# Patient Record
Sex: FEMALE | Race: WHITE | HISPANIC OR LATINO | Employment: OTHER | URBAN - METROPOLITAN AREA
[De-identification: names, ages, dates, MRNs, and addresses within clinical notes are randomized per-mention and may not be internally consistent; named-entity substitution may affect disease eponyms.]

---

## 2017-06-13 ENCOUNTER — TRANSCRIBE ORDERS (OUTPATIENT)
Dept: ADMINISTRATIVE | Facility: HOSPITAL | Age: 72
End: 2017-06-13

## 2017-06-13 DIAGNOSIS — E04.1 THYROID NODULE: Primary | ICD-10-CM

## 2017-06-16 ENCOUNTER — HOSPITAL ENCOUNTER (OUTPATIENT)
Dept: ULTRASOUND IMAGING | Facility: HOSPITAL | Age: 72
Discharge: HOME | End: 2017-06-16
Admitting: INTERNAL MEDICINE

## 2017-06-16 DIAGNOSIS — E04.1 THYROID NODULE: ICD-10-CM

## 2017-06-16 PROCEDURE — 76536 US EXAM OF HEAD AND NECK: CPT

## 2017-12-08 ENCOUNTER — TRANSCRIBE ORDERS (OUTPATIENT)
Dept: ADMINISTRATIVE | Facility: HOSPITAL | Age: 72
End: 2017-12-08

## 2017-12-13 ENCOUNTER — TRANSCRIBE ORDERS (OUTPATIENT)
Dept: ADMINISTRATIVE | Facility: HOSPITAL | Age: 72
End: 2017-12-13

## 2017-12-13 DIAGNOSIS — K83.8 COMMON BILE DUCT DILATATION: Primary | ICD-10-CM

## 2017-12-18 ENCOUNTER — OFFICE VISIT (OUTPATIENT)
Dept: PULMONOLOGY | Facility: CLINIC | Age: 72
End: 2017-12-18

## 2017-12-18 DIAGNOSIS — R06.02 SOB (SHORTNESS OF BREATH): Primary | ICD-10-CM

## 2017-12-18 PROCEDURE — 94726 PLETHYSMOGRAPHY LUNG VOLUMES: CPT | Performed by: INTERNAL MEDICINE

## 2017-12-18 PROCEDURE — 94375 RESPIRATORY FLOW VOLUME LOOP: CPT | Performed by: INTERNAL MEDICINE

## 2017-12-18 PROCEDURE — 94729 DIFFUSING CAPACITY: CPT | Performed by: INTERNAL MEDICINE

## 2017-12-22 ENCOUNTER — HOSPITAL ENCOUNTER (OUTPATIENT)
Dept: MRI IMAGING | Facility: HOSPITAL | Age: 72
Discharge: HOME | End: 2017-12-22
Admitting: INTERNAL MEDICINE

## 2017-12-22 DIAGNOSIS — K83.8 COMMON BILE DUCT DILATATION: ICD-10-CM

## 2017-12-22 PROCEDURE — 0 GADOBENATE DIMEGLUMINE 529 MG/ML SOLUTION: Performed by: INTERNAL MEDICINE

## 2017-12-22 PROCEDURE — 82565 ASSAY OF CREATININE: CPT

## 2017-12-22 PROCEDURE — 74183 MRI ABD W/O CNTR FLWD CNTR: CPT

## 2017-12-22 PROCEDURE — A9577 INJ MULTIHANCE: HCPCS | Performed by: INTERNAL MEDICINE

## 2017-12-22 RX ADMIN — GADOBENATE DIMEGLUMINE 18 ML: 529 INJECTION, SOLUTION INTRAVENOUS at 09:30

## 2017-12-26 ENCOUNTER — OFFICE VISIT (OUTPATIENT)
Dept: PULMONOLOGY | Facility: CLINIC | Age: 72
End: 2017-12-26

## 2017-12-26 VITALS
HEIGHT: 64 IN | WEIGHT: 178.5 LBS | SYSTOLIC BLOOD PRESSURE: 126 MMHG | RESPIRATION RATE: 18 BRPM | TEMPERATURE: 98.1 F | BODY MASS INDEX: 30.48 KG/M2 | HEART RATE: 87 BPM | DIASTOLIC BLOOD PRESSURE: 70 MMHG | OXYGEN SATURATION: 95 %

## 2017-12-26 DIAGNOSIS — J30.2 OTHER SEASONAL ALLERGIC RHINITIS: ICD-10-CM

## 2017-12-26 DIAGNOSIS — R91.8 LUNG NODULES: Primary | ICD-10-CM

## 2017-12-26 LAB
ALBUMIN SERPL-MCNC: 4.4 G/DL (ref 3.2–4.8)
ALBUMIN/GLOB SERPL: 2.3 G/DL (ref 1.5–2.5)
ALP SERPL-CCNC: 148 U/L (ref 25–100)
ALT SERPL W P-5'-P-CCNC: 50 U/L (ref 7–40)
ANION GAP SERPL CALCULATED.3IONS-SCNC: 11 MMOL/L (ref 3–11)
AST SERPL-CCNC: 30 U/L (ref 0–33)
BASOPHILS # BLD AUTO: 0.01 10*3/MM3 (ref 0–0.2)
BASOPHILS NFR BLD AUTO: 0.1 % (ref 0–1)
BILIRUB SERPL-MCNC: 0.4 MG/DL (ref 0.3–1.2)
BUN BLD-MCNC: 22 MG/DL (ref 9–23)
BUN/CREAT SERPL: 24.4 (ref 7–25)
CALCIUM SPEC-SCNC: 9.2 MG/DL (ref 8.7–10.4)
CHLORIDE SERPL-SCNC: 102 MMOL/L (ref 99–109)
CO2 SERPL-SCNC: 25 MMOL/L (ref 20–31)
CREAT BLD-MCNC: 0.9 MG/DL (ref 0.6–1.3)
CREAT BLDA-MCNC: 0.7 MG/DL (ref 0.6–1.3)
DEPRECATED RDW RBC AUTO: 48.4 FL (ref 37–54)
EOSINOPHIL # BLD AUTO: 0.11 10*3/MM3 (ref 0–0.3)
EOSINOPHIL NFR BLD AUTO: 1.6 % (ref 0–3)
ERYTHROCYTE [DISTWIDTH] IN BLOOD BY AUTOMATED COUNT: 14.6 % (ref 11.3–14.5)
GFR SERPL CREATININE-BSD FRML MDRD: 62 ML/MIN/1.73
GLOBULIN UR ELPH-MCNC: 1.9 GM/DL
GLUCOSE BLD-MCNC: 176 MG/DL (ref 70–100)
HCT VFR BLD AUTO: 37.9 % (ref 34.5–44)
HGB BLD-MCNC: 12 G/DL (ref 11.5–15.5)
IMM GRANULOCYTES # BLD: 0.02 10*3/MM3 (ref 0–0.03)
IMM GRANULOCYTES NFR BLD: 0.3 % (ref 0–0.6)
LYMPHOCYTES # BLD AUTO: 1.42 10*3/MM3 (ref 0.6–4.8)
LYMPHOCYTES NFR BLD AUTO: 21 % (ref 24–44)
MCH RBC QN AUTO: 28.8 PG (ref 27–31)
MCHC RBC AUTO-ENTMCNC: 31.7 G/DL (ref 32–36)
MCV RBC AUTO: 91.1 FL (ref 80–99)
MONOCYTES # BLD AUTO: 0.6 10*3/MM3 (ref 0–1)
MONOCYTES NFR BLD AUTO: 8.9 % (ref 0–12)
NEUTROPHILS # BLD AUTO: 4.61 10*3/MM3 (ref 1.5–8.3)
NEUTROPHILS NFR BLD AUTO: 68.1 % (ref 41–71)
PLATELET # BLD AUTO: 326 10*3/MM3 (ref 150–450)
PMV BLD AUTO: 11.7 FL (ref 6–12)
POTASSIUM BLD-SCNC: 3.6 MMOL/L (ref 3.5–5.5)
PROT SERPL-MCNC: 6.3 G/DL (ref 5.7–8.2)
RBC # BLD AUTO: 4.16 10*6/MM3 (ref 3.89–5.14)
SODIUM BLD-SCNC: 138 MMOL/L (ref 132–146)
WBC NRBC COR # BLD: 6.77 10*3/MM3 (ref 3.5–10.8)

## 2017-12-26 PROCEDURE — 86003 ALLG SPEC IGE CRUDE XTRC EA: CPT | Performed by: INTERNAL MEDICINE

## 2017-12-26 PROCEDURE — 86698 HISTOPLASMA ANTIBODY: CPT | Performed by: INTERNAL MEDICINE

## 2017-12-26 PROCEDURE — 86609 BACTERIUM ANTIBODY: CPT | Performed by: INTERNAL MEDICINE

## 2017-12-26 PROCEDURE — 86235 NUCLEAR ANTIGEN ANTIBODY: CPT | Performed by: INTERNAL MEDICINE

## 2017-12-26 PROCEDURE — 86256 FLUORESCENT ANTIBODY TITER: CPT | Performed by: INTERNAL MEDICINE

## 2017-12-26 PROCEDURE — 83520 IMMUNOASSAY QUANT NOS NONAB: CPT | Performed by: INTERNAL MEDICINE

## 2017-12-26 PROCEDURE — 85025 COMPLETE CBC W/AUTO DIFF WBC: CPT | Performed by: INTERNAL MEDICINE

## 2017-12-26 PROCEDURE — 86631 CHLAMYDIA ANTIBODY: CPT | Performed by: INTERNAL MEDICINE

## 2017-12-26 PROCEDURE — 99204 OFFICE O/P NEW MOD 45 MIN: CPT | Performed by: INTERNAL MEDICINE

## 2017-12-26 PROCEDURE — 86612 BLASTOMYCES ANTIBODY: CPT | Performed by: INTERNAL MEDICINE

## 2017-12-26 PROCEDURE — 36415 COLL VENOUS BLD VENIPUNCTURE: CPT | Performed by: INTERNAL MEDICINE

## 2017-12-26 PROCEDURE — 86606 ASPERGILLUS ANTIBODY: CPT | Performed by: INTERNAL MEDICINE

## 2017-12-26 PROCEDURE — 86671 FUNGUS NES ANTIBODY: CPT | Performed by: INTERNAL MEDICINE

## 2017-12-26 PROCEDURE — 80053 COMPREHEN METABOLIC PANEL: CPT | Performed by: INTERNAL MEDICINE

## 2017-12-26 PROCEDURE — 87899 AGENT NOS ASSAY W/OPTIC: CPT | Performed by: INTERNAL MEDICINE

## 2017-12-26 PROCEDURE — 86635 COCCIDIOIDES ANTIBODY: CPT | Performed by: INTERNAL MEDICINE

## 2017-12-26 PROCEDURE — 86602 ANTINOMYCES ANTIBODY: CPT | Performed by: INTERNAL MEDICINE

## 2017-12-26 PROCEDURE — 86480 TB TEST CELL IMMUN MEASURE: CPT | Performed by: INTERNAL MEDICINE

## 2017-12-26 PROCEDURE — 86225 DNA ANTIBODY NATIVE: CPT | Performed by: INTERNAL MEDICINE

## 2017-12-26 PROCEDURE — 82785 ASSAY OF IGE: CPT | Performed by: INTERNAL MEDICINE

## 2017-12-26 RX ORDER — LEVOTHYROXINE SODIUM 0.05 MG/1
50 TABLET ORAL NIGHTLY
COMMUNITY
Start: 2013-08-08 | End: 2019-03-15

## 2017-12-26 RX ORDER — LOSARTAN POTASSIUM AND HYDROCHLOROTHIAZIDE 25; 100 MG/1; MG/1
1 TABLET ORAL NIGHTLY
COMMUNITY
Start: 2017-12-19 | End: 2019-07-15 | Stop reason: ALTCHOICE

## 2017-12-26 RX ORDER — CITALOPRAM 40 MG/1
20 TABLET ORAL DAILY
COMMUNITY
Start: 2013-10-16 | End: 2018-03-15 | Stop reason: DRUGHIGH

## 2017-12-26 RX ORDER — GABAPENTIN 800 MG/1
800 TABLET ORAL 3 TIMES DAILY
COMMUNITY
Start: 2017-12-21

## 2017-12-26 RX ORDER — ASPIRIN 81 MG/1
81 TABLET ORAL NIGHTLY
COMMUNITY
Start: 2013-11-26

## 2017-12-26 RX ORDER — HYDROXYZINE HYDROCHLORIDE 25 MG/1
25-50 TABLET, FILM COATED ORAL NIGHTLY
COMMUNITY
Start: 2017-12-19

## 2017-12-26 RX ORDER — OMEPRAZOLE 20 MG/1
20 CAPSULE, DELAYED RELEASE ORAL 2 TIMES DAILY
COMMUNITY
Start: 2017-11-18

## 2017-12-26 RX ORDER — LOVASTATIN 20 MG/1
20 TABLET ORAL NIGHTLY
COMMUNITY
Start: 2017-11-07

## 2017-12-26 RX ORDER — AMLODIPINE BESYLATE 2.5 MG/1
2.5 TABLET ORAL NIGHTLY
COMMUNITY
Start: 2017-12-13 | End: 2019-07-15

## 2017-12-26 RX ORDER — HYDROCODONE BITARTRATE AND ACETAMINOPHEN 7.5; 325 MG/1; MG/1
1 TABLET ORAL 2 TIMES DAILY
COMMUNITY
Start: 2017-12-08

## 2017-12-27 PROBLEM — R91.8 LUNG NODULES: Status: ACTIVE | Noted: 2017-12-27

## 2017-12-27 PROBLEM — Z87.09 H/O EXTRINSIC ASTHMA: Status: ACTIVE | Noted: 2017-12-27

## 2017-12-27 PROBLEM — Z78.9 NON-SMOKER: Status: ACTIVE | Noted: 2017-12-27

## 2017-12-27 NOTE — PROGRESS NOTES
"  PULMONARY  NOTE    Chief Complaint     Abnormal CT scan of the chest    History of Present Illness     72-year-old white female was referred for evaluation of an abnormal CT scan of the chest.    She incidentally was found to have an abnormal CT scan of the chest.  She has bilateral patchy/groundglass irregular nodular densities.  She's had 2 serial CT scans approximately 2 months apart which have shown some interval enlargement.    She is a lifelong nonsmoker although has had some secondhand smoke exposure.    She has no history of known chronic lung disease other than a past history of \"asthma\".    She has a morning cough but produces little sputum.  She has had no hemoptysis.  There's been no recent change in that.  Other then having been treated in October with a course of antibiotics for an acute upper respiratory tract infection.    She has dyspnea on exertion.  She has difficulty going up a flight of stairs without having to stop her rest.  She denies chest pain or palpitations.    She has noted no hematuria and has no history of renal dysfunction.    At time she has a \"upset stomach\", particularly after meals.  She takes omeprazole on a daily basis.  She doesn't follow reflux precautions.    She has had a recent transthoracic echocardiogram that appears to be unremarkable by report.    Patient Active Problem List   Diagnosis   • Lung nodules (\"Soft\", bilateral)   • Non-smoker   • H/O asthma     No Known Allergies    Current Outpatient Prescriptions:   •  aspirin 81 MG EC tablet, Take  by mouth Daily., Disp: , Rfl:   •  citalopram (CeleXA) 40 MG tablet, Take  by mouth Daily., Disp: , Rfl:   •  gabapentin (NEURONTIN) 800 MG tablet, Take 800 mg by mouth 3 (Three) Times a Day., Disp: , Rfl:   •  HYDROcodone-acetaminophen (NORCO) 5-325 MG per tablet, Take 1 tablet by mouth Every 6 (Six) Hours As Needed., Disp: , Rfl:   •  hydrOXYzine (ATARAX) 25 MG tablet, Take 25 mg by mouth Daily., Disp: , Rfl:   •  " "levothyroxine (SYNTHROID, LEVOTHROID) 50 MCG tablet, Take 50 mcg by mouth Daily., Disp: , Rfl:   •  losartan-hydrochlorothiazide (HYZAAR) 100-25 MG per tablet, Take 1 tablet by mouth Daily., Disp: , Rfl:   •  lovastatin (MEVACOR) 10 MG tablet, Take 10 mg by mouth Every Night., Disp: , Rfl:   •  omeprazole (priLOSEC) 20 MG capsule, Take 20 mg by mouth Daily., Disp: , Rfl:   •  amLODIPine (NORVASC) 2.5 MG tablet, Take 2.5 mg by mouth Daily., Disp: , Rfl:   MEDICATION LIST AND ALLERGIES REVIEWED.    Family History   Problem Relation Age of Onset   • Ovarian cancer Mother 19   • Emphysema Father    • Breast cancer Neg Hx      Social History   Substance Use Topics   • Smoking status: Never Smoker   • Smokeless tobacco: Never Used   • Alcohol use No     Social History     Social History Narrative    Nonsmoker    Has been exposed to secondhand smoke        Has 3 children    No regular alcohol use     FAMILY AND SOCIAL HISTORY REVIEWED.    Review of Systems  ALSO REFER TO SCANNED ROS SHEET FROM SAME DATE.    /70 (BP Location: Left arm, Patient Position: Sitting)  Pulse 87  Temp 98.1 °F (36.7 °C)  Resp 18  Ht 162.6 cm (64.02\")  Wt 81 kg (178 lb 8 oz)  SpO2 95%  BMI 30.62 kg/m2  Physical Exam   Constitutional: She is oriented to person, place, and time. She appears well-developed. No distress.   HENT:   Head: Normocephalic and atraumatic.   Neck: No thyromegaly present.   Cardiovascular: Normal rate, regular rhythm and normal heart sounds.    No murmur heard.  Pulmonary/Chest: Effort normal and breath sounds normal. No stridor.   Abdominal: Soft. Bowel sounds are normal.   Musculoskeletal: Normal range of motion. She exhibits no edema.   Lymphadenopathy:     She has no cervical adenopathy.        Right: No supraclavicular and no epitrochlear adenopathy present.        Left: No supraclavicular and no epitrochlear adenopathy present.   Neurological: She is alert and oriented to person, place, and time. " "  Skin: Skin is warm and dry. She is not diaphoretic.   Psychiatric: She has a normal mood and affect. Her behavior is normal.   Nursing note and vitals reviewed.      Results     PFTs reveal no airway obstruction, no restriction, and a normal diffusion capacity    CT scan of the chest reviewed on PACS.  Bilateral irregular, soft peripheral densities noted    Problem List       ICD-10-CM ICD-9-CM   1. Lung nodules (\"Soft\", bilateral) R91.8 793.19   2. Other seasonal allergic rhinitis  J30.2 477.8       Discussion     We discussed her test results.  PFTs and exam are unremarkable.  CT scan is worrisome but more for an inflammatory or infectious process than malignancy but that is still in the differential.    We will obtain lab work today for ANCA, hypersensitivity pneumonitis panel, fungal serologies, etc.  I'll see her back in several weeks to follow-up on the lab results and we will get an I-Logic CT scan of the chest at that time and probably consider navigational bronchoscopy.  A surgical lung biopsy is really the only other way of getting tissue that may be diagnostic.  A CT FNA is unlikely to provide useful information given the \"soft\" nature of these lesions.    Dixon Fatima MD  Note electronically signed    CC: Ly Funez MD  "

## 2017-12-28 LAB
CENTROMERE B AB SER-ACNC: <0.2 AI (ref 0–0.9)
CHROMATIN AB SERPL-ACNC: <0.2 AI (ref 0–0.9)
CRYPTOC AG CSF QL: NEGATIVE
DSDNA AB SER-ACNC: <1 IU/ML (ref 0–9)
ENA JO1 AB SER-ACNC: <0.2 AI (ref 0–0.9)
ENA RNP AB SER-ACNC: <0.2 AI (ref 0–0.9)
ENA SCL70 AB SER-ACNC: <0.2 AI (ref 0–0.9)
ENA SM AB SER-ACNC: <0.2 AI (ref 0–0.9)
ENA SS-A AB SER-ACNC: <0.2 AI (ref 0–0.9)
ENA SS-B AB SER-ACNC: <0.2 AI (ref 0–0.9)
Lab: NORMAL
TOTAL IGE SMQN RAST: 1 IU/ML (ref 0–100)

## 2017-12-29 LAB
C-ANCA TITR SER IF: NORMAL TITER
MYELOPEROXIDASE AB SER-ACNC: <9 U/ML (ref 0–9)
P-ANCA ATYPICAL TITR SER IF: NORMAL TITER
P-ANCA TITR SER IF: NORMAL TITER
PROTEINASE3 AB SER IA-ACNC: <3.5 U/ML (ref 0–3.5)

## 2017-12-30 LAB
A FUMIGATUS1 AB SER QL ID: NEGATIVE
A PULLULANS AB SER QL: NEGATIVE
INTERPRETATION: NORMAL
LACEYELLA SACCHARI AB SER QL: NEGATIVE
M TB TUBERC IFN-G BLD QL: NEGATIVE
MICROPOLYSPORA FAENI: NEGATIVE
PIGEON SERUM AB QL ID: NEGATIVE
QFT TB AG MINUS NIL VALUE: 0 IU/ML
QUANTIFERON CRITERIA: NORMAL
QUANTIFERON MITOGEN VALUE: >10 IU/ML
QUANTIFERON NIL VALUE: 0.02 IU/ML
QUANTIFERON TB AG VALUE: 0.02 IU/ML
T VULGARIS AB SER QL ID: NEGATIVE

## 2018-01-01 LAB
A ALTERNATA IGE QN: <0.1 KU/L
A FUMIGATUS IGE QN: <0.1 KU/L
AMER ROACH IGE QN: <0.1 KU/L
BAHIA GRASS IGE QN: <0.1 KU/L
BERMUDA GRASS IGE QN: <0.1 KU/L
BOXELDER IGE QN: <0.1 KU/L
C HERBARUM IGE QN: <0.1 KU/L
CAT DANDER IGG QN: <0.1 KU/L
CMN PIGWEED IGE QN: <0.1 KU/L
COMMON RAGWEED IGE QN: <0.1 KU/L
CONV CLASS DESCRIPTION: NORMAL
D FARINAE IGE QN: <0.1 KU/L
D PTERONYSS IGE QN: <0.1 KU/L
DOG DANDER IGE QN: <0.1 KU/L
ENGL PLANTAIN IGE QN: <0.1 KU/L
HAZELNUT POLN IGE QN: <0.1 KU/L
JOHNSON GRASS IGE QN: <0.1 KU/L
KENT BLUE GRASS IGE QN: <0.1 KU/L
M RACEMOSUS IGE QN: <0.1 KU/L
MT JUNIPER IGE QN: <0.1 KU/L
MUGWORT IGE QN: <0.1 KU/L
NETTLE IGE QN: <0.1 KU/L
P NOTATUM IGE QN: <0.1 KU/L
S BOTRYOSUM IGE QN: <0.1 KU/L
SHEEP SORREL IGE QN: <0.1 KU/L
SWEET GUM IGE QN: <0.1 KU/L
T011-IGE MAPLE LEAF SYCAMORE: <0.1 KU/L
WHITE ELM IGE QN: <0.1 KU/L
WHITE HICKORY IGE QN: <0.1 KU/L
WHITE MULBERRY IGE QN: <0.1 KU/L
WHITE OAK IGE QN: <0.1 KU/L

## 2018-01-02 DIAGNOSIS — R91.8 LUNG NODULES: Primary | ICD-10-CM

## 2018-01-02 LAB
A FLAVUS AB SER QL ID: NEGATIVE
A FUMIGATUS AB SER QL ID: NEGATIVE
A NIGER AB SER QL ID: NEGATIVE
B DERMAT AB TITR SER: NEGATIVE {TITER}
C IMMITIS AB TITR SER ID: NORMAL {TITER}
H CAPSUL AB TITR SER ID: NEGATIVE {TITER}

## 2018-01-16 ENCOUNTER — HOSPITAL ENCOUNTER (OUTPATIENT)
Dept: CT IMAGING | Facility: HOSPITAL | Age: 73
Discharge: HOME | End: 2018-01-16
Admitting: NURSE PRACTITIONER

## 2018-01-16 DIAGNOSIS — R91.8 LUNG NODULES: ICD-10-CM

## 2018-01-16 PROCEDURE — 71250 CT THORAX DX C-: CPT

## 2018-02-05 ENCOUNTER — DOCUMENTATION (OUTPATIENT)
Dept: PULMONOLOGY | Facility: CLINIC | Age: 73
End: 2018-02-05

## 2018-02-05 NOTE — PROGRESS NOTES
Finally got Previous CT from [facility] on CD for direct comparison.  NSC in LLL density or other patchy infiltrative areas.  Discussed with patient on phone.  Will do a SANDRITA bronch to biopsy LLL lesion.  Just behind a rib so not a good CT FNA candidate.

## 2018-02-07 ENCOUNTER — TRANSCRIBE ORDERS (OUTPATIENT)
Dept: PULMONOLOGY | Facility: CLINIC | Age: 73
End: 2018-02-07

## 2018-02-12 ENCOUNTER — APPOINTMENT (OUTPATIENT)
Dept: PREADMISSION TESTING | Facility: HOSPITAL | Age: 73
End: 2018-02-12

## 2018-02-12 VITALS — HEIGHT: 63 IN | BODY MASS INDEX: 32.19 KG/M2 | WEIGHT: 181.66 LBS

## 2018-02-12 LAB
ALBUMIN SERPL-MCNC: 3.9 G/DL (ref 3.2–4.8)
ALBUMIN/GLOB SERPL: 2 G/DL (ref 1.5–2.5)
ALP SERPL-CCNC: 154 U/L (ref 25–100)
ALT SERPL W P-5'-P-CCNC: 61 U/L (ref 7–40)
ANION GAP SERPL CALCULATED.3IONS-SCNC: 6 MMOL/L (ref 3–11)
AST SERPL-CCNC: 30 U/L (ref 0–33)
BILIRUB SERPL-MCNC: 0.5 MG/DL (ref 0.3–1.2)
BUN BLD-MCNC: 14 MG/DL (ref 9–23)
BUN/CREAT SERPL: 20 (ref 7–25)
CALCIUM SPEC-SCNC: 9.7 MG/DL (ref 8.7–10.4)
CHLORIDE SERPL-SCNC: 103 MMOL/L (ref 99–109)
CO2 SERPL-SCNC: 31 MMOL/L (ref 20–31)
CREAT BLD-MCNC: 0.7 MG/DL (ref 0.6–1.3)
DEPRECATED RDW RBC AUTO: 47.2 FL (ref 37–54)
ERYTHROCYTE [DISTWIDTH] IN BLOOD BY AUTOMATED COUNT: 14.6 % (ref 11.3–14.5)
GFR SERPL CREATININE-BSD FRML MDRD: 82 ML/MIN/1.73
GLOBULIN UR ELPH-MCNC: 2 GM/DL
GLUCOSE BLD-MCNC: 117 MG/DL (ref 70–100)
HCT VFR BLD AUTO: 36.5 % (ref 34.5–44)
HGB BLD-MCNC: 11.5 G/DL (ref 11.5–15.5)
MCH RBC QN AUTO: 27.6 PG (ref 27–31)
MCHC RBC AUTO-ENTMCNC: 31.5 G/DL (ref 32–36)
MCV RBC AUTO: 87.7 FL (ref 80–99)
PLATELET # BLD AUTO: 300 10*3/MM3 (ref 150–450)
PMV BLD AUTO: 10.7 FL (ref 6–12)
POTASSIUM BLD-SCNC: 3.7 MMOL/L (ref 3.5–5.5)
PROT SERPL-MCNC: 5.9 G/DL (ref 5.7–8.2)
RBC # BLD AUTO: 4.16 10*6/MM3 (ref 3.89–5.14)
SODIUM BLD-SCNC: 140 MMOL/L (ref 132–146)
WBC NRBC COR # BLD: 5.45 10*3/MM3 (ref 3.5–10.8)

## 2018-02-12 PROCEDURE — 93005 ELECTROCARDIOGRAM TRACING: CPT

## 2018-02-12 PROCEDURE — 36415 COLL VENOUS BLD VENIPUNCTURE: CPT

## 2018-02-12 PROCEDURE — 85027 COMPLETE CBC AUTOMATED: CPT | Performed by: INTERNAL MEDICINE

## 2018-02-12 PROCEDURE — 80053 COMPREHEN METABOLIC PANEL: CPT | Performed by: INTERNAL MEDICINE

## 2018-02-12 RX ORDER — MELATONIN
1000 DAILY
COMMUNITY
End: 2019-01-15

## 2018-02-12 RX ORDER — CETIRIZINE HYDROCHLORIDE 10 MG/1
10 TABLET ORAL NIGHTLY
COMMUNITY

## 2018-02-12 NOTE — DISCHARGE INSTRUCTIONS
The following information and instructions were given:    NPO after MN except sips of water with routine prescribed medication (except blood thinner, diabetes, or weight reducing medication) unless otherwise instructed by your physician.  Do not eat, drink, smoke or chew gum after MN the night before surgery. This also includes no mints.    DO NOT shave for two days before your procedure.  Do not wear makeup.      DO NOT wear fingernail polish (gel/regular) and/or acrylic/artificial nails on the day of surgery.   If a patient had recent manicure and would rather not remove polish or artificial nails, then the minimum requirement is that the polish/artificial nails must be removed from the middle finger on each hand.      If patient was having surgery on an upper extremity, then the patient was instructed that fingernail polish/artificial fingernails must be removed for surgery.  NO EXCEPTIONS.      If patient was having surgery on a lower extremity, then the patient was instructed that toenail polish on both extremities must be removed for surgery.  NO EXCEPTIONS.    Remove all jewelry (advised to go to jeweler if unable to remove).  Jewelry especially rings can no longer be taped for surgery.    Leave anything you consider valuable at home.    Leave your suitcase in the car until after your surgery.    Bring the following with you (if applicable)   -picture ID and insurance cards   -Co-pay/deductible required by insurance   -Medications in the original bottles (not a list) including all over-the-counter  medications if not brought to PAT   -Copy of advance directive, living will or power of  documents if not  brought to PAT   -CPAP or BIPAP mask and tubing (do not bring machine)   -Skin prep instructions sheet   -PAT Pass    Education booklet, brochure, handout or binder given to patient.    Pain Control After Surgery handout given to patient.    Respirex use (handout given to patient) and pneumonia  prevention.    Signs and Symptoms of infection.    DVT Prevention stressing the importance of ambulation.

## 2018-02-13 ENCOUNTER — ANESTHESIA (OUTPATIENT)
Dept: GASTROENTEROLOGY | Facility: HOSPITAL | Age: 73
End: 2018-02-13

## 2018-02-13 ENCOUNTER — ANESTHESIA EVENT (OUTPATIENT)
Dept: GASTROENTEROLOGY | Facility: HOSPITAL | Age: 73
End: 2018-02-13

## 2018-02-13 ENCOUNTER — APPOINTMENT (OUTPATIENT)
Dept: GENERAL RADIOLOGY | Facility: HOSPITAL | Age: 73
End: 2018-02-13

## 2018-02-13 ENCOUNTER — HOSPITAL ENCOUNTER (OUTPATIENT)
Facility: HOSPITAL | Age: 73
Setting detail: HOSPITAL OUTPATIENT SURGERY
Discharge: HOME | End: 2018-02-13
Attending: INTERNAL MEDICINE | Admitting: INTERNAL MEDICINE

## 2018-02-13 VITALS
RESPIRATION RATE: 18 BRPM | OXYGEN SATURATION: 100 % | HEART RATE: 83 BPM | SYSTOLIC BLOOD PRESSURE: 130 MMHG | TEMPERATURE: 98.1 F | DIASTOLIC BLOOD PRESSURE: 60 MMHG

## 2018-02-13 DIAGNOSIS — R91.1 LUNG NODULE: ICD-10-CM

## 2018-02-13 PROCEDURE — 31623 DX BRONCHOSCOPE/BRUSH: CPT | Performed by: INTERNAL MEDICINE

## 2018-02-13 PROCEDURE — 76000 FLUOROSCOPY <1 HR PHYS/QHP: CPT

## 2018-02-13 PROCEDURE — 71045 X-RAY EXAM CHEST 1 VIEW: CPT

## 2018-02-13 PROCEDURE — 31628 BRONCHOSCOPY/LUNG BX EACH: CPT | Performed by: INTERNAL MEDICINE

## 2018-02-13 PROCEDURE — 87116 MYCOBACTERIA CULTURE: CPT | Performed by: INTERNAL MEDICINE

## 2018-02-13 PROCEDURE — 87206 SMEAR FLUORESCENT/ACID STAI: CPT | Performed by: INTERNAL MEDICINE

## 2018-02-13 PROCEDURE — 87070 CULTURE OTHR SPECIMN AEROBIC: CPT | Performed by: INTERNAL MEDICINE

## 2018-02-13 PROCEDURE — 88112 CYTOPATH CELL ENHANCE TECH: CPT | Performed by: INTERNAL MEDICINE

## 2018-02-13 PROCEDURE — 31627 NAVIGATIONAL BRONCHOSCOPY: CPT | Performed by: INTERNAL MEDICINE

## 2018-02-13 PROCEDURE — 87102 FUNGUS ISOLATION CULTURE: CPT | Performed by: INTERNAL MEDICINE

## 2018-02-13 PROCEDURE — 31652 BRONCH EBUS SAMPLNG 1/2 NODE: CPT | Performed by: INTERNAL MEDICINE

## 2018-02-13 PROCEDURE — 25010000002 HYDRALAZINE PER 20 MG: Performed by: NURSE ANESTHETIST, CERTIFIED REGISTERED

## 2018-02-13 PROCEDURE — 88305 TISSUE EXAM BY PATHOLOGIST: CPT | Performed by: INTERNAL MEDICINE

## 2018-02-13 PROCEDURE — 31624 DX BRONCHOSCOPE/LAVAGE: CPT | Performed by: INTERNAL MEDICINE

## 2018-02-13 PROCEDURE — 25010000002 PROPOFOL 10 MG/ML EMULSION: Performed by: NURSE ANESTHETIST, CERTIFIED REGISTERED

## 2018-02-13 PROCEDURE — C1726 CATH, BAL DIL, NON-VASCULAR: HCPCS | Performed by: INTERNAL MEDICINE

## 2018-02-13 PROCEDURE — 31654 BRONCH EBUS IVNTJ PERPH LES: CPT | Performed by: INTERNAL MEDICINE

## 2018-02-13 PROCEDURE — 25010000002 ONDANSETRON PER 1 MG: Performed by: NURSE ANESTHETIST, CERTIFIED REGISTERED

## 2018-02-13 PROCEDURE — 25010000002 SUCCINYLCHOLINE PER 20 MG: Performed by: NURSE ANESTHETIST, CERTIFIED REGISTERED

## 2018-02-13 PROCEDURE — 25010000002 DEXAMETHASONE PER 1 MG: Performed by: NURSE ANESTHETIST, CERTIFIED REGISTERED

## 2018-02-13 PROCEDURE — 87205 SMEAR GRAM STAIN: CPT | Performed by: INTERNAL MEDICINE

## 2018-02-13 RX ORDER — PROMETHAZINE HYDROCHLORIDE 25 MG/1
25 SUPPOSITORY RECTAL ONCE AS NEEDED
Status: DISCONTINUED | OUTPATIENT
Start: 2018-02-13 | End: 2018-02-13 | Stop reason: HOSPADM

## 2018-02-13 RX ORDER — IPRATROPIUM BROMIDE AND ALBUTEROL SULFATE 2.5; .5 MG/3ML; MG/3ML
3 SOLUTION RESPIRATORY (INHALATION) ONCE AS NEEDED
Status: DISCONTINUED | OUTPATIENT
Start: 2018-02-13 | End: 2018-02-13 | Stop reason: HOSPADM

## 2018-02-13 RX ORDER — DEXAMETHASONE SODIUM PHOSPHATE 4 MG/ML
INJECTION, SOLUTION INTRA-ARTICULAR; INTRALESIONAL; INTRAMUSCULAR; INTRAVENOUS; SOFT TISSUE AS NEEDED
Status: DISCONTINUED | OUTPATIENT
Start: 2018-02-13 | End: 2018-02-13 | Stop reason: SURG

## 2018-02-13 RX ORDER — SODIUM CHLORIDE, SODIUM LACTATE, POTASSIUM CHLORIDE, CALCIUM CHLORIDE 600; 310; 30; 20 MG/100ML; MG/100ML; MG/100ML; MG/100ML
INJECTION, SOLUTION INTRAVENOUS CONTINUOUS PRN
Status: DISCONTINUED | OUTPATIENT
Start: 2018-02-13 | End: 2018-02-13 | Stop reason: SURG

## 2018-02-13 RX ORDER — PROMETHAZINE HYDROCHLORIDE 25 MG/ML
6.25 INJECTION, SOLUTION INTRAMUSCULAR; INTRAVENOUS ONCE AS NEEDED
Status: DISCONTINUED | OUTPATIENT
Start: 2018-02-13 | End: 2018-02-13 | Stop reason: HOSPADM

## 2018-02-13 RX ORDER — OXYCODONE AND ACETAMINOPHEN 7.5; 325 MG/1; MG/1
1 TABLET ORAL ONCE AS NEEDED
Status: DISCONTINUED | OUTPATIENT
Start: 2018-02-13 | End: 2018-02-13 | Stop reason: HOSPADM

## 2018-02-13 RX ORDER — FENTANYL CITRATE 50 UG/ML
50 INJECTION, SOLUTION INTRAMUSCULAR; INTRAVENOUS
Status: DISCONTINUED | OUTPATIENT
Start: 2018-02-13 | End: 2018-02-13 | Stop reason: HOSPADM

## 2018-02-13 RX ORDER — HYDRALAZINE HYDROCHLORIDE 20 MG/ML
INJECTION INTRAMUSCULAR; INTRAVENOUS AS NEEDED
Status: DISCONTINUED | OUTPATIENT
Start: 2018-02-13 | End: 2018-02-13 | Stop reason: SURG

## 2018-02-13 RX ORDER — PROMETHAZINE HYDROCHLORIDE 25 MG/1
25 TABLET ORAL ONCE AS NEEDED
Status: DISCONTINUED | OUTPATIENT
Start: 2018-02-13 | End: 2018-02-13 | Stop reason: HOSPADM

## 2018-02-13 RX ORDER — HYDROMORPHONE HYDROCHLORIDE 1 MG/ML
0.5 INJECTION, SOLUTION INTRAMUSCULAR; INTRAVENOUS; SUBCUTANEOUS
Status: DISCONTINUED | OUTPATIENT
Start: 2018-02-13 | End: 2018-02-13 | Stop reason: HOSPADM

## 2018-02-13 RX ORDER — PROPOFOL 10 MG/ML
VIAL (ML) INTRAVENOUS AS NEEDED
Status: DISCONTINUED | OUTPATIENT
Start: 2018-02-13 | End: 2018-02-13 | Stop reason: SURG

## 2018-02-13 RX ORDER — HYDRALAZINE HYDROCHLORIDE 20 MG/ML
5 INJECTION INTRAMUSCULAR; INTRAVENOUS
Status: DISCONTINUED | OUTPATIENT
Start: 2018-02-13 | End: 2018-02-13 | Stop reason: HOSPADM

## 2018-02-13 RX ORDER — SUCCINYLCHOLINE CHLORIDE 20 MG/ML
INJECTION INTRAMUSCULAR; INTRAVENOUS AS NEEDED
Status: DISCONTINUED | OUTPATIENT
Start: 2018-02-13 | End: 2018-02-13 | Stop reason: SURG

## 2018-02-13 RX ORDER — OXYCODONE HYDROCHLORIDE AND ACETAMINOPHEN 5; 325 MG/1; MG/1
1 TABLET ORAL ONCE AS NEEDED
Status: DISCONTINUED | OUTPATIENT
Start: 2018-02-13 | End: 2018-02-13 | Stop reason: HOSPADM

## 2018-02-13 RX ORDER — SODIUM CHLORIDE 0.9 % (FLUSH) 0.9 %
1-10 SYRINGE (ML) INJECTION AS NEEDED
Status: DISCONTINUED | OUTPATIENT
Start: 2018-02-13 | End: 2018-02-13 | Stop reason: HOSPADM

## 2018-02-13 RX ORDER — ESMOLOL HYDROCHLORIDE 10 MG/ML
INJECTION INTRAVENOUS AS NEEDED
Status: DISCONTINUED | OUTPATIENT
Start: 2018-02-13 | End: 2018-02-13 | Stop reason: SURG

## 2018-02-13 RX ORDER — ONDANSETRON 2 MG/ML
INJECTION INTRAMUSCULAR; INTRAVENOUS AS NEEDED
Status: DISCONTINUED | OUTPATIENT
Start: 2018-02-13 | End: 2018-02-13 | Stop reason: SURG

## 2018-02-13 RX ORDER — ONDANSETRON 2 MG/ML
4 INJECTION INTRAMUSCULAR; INTRAVENOUS ONCE AS NEEDED
Status: DISCONTINUED | OUTPATIENT
Start: 2018-02-13 | End: 2018-02-13 | Stop reason: HOSPADM

## 2018-02-13 RX ORDER — LIDOCAINE HYDROCHLORIDE 10 MG/ML
INJECTION, SOLUTION EPIDURAL; INFILTRATION; INTRACAUDAL; PERINEURAL AS NEEDED
Status: DISCONTINUED | OUTPATIENT
Start: 2018-02-13 | End: 2018-02-13 | Stop reason: SURG

## 2018-02-13 RX ORDER — NALOXONE HCL 0.4 MG/ML
0.4 VIAL (ML) INJECTION AS NEEDED
Status: DISCONTINUED | OUTPATIENT
Start: 2018-02-13 | End: 2018-02-13 | Stop reason: HOSPADM

## 2018-02-13 RX ORDER — LABETALOL HYDROCHLORIDE 5 MG/ML
5 INJECTION, SOLUTION INTRAVENOUS
Status: DISCONTINUED | OUTPATIENT
Start: 2018-02-13 | End: 2018-02-13 | Stop reason: HOSPADM

## 2018-02-13 RX ORDER — MEPERIDINE HYDROCHLORIDE 25 MG/ML
12.5 INJECTION INTRAMUSCULAR; INTRAVENOUS; SUBCUTANEOUS
Status: DISCONTINUED | OUTPATIENT
Start: 2018-02-13 | End: 2018-02-13 | Stop reason: HOSPADM

## 2018-02-13 RX ADMIN — ESMOLOL HYDROCHLORIDE 20 MG: 10 INJECTION INTRAVENOUS at 14:28

## 2018-02-13 RX ADMIN — EPHEDRINE SULFATE 5 MG: 50 INJECTION INTRAMUSCULAR; INTRAVENOUS; SUBCUTANEOUS at 14:57

## 2018-02-13 RX ADMIN — SUCCINYLCHOLINE CHLORIDE 200 MG: 20 INJECTION, SOLUTION INTRAMUSCULAR; INTRAVENOUS at 14:18

## 2018-02-13 RX ADMIN — PROPOFOL 50 MG: 10 INJECTION, EMULSION INTRAVENOUS at 14:19

## 2018-02-13 RX ADMIN — EPHEDRINE SULFATE 10 MG: 50 INJECTION INTRAMUSCULAR; INTRAVENOUS; SUBCUTANEOUS at 14:42

## 2018-02-13 RX ADMIN — ESMOLOL HYDROCHLORIDE 10 MG: 10 INJECTION INTRAVENOUS at 14:33

## 2018-02-13 RX ADMIN — SODIUM CHLORIDE, POTASSIUM CHLORIDE, SODIUM LACTATE AND CALCIUM CHLORIDE: 600; 310; 30; 20 INJECTION, SOLUTION INTRAVENOUS at 14:12

## 2018-02-13 RX ADMIN — PROPOFOL 150 MG: 10 INJECTION, EMULSION INTRAVENOUS at 14:18

## 2018-02-13 RX ADMIN — HYDRALAZINE HYDROCHLORIDE 10 MG: 20 INJECTION INTRAMUSCULAR; INTRAVENOUS at 14:33

## 2018-02-13 RX ADMIN — PROPOFOL 50 MG: 10 INJECTION, EMULSION INTRAVENOUS at 14:51

## 2018-02-13 RX ADMIN — LIDOCAINE HYDROCHLORIDE 50 MG: 10 INJECTION, SOLUTION EPIDURAL; INFILTRATION; INTRACAUDAL; PERINEURAL at 14:18

## 2018-02-13 RX ADMIN — ONDANSETRON 4 MG: 2 INJECTION INTRAMUSCULAR; INTRAVENOUS at 14:35

## 2018-02-13 RX ADMIN — DEXAMETHASONE SODIUM PHOSPHATE 8 MG: 4 INJECTION, SOLUTION INTRAMUSCULAR; INTRAVENOUS at 14:35

## 2018-02-13 NOTE — ANESTHESIA PREPROCEDURE EVALUATION
Anesthesia Evaluation     Patient summary reviewed and Nursing notes reviewed   NPO Solid Status: > 8 hours  NPO Liquid Status: > 8 hours           Airway   Mallampati: II  TM distance: >3 FB  Neck ROM: full  no difficulty expected  Dental      Pulmonary    (-) pneumonia, COPD, asthma, shortness of breath, recent URI, not a smoker  Cardiovascular     ECG reviewed    (+) hypertension,   (-) past MI, CAD, dysrhythmias, cardiac stents    ROS comment: Normal sinus rhythm  Minimal voltage criteria for LVH, may be normal variant  Nonspecific ST and T wave abnormality    ECHO   EF = 65%. Mild MVR is present  · RVSP(TR) 35.3 mmHg    Neuro/Psych  (+) psychiatric history (celexa gabapentin atarax),     (-) seizures, TIA, CVA  GI/Hepatic/Renal/Endo    (+)  GERD, liver disease, diabetes mellitus (High ),   (-) no renal disease, hypothyroidism    Musculoskeletal     Abdominal    Substance History      OB/GYN          Other   (+) arthritis     ROS/Med Hx Other: Liver resection fo hemangiomas 1998 [facility]                Anesthesia Plan    ASA 3     general   (Propofol Infusion as part of Anti PONV tech )  intravenous induction   Anesthetic plan and risks discussed with patient.    Plan discussed with CRNA.

## 2018-02-13 NOTE — H&P
"72-year-old white female was referred for evaluation of an abnormal CT scan of the chest.     She incidentally was found to have an abnormal CT scan of the chest.  She has bilateral patchy/groundglass irregular nodular densities.  She's had 2 serial CT scans approximately 2 months apart which have shown some interval enlargement.     She is a lifelong nonsmoker although has had some secondhand smoke exposure.     She has no history of known chronic lung disease other than a past history of \"asthma\".     She has a morning cough but produces little sputum.  She has had no hemoptysis.  There's been no recent change in that.  Other then having been treated in October with a course of antibiotics for an acute upper respiratory tract infection.     She has dyspnea on exertion.  She has difficulty going up a flight of stairs without having to stop her rest.  She denies chest pain or palpitations.     She has noted no hematuria and has no history of renal dysfunction.     At time she has a \"upset stomach\", particularly after meals.  She takes omeprazole on a daily basis.  She doesn't follow reflux precautions.     She has had a recent transthoracic echocardiogram that appears to be unremarkable by report.         Patient Active Problem List   Diagnosis   • Lung nodules (\"Soft\", bilateral)   • Non-smoker   • H/O asthma      No Known Allergies     Current Outpatient Prescriptions:   •  aspirin 81 MG EC tablet, Take  by mouth Daily., Disp: , Rfl:   •  citalopram (CeleXA) 40 MG tablet, Take  by mouth Daily., Disp: , Rfl:   •  gabapentin (NEURONTIN) 800 MG tablet, Take 800 mg by mouth 3 (Three) Times a Day., Disp: , Rfl:   •  HYDROcodone-acetaminophen (NORCO) 5-325 MG per tablet, Take 1 tablet by mouth Every 6 (Six) Hours As Needed., Disp: , Rfl:   •  hydrOXYzine (ATARAX) 25 MG tablet, Take 25 mg by mouth Daily., Disp: , Rfl:   •  levothyroxine (SYNTHROID, LEVOTHROID) 50 MCG tablet, Take 50 mcg by mouth Daily., Disp: , Rfl:   •  " "losartan-hydrochlorothiazide (HYZAAR) 100-25 MG per tablet, Take 1 tablet by mouth Daily., Disp: , Rfl:   •  lovastatin (MEVACOR) 10 MG tablet, Take 10 mg by mouth Every Night., Disp: , Rfl:   •  omeprazole (priLOSEC) 20 MG capsule, Take 20 mg by mouth Daily., Disp: , Rfl:   •  amLODIPine (NORVASC) 2.5 MG tablet, Take 2.5 mg by mouth Daily., Disp: , Rfl:   MEDICATION LIST AND ALLERGIES REVIEWED.           Family History   Problem Relation Age of Onset   • Ovarian cancer Mother 19   • Emphysema Father     • Breast cancer Neg Hx             Social History   Substance Use Topics   • Smoking status: Never Smoker   • Smokeless tobacco: Never Used   • Alcohol use No      Social History          Social History Narrative     Nonsmoker     Has been exposed to secondhand smoke          Has 3 children     No regular alcohol use      FAMILY AND SOCIAL HISTORY REVIEWED.     Review of Systems  ALSO REFER TO SCANNED ROS SHEET FROM SAME DATE.     /70 (BP Location: Left arm, Patient Position: Sitting)  Pulse 87  Temp 98.1 °F (36.7 °C)  Resp 18  Ht 162.6 cm (64.02\")  Wt 81 kg (178 lb 8 oz)  SpO2 95%  BMI 30.62 kg/m2  Physical Exam   Constitutional: She is oriented to person, place, and time. She appears well-developed. No distress.   HENT:   Head: Normocephalic and atraumatic.   Neck: No thyromegaly present.   Cardiovascular: Normal rate, regular rhythm and normal heart sounds.    No murmur heard.  Pulmonary/Chest: Effort normal and breath sounds normal. No stridor.   Abdominal: Soft. Bowel sounds are normal.   Musculoskeletal: Normal range of motion. She exhibits no edema.   Lymphadenopathy:     She has no cervical adenopathy.        Right: No supraclavicular and no epitrochlear adenopathy present.        Left: No supraclavicular and no epitrochlear adenopathy present.   Neurological: She is alert and oriented to person, place, and time.   Skin: Skin is warm and dry. She is not diaphoretic.   Psychiatric: She " "has a normal mood and affect. Her behavior is normal.   Nursing note and vitals reviewed.        Results      PFTs reveal no airway obstruction, no restriction, and a normal diffusion capacity     CT scan of the chest reviewed on PACS.  Bilateral irregular, soft peripheral densities noted     Problem List          ICD-10-CM ICD-9-CM   1. Lung nodules (\"Soft\", bilateral) R91.8 793.19   2. Other seasonal allergic rhinitis  J30.2 477.8         Discussion      We discussed her test results.  PFTs and exam are unremarkable.  CT scan is worrisome but more for an inflammatory or infectious process than malignancy but that is still in the differential.     We will obtain lab work today for ANCA, hypersensitivity pneumonitis panel, fungal serologies, etc.  I'll see her back in several weeks to follow-up on the lab results and we will get an I-Logic CT scan of the chest at that time and probably consider navigational bronchoscopy.  A surgical lung biopsy is really the only other way of getting tissue that may be diagnostic.  A CT FNA is unlikely to provide useful information given the \"soft\" nature of these lesions.     Dixon Fatima MD  Note electronically signed     CC: Ly Funez MD    FU Note:    Finally got Previous CT from [facility] on CD for direct comparison.  NSC in LLL density or other patchy infiltrative areas.  Discussed with patient on phone.  Will do a SANDRITA bronch to biopsy LLL lesion.  Just behind a rib so not a good CT FNA candidate.    Today there is no change in history, exam, or plan.    KALEIGH Fatima MD  Pulmonary and Critical Care Medicine       "

## 2018-02-13 NOTE — OP NOTE
Bronchoscopy Procedure Note    Pre-op Diagnosis: Left lower lobe infiltrate    Post-op Diagnosis: Left lower lobe infiltrate    Surgeon: Dixon Fatima MD    Anesthesia: Gen. anesthesia    Operation: Flexible fiberoptic bronchoscopy    Findings: No endobronchial lesions    Specimen(s): Transbronchial biopsies left lower lobe, BAL left lower lobe, cytology brushing specimen left lower lobe, station 4R TBNA    Estimated Blood Loss: Minimal/None    Complications: No immediate    Indications and History:  Ruby Pettit is a 73 y.o. female  with persistent patchy infiltrate and consolidation left lower lobe of uncertain etiology.  The risks, benefits, complications, treatment options and expected outcomes were discussed with the patient.  The possibilities of reaction to medication, pulmonary aspiration, perforation of a viscus, bleeding, failure to diagnose a condition and creating a complication requiring transfusion or operation were discussed with the patient who freely signed the consent.      Description of Procedure:  The patient was seen in the Holding Room and the site of surgery properly noted/marked.  The patient was taken to the Endoscopy Suite, identified as Ruby Pettit  and the procedure verified as Flexible Fiberoptic Bronchoscopy.  A Time Out was held and the above information confirmed.    In the endoscopy suite the patient was intubated with an 8.5 endotracheal tube after the induction of general anesthesia.    The bronchoscope was introduced through the endotracheal tube which confirmed good position.  This scope was advanced into the right mainstem bronchus.  The right upper lobe, bronchus intermedius, right lower lobe, superior segment right lower lobe, and right middle lobe revealed no endobronchial lesions and minimal clear secretions.    The scope is advanced into the left mainstem bronchus.  The left upper lobe, lingula, left lower lobe, and superior segment left lower lobe revealed no  endobronchial lesions and minimal clear secretions.    The scope was pulled up into the trachea and the navigational probe was introduced.  Utilizing the navigational software registration was performed then using previously plotted courses to the left lower lobe infiltrate/consolidation the scope and then the working channel were advanced out to this location.  Radial ultrasound was used to help confirm appropriate biopsy locations.    Multiple transbronchial biopsies were obtained.  A BAL was obtained with 60 mL of normal saline, 30 mL returned.  And a 7 mm cytology brushing specimen was obtained.    The therapeutic scope was removed and the end bronchial ultrasound scope was introduced.  It was advanced to station 4.  Station 4, station 7, station 10 bilaterally were both evaluated.  Only one small lymph node was noted at station 4R and multiple passes were taken with a 21-gauge needle.  These were similar for routine histopathology.    The scope was withdrawn without difficulty.  Patient tolerated the procedure well.  There are no complications.    Postprocedure chest x-ray revealed no pneumothorax.    The Patient was taken to the Endoscopy Recovery area in satisfactory condition.    Attestation: I performed the procedure    Dixon Fatima MD, [facility]

## 2018-02-13 NOTE — ANESTHESIA POSTPROCEDURE EVALUATION
Patient: Ruby Pettit    Procedure Summary     Date Anesthesia Start Anesthesia Stop Room / Location    02/13/18 1412   [facility]       Procedure Diagnosis Surgeon Provider    BRONCHOSCOPY WITH SANDRITA ENDOBRONCHIAL ULTRASOUND WITH FLUORO (N/A Bronchus) No diagnosis on file. MD El Merritt MD          Anesthesia Type: general  Last vitals 2/13/18 @ 1525  BP   143/63   Temp   96.8   Pulse   92   Resp   22   SpO2   94%     Post Anesthesia Care and Evaluation    Patient location during evaluation: PACU  Patient participation: complete - patient cannot participate  Level of consciousness: responsive to physical stimuli (Restless)  Pain score: 0  Pain management: adequate  Airway patency: patent  Anesthetic complications: No anesthetic complications    Cardiovascular status: stable  Respiratory status: acceptable, nasal cannula and spontaneous ventilation  Hydration status: stable    Comments: Pt transferred to PACU with O2. Vital signs stable. Report to PACU RN and care accepted.

## 2018-02-13 NOTE — ANESTHESIA PROCEDURE NOTES
Airway  Urgency: elective    Date/Time: 2/13/2018 2:25 PM  End Time:2/13/2018 2:25 PM  Difficult airway    General Information and Staff    Patient location during procedure: OR  CRNA: ASTRID DAVID    Indications and Patient Condition  Indications for airway management: airway protection    Preoxygenated: yes  MILS maintained throughout  Mask difficulty assessment: 1 - vent by mask    Final Airway Details  Final airway type: endotracheal airway      Successful airway: ETT  Cuffed: yes   Successful intubation technique: direct laryngoscopy  Facilitating devices/methods: Bougie  Endotracheal tube insertion site: oral  Blade: Ken  Blade size: #3  ETT size: 7.0 mm  Cormack-Lehane Classification: grade IIa - partial view of glottis  Placement verified by: chest auscultation and capnometry   Measured from: lips  ETT to lips (cm): 21  Number of attempts at approach: 2    Additional Comments  ANTERIOR GLOTTIS

## 2018-02-15 LAB
BACTERIA SPEC RESP CULT: NORMAL
BACTERIA SPEC RESP CULT: NORMAL
GRAM STN SPEC: NORMAL
GRAM STN SPEC: NORMAL
LAB AP CASE REPORT: NORMAL
Lab: NORMAL
PATH REPORT.FINAL DX SPEC: NORMAL

## 2018-02-16 ENCOUNTER — TELEPHONE (OUTPATIENT)
Dept: PULMONOLOGY | Facility: CLINIC | Age: 73
End: 2018-02-16

## 2018-02-16 LAB
CYTO UR: NORMAL
LAB AP CASE REPORT: NORMAL
LAB AP CLINICAL INFORMATION: NORMAL
Lab: NORMAL
PATH REPORT.FINAL DX SPEC: NORMAL
PATH REPORT.GROSS SPEC: NORMAL

## 2018-02-16 NOTE — TELEPHONE ENCOUNTER
Biopsy results reviewed with pathologist.  Some inflammatory changes and one granuloma.  Cultures negative so far.    Will need to follow up on cultures and serial CT scans at the very least.    Discussed with the patient on the phone.    Will see back in a couple of months to review culture results and discuss CT re-scanning.

## 2018-03-15 ENCOUNTER — OFFICE VISIT (OUTPATIENT)
Dept: PULMONOLOGY | Facility: CLINIC | Age: 73
End: 2018-03-15

## 2018-03-15 VITALS
HEIGHT: 63 IN | RESPIRATION RATE: 18 BRPM | SYSTOLIC BLOOD PRESSURE: 116 MMHG | WEIGHT: 183.13 LBS | DIASTOLIC BLOOD PRESSURE: 70 MMHG | TEMPERATURE: 98.1 F | HEART RATE: 71 BPM | BODY MASS INDEX: 32.45 KG/M2 | OXYGEN SATURATION: 91 %

## 2018-03-15 DIAGNOSIS — R91.8 LUNG NODULES: Primary | ICD-10-CM

## 2018-03-15 DIAGNOSIS — R06.09 DYSPNEA ON EXERTION: ICD-10-CM

## 2018-03-15 PROCEDURE — 99213 OFFICE O/P EST LOW 20 MIN: CPT | Performed by: NURSE PRACTITIONER

## 2018-03-15 RX ORDER — CITALOPRAM 20 MG/1
20 TABLET ORAL NIGHTLY
COMMUNITY
Start: 2018-02-15

## 2018-03-15 NOTE — PROGRESS NOTES
"[facility] Follow [facility]    CHIEF COMPLAINT    Abnormal chest CT     HISTORY OF PRESENT ILLNESS    Ruby Pettit is a 73 y.o.female here today for follow up after bronchoscopy for an abnormal chest CT.     She had a CT Abdomen/Pelvis last September at [facility] that incidentally some lower lobe nodular opacities. A dedicated chest CT was performed in November that revealed multiple nodular opacities including a 1.4 cm opacity in the left lung base and a 1.3 cm nodular opacity in the lingula. There was also a 1.6 cm mixed attenuation lesion in the right upper lobe with more patchy areas of ground-glass attenuation in the right middle and lower lobe. She was referred to our office for furthter evaluation and saw Dr Fatima on 12/26/17.     Lab work from her initial visit was unremarkable. She had a follow-up CT performed at [facility] on 1/16/18. There was no significant change in comparison to her previous scan done at [facility]. She underwent navigational bronchoscopy with Dr Fatima on 2/13/18.     She had a little bit of a cough for about 1 week after her bronchoscopy but this has since resolved. She still complains of dyspnea on exertion. She had normal PFTs and her echo was unremarkable by report.         Patient Active Problem List   Diagnosis   • Lung nodules (\"Soft\", bilateral)   • Non-smoker   • H/O asthma       No Known Allergies    Current Outpatient Prescriptions:   •  amLODIPine (NORVASC) 2.5 MG tablet, Take 2.5 mg by mouth Daily., Disp: , Rfl:   •  aspirin 81 MG EC tablet, Take 81 mg by mouth Daily., Disp: , Rfl:   •  cetirizine (zyrTEC) 10 MG tablet, Take 10 mg by mouth Daily., Disp: , Rfl:   •  cholecalciferol (VITAMIN D3) 1000 units tablet, Take 1,000 Units by mouth Daily., Disp: , Rfl:   •  citalopram (CeleXA) 20 MG tablet, Take 20 mg by mouth Daily., Disp: , Rfl:   •  gabapentin (NEURONTIN) 800 MG tablet, Take 800 mg by mouth 3 (Three) Times a Day., Disp: , Rfl:   •  HYDROcodone-acetaminophen (NORCO) 5-325 MG " "per tablet, Take 1 tablet by mouth 2 (Two) Times a Day As Needed., Disp: , Rfl:   •  hydrOXYzine (ATARAX) 25 MG tablet, Take 25 mg by mouth Daily., Disp: , Rfl:   •  levothyroxine (SYNTHROID, LEVOTHROID) 50 MCG tablet, Take 50 mcg by mouth Daily., Disp: , Rfl:   •  losartan-hydrochlorothiazide (HYZAAR) 100-25 MG per tablet, Take 1 tablet by mouth Daily., Disp: , Rfl:   •  lovastatin (MEVACOR) 10 MG tablet, Take 10 mg by mouth Every Night., Disp: , Rfl:   •  Milk Thistle 175 MG capsule, Take 1 capsule by mouth Daily., Disp: , Rfl:   •  omeprazole (priLOSEC) 20 MG capsule, Take 20 mg by mouth 2 (Two) Times a Day., Disp: , Rfl:   •  Selenium (SELENICAPS-200 PO), Take 200 mcg by mouth Daily., Disp: , Rfl:   MEDICATION LIST AND ALLERGIES REVIEWED.    Social History   Substance Use Topics   • Smoking status: Never Smoker   • Smokeless tobacco: Never Used   • Alcohol use 0.0 - 1.2 oz/week       FAMILY AND SOCIAL HISTORY REVIEWED.    Review of Systems   Constitutional: Negative for chills, fatigue, fever and unexpected weight change.   HENT: Negative for congestion, nosebleeds, postnasal drip, rhinorrhea, sinus pressure and trouble swallowing.    Respiratory: Positive for shortness of breath (on exertion ). Negative for cough, chest tightness and wheezing.    Cardiovascular: Negative for chest pain and leg swelling.   Gastrointestinal: Negative for abdominal pain, constipation, diarrhea, nausea and vomiting.   Genitourinary: Negative for dysuria, frequency, hematuria and urgency.   Musculoskeletal: Negative for myalgias.   Neurological: Negative for dizziness, weakness, numbness and headaches.   All other systems reviewed and are negative.  .    /70 (BP Location: Right arm, Patient Position: Sitting, Cuff Size: Adult)   Pulse 71   Temp 98.1 °F (36.7 °C)   Resp 18   Ht 160 cm (62.99\")   Wt 83.1 kg (183 lb 2 oz)   SpO2 91%   BMI 32.45 kg/m²     Physical Exam   Constitutional: She is oriented to person, place, and " "time. She appears well-developed. No distress.   HENT:   Head: Normocephalic.   Neck: Normal range of motion. Neck supple.   Cardiovascular: Normal rate, regular rhythm and normal heart sounds.    Pulmonary/Chest: Effort normal and breath sounds normal. No stridor. No respiratory distress. She has no wheezes. She has no rales.   Musculoskeletal: Normal range of motion. She exhibits no edema.   Neurological: She is alert and oriented to person, place, and time.   Skin: Skin is warm and dry.   Psychiatric: She has a normal mood and affect. Her behavior is normal.   Vitals reviewed.        RESULTS      PROBLEM LIST    Problem List Items Addressed This Visit        Respiratory    Lung nodules (\"Soft\", bilateral) - Primary    Relevant Orders    CT Chest Without Contrast      Other Visit Diagnoses     Dyspnea on exertion                DISCUSSION    We reviewed her labs from her initial visit, pathology, cytology, and culture reports. We'll continue to monitor her AFB and Fungal cultures until finalized (in about 2 weeks).     We'll follow up on her abnormal CT scan with a repeat CT Chest in about 3 months.    We discussed that her dyspnea on exertion could be a result of deconditioning and obesity. I've encouraged weight loss and regular activity.     Follow up with Dr Fatima in 3 months after CT scan.     I spent 15 minutes with the patient. I spent > 50% percent of this time counseling and discussing diagnosis, diagnostic testing and current status.    Kristen Ocampo, EMMIE  03/15/17985:39 PM  Electronically signed     Please note that portions of this note were completed with a voice recognition program. Efforts were made to edit the dictations, but occasionally words are mistranscribed.      CC: Ly Funez MD  "

## 2018-03-27 LAB
FUNGUS WND CULT: NORMAL
MYCOBACTERIUM SPEC CULT: NORMAL
NIGHT BLUE STAIN TISS: NORMAL

## 2018-07-25 ENCOUNTER — HOSPITAL ENCOUNTER (OUTPATIENT)
Dept: CT IMAGING | Facility: HOSPITAL | Age: 73
Discharge: HOME | End: 2018-07-25
Admitting: NURSE PRACTITIONER

## 2018-07-25 DIAGNOSIS — R91.8 LUNG NODULES: ICD-10-CM

## 2018-07-25 PROCEDURE — 71250 CT THORAX DX C-: CPT

## 2018-07-26 ENCOUNTER — OFFICE VISIT (OUTPATIENT)
Dept: PULMONOLOGY | Facility: CLINIC | Age: 73
End: 2018-07-26

## 2018-07-26 VITALS
WEIGHT: 186 LBS | DIASTOLIC BLOOD PRESSURE: 82 MMHG | HEIGHT: 64 IN | BODY MASS INDEX: 31.76 KG/M2 | OXYGEN SATURATION: 92 % | TEMPERATURE: 98.6 F | SYSTOLIC BLOOD PRESSURE: 130 MMHG | HEART RATE: 80 BPM

## 2018-07-26 DIAGNOSIS — Z87.09 H/O EXTRINSIC ASTHMA: ICD-10-CM

## 2018-07-26 DIAGNOSIS — J30.89 SEASONAL AND PERENNIAL ALLERGIC RHINITIS: ICD-10-CM

## 2018-07-26 DIAGNOSIS — J30.2 SEASONAL AND PERENNIAL ALLERGIC RHINITIS: ICD-10-CM

## 2018-07-26 DIAGNOSIS — R05.9 COUGH: ICD-10-CM

## 2018-07-26 DIAGNOSIS — Z78.9 NON-SMOKER: ICD-10-CM

## 2018-07-26 DIAGNOSIS — R91.8 LUNG NODULES: Primary | ICD-10-CM

## 2018-07-26 PROCEDURE — 99214 OFFICE O/P EST MOD 30 MIN: CPT | Performed by: INTERNAL MEDICINE

## 2018-07-26 NOTE — PROGRESS NOTES
"  PULMONARY  NOTE    Chief Complaint     Abnormal CT scan of the chest    History of Present Illness     73-year-old white female returns today for follow-up.  She was last seen in our office March 2018.    Last September she underwent an abdominal and pelvic CT scan of the chest which revealed multiple nodular opacities in the lung bases.  Follow-up scanning revealed solid and semisolid nodules ranging from 1.3-1.6 cm.  She underwent a navigational bronchoscopy which was unremarkable with no growth on cultures and no evidence of malignancy.    She returns today for follow-up.  Overall she feels that she is doing okay.  She still has a daily relatively dry cough.  She has produced no hemoptysis.    She does have some postnasal drip and sinus congestion periodically.  She uses Zyrtec.    She does not report regular reflux symptoms.  She uses omeprazole on a fairly regular basis, however.    Patient Active Problem List   Diagnosis   • Lung nodules (\"Soft\", bilateral)   • Non-smoker   • H/O asthma   • Cough   • Perennial rhinitis     No Known Allergies    Current Outpatient Prescriptions:   •  amLODIPine (NORVASC) 2.5 MG tablet, Take 2.5 mg by mouth Daily., Disp: , Rfl:   •  aspirin 81 MG EC tablet, Take 81 mg by mouth Daily., Disp: , Rfl:   •  cetirizine (zyrTEC) 10 MG tablet, Take 10 mg by mouth Daily., Disp: , Rfl:   •  cholecalciferol (VITAMIN D3) 1000 units tablet, Take 1,000 Units by mouth Daily., Disp: , Rfl:   •  citalopram (CeleXA) 20 MG tablet, Take 20 mg by mouth Daily., Disp: , Rfl:   •  gabapentin (NEURONTIN) 800 MG tablet, Take 800 mg by mouth 3 (Three) Times a Day., Disp: , Rfl:   •  HYDROcodone-acetaminophen (NORCO) 5-325 MG per tablet, Take 1 tablet by mouth 2 (Two) Times a Day As Needed., Disp: , Rfl:   •  hydrOXYzine (ATARAX) 25 MG tablet, Take 25 mg by mouth Daily., Disp: , Rfl:   •  levothyroxine (SYNTHROID, LEVOTHROID) 50 MCG tablet, Take 50 mcg by mouth Daily., Disp: , Rfl:   •  " "losartan-hydrochlorothiazide (HYZAAR) 100-25 MG per tablet, Take 1 tablet by mouth Daily., Disp: , Rfl:   •  lovastatin (MEVACOR) 10 MG tablet, Take 10 mg by mouth Every Night., Disp: , Rfl:   •  Milk Thistle 175 MG capsule, Take 1 capsule by mouth Daily., Disp: , Rfl:   •  omeprazole (priLOSEC) 20 MG capsule, Take 20 mg by mouth 2 (Two) Times a Day., Disp: , Rfl:   •  Selenium (SELENICAPS-200 PO), Take 200 mcg by mouth Daily., Disp: , Rfl:   MEDICATION LIST AND ALLERGIES REVIEWED.    Family History   Problem Relation Age of Onset   • Ovarian cancer Mother 19   • Emphysema Father    • Breast cancer Neg Hx      Social History   Substance Use Topics   • Smoking status: Never Smoker   • Smokeless tobacco: Never Used   • Alcohol use 0.0 - 1.2 oz/week     Social History     Social History Narrative    Nonsmoker    Has been exposed to secondhand smoke        Has 3 children    No regular alcohol use     FAMILY AND SOCIAL HISTORY REVIEWED.    Review of Systems  ALSO REFER TO SCANNED ROS SHEET FROM SAME DATE.    /82   Pulse 80   Temp 98.6 °F (37 °C)   Ht 162.6 cm (64\")   Wt 84.4 kg (186 lb)   SpO2 92%   BMI 31.93 kg/m²   Physical Exam   Constitutional: She is oriented to person, place, and time. She appears well-developed. No distress.   HENT:   Head: Normocephalic and atraumatic.   Neck: No thyromegaly present.   Cardiovascular: Normal rate, regular rhythm and normal heart sounds.    No murmur heard.  Pulmonary/Chest: Effort normal and breath sounds normal. No stridor.   Abdominal: Soft. Bowel sounds are normal.   Musculoskeletal: Normal range of motion. She exhibits no edema.   Lymphadenopathy:     She has no cervical adenopathy.        Right: No supraclavicular and no epitrochlear adenopathy present.        Left: No supraclavicular and no epitrochlear adenopathy present.   Neurological: She is alert and oriented to person, place, and time.   Skin: Skin is warm and dry. She is not diaphoretic. " "  Psychiatric: She has a normal mood and affect. Her behavior is normal.   Nursing note and vitals reviewed.      Results     CT scan of the chest from 7/25/2018 was reviewed on PACS.  The largest, primary remaining abnormality is a 9 mm solid nodule    Problem List       ICD-10-CM ICD-9-CM   1. Lung nodules (\"Soft\", bilateral) R91.8 793.19   2. Non-smoker Z78.9 V49.89   3. H/O asthma Z87.09 V12.69   4. Perennial rhinitis J30.89 477.9    J30.2    5. Cough R05 786.2       Discussion     We discussed her CT scan results.  Her previously noted groundglass abnormalities had ranged 1.3-1.6 cm.  On her current CT scan, the largest abnormality his 9 mm in size.  This is most likely the sequela of a prior inflammatory insult.  At this point, I've just recommended serial CT scan with a follow-up CT scan about 6 months and probably one year after that.    Standard of care would be to follow the semisolid nodules for total of 5 years with scans every other year the last 2.    Dixon Fatima MD  Note electronically signed    CC: Ly Funez MD  "

## 2018-07-27 DIAGNOSIS — R91.8 LUNG NODULES: Primary | ICD-10-CM

## 2018-09-17 ENCOUNTER — TRANSCRIBE ORDERS (OUTPATIENT)
Dept: ADMINISTRATIVE | Facility: HOSPITAL | Age: 73
End: 2018-09-17

## 2018-09-17 DIAGNOSIS — Z12.31 VISIT FOR SCREENING MAMMOGRAM: Primary | ICD-10-CM

## 2018-10-01 ENCOUNTER — TRANSCRIBE ORDERS (OUTPATIENT)
Dept: DIABETES SERVICES | Facility: HOSPITAL | Age: 73
End: 2018-10-01

## 2018-10-01 DIAGNOSIS — E11.9 TYPE 2 DIABETES MELLITUS WITHOUT COMPLICATION, WITHOUT LONG-TERM CURRENT USE OF INSULIN (HCC): Primary | ICD-10-CM

## 2018-10-18 ENCOUNTER — APPOINTMENT (OUTPATIENT)
Dept: DIABETES SERVICES | Facility: HOSPITAL | Age: 73
End: 2018-10-18

## 2018-10-26 ENCOUNTER — HOSPITAL ENCOUNTER (OUTPATIENT)
Dept: DIABETES SERVICES | Facility: HOSPITAL | Age: 73
Setting detail: RECURRING SERIES
Discharge: HOME | End: 2018-10-26

## 2018-10-26 PROCEDURE — G0109 DIAB MANAGE TRN IND/GROUP: HCPCS | Performed by: DIETITIAN, REGISTERED

## 2018-12-05 ENCOUNTER — APPOINTMENT (OUTPATIENT)
Dept: CT IMAGING | Facility: HOSPITAL | Age: 73
End: 2018-12-05
Attending: INTERNAL MEDICINE

## 2018-12-11 ENCOUNTER — HOSPITAL ENCOUNTER (OUTPATIENT)
Dept: MAMMOGRAPHY | Facility: HOSPITAL | Age: 73
Discharge: HOME | End: 2018-12-11
Admitting: INTERNAL MEDICINE

## 2018-12-11 DIAGNOSIS — Z12.31 VISIT FOR SCREENING MAMMOGRAM: ICD-10-CM

## 2018-12-11 PROCEDURE — 77067 SCR MAMMO BI INCL CAD: CPT

## 2018-12-11 PROCEDURE — 77063 BREAST TOMOSYNTHESIS BI: CPT | Performed by: RADIOLOGY

## 2018-12-11 PROCEDURE — 77063 BREAST TOMOSYNTHESIS BI: CPT

## 2018-12-11 PROCEDURE — 77067 SCR MAMMO BI INCL CAD: CPT | Performed by: RADIOLOGY

## 2019-01-07 ENCOUNTER — HOSPITAL ENCOUNTER (OUTPATIENT)
Dept: CT IMAGING | Facility: HOSPITAL | Age: 74
Discharge: HOME | End: 2019-01-07
Attending: INTERNAL MEDICINE | Admitting: INTERNAL MEDICINE

## 2019-01-07 ENCOUNTER — APPOINTMENT (OUTPATIENT)
Dept: CT IMAGING | Facility: HOSPITAL | Age: 74
End: 2019-01-07
Attending: INTERNAL MEDICINE

## 2019-01-07 DIAGNOSIS — R91.8 LUNG NODULES: ICD-10-CM

## 2019-01-07 PROCEDURE — 71250 CT THORAX DX C-: CPT

## 2019-01-15 ENCOUNTER — APPOINTMENT (OUTPATIENT)
Dept: CT IMAGING | Facility: HOSPITAL | Age: 74
End: 2019-01-15

## 2019-01-15 ENCOUNTER — APPOINTMENT (OUTPATIENT)
Dept: GENERAL RADIOLOGY | Facility: HOSPITAL | Age: 74
End: 2019-01-15

## 2019-01-15 ENCOUNTER — HOSPITAL ENCOUNTER (INPATIENT)
Facility: HOSPITAL | Age: 74
LOS: 1 days | Discharge: HOME | End: 2019-01-16
Attending: EMERGENCY MEDICINE | Admitting: FAMILY MEDICINE

## 2019-01-15 ENCOUNTER — APPOINTMENT (OUTPATIENT)
Dept: MRI IMAGING | Facility: HOSPITAL | Age: 74
End: 2019-01-15

## 2019-01-15 DIAGNOSIS — R55 SYNCOPE AND COLLAPSE: Primary | ICD-10-CM

## 2019-01-15 DIAGNOSIS — N39.0 URINARY TRACT INFECTION IN ELDERLY PATIENT: ICD-10-CM

## 2019-01-15 DIAGNOSIS — G93.9 LESION OF TEMPORAL LOBE: ICD-10-CM

## 2019-01-15 DIAGNOSIS — E11.69 DIABETES MELLITUS TYPE 2 IN OBESE (HCC): ICD-10-CM

## 2019-01-15 DIAGNOSIS — E66.9 DIABETES MELLITUS TYPE 2 IN OBESE (HCC): ICD-10-CM

## 2019-01-15 DIAGNOSIS — I10 ELEVATED BLOOD PRESSURE READING WITH DIAGNOSIS OF HYPERTENSION: ICD-10-CM

## 2019-01-15 PROBLEM — E87.6 HYPOKALEMIA: Status: ACTIVE | Noted: 2019-01-15

## 2019-01-15 PROBLEM — E11.9 T2DM (TYPE 2 DIABETES MELLITUS) (HCC): Status: ACTIVE | Noted: 2019-01-15

## 2019-01-15 PROBLEM — G62.9 NEUROPATHY: Status: ACTIVE | Noted: 2019-01-15

## 2019-01-15 PROBLEM — A49.9 UTI (URINARY TRACT INFECTION), BACTERIAL: Status: ACTIVE | Noted: 2019-01-15

## 2019-01-15 PROBLEM — K21.9 GERD (GASTROESOPHAGEAL REFLUX DISEASE): Status: ACTIVE | Noted: 2019-01-15

## 2019-01-15 LAB
ALBUMIN SERPL-MCNC: 4.33 G/DL (ref 3.2–4.8)
ALBUMIN/GLOB SERPL: 2.3 G/DL (ref 1.5–2.5)
ALP SERPL-CCNC: 143 U/L (ref 25–100)
ALT SERPL W P-5'-P-CCNC: 43 U/L (ref 7–40)
ANION GAP SERPL CALCULATED.3IONS-SCNC: 10 MMOL/L (ref 3–11)
AST SERPL-CCNC: 23 U/L (ref 0–33)
BACTERIA UR QL AUTO: ABNORMAL /HPF
BASOPHILS # BLD AUTO: 0.02 10*3/MM3 (ref 0–0.2)
BASOPHILS NFR BLD AUTO: 0.2 % (ref 0–1)
BILIRUB SERPL-MCNC: 0.4 MG/DL (ref 0.3–1.2)
BILIRUB UR QL STRIP: NEGATIVE
BUN BLD-MCNC: 17 MG/DL (ref 9–23)
BUN/CREAT SERPL: 17.9 (ref 7–25)
CALCIUM SPEC-SCNC: 9.1 MG/DL (ref 8.7–10.4)
CHLORIDE SERPL-SCNC: 99 MMOL/L (ref 99–109)
CLARITY UR: ABNORMAL
CO2 SERPL-SCNC: 25 MMOL/L (ref 20–31)
COLOR UR: YELLOW
CREAT BLD-MCNC: 0.95 MG/DL (ref 0.6–1.3)
DEPRECATED RDW RBC AUTO: 51.8 FL (ref 37–54)
EOSINOPHIL # BLD AUTO: 0.09 10*3/MM3 (ref 0–0.3)
EOSINOPHIL NFR BLD AUTO: 1.1 % (ref 0–3)
ERYTHROCYTE [DISTWIDTH] IN BLOOD BY AUTOMATED COUNT: 16.9 % (ref 11.3–14.5)
GFR SERPL CREATININE-BSD FRML MDRD: 58 ML/MIN/1.73
GLOBULIN UR ELPH-MCNC: 1.9 GM/DL
GLUCOSE BLD-MCNC: 169 MG/DL (ref 70–100)
GLUCOSE BLDC GLUCOMTR-MCNC: 139 MG/DL (ref 70–130)
GLUCOSE UR STRIP-MCNC: NEGATIVE MG/DL
HCT VFR BLD AUTO: 37.3 % (ref 34.5–44)
HGB BLD-MCNC: 11.8 G/DL (ref 11.5–15.5)
HGB UR QL STRIP.AUTO: NEGATIVE
HOLD SPECIMEN: NORMAL
HOLD SPECIMEN: NORMAL
HYALINE CASTS UR QL AUTO: ABNORMAL /LPF
IMM GRANULOCYTES # BLD AUTO: 0.02 10*3/MM3 (ref 0–0.03)
IMM GRANULOCYTES NFR BLD AUTO: 0.2 % (ref 0–0.6)
KETONES UR QL STRIP: ABNORMAL
LEUKOCYTE ESTERASE UR QL STRIP.AUTO: ABNORMAL
LYMPHOCYTES # BLD AUTO: 2.18 10*3/MM3 (ref 0.6–4.8)
LYMPHOCYTES NFR BLD AUTO: 25.5 % (ref 24–44)
MAGNESIUM SERPL-MCNC: 2.2 MG/DL (ref 1.3–2.7)
MCH RBC QN AUTO: 26.5 PG (ref 27–31)
MCHC RBC AUTO-ENTMCNC: 31.6 G/DL (ref 32–36)
MCV RBC AUTO: 83.6 FL (ref 80–99)
MONOCYTES # BLD AUTO: 0.76 10*3/MM3 (ref 0–1)
MONOCYTES NFR BLD AUTO: 8.9 % (ref 0–12)
NEUTROPHILS # BLD AUTO: 5.5 10*3/MM3 (ref 1.5–8.3)
NEUTROPHILS NFR BLD AUTO: 64.3 % (ref 41–71)
NITRITE UR QL STRIP: POSITIVE
PH UR STRIP.AUTO: 6 [PH] (ref 5–8)
PLATELET # BLD AUTO: 401 10*3/MM3 (ref 150–450)
PMV BLD AUTO: 10.7 FL (ref 6–12)
POTASSIUM BLD-SCNC: 3.2 MMOL/L (ref 3.5–5.5)
PROT SERPL-MCNC: 6.2 G/DL (ref 5.7–8.2)
PROT UR QL STRIP: ABNORMAL
RBC # BLD AUTO: 4.46 10*6/MM3 (ref 3.89–5.14)
RBC # UR: ABNORMAL /HPF
REF LAB TEST METHOD: ABNORMAL
SODIUM BLD-SCNC: 134 MMOL/L (ref 132–146)
SP GR UR STRIP: 1.02 (ref 1–1.03)
SQUAMOUS #/AREA URNS HPF: ABNORMAL /HPF
TROPONIN I SERPL-MCNC: 0 NG/ML (ref 0–0.07)
UROBILINOGEN UR QL STRIP: ABNORMAL
WBC NRBC COR # BLD: 8.55 10*3/MM3 (ref 3.5–10.8)
WBC UR QL AUTO: ABNORMAL /HPF
WHOLE BLOOD HOLD SPECIMEN: NORMAL
WHOLE BLOOD HOLD SPECIMEN: NORMAL

## 2019-01-15 PROCEDURE — 87086 URINE CULTURE/COLONY COUNT: CPT | Performed by: EMERGENCY MEDICINE

## 2019-01-15 PROCEDURE — 87077 CULTURE AEROBIC IDENTIFY: CPT | Performed by: EMERGENCY MEDICINE

## 2019-01-15 PROCEDURE — 93005 ELECTROCARDIOGRAM TRACING: CPT

## 2019-01-15 PROCEDURE — 85025 COMPLETE CBC W/AUTO DIFF WBC: CPT | Performed by: EMERGENCY MEDICINE

## 2019-01-15 PROCEDURE — 70450 CT HEAD/BRAIN W/O DYE: CPT

## 2019-01-15 PROCEDURE — 99285 EMERGENCY DEPT VISIT HI MDM: CPT

## 2019-01-15 PROCEDURE — 83735 ASSAY OF MAGNESIUM: CPT | Performed by: EMERGENCY MEDICINE

## 2019-01-15 PROCEDURE — 80053 COMPREHEN METABOLIC PANEL: CPT | Performed by: EMERGENCY MEDICINE

## 2019-01-15 PROCEDURE — G0378 HOSPITAL OBSERVATION PER HR: HCPCS

## 2019-01-15 PROCEDURE — 25010000002 CEFTRIAXONE PER 250 MG: Performed by: EMERGENCY MEDICINE

## 2019-01-15 PROCEDURE — A9577 INJ MULTIHANCE: HCPCS | Performed by: FAMILY MEDICINE

## 2019-01-15 PROCEDURE — 84484 ASSAY OF TROPONIN QUANT: CPT

## 2019-01-15 PROCEDURE — 81001 URINALYSIS AUTO W/SCOPE: CPT | Performed by: EMERGENCY MEDICINE

## 2019-01-15 PROCEDURE — 93005 ELECTROCARDIOGRAM TRACING: CPT | Performed by: EMERGENCY MEDICINE

## 2019-01-15 PROCEDURE — 87186 SC STD MICRODIL/AGAR DIL: CPT | Performed by: EMERGENCY MEDICINE

## 2019-01-15 PROCEDURE — 71045 X-RAY EXAM CHEST 1 VIEW: CPT

## 2019-01-15 PROCEDURE — 0 GADOBENATE DIMEGLUMINE 529 MG/ML SOLUTION: Performed by: FAMILY MEDICINE

## 2019-01-15 PROCEDURE — 99223 1ST HOSP IP/OBS HIGH 75: CPT | Performed by: INTERNAL MEDICINE

## 2019-01-15 PROCEDURE — 70553 MRI BRAIN STEM W/O & W/DYE: CPT

## 2019-01-15 PROCEDURE — 82962 GLUCOSE BLOOD TEST: CPT

## 2019-01-15 RX ORDER — AMLODIPINE BESYLATE 2.5 MG/1
2.5 TABLET ORAL NIGHTLY
Status: CANCELLED | OUTPATIENT
Start: 2019-01-15

## 2019-01-15 RX ORDER — CETIRIZINE HYDROCHLORIDE 10 MG/1
10 TABLET ORAL NIGHTLY
Status: DISCONTINUED | OUTPATIENT
Start: 2019-01-16 | End: 2019-01-16 | Stop reason: HOSPADM

## 2019-01-15 RX ORDER — CITALOPRAM 20 MG/1
20 TABLET ORAL NIGHTLY
Status: DISCONTINUED | OUTPATIENT
Start: 2019-01-16 | End: 2019-01-16 | Stop reason: HOSPADM

## 2019-01-15 RX ORDER — GABAPENTIN 400 MG/1
800 CAPSULE ORAL EVERY 12 HOURS SCHEDULED
Status: DISCONTINUED | OUTPATIENT
Start: 2019-01-16 | End: 2019-01-16 | Stop reason: HOSPADM

## 2019-01-15 RX ORDER — GLIMEPIRIDE 1 MG/1
1 TABLET ORAL
COMMUNITY
End: 2021-04-05

## 2019-01-15 RX ORDER — SODIUM CHLORIDE 0.9 % (FLUSH) 0.9 %
3-10 SYRINGE (ML) INJECTION AS NEEDED
Status: DISCONTINUED | OUTPATIENT
Start: 2019-01-15 | End: 2019-01-16 | Stop reason: HOSPADM

## 2019-01-15 RX ORDER — HYDROXYZINE HYDROCHLORIDE 25 MG/1
25 TABLET, FILM COATED ORAL NIGHTLY
Status: DISCONTINUED | OUTPATIENT
Start: 2019-01-16 | End: 2019-01-16 | Stop reason: HOSPADM

## 2019-01-15 RX ORDER — LEVOTHYROXINE SODIUM 0.05 MG/1
50 TABLET ORAL NIGHTLY
Status: DISCONTINUED | OUTPATIENT
Start: 2019-01-16 | End: 2019-01-16 | Stop reason: HOSPADM

## 2019-01-15 RX ORDER — POTASSIUM CHLORIDE 7.45 MG/ML
10 INJECTION INTRAVENOUS
Status: DISCONTINUED | OUTPATIENT
Start: 2019-01-15 | End: 2019-01-16 | Stop reason: HOSPADM

## 2019-01-15 RX ORDER — ATORVASTATIN CALCIUM 10 MG/1
10 TABLET, FILM COATED ORAL DAILY
Status: DISCONTINUED | OUTPATIENT
Start: 2019-01-16 | End: 2019-01-16 | Stop reason: HOSPADM

## 2019-01-15 RX ORDER — DEXTROSE MONOHYDRATE 25 G/50ML
25 INJECTION, SOLUTION INTRAVENOUS
Status: DISCONTINUED | OUTPATIENT
Start: 2019-01-15 | End: 2019-01-16 | Stop reason: HOSPADM

## 2019-01-15 RX ORDER — SODIUM CHLORIDE 0.9 % (FLUSH) 0.9 %
10 SYRINGE (ML) INJECTION AS NEEDED
Status: DISCONTINUED | OUTPATIENT
Start: 2019-01-15 | End: 2019-01-16 | Stop reason: HOSPADM

## 2019-01-15 RX ORDER — PANTOPRAZOLE SODIUM 40 MG/1
40 TABLET, DELAYED RELEASE ORAL EVERY MORNING
Status: DISCONTINUED | OUTPATIENT
Start: 2019-01-16 | End: 2019-01-16 | Stop reason: HOSPADM

## 2019-01-15 RX ORDER — NICOTINE POLACRILEX 4 MG
15 LOZENGE BUCCAL
Status: DISCONTINUED | OUTPATIENT
Start: 2019-01-15 | End: 2019-01-16 | Stop reason: HOSPADM

## 2019-01-15 RX ORDER — POTASSIUM CHLORIDE 750 MG/1
40 CAPSULE, EXTENDED RELEASE ORAL AS NEEDED
Status: DISCONTINUED | OUTPATIENT
Start: 2019-01-15 | End: 2019-01-16 | Stop reason: HOSPADM

## 2019-01-15 RX ORDER — POTASSIUM CHLORIDE 1.5 G/1.77G
40 POWDER, FOR SOLUTION ORAL AS NEEDED
Status: DISCONTINUED | OUTPATIENT
Start: 2019-01-15 | End: 2019-01-16 | Stop reason: HOSPADM

## 2019-01-15 RX ORDER — CEFTRIAXONE SODIUM 1 G/50ML
1 INJECTION, SOLUTION INTRAVENOUS ONCE
Status: COMPLETED | OUTPATIENT
Start: 2019-01-15 | End: 2019-01-15

## 2019-01-15 RX ORDER — CEFTRIAXONE SODIUM 1 G/50ML
1 INJECTION, SOLUTION INTRAVENOUS NIGHTLY
Status: DISCONTINUED | OUTPATIENT
Start: 2019-01-16 | End: 2019-01-16 | Stop reason: HOSPADM

## 2019-01-15 RX ORDER — AMLODIPINE BESYLATE 2.5 MG/1
2.5 TABLET ORAL NIGHTLY
Status: DISCONTINUED | OUTPATIENT
Start: 2019-01-16 | End: 2019-01-16 | Stop reason: HOSPADM

## 2019-01-15 RX ORDER — ONDANSETRON 4 MG/1
4 TABLET, FILM COATED ORAL EVERY 6 HOURS PRN
Status: DISCONTINUED | OUTPATIENT
Start: 2019-01-15 | End: 2019-01-16 | Stop reason: HOSPADM

## 2019-01-15 RX ORDER — SODIUM CHLORIDE 9 MG/ML
125 INJECTION, SOLUTION INTRAVENOUS CONTINUOUS
Status: DISCONTINUED | OUTPATIENT
Start: 2019-01-15 | End: 2019-01-16 | Stop reason: HOSPADM

## 2019-01-15 RX ORDER — SODIUM CHLORIDE 0.9 % (FLUSH) 0.9 %
3 SYRINGE (ML) INJECTION EVERY 12 HOURS SCHEDULED
Status: DISCONTINUED | OUTPATIENT
Start: 2019-01-16 | End: 2019-01-16 | Stop reason: HOSPADM

## 2019-01-15 RX ORDER — ONDANSETRON 2 MG/ML
4 INJECTION INTRAMUSCULAR; INTRAVENOUS EVERY 6 HOURS PRN
Status: DISCONTINUED | OUTPATIENT
Start: 2019-01-15 | End: 2019-01-16 | Stop reason: HOSPADM

## 2019-01-15 RX ADMIN — POTASSIUM CHLORIDE 40 MEQ: 750 CAPSULE, EXTENDED RELEASE ORAL at 23:40

## 2019-01-15 RX ADMIN — AMLODIPINE BESYLATE 2.5 MG: 2.5 TABLET ORAL at 23:41

## 2019-01-15 RX ADMIN — GABAPENTIN 800 MG: 400 CAPSULE ORAL at 23:41

## 2019-01-15 RX ADMIN — SODIUM CHLORIDE 125 ML/HR: 9 INJECTION, SOLUTION INTRAVENOUS at 23:17

## 2019-01-15 RX ADMIN — CEFTRIAXONE SODIUM 1 G: 1 INJECTION, SOLUTION INTRAVENOUS at 21:45

## 2019-01-15 RX ADMIN — LEVOTHYROXINE SODIUM 50 MCG: 50 TABLET ORAL at 23:41

## 2019-01-15 RX ADMIN — SODIUM CHLORIDE 500 ML: 9 INJECTION, SOLUTION INTRAVENOUS at 18:14

## 2019-01-15 RX ADMIN — GADOBENATE DIMEGLUMINE 18 ML: 529 INJECTION, SOLUTION INTRAVENOUS at 21:20

## 2019-01-15 RX ADMIN — SODIUM CHLORIDE 125 ML/HR: 9 INJECTION, SOLUTION INTRAVENOUS at 18:44

## 2019-01-15 RX ADMIN — HYDROXYZINE HYDROCHLORIDE 25 MG: 25 TABLET, FILM COATED ORAL at 23:41

## 2019-01-15 RX ADMIN — CITALOPRAM HYDROBROMIDE 20 MG: 20 TABLET ORAL at 23:41

## 2019-01-15 RX ADMIN — CETIRIZINE HYDROCHLORIDE 10 MG: 10 TABLET, FILM COATED ORAL at 23:41

## 2019-01-15 NOTE — ED PROVIDER NOTES
Subjective   Ms. Ruby Pettit is a 73-year-old female presenting to the emergency department for evaluation following a syncopal episode just prior to arrival. The patient reports that she was sitting down, having dinner at [employer] when she suddenly developed a headache. She endorses associated nausea, dizziness, and lightheadedness. She then stood up to go to the bathroom, and just before she got there, she fell, sliding down the wall and landing on the ground. She is amnesic to the event and is unsure if she hit her head. She lost consciousness and was easily aroused, per witness at the scene. She states that her symptoms have somewhat resolved since arrival at the ED, but she is still feeling nauseated and lightheaded. She denies any CP, unilateral weakness, SOA, or fevers associated with the episode. She denies a history of similar episodes. The patient does have a history of HTN, DM, and peripheral neuropathy. There are no other acute complaints at this time.        History provided by:  Patient  Syncope   Episode history:  Single  Most recent episode:  Today  Duration: Unable to specify.  Timing:  Constant  Progression:  Partially resolved  Chronicity:  New  Context: standing up    Witnessed: yes    Relieved by:  None tried  Worsened by:  Nothing  Ineffective treatments:  None tried  Associated symptoms: dizziness, headaches and nausea    Associated symptoms: no chest pain, no fever, no shortness of breath and no vomiting        Review of Systems   Constitutional: Negative.  Negative for fever.   Respiratory: Negative.  Negative for shortness of breath.    Cardiovascular: Positive for syncope. Negative for chest pain.   Gastrointestinal: Positive for nausea. Negative for vomiting.   Genitourinary: Negative.    Musculoskeletal: Negative.    Skin: Negative.    Neurological: Positive for dizziness, syncope, light-headedness and headaches.   Psychiatric/Behavioral: Negative.    All other systems reviewed and  are negative.      Past Medical History:   Diagnosis Date   • Anxiety and depression    • Arthritis    • Disease of thyroid gland    • GERD (gastroesophageal reflux disease)    • Head injury due to trauma 1992    fell down stairs    • History of transfusion    • Hypertension    • Liver disorder     surgery 1998 approx    • Peripheral neuropathic pain    • T2DM (type 2 diabetes mellitus) (CMS/HCC)    • Wears eyeglasses        No Known Allergies    Past Surgical History:   Procedure Laterality Date   • ABDOMINAL SURGERY     • APPENDECTOMY     • BRONCHOSCOPY N/A 2/13/2018    Procedure: BRONCHOSCOPY WITH SANDRITA ENDOBRONCHIAL ULTRASOUND WITH FLUORO;  Surgeon: Dixon Fatima MD;  Location: [facility] ENDOSCOPY;  Service:    • CHOLECYSTECTOMY     • COLONOSCOPY     • HYSTERECTOMY      2001   • LIVER RESECTION     • OOPHORECTOMY Bilateral     2001   • TUBAL ABDOMINAL LIGATION         Family History   Problem Relation Age of Onset   • Ovarian cancer Mother 19   • Emphysema Father    • Breast cancer Neg Hx        Social History     Socioeconomic History   • Marital status:      Spouse name: Not on file   • Number of children: Not on file   • Years of education: Not on file   • Highest education level: Not on file   Tobacco Use   • Smoking status: Never Smoker   • Smokeless tobacco: Never Used   Substance and Sexual Activity   • Alcohol use: Yes     Alcohol/week: 0.0 - 1.2 oz   • Drug use: No   • Sexual activity: Defer   Social History Narrative    Nonsmoker    Has been exposed to secondhand smoke        Has 3 children    No regular alcohol use         Objective   Physical Exam   Constitutional: She is oriented to person, place, and time. She appears well-developed and well-nourished. No distress.   HENT:   Head: Normocephalic and atraumatic.   Nose: Nose normal.   Eyes: Conjunctivae are normal. No scleral icterus.   Neck: Normal range of motion. Neck supple.   Cardiovascular: Normal rate, regular rhythm and  normal heart sounds.   No murmur heard.  Pulmonary/Chest: Effort normal and breath sounds normal. No respiratory distress.   Abdominal: Soft. Bowel sounds are normal. There is no tenderness.   Musculoskeletal: Normal range of motion.   Mild edema to the bilateral lower extremities.   Neurological: She is alert and oriented to person, place, and time.   2/5 strength in the bilateral upper extremities.   Skin: Skin is warm and dry.   Psychiatric: She has a normal mood and affect. Her behavior is normal.   Nursing note and vitals reviewed.      Procedures         ED Course  ED Course as of Jan 16 0013   Tue Anthony 15, 2019   1836 Troponin I: 0.00 [RS]   1915 Leuk Esterase, UA: (!) Small (1+) [RS]   1915 Nitrite, UA: (!) Positive [RS]   1915 WBC, UA: (!) 6-12 [RS]   1915 Bacteria, UA: (!) 4+ [RS]   1916 Hospitalist paged for admission.  [RS]   1928 I personally reviewed the images and report with the radiologist.  Patient has a nonspecific left temporal lesion.  MRI ordered.  [RS]   1937 Discussed the findings with the patient.  When I did mention the lesion on the brain, the patient reported that recently she's been having some issues with vision, coordination, memory, and overall fine motor skills.  His may explain the left temporal lesion.  We've ordered imaging to characterize.  The successful the case with Dr. Frazier who agrees to admit.  [RS]      ED Course User Index  [RS] Omar Pierce MD     Recent Results (from the past 24 hour(s))   POC Troponin, Rapid    Collection Time: 01/15/19  6:09 PM   Result Value Ref Range    Troponin I 0.00 0.00 - 0.07 ng/mL   Comprehensive Metabolic Panel    Collection Time: 01/15/19  6:13 PM   Result Value Ref Range    Glucose 169 (H) 70 - 100 mg/dL    BUN 17 9 - 23 mg/dL    Creatinine 0.95 0.60 - 1.30 mg/dL    Sodium 134 132 - 146 mmol/L    Potassium 3.2 (L) 3.5 - 5.5 mmol/L    Chloride 99 99 - 109 mmol/L    CO2 25.0 20.0 - 31.0 mmol/L    Calcium 9.1 8.7 - 10.4 mg/dL    Total  Protein 6.2 5.7 - 8.2 g/dL    Albumin 4.33 3.20 - 4.80 g/dL    ALT (SGPT) 43 (H) 7 - 40 U/L    AST (SGOT) 23 0 - 33 U/L    Alkaline Phosphatase 143 (H) 25 - 100 U/L    Total Bilirubin 0.4 0.3 - 1.2 mg/dL    eGFR Non African Amer 58 (L) >60 mL/min/1.73    Globulin 1.9 gm/dL    A/G Ratio 2.3 1.5 - 2.5 g/dL    BUN/Creatinine Ratio 17.9 7.0 - 25.0    Anion Gap 10.0 3.0 - 11.0 mmol/L   Magnesium    Collection Time: 01/15/19  6:13 PM   Result Value Ref Range    Magnesium 2.2 1.3 - 2.7 mg/dL   Light Blue Top    Collection Time: 01/15/19  6:13 PM   Result Value Ref Range    Extra Tube hold for add-on    Green Top (Gel)    Collection Time: 01/15/19  6:13 PM   Result Value Ref Range    Extra Tube Hold for add-ons.    Lavender Top    Collection Time: 01/15/19  6:13 PM   Result Value Ref Range    Extra Tube hold for add-on    Gold Top - SST    Collection Time: 01/15/19  6:13 PM   Result Value Ref Range    Extra Tube Hold for add-ons.    CBC Auto Differential    Collection Time: 01/15/19  6:13 PM   Result Value Ref Range    WBC 8.55 3.50 - 10.80 10*3/mm3    RBC 4.46 3.89 - 5.14 10*6/mm3    Hemoglobin 11.8 11.5 - 15.5 g/dL    Hematocrit 37.3 34.5 - 44.0 %    MCV 83.6 80.0 - 99.0 fL    MCH 26.5 (L) 27.0 - 31.0 pg    MCHC 31.6 (L) 32.0 - 36.0 g/dL    RDW 16.9 (H) 11.3 - 14.5 %    RDW-SD 51.8 37.0 - 54.0 fl    MPV 10.7 6.0 - 12.0 fL    Platelets 401 150 - 450 10*3/mm3    Neutrophil % 64.3 41.0 - 71.0 %    Lymphocyte % 25.5 24.0 - 44.0 %    Monocyte % 8.9 0.0 - 12.0 %    Eosinophil % 1.1 0.0 - 3.0 %    Basophil % 0.2 0.0 - 1.0 %    Immature Grans % 0.2 0.0 - 0.6 %    Neutrophils, Absolute 5.50 1.50 - 8.30 10*3/mm3    Lymphocytes, Absolute 2.18 0.60 - 4.80 10*3/mm3    Monocytes, Absolute 0.76 0.00 - 1.00 10*3/mm3    Eosinophils, Absolute 0.09 0.00 - 0.30 10*3/mm3    Basophils, Absolute 0.02 0.00 - 0.20 10*3/mm3    Immature Grans, Absolute 0.02 0.00 - 0.03 10*3/mm3   Urinalysis With Microscopic If Indicated (No Culture) - Urine, Clean  Catch    Collection Time: 01/15/19  6:31 PM   Result Value Ref Range    Color, UA Yellow Yellow, Straw    Appearance, UA Cloudy (A) Clear    pH, UA 6.0 5.0 - 8.0    Specific Gravity, UA 1.020 1.001 - 1.030    Glucose, UA Negative Negative    Ketones, UA Trace (A) Negative    Bilirubin, UA Negative Negative    Blood, UA Negative Negative    Protein, UA Trace (A) Negative    Leuk Esterase, UA Small (1+) (A) Negative    Nitrite, UA Positive (A) Negative    Urobilinogen, UA 1.0 E.U./dL 0.2 - 1.0 E.U./dL   Urinalysis, Microscopic Only - Urine, Clean Catch    Collection Time: 01/15/19  6:31 PM   Result Value Ref Range    RBC, UA 0-2 None Seen, 0-2 /HPF    WBC, UA 6-12 (A) None Seen, 0-2 /HPF    Bacteria, UA 4+ (A) None Seen, Trace /HPF    Squamous Epithelial Cells, UA 0-2 None Seen, 0-2 /HPF    Hyaline Casts, UA 7-12 0 - 6 /LPF    Methodology Automated Microscopy    POC Glucose Once    Collection Time: 01/15/19 11:52 PM   Result Value Ref Range    Glucose 139 (H) 70 - 130 mg/dL     Note: In addition to lab results from this visit, the labs listed above may include labs taken at another facility or during a different encounter within the last 24 hours. Please correlate lab times with ED admission and discharge times for further clarification of the services performed during this visit.    MRI Brain With & Without Contrast   Final Result   Nonspecific nonenhancing cystic focus in the left cerebellum with mild adjacent    edema. Although chronic infarction is possible, lack of volume loss    demonstrated by the temporal horn raises possibility of cystic lesion.    Recommend repeat imaging in 3 months      THIS DOCUMENT HAS BEEN ELECTRONICALLY SIGNED BY TANIYA FLORES MD      CT Head Without Contrast   Final Result   Addendum 2 of 2   THIS REPORT CONTAINS FINDINGS THAT MAY BE CRITICAL TO PATIENT CARE. The    findings were verbally communicated via telephone conference with Omar Pierce    at 7:22 PM EST on 1/15/2019. The  findings were acknowledged and    understood.      THIS DOCUMENT HAS BEEN ELECTRONICALLY SIGNED BY TANIYA FLORES MD      Addendum 1 of 2   Report should read:      Brain:  15 mm hypodensity left temporal lobe. No visualized hemorrhage. No       midline shift.      THIS DOCUMENT HAS BEEN ELECTRONICALLY SIGNED BY TANIYA FLORES MD      Final   Nonspecific hypodensity in the left temporal lobe. Although possibly related to    encephalomalacia/chronic infarction, no definite volume loss is seen of the    adjacent left temporal horn. Recommend MRI brain with gadolinium to further    evaluate.      THIS DOCUMENT HAS BEEN ELECTRONICALLY SIGNED BY TANIYA FLORES MD      XR Chest 1 View    (Results Pending)     Vitals:    01/15/19 2323 01/15/19 2336 01/15/19 2338 01/15/19 2339   BP:  137/67 139/68 125/64   BP Location:  Right arm Right arm Right arm   Patient Position:  Lying Sitting Standing   Pulse:       Resp:       Temp:       TempSrc:       SpO2:  93% 93% 90%   Weight: 86.7 kg (191 lb 3.2 oz)      Height:         Medications   sodium chloride 0.9 % flush 10 mL (not administered)   Sodium chloride 0.9 % infusion (125 mL/hr Intravenous New Bag 1/15/19 2317)   amLODIPine (NORVASC) tablet 2.5 mg (2.5 mg Oral Given 1/15/19 2341)   cetirizine (zyrTEC) tablet 10 mg (10 mg Oral Given 1/15/19 2341)   citalopram (CeleXA) tablet 20 mg (20 mg Oral Given 1/15/19 2341)   gabapentin (NEURONTIN) capsule 800 mg (800 mg Oral Given 1/15/19 2341)   hydrOXYzine (ATARAX) tablet 25 mg (25 mg Oral Given 1/15/19 2341)   levothyroxine (SYNTHROID, LEVOTHROID) tablet 50 mcg (50 mcg Oral Given 1/15/19 2341)   losartan (COZAAR) 100 mg, hydrochlorothiazide (HYDRODIURIL) 25 mg for HYZAAR 100-25 (not administered)   atorvastatin (LIPITOR) tablet 10 mg (not administered)   pantoprazole (PROTONIX) EC tablet 40 mg (not administered)   sodium chloride 0.9 % flush 3 mL (3 mL Intravenous Not Given 1/15/19 2347)   sodium chloride 0.9 % flush 3-10 mL (not  administered)   dextrose (GLUTOSE) oral gel 15 g (not administered)   dextrose (D50W) 25 g/ 50mL Intravenous Solution 25 g (not administered)   glucagon (human recombinant) (GLUCAGEN DIAGNOSTIC) injection 1 mg (not administered)   insulin lispro (humaLOG) injection 0-9 Units (0 Units Subcutaneous Not Given 1/15/19 2354)   ondansetron (ZOFRAN) tablet 4 mg (not administered)     Or   ondansetron (ZOFRAN) injection 4 mg (not administered)   cefTRIAXone (ROCEPHIN) IVPB 1 g (not administered)   potassium chloride (MICRO-K) CR capsule 40 mEq (40 mEq Oral Given 1/15/19 2340)     Or   potassium chloride (KLOR-CON) packet 40 mEq ( Oral Not Given:  See Alt 1/15/19 2340)     Or   potassium chloride 10 mEq in 100 mL IVPB ( Intravenous Not Given:  See Alt 1/15/19 2340)   sodium chloride 0.9 % bolus 500 mL (0 mL Intravenous Stopped 1/15/19 1844)   cefTRIAXone (ROCEPHIN) IVPB 1 g (0 g Intravenous Stopped 1/15/19 2215)   gadobenate dimeglumine (MULTIHANCE) injection 18 mL (18 mL Intravenous Given 1/15/19 2120)     ECG/EMG Results (last 24 hours)     Procedure Component Value Units Date/Time    ECG 12 Lead [[id number]] Collected:  01/15/19 1727     Updated:  01/15/19 1728                        MDM  Number of Diagnoses or Management Options  Diabetes mellitus type 2 in obese (CMS/HCC):   Elevated blood pressure reading with diagnosis of hypertension:   Lesion of temporal lobe:   Syncope and collapse:   Urinary tract infection in elderly patient:      Amount and/or Complexity of Data Reviewed  Clinical lab tests: reviewed  Tests in the radiology section of CPT®: reviewed  Decide to obtain previous medical records or to obtain history from someone other than the patient: yes  Obtain history from someone other than the patient: yes  Discuss the patient with other providers: yes  Independent visualization of images, tracings, or specimens: yes        Final diagnoses:   Syncope and collapse   Urinary tract infection in elderly patient    Lesion of temporal lobe   Elevated blood pressure reading with diagnosis of hypertension   Diabetes mellitus type 2 in obese (CMS/HCC)       Documentation assistance provided by juan m Orozco.  Information recorded by the scribe was done at my direction and has been verified and validated by me.     Ish Orozco  01/15/19 1806       Omar Pierce MD  01/16/19 0013

## 2019-01-16 ENCOUNTER — DOCUMENTATION (OUTPATIENT)
Dept: PULMONOLOGY | Facility: CLINIC | Age: 74
End: 2019-01-16

## 2019-01-16 ENCOUNTER — APPOINTMENT (OUTPATIENT)
Dept: CARDIOLOGY | Facility: HOSPITAL | Age: 74
End: 2019-01-16

## 2019-01-16 VITALS
WEIGHT: 191.2 LBS | OXYGEN SATURATION: 93 % | RESPIRATION RATE: 16 BRPM | SYSTOLIC BLOOD PRESSURE: 140 MMHG | HEART RATE: 72 BPM | TEMPERATURE: 98.7 F | HEIGHT: 64 IN | BODY MASS INDEX: 32.64 KG/M2 | DIASTOLIC BLOOD PRESSURE: 89 MMHG

## 2019-01-16 LAB
ANION GAP SERPL CALCULATED.3IONS-SCNC: 3.4 MMOL/L (ref 3–11)
BASOPHILS # BLD AUTO: 0.01 10*3/MM3 (ref 0–0.2)
BASOPHILS NFR BLD AUTO: 0.2 % (ref 0–1)
BH CV ECHO MEAS - AO MAX PG (FULL): 3.9 MMHG
BH CV ECHO MEAS - AO MAX PG: 7.4 MMHG
BH CV ECHO MEAS - AO MEAN PG (FULL): 2.2 MMHG
BH CV ECHO MEAS - AO MEAN PG: 3.9 MMHG
BH CV ECHO MEAS - AO ROOT AREA (BSA CORRECTED): 1.7
BH CV ECHO MEAS - AO ROOT AREA: 8.1 CM^2
BH CV ECHO MEAS - AO ROOT DIAM: 3.2 CM
BH CV ECHO MEAS - AO V2 MAX: 135.7 CM/SEC
BH CV ECHO MEAS - AO V2 MEAN: 90 CM/SEC
BH CV ECHO MEAS - AO V2 VTI: 29.5 CM
BH CV ECHO MEAS - ASC AORTA: 3.3 CM
BH CV ECHO MEAS - AVA(I,A): 2.7 CM^2
BH CV ECHO MEAS - AVA(I,D): 2.7 CM^2
BH CV ECHO MEAS - AVA(V,A): 2.1 CM^2
BH CV ECHO MEAS - AVA(V,D): 2.1 CM^2
BH CV ECHO MEAS - BSA(HAYCOCK): 2 M^2
BH CV ECHO MEAS - BSA(HAYCOCK): 2 M^2
BH CV ECHO MEAS - BSA: 1.9 M^2
BH CV ECHO MEAS - BSA: 1.9 M^2
BH CV ECHO MEAS - BZI_BMI: 32.8 KILOGRAMS/M^2
BH CV ECHO MEAS - BZI_BMI: 32.8 KILOGRAMS/M^2
BH CV ECHO MEAS - BZI_METRIC_HEIGHT: 162.6 CM
BH CV ECHO MEAS - BZI_METRIC_HEIGHT: 162.6 CM
BH CV ECHO MEAS - BZI_METRIC_WEIGHT: 86.6 KG
BH CV ECHO MEAS - BZI_METRIC_WEIGHT: 86.6 KG
BH CV ECHO MEAS - EDV(CUBED): 87.6 ML
BH CV ECHO MEAS - EDV(TEICH): 89.6 ML
BH CV ECHO MEAS - EF(CUBED): 89.6 %
BH CV ECHO MEAS - EF(TEICH): 84.2 %
BH CV ECHO MEAS - ESV(CUBED): 9.1 ML
BH CV ECHO MEAS - ESV(TEICH): 14.2 ML
BH CV ECHO MEAS - FS: 53 %
BH CV ECHO MEAS - IVS/LVPW: 0.94
BH CV ECHO MEAS - IVSD: 1.1 CM
BH CV ECHO MEAS - LA DIMENSION: 3.7 CM
BH CV ECHO MEAS - LA/AO: 1.1
BH CV ECHO MEAS - LAD MAJOR: 4.9 CM
BH CV ECHO MEAS - LAT PEAK E' VEL: 4.9 CM/SEC
BH CV ECHO MEAS - LATERAL E/E' RATIO: 13.9
BH CV ECHO MEAS - LV MASS(C)D: 168.7 GRAMS
BH CV ECHO MEAS - LV MASS(C)DI: 88 GRAMS/M^2
BH CV ECHO MEAS - LV MAX PG: 3.4 MMHG
BH CV ECHO MEAS - LV MEAN PG: 1.7 MMHG
BH CV ECHO MEAS - LV V1 MAX: 92.5 CM/SEC
BH CV ECHO MEAS - LV V1 MEAN: 60.6 CM/SEC
BH CV ECHO MEAS - LV V1 VTI: 25.8 CM
BH CV ECHO MEAS - LVIDD: 4.4 CM
BH CV ECHO MEAS - LVIDS: 2.1 CM
BH CV ECHO MEAS - LVOT AREA (M): 3.1 CM^2
BH CV ECHO MEAS - LVOT AREA: 3 CM^2
BH CV ECHO MEAS - LVOT DIAM: 2 CM
BH CV ECHO MEAS - LVPWD: 1.1 CM
BH CV ECHO MEAS - MED PEAK E' VEL: 6.8 CM/SEC
BH CV ECHO MEAS - MEDIAL E/E' RATIO: 10.2
BH CV ECHO MEAS - MV A MAX VEL: 98.7 CM/SEC
BH CV ECHO MEAS - MV DEC TIME: 0.26 SEC
BH CV ECHO MEAS - MV E MAX VEL: 71 CM/SEC
BH CV ECHO MEAS - MV E/A: 0.72
BH CV ECHO MEAS - PA ACC SLOPE: 656.7 CM/SEC^2
BH CV ECHO MEAS - PA ACC TIME: 0.11 SEC
BH CV ECHO MEAS - PA MAX PG: 3.7 MMHG
BH CV ECHO MEAS - PA PR(ACCEL): 30.7 MMHG
BH CV ECHO MEAS - PA V2 MAX: 96.4 CM/SEC
BH CV ECHO MEAS - RAP SYSTOLE: 8 MMHG
BH CV ECHO MEAS - RVDD: 2 CM
BH CV ECHO MEAS - RVSP: 40 MMHG
BH CV ECHO MEAS - SI(AO): 124.1 ML/M^2
BH CV ECHO MEAS - SI(CUBED): 40.9 ML/M^2
BH CV ECHO MEAS - SI(LVOT): 40.9 ML/M^2
BH CV ECHO MEAS - SI(TEICH): 39.3 ML/M^2
BH CV ECHO MEAS - SV(AO): 238.1 ML
BH CV ECHO MEAS - SV(CUBED): 78.5 ML
BH CV ECHO MEAS - SV(LVOT): 78.4 ML
BH CV ECHO MEAS - SV(TEICH): 75.4 ML
BH CV ECHO MEAS - TAPSE (>1.6): 2.6 CM2
BH CV ECHO MEAS - TR MAX PG: 32 MMHG
BH CV ECHO MEAS - TR MAX VEL: 279.6 CM/SEC
BH CV ECHO MEAS - TV MAX PG: 1 MMHG
BH CV ECHO MEAS - TV V2 MAX: 51.2 CM/SEC
BH CV ECHO MEASUREMENTS AVERAGE E/E' RATIO: 12.14
BH CV XLRA - RV BASE: 3.2 CM
BH CV XLRA - RV LENGTH: 6 CM
BH CV XLRA - RV MID: 3.3 CM
BH CV XLRA - TDI S': 12.3 CM/SEC
BH CV XLRA MEAS LEFT CCA RATIO VEL: 101 CM/SEC
BH CV XLRA MEAS LEFT DIST CCA EDV: 19.6 CM/SEC
BH CV XLRA MEAS LEFT DIST CCA PSV: 62.9 CM/SEC
BH CV XLRA MEAS LEFT DIST ICA EDV: 30.4 CM/SEC
BH CV XLRA MEAS LEFT DIST ICA PSV: 97.2 CM/SEC
BH CV XLRA MEAS LEFT ICA RATIO VEL: 124 CM/SEC
BH CV XLRA MEAS LEFT ICA/CCA RATIO: 1.2
BH CV XLRA MEAS LEFT MID CCA EDV: 22.6 CM/SEC
BH CV XLRA MEAS LEFT MID CCA PSV: 102.1 CM/SEC
BH CV XLRA MEAS LEFT MID ICA EDV: 36.3 CM/SEC
BH CV XLRA MEAS LEFT MID ICA PSV: 124.7 CM/SEC
BH CV XLRA MEAS LEFT PROX CCA EDV: 22.6 CM/SEC
BH CV XLRA MEAS LEFT PROX CCA PSV: 107.1 CM/SEC
BH CV XLRA MEAS LEFT PROX ECA EDV: 27 CM/SEC
BH CV XLRA MEAS LEFT PROX ECA PSV: 138 CM/SEC
BH CV XLRA MEAS LEFT PROX ICA EDV: 30.4 CM/SEC
BH CV XLRA MEAS LEFT PROX ICA PSV: 99.2 CM/SEC
BH CV XLRA MEAS LEFT PROX SCLA PSV: 167.9 CM/SEC
BH CV XLRA MEAS LEFT VERTEBRAL A EDV: 27.5 CM/SEC
BH CV XLRA MEAS LEFT VERTEBRAL A PSV: 94.3 CM/SEC
BH CV XLRA MEAS RIGHT CCA RATIO VEL: 84.5 CM/SEC
BH CV XLRA MEAS RIGHT DIST CCA EDV: 12.7 CM/SEC
BH CV XLRA MEAS RIGHT DIST CCA PSV: 46.9 CM/SEC
BH CV XLRA MEAS RIGHT DIST ICA EDV: 34.9 CM/SEC
BH CV XLRA MEAS RIGHT DIST ICA PSV: 120.5 CM/SEC
BH CV XLRA MEAS RIGHT ICA RATIO VEL: 85.5 CM/SEC
BH CV XLRA MEAS RIGHT ICA/CCA RATIO: 1
BH CV XLRA MEAS RIGHT MID CCA EDV: 14.7 CM/SEC
BH CV XLRA MEAS RIGHT MID CCA PSV: 85.4 CM/SEC
BH CV XLRA MEAS RIGHT MID ICA EDV: 20.4 CM/SEC
BH CV XLRA MEAS RIGHT MID ICA PSV: 86 CM/SEC
BH CV XLRA MEAS RIGHT PROX CCA EDV: 24.6 CM/SEC
BH CV XLRA MEAS RIGHT PROX CCA PSV: 95.3 CM/SEC
BH CV XLRA MEAS RIGHT PROX ECA EDV: 10 CM/SEC
BH CV XLRA MEAS RIGHT PROX ECA PSV: 70 CM/SEC
BH CV XLRA MEAS RIGHT PROX ICA EDV: 13.8 CM/SEC
BH CV XLRA MEAS RIGHT PROX ICA PSV: 64 CM/SEC
BH CV XLRA MEAS RIGHT PROX SCLA PSV: 217.1 CM/SEC
BH CV XLRA MEAS RIGHT VERTEBRAL A EDV: 10 CM/SEC
BH CV XLRA MEAS RIGHT VERTEBRAL A PSV: 40 CM/SEC
BUN BLD-MCNC: 14 MG/DL (ref 9–23)
BUN/CREAT SERPL: 17.3 (ref 7–25)
CALCIUM SPEC-SCNC: 8.4 MG/DL (ref 8.7–10.4)
CHLORIDE SERPL-SCNC: 108 MMOL/L (ref 99–109)
CO2 SERPL-SCNC: 25.6 MMOL/L (ref 20–31)
CREAT BLD-MCNC: 0.81 MG/DL (ref 0.6–1.3)
DEPRECATED RDW RBC AUTO: 53.5 FL (ref 37–54)
EOSINOPHIL # BLD AUTO: 0.13 10*3/MM3 (ref 0–0.3)
EOSINOPHIL NFR BLD AUTO: 2.1 % (ref 0–3)
ERYTHROCYTE [DISTWIDTH] IN BLOOD BY AUTOMATED COUNT: 17.2 % (ref 11.3–14.5)
GFR SERPL CREATININE-BSD FRML MDRD: 69 ML/MIN/1.73
GLUCOSE BLD-MCNC: 135 MG/DL (ref 70–100)
GLUCOSE BLDC GLUCOMTR-MCNC: 108 MG/DL (ref 70–130)
GLUCOSE BLDC GLUCOMTR-MCNC: 127 MG/DL (ref 70–130)
GLUCOSE BLDC GLUCOMTR-MCNC: 98 MG/DL (ref 70–130)
HBA1C MFR BLD: 7 % (ref 4.8–5.6)
HCT VFR BLD AUTO: 33.3 % (ref 34.5–44)
HGB BLD-MCNC: 10.2 G/DL (ref 11.5–15.5)
IMM GRANULOCYTES # BLD AUTO: 0.02 10*3/MM3 (ref 0–0.03)
IMM GRANULOCYTES NFR BLD AUTO: 0.3 % (ref 0–0.6)
LV EF 2D ECHO EST: 65 %
LYMPHOCYTES # BLD AUTO: 1.62 10*3/MM3 (ref 0.6–4.8)
LYMPHOCYTES NFR BLD AUTO: 26.1 % (ref 24–44)
MCH RBC QN AUTO: 26.1 PG (ref 27–31)
MCHC RBC AUTO-ENTMCNC: 30.6 G/DL (ref 32–36)
MCV RBC AUTO: 85.2 FL (ref 80–99)
MONOCYTES # BLD AUTO: 0.43 10*3/MM3 (ref 0–1)
MONOCYTES NFR BLD AUTO: 6.9 % (ref 0–12)
NEUTROPHILS # BLD AUTO: 4.01 10*3/MM3 (ref 1.5–8.3)
NEUTROPHILS NFR BLD AUTO: 64.7 % (ref 41–71)
PLATELET # BLD AUTO: 323 10*3/MM3 (ref 150–450)
PMV BLD AUTO: 10.4 FL (ref 6–12)
POTASSIUM BLD-SCNC: 4.2 MMOL/L (ref 3.5–5.5)
POTASSIUM BLD-SCNC: 4.8 MMOL/L (ref 3.5–5.5)
RBC # BLD AUTO: 3.91 10*6/MM3 (ref 3.89–5.14)
SODIUM BLD-SCNC: 137 MMOL/L (ref 132–146)
TSH SERPL DL<=0.05 MIU/L-ACNC: 1.18 MIU/ML (ref 0.35–5.35)
WBC NRBC COR # BLD: 6.2 10*3/MM3 (ref 3.5–10.8)

## 2019-01-16 PROCEDURE — 93306 TTE W/DOPPLER COMPLETE: CPT | Performed by: INTERNAL MEDICINE

## 2019-01-16 PROCEDURE — 82962 GLUCOSE BLOOD TEST: CPT

## 2019-01-16 PROCEDURE — 80048 BASIC METABOLIC PNL TOTAL CA: CPT | Performed by: NURSE PRACTITIONER

## 2019-01-16 PROCEDURE — 93010 ELECTROCARDIOGRAM REPORT: CPT | Performed by: INTERNAL MEDICINE

## 2019-01-16 PROCEDURE — 85025 COMPLETE CBC W/AUTO DIFF WBC: CPT | Performed by: NURSE PRACTITIONER

## 2019-01-16 PROCEDURE — 99239 HOSP IP/OBS DSCHRG MGMT >30: CPT | Performed by: INTERNAL MEDICINE

## 2019-01-16 PROCEDURE — 93880 EXTRACRANIAL BILAT STUDY: CPT

## 2019-01-16 PROCEDURE — 99222 1ST HOSP IP/OBS MODERATE 55: CPT | Performed by: PSYCHIATRY & NEUROLOGY

## 2019-01-16 PROCEDURE — 93005 ELECTROCARDIOGRAM TRACING: CPT | Performed by: NURSE PRACTITIONER

## 2019-01-16 PROCEDURE — 83036 HEMOGLOBIN GLYCOSYLATED A1C: CPT | Performed by: NURSE PRACTITIONER

## 2019-01-16 PROCEDURE — 93880 EXTRACRANIAL BILAT STUDY: CPT | Performed by: INTERNAL MEDICINE

## 2019-01-16 PROCEDURE — 84132 ASSAY OF SERUM POTASSIUM: CPT | Performed by: INTERNAL MEDICINE

## 2019-01-16 PROCEDURE — 84443 ASSAY THYROID STIM HORMONE: CPT | Performed by: NURSE PRACTITIONER

## 2019-01-16 PROCEDURE — 99221 1ST HOSP IP/OBS SF/LOW 40: CPT | Performed by: NEUROLOGICAL SURGERY

## 2019-01-16 PROCEDURE — 93306 TTE W/DOPPLER COMPLETE: CPT

## 2019-01-16 RX ORDER — CEFUROXIME AXETIL 250 MG/1
250 TABLET ORAL 2 TIMES DAILY
Qty: 10 TABLET | Refills: 0 | Status: SHIPPED | OUTPATIENT
Start: 2019-01-16 | End: 2019-01-21

## 2019-01-16 RX ADMIN — PANTOPRAZOLE SODIUM 40 MG: 40 TABLET, DELAYED RELEASE ORAL at 05:39

## 2019-01-16 RX ADMIN — GABAPENTIN 800 MG: 400 CAPSULE ORAL at 08:40

## 2019-01-16 RX ADMIN — SODIUM CHLORIDE 125 ML/HR: 9 INJECTION, SOLUTION INTRAVENOUS at 06:25

## 2019-01-16 RX ADMIN — POTASSIUM CHLORIDE 40 MEQ: 750 CAPSULE, EXTENDED RELEASE ORAL at 05:39

## 2019-01-16 RX ADMIN — HYDROCHLOROTHIAZIDE: 25 TABLET ORAL at 08:40

## 2019-01-16 RX ADMIN — SODIUM CHLORIDE, PRESERVATIVE FREE 3 ML: 5 INJECTION INTRAVENOUS at 08:40

## 2019-01-16 RX ADMIN — ATORVASTATIN CALCIUM 10 MG: 10 TABLET, FILM COATED ORAL at 08:40

## 2019-01-16 NOTE — DISCHARGE SUMMARY
[facility] Medicine Services  DISCHARGE SUMMARY    Patient Name: Ruby Pettit  : 1945  MRN: [id number]    Date of Admission: 1/15/2019  Date of Discharge: 2019  Primary Care Physician: Ly Funez MD    Consults     Date and Time Order Name Status Description    2019 0030 Inpatient Neurosurgery Consult Completed     2019 0030 Inpatient Neurology Consult General            Hospital Course     Presenting Problem:   Syncope and collapse [R55]  Syncope and collapse [R55]    Active Hospital Problems    Diagnosis Date Noted   • **Syncope and collapse [R55] 01/15/2019   • UTI (urinary tract infection), bacterial [N39.0, A49.9] 01/15/2019   • Hypertension [I10] 01/15/2019   • T2DM (type 2 diabetes mellitus) (CMS/Formerly [facility]) [E11.9] 01/15/2019   • Neuropathy [G62.9] 01/15/2019   • GERD (gastroesophageal reflux disease) [K21.9] 01/15/2019   • Hypokalemia [E87.6] 01/15/2019   • Lesion of temporal lobe [G93.9] 01/15/2019      Resolved Hospital Problems   No resolved problems to display.          Hospital Course:  Rbuy Pettit is a 73 y.o. female admitted after a syncopal event.    Syncope, likely vasovagal: 72 y/o female presenting with above. Patient not found to have hypotension or hypoglycemic upon arrival. No medication changes were found which might have precipitated this. Echo and carotid US were completed and prelim report was not consistent with cause for syncope. She was fount to have GNR UTI which likely precipitated pain which caused her syncope. She will continue PO ceftin x 5 days at d/c. Can f/u with PCP in 1 week    Abnormal MRI brain: Patient incidentally noted to have abnormal MRI brain with cystic lesion. She was seen by Dr. Pruitt who felt this was likely incidental and required no treatment. Furthermore felt it played no role in her symptoms. She will follow up with repeat MRI brain in 6 months.      Discharge Follow Up Recommendations for labs/diagnostics:    PCP in 1 week   Neurology in 4 weeks.   Dr. Pruitt in 6 months with MRI brain w/ and w/out contrast    Day of Discharge     HPI:  Up in bed with friend at bedside. No complaints. Doing well. Wants to go home.    Review of Systems  Gen- No fevers, chills  CV- No chest pain, palpitations  Resp- No cough, dyspnea  GI- No N/V/D, abd pain    Otherwise ROS is negative except as mentioned in the HPI.    Vital Signs:   Temp:  [97.5 °F (36.4 °C)-98 °F (36.7 °C)] 98 °F (36.7 °C)  Heart Rate:  [62-74] 74  Resp:  [16-18] 16  BP: (117-150)/(54-84) 150/63     Physical Exam:  Constitutional: No acute distress, awake, alert, obese  HENT: NCAT, mucous membranes moist  Respiratory: Clear to auscultation bilaterally, respiratory effort normal   Cardiovascular: RRR, no murmurs, rubs, or gallops, palpable pedal pulses bilaterally  Gastrointestinal: Positive bowel sounds, soft, nontender, nondistended  Musculoskeletal: No bilateral ankle edema  Psychiatric: Appropriate affect, cooperative  Neurologic: Oriented x 3, strength symmetric in all extremities, Cranial Nerves grossly intact to confrontation, speech clear  Skin: No rashes    Pertinent  and/or Most Recent Results     Results from last 7 days   Lab Units 01/16/19  1031 01/16/19  0517 01/15/19  1813   WBC 10*3/mm3  --  6.20 8.55   HEMOGLOBIN g/dL  --  10.2* 11.8   HEMATOCRIT %  --  33.3* 37.3   PLATELETS 10*3/mm3  --  323 401   SODIUM mmol/L  --  137 134   POTASSIUM mmol/L 4.8 4.2 3.2*   CHLORIDE mmol/L  --  108 99   CO2 mmol/L  --  25.6 25.0   BUN mg/dL  --  14 17   CREATININE mg/dL  --  0.81 0.95   GLUCOSE mg/dL  --  135* 169*   CALCIUM mg/dL  --  8.4* 9.1     Results from last 7 days   Lab Units 01/15/19  1813   BILIRUBIN mg/dL 0.4   ALK PHOS U/L 143*   ALT (SGPT) U/L 43*   AST (SGOT) U/L 23           Invalid input(s): TG, LDLCALC, LDLREALC  Results from last 7 days   Lab Units 01/16/19  0517 01/15/19  1809   TSH mIU/mL 1.179  --    HEMOGLOBIN A1C % 7.00*  --    TROPONIN I ng/mL   --  0.00       Brief Urine Lab Results  (Last result in the past 365 days)      Color   Clarity   Blood   Leuk Est   Nitrite   Protein   CREAT   Urine HCG        01/15/19 1831 Yellow Cloudy Negative Small (1+) Positive Trace               Microbiology Results Abnormal     Procedure Component Value - Date/Time    Urine Culture - Urine, Urine, Clean Catch [[id number]]  (Abnormal) Collected:  01/15/19 1831    Lab Status:  Preliminary result Specimen:  Urine, Clean Catch Updated:  01/16/19 1009     Urine Culture >100,000 CFU/mL Gram Negative Bacilli          Imaging Results (all)     Procedure Component Value Units Date/Time    XR Chest 1 View [[id number]] Collected:  01/16/19 0850     Updated:  01/16/19 0918    Narrative:       EXAMINATION: XR CHEST 1 VW- 01/15/2019     INDICATION: Weak/Dizzy/AMS triage protocol     TECHNIQUE:  Single view frontal chest.     COMPARISONS: CT chest 01/07/2019     FINDINGS:  The opacities in the lingula and left lower lobe are not well  delineated. No pleural effusion or pneumothorax. Cardiomediastinal  silhouette is within normal limits.       Impression:       Known opacities in the left lower lobe and lingula are not  well visualized.     D:  01/15/2019  E:  01/16/2019     This report was finalized on 1/16/2019 9:16 AM by Wilbert Vallecillo.       MRI Brain With & Without Contrast [[id number]] Collected:  01/15/19 2100     Updated:  01/15/19 2157    Narrative:       EXAM:    MR Head Without and With Contrast     EXAM DATE/TIME:    1/15/2019 9:00 PM     CLINICAL HISTORY:    73 years old, female; Type 2 diabetes mellitus with other specified   complication; Obesity, unspecified; Disorder of brain, unspecified; Essential   (primary) hypertension; Urinary tract infection, site not specified; Syncope   and collapse; Signs and symptoms and abnormal findings; Abnormal radiologic   findings of head/skull; Intracranial mass / space-occupying lesion; Dizziness   and syncope and collapse and other: Nausea,  headache; Patient HX: Possible   abnormality on CT head, syncopal episode today, headache, nausea, dizziness   creat 0.9 gfr 58 18 ml multihance; Additional info: Left temporal lesion on CT     TECHNIQUE:    MR of the head without and with intravenous contrast.     CONTRAST:    18 ml of Multihance administered intravenously.     COMPARISON:    CT HEAD WO CONTRAST 1/15/2019 7:00 PM     FINDINGS:    Brain:  1.5 x 1.3 cm cystic focus in the left temporal lobe with minimal   surrounding edema. No associated contrast enhancement. Enhancing 1.7 cm   extra-axial lesion along the right tentorium suggestive for meningioma. No   acute infarction. No extra-axial collection. There are minimal periventricular   and subcortical areas of T2/flair high signal consistent with chronic small   vessel ischemic disease.    Ventricles: Normal. No ventriculomegaly.    Bones/joints:  Disc bulge at C4-5 appears to indent the ventral spinal cord.    Soft tissues: Normal.    Sinuses: Normal as visualized. No acute sinusitis.    Mastoid air cells: Normal as visualized. No mastoid effusion.    Orbits: Unremarkable.       Impression:       Nonspecific nonenhancing cystic focus in the left cerebellum with mild adjacent   edema. Although chronic infarction is possible, lack of volume loss   demonstrated by the temporal horn raises possibility of cystic lesion.   Recommend repeat imaging in 3 months    THIS DOCUMENT HAS BEEN ELECTRONICALLY SIGNED BY POLO AREVALO MD    CT Head Without Contrast [[id number]] Collected:  01/15/19 1901     Updated:  01/15/19 1922    Addenda:        THIS REPORT CONTAINS FINDINGS THAT MAY BE CRITICAL TO PATIENT CARE. The   findings were verbally communicated via telephone conference with Omar Pierce   at 7:22 PM EST on 1/15/2019. The findings were acknowledged and   understood.    THIS DOCUMENT HAS BEEN ELECTRONICALLY SIGNED BY POLO AREVALO MD  Signed:  01/15/19 1922 by Polo Arevalo MD            Report should  read:    Brain:  15 mm hypodensity left temporal lobe. No visualized hemorrhage. No     midline shift.    THIS DOCUMENT HAS BEEN ELECTRONICALLY SIGNED BY POLO AREVALO MD  Signed:  01/15/19 1920 by Polo Arevalo MD    Narrative:       EXAM:    CT Head Without Contrast     EXAM DATE/TIME:    1/15/2019 7:01 PM     CLINICAL HISTORY:    73 years old, female; Signs and symptoms; Other: See notes; Additional info:   Headache with dizziness and syncope     TECHNIQUE:    Axial computed tomography images of the head/brain without contrast.    All CT scans at this facility use at least one of these dose optimization   techniques: automated exposure control; mA and/or kV adjustment per patient   size (includes targeted exams where dose is matched to clinical indication); or   iterative reconstruction.     COMPARISON:    No relevant prior studies available.     FINDINGS:    Brain:  15 mm hypodensity left temporal lobe. Otherwise normal in height and   alignment. No visualized hemorrhage    Ventricles: Normal. No ventriculomegaly.    Bones/joints: Normal. No acute fracture.    Sinuses: Normal as visualized. No acute sinusitis.    Mastoid air cells: Normal as visualized. No mastoid effusion.    Soft tissues: Normal.       Impression:       Nonspecific hypodensity in the left temporal lobe. Although possibly related to   encephalomalacia/chronic infarction, no definite volume loss is seen of the   adjacent left temporal horn. Recommend MRI brain with gadolinium to further   evaluate.    THIS DOCUMENT HAS BEEN ELECTRONICALLY SIGNED BY POLO AREVALO MD                    Results for orders placed during the hospital encounter of 09/28/16   Adult Transthoracic Echo Complete    Narrative · Left ventricular function is normal. Estimated EF = 65%.  · Mild mitral valve regurgitation is present  · RVSP(TR) 35.3 mmHg           Order Current Status    Urine Culture - Urine, Urine, Clean Catch Preliminary result        Discharge Details         Discharge Medications      New Medications      Instructions Start Date   cefuroxime 250 MG tablet  Commonly known as:  CEFTIN   250 mg, Oral, 2 Times Daily         Continue These Medications      Instructions Start Date   amLODIPine 2.5 MG tablet  Commonly known as:  NORVASC   2.5 mg, Oral, Nightly      aspirin 81 MG EC tablet   81 mg, Oral, Nightly      cetirizine 10 MG tablet  Commonly known as:  zyrTEC   10 mg, Oral, Nightly      citalopram 20 MG tablet  Commonly known as:  CeleXA   20 mg, Oral, Nightly      gabapentin 800 MG tablet  Commonly known as:  NEURONTIN   800 mg, Oral, 2 Times Daily      glimepiride 1 MG tablet  Commonly known as:  AMARYL   1 mg, Oral, Daily With Lunch, Daily at noon       HYDROcodone-acetaminophen 5-325 MG per tablet  Commonly known as:  NORCO   1 tablet, Oral, Nightly      hydrOXYzine 25 MG tablet  Commonly known as:  ATARAX   25-50 mg, Oral, Nightly      levothyroxine 50 MCG tablet  Commonly known as:  SYNTHROID, LEVOTHROID   50 mcg, Oral, Nightly      losartan-hydrochlorothiazide 100-25 MG per tablet  Commonly known as:  HYZAAR   1 tablet, Oral, Nightly      lovastatin 20 MG tablet  Commonly known as:  MEVACOR   20 mg, Oral, Nightly      Milk Thistle 175 MG capsule   1 capsule, Oral, Daily      omeprazole 20 MG capsule  Commonly known as:  priLOSEC   20 mg, Oral, 2 Times Daily             No Known Allergies      Discharge Disposition: Home      Discharge Diet:  Diet Order   Procedures   • Diet Regular; Cardiac, Consistent Carbohydrate         Discharge Activity: As tolreated      CODE STATUS:    Code Status and Medical Interventions:   Ordered at: 01/15/19 2127     Code Status:    CPR     Medical Interventions (Level of Support Prior to Arrest):    Full         No future appointments.        Time Spent on Discharge: 45 minutes    Electronically signed by Coral Escobar II, DO, 01/16/19, 2:38 PM.

## 2019-01-16 NOTE — CONSULTS
Referring Provider: Dr. Escobar  Reason for Consultation: Syncope    Patient Care Team:  Ly Funez MD as PCP - General    Chief complaint syncope    Subjective .     History of present illness:  73-year-old right-handed woman with diabetes and peripheral neuropathy to the ED yesterday after syncope while having dinner at [employer].  She developed a frontal headache that builds over couple of minutes and with that then noted nausea and dizziness and lightheadedness.  The headache improved but she remained nauseated and was afraid she might vomit so she stood up to walk to the bathroom and as she walked felt very lightheaded and nauseated.  She held onto the wall and then leaned against it and slid down and lost consciousness.  She was reportedly aroused easily by witnesses at the scene, and had no convulsion.  She reports she had no prolonged alteration of consciousness and remembers events after she came to.  She had no chest pain, unilateral weakness or incoordination.  The dizziness was not a spinning sensation but more lightheaded.  She recalls having 1 episode of syncope sometime in the past few years.  She reports she occasionally feels lightheaded on standing but not frequently.  She had no tongue biting or incontinence with this event.    Review of Systems  Pertinent items are noted in HPI, all other systems reviewed and negative     History  Past Medical History:   Diagnosis Date   • Anxiety and depression    • Arthritis    • Disease of thyroid gland    • GERD (gastroesophageal reflux disease)    • Head injury due to trauma 1992    fell down stairs    • History of transfusion    • Hypertension    • Liver disorder     surgery 1998 approx    • Peripheral neuropathic pain    • T2DM (type 2 diabetes mellitus) (CMS/HCC)    • Wears eyeglasses    ,   Past Surgical History:   Procedure Laterality Date   • ABDOMINAL SURGERY     • APPENDECTOMY     • BRONCHOSCOPY N/A 2/13/2018    Procedure:  BRONCHOSCOPY WITH SANDRITA ENDOBRONCHIAL ULTRASOUND WITH FLUORO;  Surgeon: Dixon Fatima MD;  Location: [facility] ENDOSCOPY;  Service:    • CHOLECYSTECTOMY     • COLONOSCOPY     • HYSTERECTOMY      2001   • LIVER RESECTION     • OOPHORECTOMY Bilateral     2001   • TUBAL ABDOMINAL LIGATION     ,   Family History   Problem Relation Age of Onset   • Ovarian cancer Mother 19   • Emphysema Father    • Breast cancer Neg Hx    ,   Social History     Tobacco Use   • Smoking status: Never Smoker   • Smokeless tobacco: Never Used   Substance Use Topics   • Alcohol use: Yes     Alcohol/week: 0.0 - 1.2 oz   • Drug use: No   ,   Medications Prior to Admission   Medication Sig Dispense Refill Last Dose   • amLODIPine (NORVASC) 2.5 MG tablet Take 2.5 mg by mouth Every Night.   1/14/2019 at Unknown time   • aspirin 81 MG EC tablet Take 81 mg by mouth Every Night.   1/14/2019 at Unknown time   • cetirizine (zyrTEC) 10 MG tablet Take 10 mg by mouth Every Night.   1/14/2019 at Unknown time   • citalopram (CeleXA) 20 MG tablet Take 20 mg by mouth Every Night.   1/14/2019 at Unknown time   • gabapentin (NEURONTIN) 800 MG tablet Take 800 mg by mouth 2 (Two) Times a Day.   1/15/2019 at Unknown time   • glimepiride (AMARYL) 1 MG tablet Take 1 mg by mouth Daily With Lunch. Daily at noon   1/15/2019 at Unknown time   • HYDROcodone-acetaminophen (NORCO) 5-325 MG per tablet Take 1 tablet by mouth Every Night.   1/14/2019 at Unknown time   • hydrOXYzine (ATARAX) 25 MG tablet Take 25-50 mg by mouth Every Night.   1/14/2019 at Unknown time   • levothyroxine (SYNTHROID, LEVOTHROID) 50 MCG tablet Take 50 mcg by mouth Every Night.   1/14/2019 at Unknown time   • losartan-hydrochlorothiazide (HYZAAR) 100-25 MG per tablet Take 1 tablet by mouth Every Night.   1/14/2019 at Unknown time   • lovastatin (MEVACOR) 20 MG tablet Take 20 mg by mouth Every Night.   1/14/2019 at Unknown time   • Milk Thistle 175 MG capsule Take 1 capsule by mouth Daily.    "1/14/2019 at Unknown time   • omeprazole (priLOSEC) 20 MG capsule Take 20 mg by mouth 2 (Two) Times a Day.   1/15/2019 at Unknown time   , Scheduled Meds:    amLODIPine 2.5 mg Oral Nightly   atorvastatin 10 mg Oral Daily   ceftriaxone 1 g Intravenous Nightly   cetirizine 10 mg Oral Nightly   citalopram 20 mg Oral Nightly   gabapentin 800 mg Oral Q12H   hydrOXYzine 25 mg Oral Nightly   insulin lispro 0-9 Units Subcutaneous 4x Daily With Meals & Nightly   levothyroxine 50 mcg Oral Nightly   losartan-HCTZ (HYZAAR) 100-25 combo dose  Oral Daily   pantoprazole 40 mg Oral QAM   sodium chloride 3 mL Intravenous Q12H   , Continuous Infusions:    Sodium chloride 125 mL/hr Last Rate: 125 mL/hr (01/16/19 0841)   , PRN Meds:  dextrose  •  dextrose  •  glucagon (human recombinant)  •  ondansetron **OR** ondansetron  •  potassium chloride **OR** potassium chloride **OR** potassium chloride  •  sodium chloride  •  sodium chloride and Allergies:  Patient has no known allergies.    Objective     Vital Signs   Blood pressure 150/63, pulse 74, temperature 98 °F (36.7 °C), temperature source Oral, resp. rate 16, height 162.6 cm (64\"), weight 86.7 kg (191 lb 3.2 oz), SpO2 94 %, not currently breastfeeding.    Physical Exam:   Well-developed elderly white woman in no acute distress, alert and fully oriented, with normal language, attention, memory and fund of knowledge.  She has no dysarthria.  2.5 mm and reactive, visual fields full to confrontation, no papilledema appreciated, extraocular movements intact, normal facial sensation and movement, hearing intact to voice, palate and shoulders elevate symmetrically and tongue protrudes midline  Motor: 5/5 throughout with no pronator drift  Muscle tone and coordination are normal in upper and lower extremities  Vibration is present although diminished in the toes and light touch is symmetric  Gait is normal  Neck supple, no carotid bruit appreciated  Heart RRR no murmur appreciated  Abdomen " soft, NT, ND with positive bowel sounds    Results Review:   I reviewed the patient's new clinical results.  I reviewed the patient's new imaging results and agree with the interpretation.  I reviewed the patient's other test results and agree with the interpretation  Head CT and brain MRI images reviewed, agree small cyst and left temporal lobe, and small probable meningioma around the tentorium on the right  Labs reviewed, BMP unremarkable except glucose 135, white cell count 6.2 H&H 10.2 and 33, platelets 323, A1c 7, TSH 1.179, UA with small leukocyte esterase and nitrite positive, culture plus Escherichia coli    Assessment/Plan       Syncope and collapse    UTI (urinary tract infection), bacterial    Hypertension    T2DM (type 2 diabetes mellitus) (CMS/HCC)    Neuropathy    GERD (gastroesophageal reflux disease)    Hypokalemia    Lesion of temporal lobe      I agree this event is highly consistent with syncope without features to suggest primary seizure.  Discussed with patient that pain from the headache may have been a factor in causing a vasovagal event and discussed symptoms of that and how to avoid syncope in the future.  I note neurosurgery plan for follow-up scan.  Patient okay for discharge home from neurology perspective.    I discussed the patients findings and my recommendations with patient and primary care team    Carmen Holcomb MD  01/16/19  3:53 PM

## 2019-01-16 NOTE — H&P
"    [facility] Medicine Services  HISTORY AND PHYSICAL    Patient Name: Ruby Pettit  : 1945  MRN: [id number]  Primary Care Physician: Ly Funez MD  Date of admission: 1/15/2019      Subjective   Subjective     Chief Complaint:  Loss of consciousness    HPI:  Ruby Pettit is a 73 y.o. female who was in [place]; had headache then nausea.  Went to bathroom and friend thought she looked odd.  Tried to go to bathroom.  [place] commotion and found people gathered around her.  Friend said she was twitching but no hx of sz.  Pt remembered going to bathroom but no other details.  No loss of control of bowel or bladder.  Still feels nauseated.  No dysuria or frequency.  Does note ankle swelling for last week.  Pt notes memory impairment over last few wks.  LOC very short \"maybe 2 minutes\".  Still feels nauseated but not dizzy.  Some light-headedness.  No cp or palpitations.    Pt does note 6 month hx of being significantly short of breath limiting even her usual house hold activities.  This was noted at same time she notes memory loss.     Review of Systems      Otherwise 10-system ROS reviewed and is negative except as mentioned in the HPI.    Personal History     Past Medical History:   Diagnosis Date   • Anxiety and depression    • Arthritis    • Disease of thyroid gland    • GERD (gastroesophageal reflux disease)    • Head injury due to trauma     fell down stairs    • History of transfusion    • Hypertension    • Liver disorder     surgery  approx    • Peripheral neuropathic pain    • T2DM (type 2 diabetes mellitus) (CMS/HCC)    • Wears eyeglasses        Past Surgical History:   Procedure Laterality Date   • ABDOMINAL SURGERY     • APPENDECTOMY     • BRONCHOSCOPY N/A 2018    Procedure: BRONCHOSCOPY WITH SANDRITA ENDOBRONCHIAL ULTRASOUND WITH FLUORO;  Surgeon: Dixon Fatima MD;  Location: [facility] ENDOSCOPY;  Service:    • CHOLECYSTECTOMY     • COLONOSCOPY     • " HYSTERECTOMY      2001   • LIVER RESECTION     • OOPHORECTOMY Bilateral     2001   • TUBAL ABDOMINAL LIGATION         Family History: family history includes Emphysema in her father; Ovarian cancer (age of onset: 19) in her mother. Otherwise pertinent FHx was reviewed and unremarkable.     Social History:  reports that  has never smoked. she has never used smokeless tobacco. She reports that she drinks alcohol. She reports that she does not use drugs.  Social History     Social History Narrative    Nonsmoker    Has been exposed to secondhand smoke        Has 3 children    No regular alcohol use       Medications:    Available home medication information reviewed.    (Not in a hospital admission)    No Known Allergies    Objective   Objective     Vital Signs:   Temp:  [97.5 °F (36.4 °C)] 97.5 °F (36.4 °C)  Heart Rate:  [73] 73  Resp:  [17] 17  BP: (120)/(57) 120/57    O2; 89-92% RA    Physical Exam   Gen; alert, oriented, nad  Heent; perrla, eomi, mmm, no facial asymmetry  Cv; rrr, no mrg  L; ctab, no wheeze/crackles  Abd; soft, +bs, ntnd  Ext; no cce, 2+ pulses  Skin; cdi, warm  Neuro; 4+/5 rue motor; 5/5 remainder; cn's grossly intact; ftn intact; no nystagmus; sensation intact  Psych; mood and affect appropriate      Results Reviewed:  I have personally reviewed current lab, radiology, and data and agree.    Results from last 7 days   Lab Units  01/15/19   1813   WBC 10*3/mm3  8.55   HEMOGLOBIN g/dL  11.8   HEMATOCRIT %  37.3   PLATELETS 10*3/mm3  401     Results from last 7 days   Lab Units  01/15/19   1813  01/15/19   1809   SODIUM mmol/L  134   --    POTASSIUM mmol/L  3.2*   --    CHLORIDE mmol/L  99   --    CO2 mmol/L  25.0   --    BUN mg/dL  17   --    CREATININE mg/dL  0.95   --    GLUCOSE mg/dL  169*   --    CALCIUM mg/dL  9.1   --    ALT (SGPT) U/L  43*   --    AST (SGOT) U/L  23   --    TROPONIN I ng/mL   --   0.00     Estimated Creatinine Clearance: 56 mL/min (by C-G formula based on SCr of 0.95  mg/dL).  Brief Urine Lab Results  (Last result in the past 365 days)      Color   Clarity   Blood   Leuk Est   Nitrite   Protein   CREAT   Urine HCG        01/15/19 1831 Yellow Cloudy Negative Small (1+) Positive Trace             No results found for: BNP  Imaging Results (last 24 hours)     Procedure Component Value Units Date/Time    MRI Brain With & Without Contrast [[id number]] Updated:  01/15/19 2129    CT Head Without Contrast [[id number]] Collected:  01/15/19 1901     Updated:  01/15/19 1922    Addenda:        THIS REPORT CONTAINS FINDINGS THAT MAY BE CRITICAL TO PATIENT CARE. The   findings were verbally communicated via telephone conference with Omar Pierce   at 7:22 PM EST on 1/15/2019. The findings were acknowledged and   understood.    THIS DOCUMENT HAS BEEN ELECTRONICALLY SIGNED BY POLO AREVALO MD  Signed:  01/15/19 1922 by Polo Arevalo MD            Report should read:    Brain:  15 mm hypodensity left temporal lobe. No visualized hemorrhage. No     midline shift.    THIS DOCUMENT HAS BEEN ELECTRONICALLY SIGNED BY POOL AREVALO MD  Signed:  01/15/19 1920 by Polo Arevalo MD    Narrative:       EXAM:    CT Head Without Contrast     EXAM DATE/TIME:    1/15/2019 7:01 PM     CLINICAL HISTORY:    73 years old, female; Signs and symptoms; Other: See notes; Additional info:   Headache with dizziness and syncope     TECHNIQUE:    Axial computed tomography images of the head/brain without contrast.    All CT scans at this facility use at least one of these dose optimization   techniques: automated exposure control; mA and/or kV adjustment per patient   size (includes targeted exams where dose is matched to clinical indication); or   iterative reconstruction.     COMPARISON:    No relevant prior studies available.     FINDINGS:    Brain:  15 mm hypodensity left temporal lobe. Otherwise normal in height and   alignment. No visualized hemorrhage    Ventricles: Normal. No ventriculomegaly.    Bones/joints:  Normal. No acute fracture.    Sinuses: Normal as visualized. No acute sinusitis.    Mastoid air cells: Normal as visualized. No mastoid effusion.    Soft tissues: Normal.       Impression:       Nonspecific hypodensity in the left temporal lobe. Although possibly related to   encephalomalacia/chronic infarction, no definite volume loss is seen of the   adjacent left temporal horn. Recommend MRI brain with gadolinium to further   evaluate.    THIS DOCUMENT HAS BEEN ELECTRONICALLY SIGNED BY TANIYA FLORES MD    XR Chest 1 View [[id number]] Updated:  01/15/19 8724        Results for orders placed during the hospital encounter of 09/28/16   Adult Transthoracic Echo Complete    Narrative · Left ventricular function is normal. Estimated EF = 65%.  · Mild mitral valve regurgitation is present  · RVSP(TR) 35.3 mmHg          Assessment/Plan   Assessment / Plan     Active Hospital Problems    Diagnosis Date Noted   • **Syncope and collapse [R55] 01/15/2019   • UTI (urinary tract infection), bacterial [N39.0, A49.9] 01/15/2019   • Hypertension [I10] 01/15/2019   • T2DM (type 2 diabetes mellitus) (CMS/[facility]) [E11.9] 01/15/2019   • Neuropathy [G62.9] 01/15/2019   • GERD (gastroesophageal reflux disease) [K21.9] 01/15/2019   • Hypokalemia [E87.6] 01/15/2019   • Lesion of temporal lobe [G93.9] 01/15/2019     MRI pending, CT w/ abnormal findings           Assessment & Plan    **Sycnope and Collapse; SUSPECT THIS MAY BE MULTIFACTORIAL W/ UTI, DYSPNEA NOW W/ HYPOXIA IN ER, AND ?TEMPORAL LOBE FINDINGS.  WILL ADDRESS EACH.  AWAIT MRI RESULTS AND CONSULT ?NEURO/NS PENDING RESULTS.  AGREE W/ ECHO REGARDLESS GIVEN DYSPNEA/HYPOXIA.    -CT head obtained; abnormal findings  -MRI Brain pending  -labs reviewed  -bilateral carotid duplex ordered  -ECHO ordered for am  -fall precautions  -neuro checks every 4 hours  -telemetry  -trend EKG  -neurology consult in am    **UTI; SEE ABOVE  -cx pending  -Rocephin pending  -pt  asymptomatic    **Hypokalemia; REPLACE. MG WNL.  -magnesium normal  -electrolyte replacement ordered  -repeat bmp in am    **Lesion on temporal lobe (15mm hypodensity)SEE ABOVE.  WILL F/U RESULTS AND CONSULT APPROPRIATELY.  CONCERNED GIVEN HEADACHES, ??SZ ACTIVITY, RUE WEAKNESS, NAUSEA.  -MRI pending  -Neuro to see in am  -neuro checks, no neurological deficits on exam  -pt does report recent subtle memory changes and improvement in her vision??    **DYSPNEA/HYPOXIA;   AWAITING OFFICIAL CXR RESULTS.  AGREE W/ ECHO AND WOULD PROB BENEFIT FROM CCT GIVEN DURATION TENZIN IF TEMPORAL LOBE FINDINGS ARE ?MASS.  NO HX OF TOBACCO ABUSE BUT DOES HAVE HX OF ASTHMA.      DVT prophylaxis:  AWAIT MRI RESULTS    CODE STATUS:    Code Status and Medical Interventions:   Ordered at: 01/15/19 2127     Code Status:    CPR     Medical Interventions (Level of Support Prior to Arrest):    Full       Admission Status:  I believe this patient meets INPATIENT status due to the need for care which can only be reasonably provided in an hospital setting such as possible need for aggressive/expedited ancillary services and/or consultation services, IV medications, close physician monitoring, and/or procedures.  In such, I feel patient’s risk for adverse outcomes and need for care warrant INPATIENT evaluation and predict the patient’s care encounter to likely last beyond 2 midnights.    Electronically signed by EMMIE You, 01/15/19, 9:30 PM.

## 2019-01-16 NOTE — CONSULTS
NEUROSURGERY CONSULT    Referring Provider: No ref. provider found  Reason for Consultation: Left temporal cystic lesion    Patient Care Team:  Ly Funez MD as PCP - General    Chief complaint: Fainting episode    Subjective .     History of present illness:  The patient is a 73-year-old woman who was having supper yesterday evening at [place] when she developed an abrupt frontal headache followed quickly by dizziness.  She stood and tried to walk to the restroom, became more dizzy and unsteady and had an episode of syncope apparently sinking to the ground.  The next she remembers is someone asking her if she was all right.  She was nauseated throughout the night.  Dizziness improved more rapidly than the nausea.  She did not have any residual headache.  She was admitted to the hospital for observation.  CT scan of the head showed a left temporal anterior cystic lesion, an MRI scan of the head was done.  She is a diabetic, but has not been noted to have hypoglycemia.    Results Review:   MRI scan of the head shows a 13 x 15 mm cystic finding in the left anterior temporal lobe just in front of the temporal horn tip without significant mass effect at all.  She is also found to have an incidental 1.7 cm enhancing lesion on the edge of the right tentorium at the incise dura that is most likely a benign small meningioma.    Review of Systems  Pertinent items are noted in HPI, all other systems reviewed.    History  Past Medical History:   Diagnosis Date   • Anxiety and depression    • Arthritis    • Disease of thyroid gland    • GERD (gastroesophageal reflux disease)    • Head injury due to trauma 1992    fell down stairs    • History of transfusion    • Hypertension    • Liver disorder     surgery 1998 approx    • Peripheral neuropathic pain    • T2DM (type 2 diabetes mellitus) (CMS/HCC)    • Wears eyeglasses    ,   Past Surgical History:   Procedure Laterality Date   • ABDOMINAL SURGERY     • APPENDECTOMY      • BRONCHOSCOPY N/A 2/13/2018    Procedure: BRONCHOSCOPY WITH SANDRITA ENDOBRONCHIAL ULTRASOUND WITH FLUORO;  Surgeon: Dixon Fatima MD;  Location: Replaced by [facility] ENDOSCOPY;  Service:    • CHOLECYSTECTOMY     • COLONOSCOPY     • HYSTERECTOMY      2001   • LIVER RESECTION     • OOPHORECTOMY Bilateral     2001   • TUBAL ABDOMINAL LIGATION     ,   Family History   Problem Relation Age of Onset   • Ovarian cancer Mother 19   • Emphysema Father    • Breast cancer Neg Hx    ,   Social History     Tobacco Use   • Smoking status: Never Smoker   • Smokeless tobacco: Never Used   Substance Use Topics   • Alcohol use: Yes     Alcohol/week: 0.0 - 1.2 oz   • Drug use: No   ,   Medications Prior to Admission   Medication Sig Dispense Refill Last Dose   • amLODIPine (NORVASC) 2.5 MG tablet Take 2.5 mg by mouth Every Night.   1/14/2019 at Unknown time   • aspirin 81 MG EC tablet Take 81 mg by mouth Every Night.   1/14/2019 at Unknown time   • cetirizine (zyrTEC) 10 MG tablet Take 10 mg by mouth Every Night.   1/14/2019 at Unknown time   • citalopram (CeleXA) 20 MG tablet Take 20 mg by mouth Every Night.   1/14/2019 at Unknown time   • gabapentin (NEURONTIN) 800 MG tablet Take 800 mg by mouth 2 (Two) Times a Day.   1/15/2019 at Unknown time   • glimepiride (AMARYL) 1 MG tablet Take 1 mg by mouth Daily With Lunch. Daily at noon   1/15/2019 at Unknown time   • HYDROcodone-acetaminophen (NORCO) 5-325 MG per tablet Take 1 tablet by mouth Every Night.   1/14/2019 at Unknown time   • hydrOXYzine (ATARAX) 25 MG tablet Take 25-50 mg by mouth Every Night.   1/14/2019 at Unknown time   • levothyroxine (SYNTHROID, LEVOTHROID) 50 MCG tablet Take 50 mcg by mouth Every Night.   1/14/2019 at Unknown time   • losartan-hydrochlorothiazide (HYZAAR) 100-25 MG per tablet Take 1 tablet by mouth Every Night.   1/14/2019 at Unknown time   • lovastatin (MEVACOR) 20 MG tablet Take 20 mg by mouth Every Night.   1/14/2019 at Unknown time   • Milk Thistle 175  "MG capsule Take 1 capsule by mouth Daily.   1/14/2019 at Unknown time   • omeprazole (priLOSEC) 20 MG capsule Take 20 mg by mouth 2 (Two) Times a Day.   1/15/2019 at Unknown time    and Allergies:  Patient has no known allergies.    Objective     Vital Signs   Blood pressure 140/75, pulse 64, temperature 98 °F (36.7 °C), temperature source Oral, resp. rate 18, height 162.6 cm (64\"), weight 86.7 kg (191 lb 3.2 oz), SpO2 94 %, not currently breastfeeding.    Physical Exam:  NEUROLOGICAL EXAMINATION:      MENTAL STATUS:  Alert and oriented.  Speech intact.  Recent and remote memory intact.      CRANIAL NERVES:  Cranial nerve II:  Visual fields are full to confrontation.  Cranial nerves III, IV and VI:  PERRLADC.  Extraocular movements are intact.  Nystagmus is not present.  Cranial nerve V:  Facial sensation is intact to light touch.  Cranial nerve VII:  Muscles of facial expression reveal no asymmetry.  Cranial nerve VIII:  Hearing is intact to finger rub bilaterally.  Cranial nerves IX and X:  Palate elevates symmetrically.  Cranial nerve XI:  Shoulder shrug is intact.  Cranial nerve XII:  Tongue is midline without evidence of atrophy or fasciculation.    MUSCULOSKELETAL:  No scalp or facial trauma.    MOTOR:  Deltoid, biceps, triceps, and  strength intact.  No hand atrophy.  Hip flexion, knee extension, ankle dorsiflexion and plantar flexion intact. No tremor, spasticity, ataxia, or dysmetria.    SENSATION:  Intact to touch upper and lower extremities.  Position sense intact.    REFLEXES:  DTR intact and symmetrical in upper and lower extremities.Babinski negative bilaterally.      Assessment/Plan     DIAGNOSIS: Syncopal episode, undetermined etiology.  Incidental left anterior temporal lobe cyst measuring 15 mm in size.  It also incidental oblong 1.7 cm probable meningioma on the edge of the right tentorium without mass effect on the brainstem.  The cyst and benign tumor findings are unrelated to the symptoms " of headache, dizziness, nausea, or syncope which she experienced.    RECOMMENDATION: Follow-up MRI scan of the head with and without contrast in 6 months.  Currently there is no need to treat these lesions as they are not responsible for her symptoms.    Juan Pruitt MD  01/16/19  10:07 AM

## 2019-01-16 NOTE — PROGRESS NOTES
Patient underwent a repeat CT scan of the chest.  The previously noted abnormalities in the left base appear more prominent and there is a new, peripheral solid nodule.  The soft nodule in the left base was present on prior scans but the solid more peripheral nodule is new in comparison to prior scan.    There have been some patchy subtle groundglass changes on the right which I don't see on the current scan.  I still think these are probably inflammatory insults, possibly reflux and aspiration but malignancy or a respiratory tract infection cannot be excluded.  A PET scan could be considered but would not differentiate between an inflammatory or malignant process.    Like to see her in the office to discuss her CAT scan results and consider a bronchoscopy with at least a BAL and possibly biopsy, as well.

## 2019-01-16 NOTE — PLAN OF CARE
Problem: Patient Care Overview  Goal: Plan of Care Review  Outcome: Ongoing (interventions implemented as appropriate)  Pt states feeling better, no complaints, pain free

## 2019-01-16 NOTE — PROGRESS NOTES
Discharge Planning Assessment  [facility]     Patient Name: Ruby Pettit  MRN: [id number]  Today's Date: 1/16/2019    Admit Date: 1/15/2019    Discharge Needs Assessment     Row Name 01/16/19 0948       Living Environment    Lives With  child(amalia), adult    Name(s) of Who Lives With Patient  Son, Geio Angelucci lives with patient    Current Living Arrangements  home/apartment/condo    Primary Care Provided by  self    Provides Primary Care For  no one    Family Caregiver if Needed  child(amalia), adult    Family Caregiver Names  Son, Geio Angelucci    Quality of Family Relationships  supportive;involved    Able to Return to Prior Arrangements  yes    Living Arrangement Comments  Lives in duplex with bed and bath n first floor.  Son, Geio Angelucci lives with patient and assists with housework, laundry, and shopping       Resource/Environmental Concerns    Resource/Environmental Concerns  none    Transportation Concerns  car, none       Transition Planning    Patient/Family Anticipates Transition to  home with family    Patient/Family Anticipated Services at Transition  none    Transportation Anticipated  family or friend will provide       Discharge Needs Assessment    Readmission Within the Last 30 Days  no previous admission in last 30 days    Concerns to be Addressed  no discharge needs identified    Equipment Currently Used at Home  glucometer    Anticipated Changes Related to Illness  none    Equipment Needed After Discharge  none        Discharge Plan     Row Name 01/16/19 0950       Plan    Plan  Plan is home at discharge    Patient/Family in Agreement with Plan  yes    Plan Comments  Met with patient at bedside.  Son lives with her and assists as needed.  No discharge needs    Final Discharge Disposition Code  01 - home or self-care        Destination      No service coordination in this encounter.      Durable Medical Equipment      No service coordination in this encounter.      Dialysis/Infusion      No  service coordination in this encounter.      Home Medical Care      No service coordination in this encounter.      Community Resources      No service coordination in this encounter.          Demographic Summary     Row Name 01/16/19 0947       General Information    Admission Type  observation    Arrived From  emergency department    Referral Source  admission list    Reason for Consult  discharge planning    Preferred Language  English        Functional Status     Row Name 01/16/19 0947       Functional Status    Usual Activity Tolerance  moderate    Current Activity Tolerance  moderate    Functional Status Comments  Independent with ADL       Functional Status, IADL    Medications  independent    Meal Preparation  independent    Housekeeping  assistive person    Laundry  assistive person    Shopping  assistive person    IADL Comments  Son, Geio Angelucci        Psychosocial    No documentation.       Abuse/Neglect    No documentation.       Legal    No documentation.       Substance Abuse    No documentation.       Patient Forms    No documentation.           Stacey Pastrana RN

## 2019-01-16 NOTE — PLAN OF CARE
Problem: Patient Care Overview  Goal: Plan of Care Review  Outcome: Ongoing (interventions implemented as appropriate)   01/16/19 0327   Coping/Psychosocial   Plan of Care Reviewed With patient   Plan of Care Review   Progress improving     Goal: Interprofessional Rounds/Family Conf  Outcome: Ongoing (interventions implemented as appropriate)      Problem: Syncope (Adult)  Goal: Identify Related Risk Factors and Signs and Symptoms  Outcome: Ongoing (interventions implemented as appropriate)    Goal: Physical Safety/Health Maintenance  Outcome: Ongoing (interventions implemented as appropriate)    Goal: Optimal Emotional/Functional [place]  Outcome: Ongoing (interventions implemented as appropriate)      Problem: Fall Risk (Adult)  Goal: Identify Related Risk Factors and Signs and Symptoms  Outcome: Ongoing (interventions implemented as appropriate)    Goal: Absence of Fall  Outcome: Ongoing (interventions implemented as appropriate)

## 2019-01-17 LAB — BACTERIA SPEC AEROBE CULT: ABNORMAL

## 2019-01-29 ENCOUNTER — OFFICE VISIT (OUTPATIENT)
Dept: NEUROLOGY | Facility: CLINIC | Age: 74
End: 2019-01-29

## 2019-01-29 VITALS
HEIGHT: 64 IN | HEART RATE: 81 BPM | DIASTOLIC BLOOD PRESSURE: 74 MMHG | WEIGHT: 190 LBS | OXYGEN SATURATION: 98 % | BODY MASS INDEX: 32.44 KG/M2 | SYSTOLIC BLOOD PRESSURE: 110 MMHG

## 2019-01-29 DIAGNOSIS — R55 SYNCOPE AND COLLAPSE: Primary | ICD-10-CM

## 2019-01-29 PROCEDURE — 99215 OFFICE O/P EST HI 40 MIN: CPT | Performed by: PSYCHIATRY & NEUROLOGY

## 2019-01-29 RX ORDER — METFORMIN HYDROCHLORIDE 500 MG/1
500 TABLET, EXTENDED RELEASE ORAL
COMMUNITY
End: 2019-03-15

## 2019-01-29 NOTE — PROGRESS NOTES
Subjective:    CC: Ruby Pettit is seen today in consultation at the request of Ly Funez MD for Syncope       HPI:  73-year-old female with a history of hypertension, hyperlipidemia, diabetes mellitus type 2, anxiety, depression, arthritis presents as a hospital follow-up for syncope.  As per patient she had an episode of passing out on 1/15.  Patient was out eating lunch with her friend when she had abdominal pain and went to use the restroom.  She then felt lightheaded and passed out.  She was tremulous when she woke up but denies any shaking.  She was also back to her baseline right away and did not have any tongue bite, bowel/bladder incontinence.  She was brought to the [facility] where she had extensive testing including a CT head and MRI brain that showed a cyst in the left temporal region but no acute intracranial abnormalities.  Carotid Doppler revealed 0-49% stenosis bilaterally and echocardiogram showed an EF of 65% with mild mitral and tricuspid regurg but no PFO.  She was also found to have a UTI and treated for the same.  Her A1c in the hospital was 7.  Patient says she has had a similar episode a few years ago again where she had abdominal pain and an episode of passing out when she went to use the bathroom.  In addition she has episodes of lightheadedness with prolonged standing or walking.    Of note-I personally reviewed her MRI brain, ER notes and Dr. Dr. Holcomb's note as follows    History of present illness:  73-year-old right-handed woman with diabetes and peripheral neuropathy to the ED yesterday after syncope while having dinner at [employer].  She developed a frontal headache that builds over couple of minutes and with that then noted nausea and dizziness and lightheadedness.  The headache improved but she remained nauseated and was afraid she might vomit so she stood up to walk to the bathroom and as she walked felt very lightheaded and nauseated.  She held onto the  wall and then leaned against it and slid down and lost consciousness.  She was reportedly aroused easily by witnesses at the scene, and had no convulsion.  She reports she had no prolonged alteration of consciousness and remembers events after she came to.  She had no chest pain, unilateral weakness or incoordination.  The dizziness was not a spinning sensation but more lightheaded.  She recalls having 1 episode of syncope sometime in the past few years.  She reports she occasionally feels lightheaded on standing but not frequently. She had no tongue biting or incontinence with this event.    The following portions of the patient's history were reviewed today and updated as of 01/29/2019  : allergies, current medications, past family history, past medical history, past social history, past surgical history and problem list  These document will be scanned to patient's chart.      Current Outpatient Medications:   •  amLODIPine (NORVASC) 2.5 MG tablet, Take 2.5 mg by mouth Every Night., Disp: , Rfl:   •  aspirin 81 MG EC tablet, Take 81 mg by mouth Every Night., Disp: , Rfl:   •  cetirizine (zyrTEC) 10 MG tablet, Take 10 mg by mouth Every Night., Disp: , Rfl:   •  citalopram (CeleXA) 20 MG tablet, Take 20 mg by mouth Every Night., Disp: , Rfl:   •  gabapentin (NEURONTIN) 800 MG tablet, Take 800 mg by mouth 2 (Two) Times a Day., Disp: , Rfl:   •  HYDROcodone-acetaminophen (NORCO) 5-325 MG per tablet, Take 1 tablet by mouth Every Night., Disp: , Rfl:   •  hydrOXYzine (ATARAX) 25 MG tablet, Take 25-50 mg by mouth Every Night., Disp: , Rfl:   •  levothyroxine (SYNTHROID, LEVOTHROID) 50 MCG tablet, Take 50 mcg by mouth Every Night., Disp: , Rfl:   •  losartan-hydrochlorothiazide (HYZAAR) 100-25 MG per tablet, Take 1 tablet by mouth Every Night., Disp: , Rfl:   •  lovastatin (MEVACOR) 20 MG tablet, Take 20 mg by mouth Every Night., Disp: , Rfl:   •  metFORMIN ER (GLUCOPHAGE-XR) 500 MG 24 hr tablet, Take 500 mg by mouth  Daily With Breakfast., Disp: , Rfl:   •  Milk Thistle 175 MG capsule, Take 1 capsule by mouth Daily., Disp: , Rfl:   •  omeprazole (priLOSEC) 20 MG capsule, Take 20 mg by mouth 2 (Two) Times a Day., Disp: , Rfl:   •  glimepiride (AMARYL) 1 MG tablet, Take 1 mg by mouth Daily With Lunch. Daily at noon, Disp: , Rfl:    Past Medical History:   Diagnosis Date   • Anxiety and depression    • Arthritis    • Disease of thyroid gland    • GERD (gastroesophageal reflux disease)    • Head injury due to trauma 1992    fell down stairs    • History of transfusion    • Hypertension    • Liver disorder     surgery 1998 approx    • Peripheral neuropathic pain    • T2DM (type 2 diabetes mellitus) (CMS/HCC)    • Wears eyeglasses       Past Surgical History:   Procedure Laterality Date   • ABDOMINAL SURGERY     • APPENDECTOMY     • BRONCHOSCOPY N/A 2/13/2018    Procedure: BRONCHOSCOPY WITH SANDRITA ENDOBRONCHIAL ULTRASOUND WITH FLUORO;  Surgeon: Dixon Fatima MD;  Location: [facility] END[facility]COPY;  Service:    • CHOLECYSTECTOMY     • COLONOSCOPY     • HYSTERECTOMY      2001   • LIVER RESECTION     • OOPHORECTOMY Bilateral     2001   • TUBAL ABDOMINAL LIGATION        Family History   Problem Relation Age of Onset   • Ovarian cancer Mother 19   • Emphysema Father    • Breast cancer Neg Hx       Social History     Socioeconomic History   • Marital status:      Spouse name: Not on file   • Number of children: Not on file   • Years of education: Not on file   • Highest education level: Not on file   Social Needs   • Financial resource strain: Not on file   • Food insecurity - worry: Not on file   • Food insecurity - inability: Not on file   • Transportation needs - medical: Not on file   • Transportation needs - non-medical: Not on file   Occupational History   • Not on file   Tobacco Use   • Smoking status: Never Smoker   • Smokeless tobacco: Never Used   Substance and Sexual Activity   • Alcohol use: Yes     Alcohol/week: 0.0 -  "1.2 oz   • Drug use: No   • Sexual activity: Defer   Other Topics Concern   • Not on file   Social History Narrative    Nonsmoker    Has been exposed to secondhand smoke        Has 3 children    No regular alcohol use     Review of Systems   Constitutional: Positive for fatigue.   Respiratory: Positive for shortness of breath and wheezing.    Cardiovascular: Positive for leg swelling.   Musculoskeletal: Positive for back pain.   Neurological: Positive for light-headedness, numbness and confusion.   Psychiatric/Behavioral: Positive for depressed mood. The patient is nervous/anxious.    All other systems reviewed and are negative.      Objective:    /74 (BP Location: Right arm, Patient Position: Sitting, Cuff Size: Adult)   Pulse 81   Ht 162.6 cm (64\")   Wt 86.2 kg (190 lb)   SpO2 98%   BMI 32.61 kg/m²     Neurology Exam:    General apperance: NAD.  Orthostatics today revealed a 14 point drop in systolic blood pressure on standing up and also made the patient lightheaded    Mental status: Alert, awake and oriented to time place and person.    Recent and Remote memory: Intact.    Attention span and Concentration: Normal.     Language and Speech: Intact- No dysarthria.    Fluency, Naming , Repitition and Comprehension:  Intact    Cranial Nerves:   CN II: Visual fields are full. Intact. Fundi - Normal, No papillederma, Pupils - RAFAEL  CN III, IV and VI: Extraocular movements are intact. Normal saccades.   CN V: Facial sensation is intact.   CN VII: Muscles of facial expression reveal no asymmetry. Intact.   CN VIII: Hearing is intact. Whispered voice intact.   CN IX and X: Palate elevates symmetrically. Intact  CN XI: Shoulder shrug is intact.   CN XII: Tongue is midline without evidence of atrophy or fasciculation.     Ophthalmoscopic exam of optic disc-normal    Motor:  Right UE muscle strength 5/5. Normal tone.     Left UE muscle strength 5/5. Normal tone.      Right LE muscle strength5/5. Normal " tone.     Left LE muscle strength 5/5. Normal tone.      Sensory: Normal light touch, vibration and pinprick sensation bilaterally.    DTRs: 2+ bilaterally in upper and lower extremities.    Babinski: Negative bilaterally.    Co-ordination: Normal finger-to-nose, heel to shin B/L.    Rhomberg: Negative.    Gait: Normal.    Cardiovascular: Regular rate and rhythm without murmur, gallop or rub.    Assessment and Plan:  1. Syncope and collapse  Most likely due to autonomic dysfunction as a result of underlying diabetes.  Her most recent episode could also be precipitated by an underlying UTI  I have asked patient to keep herself very well hydrated and start wearing above-knee moderate compression stockings for increased venous return  I have also asked her to monitor her blood pressure every day and to call her PCP to adjust the dose of her losartan/HCTZ if her blood pressure runs on the lower limit of normal  I will defer starting her on medications for now       Return in about 2 months (around 3/29/2019).     I spent over 45 minutes with the patient face to face out of which over 50% (22.5 minutes) was spent in management, instructions and education regarding her syncope.     Batool Aguila MD

## 2019-03-05 ENCOUNTER — OFFICE VISIT (OUTPATIENT)
Dept: PULMONOLOGY | Facility: CLINIC | Age: 74
End: 2019-03-05

## 2019-03-05 VITALS
DIASTOLIC BLOOD PRESSURE: 60 MMHG | TEMPERATURE: 98 F | SYSTOLIC BLOOD PRESSURE: 148 MMHG | HEIGHT: 64 IN | HEART RATE: 89 BPM | OXYGEN SATURATION: 97 % | WEIGHT: 194 LBS | BODY MASS INDEX: 33.12 KG/M2

## 2019-03-05 DIAGNOSIS — J45.31 MILD PERSISTENT ASTHMA WITH ACUTE EXACERBATION: ICD-10-CM

## 2019-03-05 DIAGNOSIS — Z78.9 NON-SMOKER: ICD-10-CM

## 2019-03-05 DIAGNOSIS — J30.2 SEASONAL AND PERENNIAL ALLERGIC RHINITIS: ICD-10-CM

## 2019-03-05 DIAGNOSIS — J45.998 OTHER ASTHMA: ICD-10-CM

## 2019-03-05 DIAGNOSIS — K21.9 CHRONIC GERD: ICD-10-CM

## 2019-03-05 DIAGNOSIS — Z87.09 H/O EXTRINSIC ASTHMA: ICD-10-CM

## 2019-03-05 DIAGNOSIS — J30.89 SEASONAL AND PERENNIAL ALLERGIC RHINITIS: ICD-10-CM

## 2019-03-05 DIAGNOSIS — R91.8 LUNG NODULES: Primary | ICD-10-CM

## 2019-03-05 PROCEDURE — 99214 OFFICE O/P EST MOD 30 MIN: CPT | Performed by: INTERNAL MEDICINE

## 2019-03-05 NOTE — PROGRESS NOTES
"  PULMONARY  NOTE    Chief Complaint     Abnormal CT scan of the chest, history of asthma, perennial rhinitis    History of Present Illness     74-year-old white female returns today for follow-up.  I last saw her in July 2018.    She has a history of groundglass nodules on prior CT scan.  About a year ago she underwent a bronchoscopy with transbronchial biopsies under navigational assistance.  The results of cultures and biopsies were negative for malignancy or respiratory tract infection.    We had a follow-up CT scan which revealed some interval decrease in some of the semisolid nodules.    Then, on her most recent CT scan, 1 of the soft nodules has enlarged and she has a new solid nodular density in the periphery on the left side.    She returns today for follow-up and to discuss her CT scan.    She has noted some intermittent dyspnea and wheezing.  She has no regular cough or sputum production.  She has had no hemoptysis.    She denies weight loss, fevers, or chills.    She has a history of GERD in the past but no regular reflux symptoms.    She has seasonal sinus drainage and postnasal drip and typically uses Zyrtec.    Patient Active Problem List   Diagnosis   • Migratory Lung nodules (\"Soft\" and solid, bilateral)   • Non-smoker   • H/O asthma   • Cough   • Perennial rhinitis   • Syncope and collapse   • UTI (urinary tract infection), bacterial   • Hypertension   • T2DM (type 2 diabetes mellitus) (CMS/HCC)   • Neuropathy   • Hypokalemia   • Lesion of temporal lobe   • GERD     No Known Allergies    Current Outpatient Medications:   •  amLODIPine (NORVASC) 2.5 MG tablet, Take 2.5 mg by mouth Every Night., Disp: , Rfl:   •  aspirin 81 MG EC tablet, Take 81 mg by mouth Every Night., Disp: , Rfl:   •  cetirizine (zyrTEC) 10 MG tablet, Take 10 mg by mouth Every Night., Disp: , Rfl:   •  citalopram (CeleXA) 20 MG tablet, Take 20 mg by mouth Every Night., Disp: , Rfl:   •  gabapentin (NEURONTIN) 800 MG tablet, Take " "800 mg by mouth 2 (Two) Times a Day., Disp: , Rfl:   •  glimepiride (AMARYL) 1 MG tablet, Take 1 mg by mouth Daily With Lunch. Daily at noon, Disp: , Rfl:   •  HYDROcodone-acetaminophen (NORCO) 5-325 MG per tablet, Take 1 tablet by mouth Every Night., Disp: , Rfl:   •  hydrOXYzine (ATARAX) 25 MG tablet, Take 25-50 mg by mouth Every Night., Disp: , Rfl:   •  levothyroxine (SYNTHROID, LEVOTHROID) 50 MCG tablet, Take 50 mcg by mouth Every Night., Disp: , Rfl:   •  losartan-hydrochlorothiazide (HYZAAR) 100-25 MG per tablet, Take 1 tablet by mouth Every Night., Disp: , Rfl:   •  lovastatin (MEVACOR) 20 MG tablet, Take 20 mg by mouth Every Night., Disp: , Rfl:   •  metFORMIN ER (GLUCOPHAGE-XR) 500 MG 24 hr tablet, Take 500 mg by mouth Daily With Breakfast., Disp: , Rfl:   •  Milk Thistle 175 MG capsule, Take 1 capsule by mouth Daily., Disp: , Rfl:   •  omeprazole (priLOSEC) 20 MG capsule, Take 20 mg by mouth 2 (Two) Times a Day., Disp: , Rfl:   MEDICATION LIST AND ALLERGIES REVIEWED.    Family History   Problem Relation Age of Onset   • Ovarian cancer Mother 19   • Emphysema Father    • Breast cancer Neg Hx      Social History     Tobacco Use   • Smoking status: Never Smoker   • Smokeless tobacco: Never Used   Substance Use Topics   • Alcohol use: Yes     Alcohol/week: 0.0 - 1.2 oz   • Drug use: No     Social History     Social History Narrative    Nonsmoker    Has been exposed to secondhand smoke        Has 3 children    No regular alcohol use     FAMILY AND SOCIAL HISTORY REVIEWED.    Review of Systems  ALSO REFER TO SCANNED ROS SHEET FROM SAME DATE.    /60 (BP Location: Right arm, Patient Position: Sitting, Cuff Size: Adult)   Pulse 89   Temp 98 °F (36.7 °C)   Ht 162.6 cm (64\")   Wt 88 kg (194 lb)   SpO2 97% Comment: sitting room air  BMI 33.30 kg/m²   Physical Exam   Constitutional: She is oriented to person, place, and time. She appears well-developed. No distress.   HENT:   Head: Normocephalic " "and atraumatic.   Neck: No thyromegaly present.   Cardiovascular: Normal rate, regular rhythm and normal heart sounds.   No murmur heard.  Pulmonary/Chest: Effort normal and breath sounds normal. No stridor.   Abdominal: Soft. Bowel sounds are normal.   Musculoskeletal: Normal range of motion. She exhibits no edema.   Lymphadenopathy:     She has no cervical adenopathy.        Right: No supraclavicular and no epitrochlear adenopathy present.        Left: No supraclavicular and no epitrochlear adenopathy present.   Neurological: She is alert and oriented to person, place, and time.   Skin: Skin is warm and dry. She is not diaphoretic.   Psychiatric: She has a normal mood and affect. Her behavior is normal.   Nursing note and vitals reviewed.      Results     CT scan of the chest was reviewed on PACS.  Soft/ground glass as well as a solid left lower lobe nodule are noted    Problem List       ICD-10-CM ICD-9-CM   1. Migratory Lung nodules (\"Soft\" and solid, bilateral) R91.8 793.19   2. GERD K21.9 530.81   3. Non-smoker Z78.9 V49.89   4. Perennial rhinitis J30.89 477.9    J30.2    5. H/O asthma Z87.09 V12.69       Discussion     We reviewed her CT scan of the chest.  Unfortunately, she has had progressive changes that I believe necessitate reevaluation with a repeat bronchoscopy.  It has been just a little over a year since her prior navigational bronchoscopy.  She has new lesions, at least 1 of which is fairly solid in appearance and somewhat concerning.  She does not have overt symptoms suggestive of an acute or indolent respiratory tract infection.    We discussed again the risks and benefits of navigational bronchoscopy.  This includes pneumothorax, particularly given the peripheral location of the more solid of her pulmonary lesions.    She has a history of asthma and some symptoms of increasing wheezing.  I am going to give her samples of Brio including written instructions and a discussion of potential side " effects.    When we obtain labs, we will also check a hypersensitivity pneumonitis panel, Rast panel, and eosinophil count.    Reflux might be playing a role in her recurrent pulmonary infiltrates although she really does not have much in the way of regular reflux symptoms.    Dixon Fatima MD  Note electronically signed    CC: Ly Funez MD

## 2019-03-11 ENCOUNTER — TRANSCRIBE ORDERS (OUTPATIENT)
Dept: PULMONOLOGY | Facility: CLINIC | Age: 74
End: 2019-03-11

## 2019-03-11 DIAGNOSIS — R91.8 LUNG NODULES: Primary | ICD-10-CM

## 2019-03-15 ENCOUNTER — APPOINTMENT (OUTPATIENT)
Dept: PREADMISSION TESTING | Facility: HOSPITAL | Age: 74
End: 2019-03-15

## 2019-03-15 LAB
ALBUMIN SERPL-MCNC: 4.34 G/DL (ref 3.2–4.8)
ALBUMIN/GLOB SERPL: 2.5 G/DL (ref 1.5–2.5)
ALP SERPL-CCNC: 135 U/L (ref 25–100)
ALT SERPL W P-5'-P-CCNC: 38 U/L (ref 7–40)
ANION GAP SERPL CALCULATED.3IONS-SCNC: 7 MMOL/L (ref 3–11)
AST SERPL-CCNC: 20 U/L (ref 0–33)
BILIRUB SERPL-MCNC: 0.4 MG/DL (ref 0.3–1.2)
BUN BLD-MCNC: 13 MG/DL (ref 9–23)
BUN/CREAT SERPL: 16.7 (ref 7–25)
CALCIUM SPEC-SCNC: 9.5 MG/DL (ref 8.7–10.4)
CHLORIDE SERPL-SCNC: 99 MMOL/L (ref 99–109)
CO2 SERPL-SCNC: 29 MMOL/L (ref 20–31)
CREAT BLD-MCNC: 0.78 MG/DL (ref 0.6–1.3)
DEPRECATED RDW RBC AUTO: 50.7 FL (ref 37–54)
ERYTHROCYTE [DISTWIDTH] IN BLOOD BY AUTOMATED COUNT: 16.8 % (ref 11.3–14.5)
GFR SERPL CREATININE-BSD FRML MDRD: 72 ML/MIN/1.73
GLOBULIN UR ELPH-MCNC: 1.8 GM/DL
GLUCOSE BLD-MCNC: 85 MG/DL (ref 70–100)
HCT VFR BLD AUTO: 35.6 % (ref 34.5–44)
HGB BLD-MCNC: 11.5 G/DL (ref 11.5–15.5)
INR PPP: 0.93 (ref 0.85–1.16)
MCH RBC QN AUTO: 26.6 PG (ref 27–31)
MCHC RBC AUTO-ENTMCNC: 32.3 G/DL (ref 32–36)
MCV RBC AUTO: 82.4 FL (ref 80–99)
PLATELET # BLD AUTO: 351 10*3/MM3 (ref 150–450)
PMV BLD AUTO: 9.3 FL (ref 6–12)
POTASSIUM BLD-SCNC: 4.1 MMOL/L (ref 3.5–5.5)
PROT SERPL-MCNC: 6.1 G/DL (ref 5.7–8.2)
PROTHROMBIN TIME: 12 SECONDS (ref 11.2–14.3)
RBC # BLD AUTO: 4.32 10*6/MM3 (ref 3.89–5.14)
SODIUM BLD-SCNC: 135 MMOL/L (ref 132–146)
WBC NRBC COR # BLD: 8.18 10*3/MM3 (ref 3.5–10.8)

## 2019-03-15 PROCEDURE — 80053 COMPREHEN METABOLIC PANEL: CPT | Performed by: INTERNAL MEDICINE

## 2019-03-15 PROCEDURE — 36415 COLL VENOUS BLD VENIPUNCTURE: CPT

## 2019-03-15 PROCEDURE — 85610 PROTHROMBIN TIME: CPT | Performed by: INTERNAL MEDICINE

## 2019-03-15 PROCEDURE — 85027 COMPLETE CBC AUTOMATED: CPT | Performed by: INTERNAL MEDICINE

## 2019-03-15 RX ORDER — LEVOTHYROXINE SODIUM 0.05 MG/1
50 TABLET ORAL DAILY
COMMUNITY

## 2019-03-21 ENCOUNTER — HOSPITAL ENCOUNTER (INPATIENT)
Facility: HOSPITAL | Age: 74
LOS: 1 days | Discharge: HOME | End: 2019-03-22
Attending: INTERNAL MEDICINE | Admitting: INTERNAL MEDICINE

## 2019-03-21 ENCOUNTER — APPOINTMENT (OUTPATIENT)
Dept: GENERAL RADIOLOGY | Facility: HOSPITAL | Age: 74
End: 2019-03-21

## 2019-03-21 ENCOUNTER — HOSPITAL ENCOUNTER (OUTPATIENT)
Dept: CT IMAGING | Facility: HOSPITAL | Age: 74
Discharge: HOME | End: 2019-03-21

## 2019-03-21 ENCOUNTER — ANESTHESIA EVENT (OUTPATIENT)
Dept: GASTROENTEROLOGY | Facility: HOSPITAL | Age: 74
End: 2019-03-21

## 2019-03-21 ENCOUNTER — ANESTHESIA (OUTPATIENT)
Dept: GASTROENTEROLOGY | Facility: HOSPITAL | Age: 74
End: 2019-03-21

## 2019-03-21 DIAGNOSIS — R91.8 LUNG NODULES: ICD-10-CM

## 2019-03-21 DIAGNOSIS — R91.8 LEFT LOWER LOBE PULMONARY INFILTRATE: ICD-10-CM

## 2019-03-21 PROBLEM — J95.811 IATROGENIC PNEUMOTHORAX: Status: ACTIVE | Noted: 2019-03-21

## 2019-03-21 LAB
ANION GAP SERPL CALCULATED.3IONS-SCNC: 11 MMOL/L (ref 3–11)
BUN BLD-MCNC: 15 MG/DL (ref 9–23)
BUN/CREAT SERPL: 20.8 (ref 7–25)
CALCIUM SPEC-SCNC: 9.1 MG/DL (ref 8.7–10.4)
CHLORIDE SERPL-SCNC: 97 MMOL/L (ref 99–109)
CO2 SERPL-SCNC: 28 MMOL/L (ref 20–31)
CREAT BLD-MCNC: 0.72 MG/DL (ref 0.6–1.3)
DEPRECATED RDW RBC AUTO: 50.8 FL (ref 37–54)
ERYTHROCYTE [DISTWIDTH] IN BLOOD BY AUTOMATED COUNT: 16.8 % (ref 11.3–14.5)
GFR SERPL CREATININE-BSD FRML MDRD: 79 ML/MIN/1.73
GLUCOSE BLD-MCNC: 90 MG/DL (ref 70–100)
HCT VFR BLD AUTO: 37.4 % (ref 34.5–44)
HGB BLD-MCNC: 11.7 G/DL (ref 11.5–15.5)
MCH RBC QN AUTO: 26.2 PG (ref 27–31)
MCHC RBC AUTO-ENTMCNC: 31.3 G/DL (ref 32–36)
MCV RBC AUTO: 83.7 FL (ref 80–99)
PLATELET # BLD AUTO: 378 10*3/MM3 (ref 150–450)
PMV BLD AUTO: 10.2 FL (ref 6–12)
POTASSIUM BLD-SCNC: 3.8 MMOL/L (ref 3.5–5.5)
RBC # BLD AUTO: 4.47 10*6/MM3 (ref 3.89–5.14)
SODIUM BLD-SCNC: 136 MMOL/L (ref 132–146)
WBC NRBC COR # BLD: 8.82 10*3/MM3 (ref 3.5–10.8)

## 2019-03-21 PROCEDURE — 63710000001 HYDROCHLOROTHIAZIDE 25 MG TABLET 1,000 EACH BOTTLE: Performed by: INTERNAL MEDICINE

## 2019-03-21 PROCEDURE — 25010000002 MORPHINE PER 10 MG: Performed by: INTERNAL MEDICINE

## 2019-03-21 PROCEDURE — 31623 DX BRONCHOSCOPE/BRUSH: CPT | Performed by: INTERNAL MEDICINE

## 2019-03-21 PROCEDURE — 25010000002 ONDANSETRON PER 1 MG: Performed by: NURSE PRACTITIONER

## 2019-03-21 PROCEDURE — A9270 NON-COVERED ITEM OR SERVICE: HCPCS | Performed by: INTERNAL MEDICINE

## 2019-03-21 PROCEDURE — 87116 MYCOBACTERIA CULTURE: CPT | Performed by: INTERNAL MEDICINE

## 2019-03-21 PROCEDURE — 63710000001 GABAPENTIN 300 MG CAPSULE: Performed by: INTERNAL MEDICINE

## 2019-03-21 PROCEDURE — 76000 FLUOROSCOPY <1 HR PHYS/QHP: CPT

## 2019-03-21 PROCEDURE — 0BBJ8ZX EXCISION OF LEFT LOWER LUNG LOBE, VIA NATURAL OR ARTIFICIAL OPENING ENDOSCOPIC, DIAGNOSTIC: ICD-10-PCS | Performed by: INTERNAL MEDICINE

## 2019-03-21 PROCEDURE — 63710000001 LOSARTAN 50 MG TABLET 100 EACH BOX: Performed by: INTERNAL MEDICINE

## 2019-03-21 PROCEDURE — 25010000002 DEXAMETHASONE PER 1 MG: Performed by: NURSE ANESTHETIST, CERTIFIED REGISTERED

## 2019-03-21 PROCEDURE — 85027 COMPLETE CBC AUTOMATED: CPT | Performed by: ANESTHESIOLOGY

## 2019-03-21 PROCEDURE — 87206 SMEAR FLUORESCENT/ACID STAI: CPT | Performed by: INTERNAL MEDICINE

## 2019-03-21 PROCEDURE — 25010000002 MIDAZOLAM PER 1 MG: Performed by: INTERNAL MEDICINE

## 2019-03-21 PROCEDURE — 31654 BRONCH EBUS IVNTJ PERPH LES: CPT | Performed by: INTERNAL MEDICINE

## 2019-03-21 PROCEDURE — 63710000001 ATORVASTATIN 10 MG TABLET: Performed by: INTERNAL MEDICINE

## 2019-03-21 PROCEDURE — 94799 UNLISTED PULMONARY SVC/PX: CPT

## 2019-03-21 PROCEDURE — 25010000002 PROPOFOL 10 MG/ML EMULSION: Performed by: NURSE ANESTHETIST, CERTIFIED REGISTERED

## 2019-03-21 PROCEDURE — 80048 BASIC METABOLIC PNL TOTAL CA: CPT | Performed by: ANESTHESIOLOGY

## 2019-03-21 PROCEDURE — 87070 CULTURE OTHR SPECIMN AEROBIC: CPT | Performed by: INTERNAL MEDICINE

## 2019-03-21 PROCEDURE — 87205 SMEAR GRAM STAIN: CPT | Performed by: INTERNAL MEDICINE

## 2019-03-21 PROCEDURE — 31627 NAVIGATIONAL BRONCHOSCOPY: CPT | Performed by: INTERNAL MEDICINE

## 2019-03-21 PROCEDURE — 76000 FLUOROSCOPY <1 HR PHYS/QHP: CPT | Performed by: INTERNAL MEDICINE

## 2019-03-21 PROCEDURE — 25010000002 PHENYLEPHRINE PER 1 ML: Performed by: NURSE ANESTHETIST, CERTIFIED REGISTERED

## 2019-03-21 PROCEDURE — 25010000002 PROMETHAZINE PER 50 MG: Performed by: NURSE ANESTHETIST, CERTIFIED REGISTERED

## 2019-03-21 PROCEDURE — 0W9B30Z DRAINAGE OF LEFT PLEURAL CAVITY WITH DRAINAGE DEVICE, PERCUTANEOUS APPROACH: ICD-10-PCS | Performed by: INTERNAL MEDICINE

## 2019-03-21 PROCEDURE — 71045 X-RAY EXAM CHEST 1 VIEW: CPT

## 2019-03-21 PROCEDURE — 88112 CYTOPATH CELL ENHANCE TECH: CPT | Performed by: INTERNAL MEDICINE

## 2019-03-21 PROCEDURE — 63710000001 LEVOTHYROXINE 50 MCG TABLET: Performed by: INTERNAL MEDICINE

## 2019-03-21 PROCEDURE — 32551 INSERTION OF CHEST TUBE: CPT | Performed by: INTERNAL MEDICINE

## 2019-03-21 PROCEDURE — 87102 FUNGUS ISOLATION CULTURE: CPT | Performed by: INTERNAL MEDICINE

## 2019-03-21 PROCEDURE — 0B9J8ZX DRAINAGE OF LEFT LOWER LUNG LOBE, VIA NATURAL OR ARTIFICIAL OPENING ENDOSCOPIC, DIAGNOSTIC: ICD-10-PCS | Performed by: INTERNAL MEDICINE

## 2019-03-21 PROCEDURE — 71250 CT THORAX DX C-: CPT

## 2019-03-21 PROCEDURE — 25010000002 ONDANSETRON PER 1 MG: Performed by: NURSE ANESTHETIST, CERTIFIED REGISTERED

## 2019-03-21 PROCEDURE — 88305 TISSUE EXAM BY PATHOLOGIST: CPT | Performed by: INTERNAL MEDICINE

## 2019-03-21 PROCEDURE — 31628 BRONCHOSCOPY/LUNG BX EACH: CPT | Performed by: INTERNAL MEDICINE

## 2019-03-21 RX ORDER — ONDANSETRON 2 MG/ML
INJECTION INTRAMUSCULAR; INTRAVENOUS AS NEEDED
Status: DISCONTINUED | OUTPATIENT
Start: 2019-03-21 | End: 2019-03-21 | Stop reason: SURG

## 2019-03-21 RX ORDER — ATORVASTATIN CALCIUM 10 MG/1
10 TABLET, FILM COATED ORAL DAILY
Status: DISCONTINUED | OUTPATIENT
Start: 2019-03-21 | End: 2019-03-22 | Stop reason: HOSPADM

## 2019-03-21 RX ORDER — ASPIRIN 81 MG/1
81 TABLET ORAL NIGHTLY
Status: DISCONTINUED | OUTPATIENT
Start: 2019-03-21 | End: 2019-03-22 | Stop reason: HOSPADM

## 2019-03-21 RX ORDER — SODIUM CHLORIDE, SODIUM LACTATE, POTASSIUM CHLORIDE, CALCIUM CHLORIDE 600; 310; 30; 20 MG/100ML; MG/100ML; MG/100ML; MG/100ML
9 INJECTION, SOLUTION INTRAVENOUS CONTINUOUS
Status: DISCONTINUED | OUTPATIENT
Start: 2019-03-21 | End: 2019-03-22 | Stop reason: HOSPADM

## 2019-03-21 RX ORDER — PROMETHAZINE HYDROCHLORIDE 25 MG/ML
6.25 INJECTION, SOLUTION INTRAMUSCULAR; INTRAVENOUS ONCE AS NEEDED
Status: DISCONTINUED | OUTPATIENT
Start: 2019-03-21 | End: 2019-03-21 | Stop reason: HOSPADM

## 2019-03-21 RX ORDER — ONDANSETRON 2 MG/ML
4 INJECTION INTRAMUSCULAR; INTRAVENOUS EVERY 6 HOURS PRN
Status: DISCONTINUED | OUTPATIENT
Start: 2019-03-21 | End: 2019-03-22 | Stop reason: HOSPADM

## 2019-03-21 RX ORDER — DEXAMETHASONE SODIUM PHOSPHATE 4 MG/ML
INJECTION, SOLUTION INTRA-ARTICULAR; INTRALESIONAL; INTRAMUSCULAR; INTRAVENOUS; SOFT TISSUE AS NEEDED
Status: DISCONTINUED | OUTPATIENT
Start: 2019-03-21 | End: 2019-03-21 | Stop reason: SURG

## 2019-03-21 RX ORDER — LEVOTHYROXINE SODIUM 0.05 MG/1
50 TABLET ORAL DAILY
Status: DISCONTINUED | OUTPATIENT
Start: 2019-03-21 | End: 2019-03-22 | Stop reason: HOSPADM

## 2019-03-21 RX ORDER — GLIPIZIDE 5 MG/1
2.5 TABLET ORAL
Status: DISCONTINUED | OUTPATIENT
Start: 2019-03-22 | End: 2019-03-22 | Stop reason: HOSPADM

## 2019-03-21 RX ORDER — HYDROXYZINE HYDROCHLORIDE 25 MG/1
25 TABLET, FILM COATED ORAL NIGHTLY
Status: DISCONTINUED | OUTPATIENT
Start: 2019-03-21 | End: 2019-03-22 | Stop reason: HOSPADM

## 2019-03-21 RX ORDER — PROMETHAZINE HYDROCHLORIDE 25 MG/ML
6.25 INJECTION, SOLUTION INTRAMUSCULAR; INTRAVENOUS ONCE AS NEEDED
Status: COMPLETED | OUTPATIENT
Start: 2019-03-21 | End: 2019-03-21

## 2019-03-21 RX ORDER — FAMOTIDINE 20 MG/1
20 TABLET, FILM COATED ORAL ONCE
Status: DISCONTINUED | OUTPATIENT
Start: 2019-03-21 | End: 2019-03-21 | Stop reason: HOSPADM

## 2019-03-21 RX ORDER — FAMOTIDINE 10 MG/ML
20 INJECTION, SOLUTION INTRAVENOUS ONCE
Status: COMPLETED | OUTPATIENT
Start: 2019-03-21 | End: 2019-03-21

## 2019-03-21 RX ORDER — NEOSTIGMINE METHYLSULFATE 5 MG/5 ML
SYRINGE (ML) INTRAVENOUS AS NEEDED
Status: DISCONTINUED | OUTPATIENT
Start: 2019-03-21 | End: 2019-03-21 | Stop reason: SURG

## 2019-03-21 RX ORDER — ACETAMINOPHEN 325 MG/1
650 TABLET ORAL EVERY 6 HOURS PRN
Status: DISCONTINUED | OUTPATIENT
Start: 2019-03-21 | End: 2019-03-22 | Stop reason: HOSPADM

## 2019-03-21 RX ORDER — GLYCOPYRROLATE 0.2 MG/ML
INJECTION INTRAMUSCULAR; INTRAVENOUS AS NEEDED
Status: DISCONTINUED | OUTPATIENT
Start: 2019-03-21 | End: 2019-03-21 | Stop reason: SURG

## 2019-03-21 RX ORDER — PROMETHAZINE HYDROCHLORIDE 25 MG/1
25 SUPPOSITORY RECTAL ONCE AS NEEDED
Status: DISCONTINUED | OUTPATIENT
Start: 2019-03-21 | End: 2019-03-21 | Stop reason: HOSPADM

## 2019-03-21 RX ORDER — GABAPENTIN 300 MG/1
300 CAPSULE ORAL EVERY 8 HOURS SCHEDULED
Status: DISCONTINUED | OUTPATIENT
Start: 2019-03-21 | End: 2019-03-22 | Stop reason: HOSPADM

## 2019-03-21 RX ORDER — ESMOLOL HYDROCHLORIDE 10 MG/ML
INJECTION INTRAVENOUS AS NEEDED
Status: DISCONTINUED | OUTPATIENT
Start: 2019-03-21 | End: 2019-03-21 | Stop reason: SURG

## 2019-03-21 RX ORDER — HYDROCODONE BITARTRATE AND ACETAMINOPHEN 5; 325 MG/1; MG/1
1 TABLET ORAL EVERY 4 HOURS PRN
Status: DISCONTINUED | OUTPATIENT
Start: 2019-03-21 | End: 2019-03-22 | Stop reason: HOSPADM

## 2019-03-21 RX ORDER — LIDOCAINE HYDROCHLORIDE 10 MG/ML
0.5 INJECTION, SOLUTION EPIDURAL; INFILTRATION; INTRACAUDAL; PERINEURAL ONCE AS NEEDED
Status: DISCONTINUED | OUTPATIENT
Start: 2019-03-21 | End: 2019-03-21 | Stop reason: HOSPADM

## 2019-03-21 RX ORDER — ATRACURIUM BESYLATE 10 MG/ML
INJECTION, SOLUTION INTRAVENOUS AS NEEDED
Status: DISCONTINUED | OUTPATIENT
Start: 2019-03-21 | End: 2019-03-21 | Stop reason: SURG

## 2019-03-21 RX ORDER — SODIUM CHLORIDE 0.9 % (FLUSH) 0.9 %
3 SYRINGE (ML) INJECTION EVERY 12 HOURS SCHEDULED
Status: DISCONTINUED | OUTPATIENT
Start: 2019-03-21 | End: 2019-03-21 | Stop reason: HOSPADM

## 2019-03-21 RX ORDER — MORPHINE SULFATE 4 MG/ML
2 INJECTION, SOLUTION INTRAMUSCULAR; INTRAVENOUS
Status: DISCONTINUED | OUTPATIENT
Start: 2019-03-21 | End: 2019-03-22 | Stop reason: HOSPADM

## 2019-03-21 RX ORDER — PROPOFOL 10 MG/ML
VIAL (ML) INTRAVENOUS AS NEEDED
Status: DISCONTINUED | OUTPATIENT
Start: 2019-03-21 | End: 2019-03-21 | Stop reason: SURG

## 2019-03-21 RX ORDER — MIDAZOLAM HYDROCHLORIDE 1 MG/ML
1 INJECTION INTRAMUSCULAR; INTRAVENOUS ONCE
Status: COMPLETED | OUTPATIENT
Start: 2019-03-21 | End: 2019-03-21

## 2019-03-21 RX ORDER — PANTOPRAZOLE SODIUM 40 MG/1
40 TABLET, DELAYED RELEASE ORAL EVERY MORNING
Status: DISCONTINUED | OUTPATIENT
Start: 2019-03-22 | End: 2019-03-22 | Stop reason: HOSPADM

## 2019-03-21 RX ORDER — FENTANYL CITRATE 50 UG/ML
50 INJECTION, SOLUTION INTRAMUSCULAR; INTRAVENOUS
Status: DISCONTINUED | OUTPATIENT
Start: 2019-03-21 | End: 2019-03-21 | Stop reason: HOSPADM

## 2019-03-21 RX ORDER — SODIUM CHLORIDE 0.9 % (FLUSH) 0.9 %
3-10 SYRINGE (ML) INJECTION AS NEEDED
Status: DISCONTINUED | OUTPATIENT
Start: 2019-03-21 | End: 2019-03-21 | Stop reason: HOSPADM

## 2019-03-21 RX ORDER — PROMETHAZINE HYDROCHLORIDE 25 MG/1
25 TABLET ORAL ONCE AS NEEDED
Status: DISCONTINUED | OUTPATIENT
Start: 2019-03-21 | End: 2019-03-21 | Stop reason: HOSPADM

## 2019-03-21 RX ORDER — CETIRIZINE HYDROCHLORIDE 10 MG/1
10 TABLET ORAL NIGHTLY
Status: DISCONTINUED | OUTPATIENT
Start: 2019-03-21 | End: 2019-03-22 | Stop reason: HOSPADM

## 2019-03-21 RX ORDER — AMLODIPINE BESYLATE 2.5 MG/1
2.5 TABLET ORAL NIGHTLY
Status: DISCONTINUED | OUTPATIENT
Start: 2019-03-21 | End: 2019-03-22 | Stop reason: HOSPADM

## 2019-03-21 RX ORDER — EPHEDRINE SULFATE 50 MG/ML
5 INJECTION, SOLUTION INTRAVENOUS ONCE AS NEEDED
Status: DISCONTINUED | OUTPATIENT
Start: 2019-03-21 | End: 2019-03-21 | Stop reason: HOSPADM

## 2019-03-21 RX ORDER — CITALOPRAM 20 MG/1
20 TABLET ORAL NIGHTLY
Status: DISCONTINUED | OUTPATIENT
Start: 2019-03-21 | End: 2019-03-22 | Stop reason: HOSPADM

## 2019-03-21 RX ADMIN — ESMOLOL HYDROCHLORIDE 20 MG: 10 INJECTION INTRAVENOUS at 11:00

## 2019-03-21 RX ADMIN — EPHEDRINE SULFATE 10 MG: 50 INJECTION INTRAMUSCULAR; INTRAVENOUS; SUBCUTANEOUS at 11:29

## 2019-03-21 RX ADMIN — ASPIRIN 81 MG: 81 TABLET, COATED ORAL at 21:14

## 2019-03-21 RX ADMIN — MORPHINE SULFATE 2 MG: 4 INJECTION INTRAVENOUS at 21:13

## 2019-03-21 RX ADMIN — PHENYLEPHRINE HYDROCHLORIDE 100 MCG: 10 INJECTION INTRAVENOUS at 11:24

## 2019-03-21 RX ADMIN — DEXAMETHASONE SODIUM PHOSPHATE 8 MG: 4 INJECTION, SOLUTION INTRAMUSCULAR; INTRAVENOUS at 11:00

## 2019-03-21 RX ADMIN — PHENYLEPHRINE HYDROCHLORIDE 100 MCG: 10 INJECTION INTRAVENOUS at 11:20

## 2019-03-21 RX ADMIN — Medication 4 MG: at 12:08

## 2019-03-21 RX ADMIN — HYDROCODONE BITARTRATE AND ACETAMINOPHEN 1 TABLET: 5; 325 TABLET ORAL at 20:01

## 2019-03-21 RX ADMIN — GABAPENTIN 300 MG: 300 CAPSULE ORAL at 16:03

## 2019-03-21 RX ADMIN — ATRACURIUM BESYLATE 20 MG: 10 INJECTION, SOLUTION INTRAVENOUS at 11:00

## 2019-03-21 RX ADMIN — HYDROCHLOROTHIAZIDE: 25 TABLET ORAL at 16:03

## 2019-03-21 RX ADMIN — CETIRIZINE HYDROCHLORIDE 10 MG: 10 TABLET, FILM COATED ORAL at 21:15

## 2019-03-21 RX ADMIN — HYDROXYZINE HYDROCHLORIDE 25 MG: 25 TABLET, FILM COATED ORAL at 21:13

## 2019-03-21 RX ADMIN — PROPOFOL 150 MG: 10 INJECTION, EMULSION INTRAVENOUS at 11:00

## 2019-03-21 RX ADMIN — FAMOTIDINE 20 MG: 10 INJECTION, SOLUTION INTRAVENOUS at 10:33

## 2019-03-21 RX ADMIN — ONDANSETRON 4 MG: 2 INJECTION INTRAMUSCULAR; INTRAVENOUS at 21:13

## 2019-03-21 RX ADMIN — ONDANSETRON 4 MG: 2 INJECTION INTRAMUSCULAR; INTRAVENOUS at 12:08

## 2019-03-21 RX ADMIN — GABAPENTIN 300 MG: 300 CAPSULE ORAL at 21:14

## 2019-03-21 RX ADMIN — CITALOPRAM HYDROBROMIDE 20 MG: 20 TABLET ORAL at 21:15

## 2019-03-21 RX ADMIN — PROPOFOL 50 MG: 10 INJECTION, EMULSION INTRAVENOUS at 11:43

## 2019-03-21 RX ADMIN — LEVOTHYROXINE SODIUM 50 MCG: 50 TABLET ORAL at 16:03

## 2019-03-21 RX ADMIN — SODIUM CHLORIDE, POTASSIUM CHLORIDE, SODIUM LACTATE AND CALCIUM CHLORIDE 9 ML/HR: 600; 310; 30; 20 INJECTION, SOLUTION INTRAVENOUS at 10:34

## 2019-03-21 RX ADMIN — ATORVASTATIN CALCIUM 10 MG: 10 TABLET, FILM COATED ORAL at 16:03

## 2019-03-21 RX ADMIN — MORPHINE SULFATE 2 MG: 4 INJECTION INTRAVENOUS at 14:57

## 2019-03-21 RX ADMIN — MIDAZOLAM HYDROCHLORIDE 1 MG: 1 INJECTION, SOLUTION INTRAMUSCULAR; INTRAVENOUS at 14:15

## 2019-03-21 RX ADMIN — PROMETHAZINE HYDROCHLORIDE 6.25 MG: 25 INJECTION INTRAMUSCULAR; INTRAVENOUS at 12:30

## 2019-03-21 RX ADMIN — AMLODIPINE BESYLATE 2.5 MG: 2.5 TABLET ORAL at 21:15

## 2019-03-21 RX ADMIN — GLYCOPYRROLATE 0.4 MG: 0.2 INJECTION, SOLUTION INTRAMUSCULAR; INTRAVENOUS at 12:08

## 2019-03-21 RX ADMIN — MORPHINE SULFATE 2 MG: 4 INJECTION INTRAVENOUS at 19:17

## 2019-03-21 NOTE — ANESTHESIA PREPROCEDURE EVALUATION
Anesthesia Evaluation     NPO Solid Status: > 8 hours  NPO Liquid Status: > 8 hours           Airway   Mallampati: II  TM distance: >3 FB  Neck ROM: full  Possible difficult intubation  Dental    (+) poor dentition    Pulmonary     breath sounds clear to auscultation  (+) asthma (uses inhaler daily, lungs good today, no ER visits),   (-) not a smoker  Cardiovascular     Rhythm: regular    (+) hypertension,   (-) angina, murmur, cardiac stents, CABG      Neuro/Psych  (-) seizures, TIA, CVA  GI/Hepatic/Renal/Endo    (+)   diabetes mellitus (NIDDM),   (-) hepatitis, no renal disease    Musculoskeletal     Abdominal    Substance History      OB/GYN          Other                        Anesthesia Plan    ASA 3     general   (GA)  intravenous induction   Anesthetic plan, all risks, benefits, and alternatives have been provided, discussed and informed consent has been obtained with: patient.    Plan discussed with CRNA.

## 2019-03-21 NOTE — ANESTHESIA PROCEDURE NOTES
Airway  Urgency: elective    Airway not difficult    General Information and Staff    Patient location during procedure: OR  CRNA: Dixon Collins CRNA    Indications and Patient Condition  Indications for airway management: airway protection    Preoxygenated: yes  MILS not maintained throughout  Mask difficulty assessment: 1 - vent by mask    Final Airway Details  Final airway type: endotracheal airway      Successful airway: ETT  Cuffed: yes   Successful intubation technique: direct laryngoscopy  Facilitating devices/methods: Bougie  Endotracheal tube insertion site: oral  Blade: Ken  Blade size: 3  ETT size (mm): 8.5  Cormack-Lehane Classification: grade I - full view of glottis  Placement verified by: chest auscultation and capnometry   Cuff volume (mL): 8  Measured from: lips  ETT to lips (cm): 20  Number of attempts at approach: 1    Additional Comments  Negative epigastric sounds, Breath sound equal bilaterally with symmetric chest rise and fall. Atraumatic, no damage to lips or teeth during intubation

## 2019-03-21 NOTE — ANESTHESIA POSTPROCEDURE EVALUATION
Patient: Ruby Pettit    Procedure Summary     Date:  03/21/19 Room / Location:   [facility]    Anesthesia Start:  1056 Anesthesia Stop:  1222    Procedure:  NAVIGATIONAL BRONCHOSCOPY (N/A Bronchus) Diagnosis:      Surgeon:  Dixon Fatima MD Provider:  Jd Enrique MD    Anesthesia Type:  general ASA Status:  3          Anesthesia Type: general  Last vitals  BP   147/83 (03/21/19 1008)   Temp   98 °F (36.7 °C) (03/21/19 1008)   Pulse   69 (03/21/19 1008)   Resp   22 (03/21/19 1008)     SpO2   94 % (03/21/19 1008)     Post Anesthesia Care and Evaluation    Patient location during evaluation: PACU  Patient participation: complete - patient participated  Level of consciousness: awake and alert  Pain score: 0  Pain management: adequate  Airway patency: patent  Anesthetic complications: No anesthetic complications  PONV Status: none  Cardiovascular status: hemodynamically stable and acceptable  Respiratory status: nonlabored ventilation, acceptable and nasal cannula  Hydration status: acceptable

## 2019-03-22 ENCOUNTER — APPOINTMENT (OUTPATIENT)
Dept: GENERAL RADIOLOGY | Facility: HOSPITAL | Age: 74
End: 2019-03-22

## 2019-03-22 VITALS
BODY MASS INDEX: 33.66 KG/M2 | TEMPERATURE: 97.9 F | WEIGHT: 190 LBS | HEIGHT: 63 IN | HEART RATE: 72 BPM | OXYGEN SATURATION: 98 % | SYSTOLIC BLOOD PRESSURE: 125 MMHG | RESPIRATION RATE: 16 BRPM | DIASTOLIC BLOOD PRESSURE: 65 MMHG

## 2019-03-22 LAB
ALBUMIN SERPL-MCNC: 4.02 G/DL (ref 3.2–4.8)
ALBUMIN/GLOB SERPL: 2.3 G/DL (ref 1.5–2.5)
ALP SERPL-CCNC: 123 U/L (ref 25–100)
ALT SERPL W P-5'-P-CCNC: 41 U/L (ref 7–40)
ANION GAP SERPL CALCULATED.3IONS-SCNC: 11 MMOL/L (ref 3–11)
AST SERPL-CCNC: 21 U/L (ref 0–33)
BILIRUB SERPL-MCNC: 0.5 MG/DL (ref 0.3–1.2)
BUN BLD-MCNC: 15 MG/DL (ref 9–23)
BUN/CREAT SERPL: 23.4 (ref 7–25)
CALCIUM SPEC-SCNC: 8.8 MG/DL (ref 8.7–10.4)
CHLORIDE SERPL-SCNC: 96 MMOL/L (ref 99–109)
CO2 SERPL-SCNC: 24 MMOL/L (ref 20–31)
CREAT BLD-MCNC: 0.64 MG/DL (ref 0.6–1.3)
CYTO UR: NORMAL
DEPRECATED RDW RBC AUTO: 51.2 FL (ref 37–54)
ERYTHROCYTE [DISTWIDTH] IN BLOOD BY AUTOMATED COUNT: 16.8 % (ref 11.3–14.5)
GFR SERPL CREATININE-BSD FRML MDRD: 91 ML/MIN/1.73
GLOBULIN UR ELPH-MCNC: 1.8 GM/DL
GLUCOSE BLD-MCNC: 149 MG/DL (ref 70–100)
HCT VFR BLD AUTO: 35.2 % (ref 34.5–44)
HGB BLD-MCNC: 10.8 G/DL (ref 11.5–15.5)
LAB AP CASE REPORT: NORMAL
LAB AP CASE REPORT: NORMAL
LAB AP CLINICAL INFORMATION: NORMAL
LAB AP DIAGNOSIS COMMENT: NORMAL
MCH RBC QN AUTO: 25.5 PG (ref 27–31)
MCHC RBC AUTO-ENTMCNC: 30.7 G/DL (ref 32–36)
MCV RBC AUTO: 83.2 FL (ref 80–99)
PATH REPORT.FINAL DX SPEC: NORMAL
PATH REPORT.FINAL DX SPEC: NORMAL
PATH REPORT.GROSS SPEC: NORMAL
PLATELET # BLD AUTO: 364 10*3/MM3 (ref 150–450)
PMV BLD AUTO: 10.2 FL (ref 6–12)
POTASSIUM BLD-SCNC: 4 MMOL/L (ref 3.5–5.5)
PROT SERPL-MCNC: 5.8 G/DL (ref 5.7–8.2)
RBC # BLD AUTO: 4.23 10*6/MM3 (ref 3.89–5.14)
SODIUM BLD-SCNC: 131 MMOL/L (ref 132–146)
WBC NRBC COR # BLD: 10.6 10*3/MM3 (ref 3.5–10.8)

## 2019-03-22 PROCEDURE — 25010000002 MORPHINE PER 10 MG: Performed by: INTERNAL MEDICINE

## 2019-03-22 PROCEDURE — 71045 X-RAY EXAM CHEST 1 VIEW: CPT

## 2019-03-22 PROCEDURE — 85027 COMPLETE CBC AUTOMATED: CPT | Performed by: INTERNAL MEDICINE

## 2019-03-22 PROCEDURE — 99239 HOSP IP/OBS DSCHRG MGMT >30: CPT | Performed by: NURSE PRACTITIONER

## 2019-03-22 PROCEDURE — 80053 COMPREHEN METABOLIC PANEL: CPT | Performed by: INTERNAL MEDICINE

## 2019-03-22 RX ADMIN — MORPHINE SULFATE 2 MG: 4 INJECTION INTRAVENOUS at 06:35

## 2019-03-22 RX ADMIN — ATORVASTATIN CALCIUM 10 MG: 10 TABLET, FILM COATED ORAL at 08:20

## 2019-03-22 RX ADMIN — PANTOPRAZOLE SODIUM 40 MG: 40 TABLET, DELAYED RELEASE ORAL at 06:35

## 2019-03-22 RX ADMIN — GLIPIZIDE 2.5 MG: 5 TABLET ORAL at 08:20

## 2019-03-22 RX ADMIN — GABAPENTIN 300 MG: 300 CAPSULE ORAL at 06:35

## 2019-03-22 RX ADMIN — HYDROCHLOROTHIAZIDE: 25 TABLET ORAL at 08:19

## 2019-03-22 RX ADMIN — LEVOTHYROXINE SODIUM 50 MCG: 50 TABLET ORAL at 08:19

## 2019-03-22 RX ADMIN — GABAPENTIN 300 MG: 300 CAPSULE ORAL at 13:45

## 2019-03-22 RX ADMIN — HYDROCODONE BITARTRATE AND ACETAMINOPHEN 1 TABLET: 5; 325 TABLET ORAL at 03:35

## 2019-03-23 ENCOUNTER — DOCUMENTATION (OUTPATIENT)
Dept: PULMONOLOGY | Facility: CLINIC | Age: 74
End: 2019-03-23

## 2019-03-23 LAB
BACTERIA SPEC RESP CULT: NORMAL
GRAM STN SPEC: NORMAL
GRAM STN SPEC: NORMAL

## 2019-03-28 LAB
GIE STN SPEC: NORMAL
GIE STN SPEC: NORMAL

## 2019-04-09 ENCOUNTER — OFFICE VISIT (OUTPATIENT)
Dept: NEUROLOGY | Facility: CLINIC | Age: 74
End: 2019-04-09

## 2019-04-09 VITALS
HEART RATE: 88 BPM | HEIGHT: 63 IN | BODY MASS INDEX: 33.66 KG/M2 | SYSTOLIC BLOOD PRESSURE: 128 MMHG | WEIGHT: 190 LBS | OXYGEN SATURATION: 97 % | DIASTOLIC BLOOD PRESSURE: 82 MMHG

## 2019-04-09 DIAGNOSIS — R55 SYNCOPE AND COLLAPSE: Primary | ICD-10-CM

## 2019-04-09 PROCEDURE — 99213 OFFICE O/P EST LOW 20 MIN: CPT | Performed by: PSYCHIATRY & NEUROLOGY

## 2019-04-09 NOTE — PROGRESS NOTES
Subjective:    CC: Ruby Pettit is seen today  for Syncope       HPI:  Current visit-since her last visit she has not had any more episodes of syncope.  Her episodes of dizziness have improved and she only has mild lightheadedness occasionally.  She has started to wear compression stockings and is keeping herself well-hydrated.  Her blood pressure also runs normal now and she has not had to change any of her meds.  Patient did have a bronchiolar lavage last month for pulmonary nodules which was negative.      Initial [id number]-old female with a history of hypertension, hyperlipidemia, diabetes mellitus type 2, anxiety, depression, arthritis presents as a hospital follow-up for syncope.  As per patient she had an episode of passing out on 1/15.  Patient was out eating lunch with her friend when she had abdominal pain and went to use the restroom.  She then felt lightheaded and passed out.  She was tremulous when she woke up but denies any shaking.  She was also back to her baseline right away and did not have any tongue bite, bowel/bladder incontinence.  [facility] where she had extensive testing including a CT head and MRI brain that showed a cyst in the left temporal region but no acute intracranial abnormalities.  Carotid Doppler revealed 0-49% stenosis bilaterally and echocardiogram showed an EF of 65% with mild mitral and tricuspid regurg but no PFO.  She was also found to have a UTI and treated for the same.  Her A1c in the hospital was 7.  Patient says she has had a similar episode a few years ago again where she had abdominal pain and an episode of passing out when she went to use the bathroom.  In addition she has episodes of lightheadedness with prolonged standing or walking.    The following portions of the patient's history were reviewed today and updated as of 04/09/2019  : allergies, current medications, past family history, past medical history, past social history,  past surgical history and problem list  These document will be scanned to patient's chart.      Current Outpatient Medications:   •  amLODIPine (NORVASC) 2.5 MG tablet, Take 2.5 mg by mouth Every Night., Disp: , Rfl:   •  aspirin 81 MG EC tablet, Take 81 mg by mouth Every Night., Disp: , Rfl:   •  cetirizine (zyrTEC) 10 MG tablet, Take 10 mg by mouth Every Night., Disp: , Rfl:   •  citalopram (CeleXA) 20 MG tablet, Take 20 mg by mouth Every Night., Disp: , Rfl:   •  gabapentin (NEURONTIN) 800 MG tablet, Take 800 mg by mouth 3 (Three) Times a Day., Disp: , Rfl:   •  glimepiride (AMARYL) 1 MG tablet, Take 1 mg by mouth Daily With Lunch. Daily at noon, Disp: , Rfl:   •  HYDROcodone-acetaminophen (NORCO) 5-325 MG per tablet, Take 1 tablet by mouth Every Night., Disp: , Rfl:   •  hydrOXYzine (ATARAX) 25 MG tablet, Take 25-50 mg by mouth Every Night., Disp: , Rfl:   •  hyoscyamine (LEVSIN) 0.125 MG SL tablet, Take 0.125 mg by mouth Every 4 (Four) Hours As Needed for Cramping., Disp: , Rfl:   •  levothyroxine (SYNTHROID, LEVOTHROID) 50 MCG tablet, Take 50 mcg by mouth Daily., Disp: , Rfl:   •  losartan-hydrochlorothiazide (HYZAAR) 100-25 MG per tablet, Take 1 tablet by mouth Every Night., Disp: , Rfl:   •  lovastatin (MEVACOR) 20 MG tablet, Take 20 mg by mouth Every Night., Disp: , Rfl:   •  omeprazole (priLOSEC) 20 MG capsule, Take 20 mg by mouth 2 (Two) Times a Day., Disp: , Rfl:    Past Medical History:   Diagnosis Date   • Anxiety and depression    • Arthritis    • Disease of thyroid gland    • GERD (gastroesophageal reflux disease)    • Head injury due to trauma 1992    fell down stairs    • History of transfusion    • Hypertension    • Liver disorder     surgery 1998 approx    • Peripheral neuropathic pain    • T2DM (type 2 diabetes mellitus) (CMS/HCC)    • Wears eyeglasses       Past Surgical History:   Procedure Laterality Date   • ABDOMINAL SURGERY     • APPENDECTOMY     • BRONCHOSCOPY N/A 2/13/2018    Procedure:  "BRONCHOSCOPY WITH SANDRITA ENDOBRONCHIAL ULTRASOUND WITH FLUORO;  Surgeon: Dixon Fatima MD;  Location:  [facility];  Service:    • BRONCHOSCOPY N/A 3/21/2019    Procedure: NAVIGATIONAL BRONCHOSCOPY;  Surgeon: Dixon Fatima MD;  Location:  [facility];  Service: Pulmonary   • CHOLECYSTECTOMY     • COLONOSCOPY  2019   • HYSTERECTOMY      2001   • LIVER RESECTION     • OOPHORECTOMY Bilateral     2001   • THYROID SURGERY     • TUBAL ABDOMINAL LIGATION        Family History   Problem Relation Age of Onset   • Ovarian cancer Mother 19   • Emphysema Father    • Breast cancer Neg Hx       Social History     Socioeconomic History   • Marital status:      Spouse name: Not on file   • Number of children: Not on file   • Years of education: Not on file   • Highest education level: Not on file   Tobacco Use   • Smoking status: Never Smoker   • Smokeless tobacco: Never Used   Substance and Sexual Activity   • Alcohol use: Yes     Alcohol/week: 0.0 - 1.2 oz     Comment: seldom    • Drug use: No   • Sexual activity: Defer   Social History Narrative    Nonsmoker    Has been exposed to secondhand smoke        Has 3 children    No regular alcohol use     Review of Systems   Constitutional: Positive for fatigue.   Respiratory: Positive for chest tightness and shortness of breath.    Cardiovascular: Positive for leg swelling.   Musculoskeletal: Positive for arthralgias, back pain, gait problem and joint swelling.   Psychiatric/Behavioral: Positive for depressed mood. The patient is nervous/anxious.    All other systems reviewed and are negative.      Objective:    /82 (BP Location: Right arm, Patient Position: Sitting, Cuff Size: Adult)   Pulse 88   Ht 160 cm (63\")   Wt 86.2 kg (190 lb)   SpO2 97%   BMI 33.66 kg/m²     Neurology Exam:    General apperance: obese,orthostatics at last visit revealed a 14 point drop in systolic blood pressure on standing up and also made the patient " lightheaded    Mental status: Alert, awake and oriented to time place and person.    Recent and Remote memory: Intact.    Attention span and Concentration: Normal.     Language and Speech: Intact- No dysarthria.    Fluency, Naming , Repitition and Comprehension:  Intact    Cranial Nerves:   CN II: Visual fields are full. Intact. Fundi - Normal, No papillederma, Pupils - RAFAEL  CN III, IV and VI: Extraocular movements are intact. Normal saccades.   CN V: Facial sensation is intact.   CN VII: Muscles of facial expression reveal no asymmetry. Intact.   CN VIII: Hearing is intact. Whispered voice intact.   CN IX and X: Palate elevates symmetrically. Intact  CN XI: Shoulder shrug is intact.   CN XII: Tongue is midline without evidence of atrophy or fasciculation.     Ophthalmoscopic exam of optic disc-normal    Motor:  Right UE muscle strength 5/5. Normal tone.     Left UE muscle strength 5/5. Normal tone.      Right LE muscle strength5/5. Normal tone.     Left LE muscle strength 5/5. Normal tone.      Sensory: Normal light touch, vibration and pinprick sensation bilaterally.    DTRs: 2+ bilaterally in upper and lower extremities.    Babinski: Negative bilaterally.    Co-ordination: Normal finger-to-nose, heel to shin B/L.    Rhomberg: Negative.    Gait: Normal.    Cardiovascular: Regular rate and rhythm without murmur, gallop or rub.    Assessment and Plan:  1. Syncope and collapse  Most likely due to autonomic dysfunction as a result of underlying diabetes  She has not had any more episodes since she saw me.  But if she does have them in the future then I may order a Holter monitor and EEG  I have asked her to continue keeping herself well-hydrated and wearing above-knee moderate compression stockings.  She should also keep monitoring her blood pressure.       Return in about 5 months (around 9/9/2019).     I spent over 15  minutes with the patient face to face out of which over 50% (7.5  minutes) was spent in  management, instructions and education regarding her syncopal episodes.     Batool Aguila MD

## 2019-05-02 LAB
FUNGUS WND CULT: NORMAL
MYCOBACTERIUM SPEC CULT: NORMAL
NIGHT BLUE STAIN TISS: NORMAL

## 2019-06-28 ENCOUNTER — OFFICE VISIT (OUTPATIENT)
Dept: PULMONOLOGY | Facility: CLINIC | Age: 74
End: 2019-06-28

## 2019-06-28 VITALS
SYSTOLIC BLOOD PRESSURE: 126 MMHG | BODY MASS INDEX: 34.2 KG/M2 | DIASTOLIC BLOOD PRESSURE: 72 MMHG | HEART RATE: 75 BPM | HEIGHT: 63 IN | TEMPERATURE: 98.1 F | WEIGHT: 193 LBS | OXYGEN SATURATION: 95 %

## 2019-06-28 DIAGNOSIS — Z87.09 H/O EXTRINSIC ASTHMA: ICD-10-CM

## 2019-06-28 DIAGNOSIS — K21.9 CHRONIC GERD: ICD-10-CM

## 2019-06-28 DIAGNOSIS — R06.02 SOB (SHORTNESS OF BREATH): Primary | ICD-10-CM

## 2019-06-28 DIAGNOSIS — R91.8 LUNG NODULES: ICD-10-CM

## 2019-06-28 DIAGNOSIS — J30.89 SEASONAL AND PERENNIAL ALLERGIC RHINITIS: ICD-10-CM

## 2019-06-28 DIAGNOSIS — R05.9 COUGH: ICD-10-CM

## 2019-06-28 DIAGNOSIS — Z78.9 NON-SMOKER: ICD-10-CM

## 2019-06-28 DIAGNOSIS — J30.2 SEASONAL AND PERENNIAL ALLERGIC RHINITIS: ICD-10-CM

## 2019-06-28 PROCEDURE — 94375 RESPIRATORY FLOW VOLUME LOOP: CPT | Performed by: INTERNAL MEDICINE

## 2019-06-28 PROCEDURE — 99214 OFFICE O/P EST MOD 30 MIN: CPT | Performed by: INTERNAL MEDICINE

## 2019-06-28 NOTE — PROGRESS NOTES
"  PULMONARY  NOTE    Chief Complaint     Migratory pulmonary infiltrates, history of asthma, perennial rhinitis, iatrogenic pneumothorax    History of Present Illness     74-year-old white female returns today for follow-up.  I last took care of her in March 2019.  At that time she underwent a bronchoscopy with transbronchial biopsies for migratory pulmonary infiltrates.  She sustained a iatrogenic pneumothorax and required a short-term chest tube.    Bronchoscopy biopsies were all negative as were cultures.    She returns today indicating that she has noted increasing dyspnea.  She gets short of breath with exertion, not at rest.  No cough or sputum production and no hemoptysis.  She denies chest pain or palpitations.  She denies lower extremity edema    She has a past history of asthma but has had normal spirometry in the past and has not been on any asthma medications.  She has noted some intermittent wheezing, however    She has seasonal allergic rhinitis which has been relatively stable.    Patient Active Problem List   Diagnosis   • Migratory Lung nodules (\"Soft\" and solid, bilateral)   • Non-smoker   • H/O asthma   • Cough   • Perennial rhinitis   • Syncope and collapse   • UTI (urinary tract infection), bacterial   • Hypertension   • T2DM (type 2 diabetes mellitus) (CMS/[facility])   • Neuropathy   • Hypokalemia   • Lesion of temporal lobe   • GERD   • Iatrogenic pneumothorax     No Known Allergies    Current Outpatient Medications:   •  amLODIPine (NORVASC) 2.5 MG tablet, Take 2.5 mg by mouth Every Night., Disp: , Rfl:   •  aspirin 81 MG EC tablet, Take 81 mg by mouth Every Night., Disp: , Rfl:   •  cetirizine (zyrTEC) 10 MG tablet, Take 10 mg by mouth Every Night., Disp: , Rfl:   •  citalopram (CeleXA) 20 MG tablet, Take 20 mg by mouth Every Night., Disp: , Rfl:   •  gabapentin (NEURONTIN) 800 MG tablet, Take 800 mg by mouth 3 (Three) Times a Day., Disp: , Rfl:   •  glimepiride (AMARYL) 1 MG tablet, Take 1 mg by " "mouth Daily With Lunch. Daily at noon, Disp: , Rfl:   •  HYDROcodone-acetaminophen (NORCO) 5-325 MG per tablet, Take 1 tablet by mouth Every Night., Disp: , Rfl:   •  hydrOXYzine (ATARAX) 25 MG tablet, Take 25-50 mg by mouth Every Night., Disp: , Rfl:   •  hyoscyamine (LEVSIN) 0.125 MG SL tablet, Take 0.125 mg by mouth Every 4 (Four) Hours As Needed for Cramping., Disp: , Rfl:   •  levothyroxine (SYNTHROID, LEVOTHROID) 50 MCG tablet, Take 50 mcg by mouth Daily., Disp: , Rfl:   •  losartan-hydrochlorothiazide (HYZAAR) 100-25 MG per tablet, Take 1 tablet by mouth Every Night., Disp: , Rfl:   •  lovastatin (MEVACOR) 20 MG tablet, Take 20 mg by mouth Every Night., Disp: , Rfl:   •  omeprazole (priLOSEC) 20 MG capsule, Take 20 mg by mouth 2 (Two) Times a Day., Disp: , Rfl:   MEDICATION LIST AND ALLERGIES REVIEWED.    Family History   Problem Relation Age of Onset   • Ovarian cancer Mother 19   • Emphysema Father    • Breast cancer Neg Hx      Social History     Tobacco Use   • Smoking status: Never Smoker   • Smokeless tobacco: Never Used   Substance Use Topics   • Alcohol use: Yes     Alcohol/week: 0.0 - 1.2 oz     Comment: seldom    • Drug use: No     Social History     Social History Narrative    Nonsmoker    Has been exposed to secondhand smoke        Has 3 children    No regular alcohol use     FAMILY AND SOCIAL HISTORY REVIEWED.    Review of Systems  ALSO REFER TO SCANNED ROS SHEET FROM SAME DATE.    /72 (BP Location: Right arm, Patient Position: Sitting)   Pulse 75   Temp 98.1 °F (36.7 °C)   Ht 160 cm (63\")   Wt 87.5 kg (193 lb)   SpO2 95% Comment: resting at room air  BMI 34.19 kg/m²   Physical Exam   Constitutional: She is oriented to person, place, and time. She appears well-developed. No distress.   HENT:   Head: Normocephalic and atraumatic.   Neck: No thyromegaly present.   Cardiovascular: Normal rate, regular rhythm and normal heart sounds.   No murmur heard.  Pulmonary/Chest: Effort " "normal and breath sounds normal. No stridor.   Abdominal: Soft. Bowel sounds are normal.   Musculoskeletal: Normal range of motion. She exhibits no edema.   Lymphadenopathy:     She has no cervical adenopathy.        Right: No supraclavicular and no epitrochlear adenopathy present.        Left: No supraclavicular and no epitrochlear adenopathy present.   Neurological: She is alert and oriented to person, place, and time.   Skin: Skin is warm and dry. She is not diaphoretic.   Psychiatric: She has a normal mood and affect. Her behavior is normal.   Nursing note and vitals reviewed.      Results     PA and lateral chest x-ray reveals cardiomegaly and low lung volumes.  Some streaky infiltrate or atelectasis noted just above the diaphragms bilaterally    Spirometry reveals no airway obstruction.  A restrictive defect cannot be excluded    Problem List       ICD-10-CM ICD-9-CM   1. SOB (shortness of breath) R06.02 786.05   2. Migratory Lung nodules (\"Soft\" and solid, bilateral) R91.8 793.19   3. Non-smoker Z78.9 V49.89   4. H/O asthma Z87.09 V12.69   5. Cough R05 786.2   6. GERD K21.9 530.81   7. Perennial rhinitis J30.89 477.9    J30.2        Discussion     I am not sure of the etiology of her worsening dyspnea.  She has quite prominent cardiomegaly on chest x-ray but no overt evidence of congestive heart failure although does have some lower extremity edema.  Think she needs a transthoracic echocardiogram.    She has had some intermittent wheezing by report and has a history of asthma although not obstructed by current spirometry.  I am going to give her a short course of steroids, however.    At the very least, would recommend a follow-up CT scan of the chest in September 2019.  Based on her echocardiogram, might refer her to cardiology.    We will follow-up with her after the above.    Dixon Fatima MD  Note electronically signed    CC: Ly Funez MD  "

## 2019-07-02 DIAGNOSIS — R06.09 DYSPNEA ON EXERTION: Primary | ICD-10-CM

## 2019-07-02 DIAGNOSIS — R91.8 LUNG NODULES: ICD-10-CM

## 2019-07-08 ENCOUNTER — HOSPITAL ENCOUNTER (OUTPATIENT)
Dept: CARDIOLOGY | Facility: HOSPITAL | Age: 74
Discharge: HOME | End: 2019-07-08
Admitting: INTERNAL MEDICINE

## 2019-07-08 VITALS — HEIGHT: 63 IN | BODY MASS INDEX: 34.2 KG/M2 | WEIGHT: 193 LBS

## 2019-07-08 DIAGNOSIS — R06.09 DYSPNEA ON EXERTION: ICD-10-CM

## 2019-07-08 LAB
BH CV ECHO MEAS - AO ROOT AREA (BSA CORRECTED): 1.8
BH CV ECHO MEAS - AO ROOT AREA: 8.8 CM^2
BH CV ECHO MEAS - AO ROOT DIAM: 3.4 CM
BH CV ECHO MEAS - ASC AORTA: 3.3 CM
BH CV ECHO MEAS - BSA(HAYCOCK): 2 M^2
BH CV ECHO MEAS - BSA: 1.9 M^2
BH CV ECHO MEAS - BZI_BMI: 34.2 KILOGRAMS/M^2
BH CV ECHO MEAS - BZI_METRIC_HEIGHT: 160 CM
BH CV ECHO MEAS - BZI_METRIC_WEIGHT: 87.5 KG
BH CV ECHO MEAS - EDV(CUBED): 65.9 ML
BH CV ECHO MEAS - EDV(MOD-SP2): 68 ML
BH CV ECHO MEAS - EDV(MOD-SP4): 81 ML
BH CV ECHO MEAS - EDV(TEICH): 71.7 ML
BH CV ECHO MEAS - EF(CUBED): 80.3 %
BH CV ECHO MEAS - EF(MOD-BP): 65 %
BH CV ECHO MEAS - EF(MOD-SP2): 69.1 %
BH CV ECHO MEAS - EF(MOD-SP4): 63 %
BH CV ECHO MEAS - EF(TEICH): 73.3 %
BH CV ECHO MEAS - ESV(CUBED): 13 ML
BH CV ECHO MEAS - ESV(MOD-SP2): 21 ML
BH CV ECHO MEAS - ESV(MOD-SP4): 30 ML
BH CV ECHO MEAS - ESV(TEICH): 19.1 ML
BH CV ECHO MEAS - FS: 41.8 %
BH CV ECHO MEAS - IVS/LVPW: 0.96
BH CV ECHO MEAS - IVSD: 1 CM
BH CV ECHO MEAS - LAD MAJOR: 4.7 CM
BH CV ECHO MEAS - LAT PEAK E' VEL: 3.7 CM/SEC
BH CV ECHO MEAS - LATERAL E/E' RATIO: 15.4
BH CV ECHO MEAS - LV DIASTOLIC VOL/BSA (35-75): 42.5 ML/M^2
BH CV ECHO MEAS - LV IVRT: 0.1 SEC
BH CV ECHO MEAS - LV MASS(C)D: 138.3 GRAMS
BH CV ECHO MEAS - LV MASS(C)DI: 72.6 GRAMS/M^2
BH CV ECHO MEAS - LV SYSTOLIC VOL/BSA (12-30): 15.8 ML/M^2
BH CV ECHO MEAS - LVIDD: 4 CM
BH CV ECHO MEAS - LVIDS: 2.4 CM
BH CV ECHO MEAS - LVLD AP2: 7.1 CM
BH CV ECHO MEAS - LVLD AP4: 7 CM
BH CV ECHO MEAS - LVLS AP2: 5.2 CM
BH CV ECHO MEAS - LVLS AP4: 5.7 CM
BH CV ECHO MEAS - LVOT AREA (M): 2.8 CM^2
BH CV ECHO MEAS - LVOT AREA: 2.8 CM^2
BH CV ECHO MEAS - LVOT DIAM: 1.9 CM
BH CV ECHO MEAS - LVPWD: 1.1 CM
BH CV ECHO MEAS - MED PEAK E' VEL: 4.7 CM/SEC
BH CV ECHO MEAS - MEDIAL E/E' RATIO: 12.1
BH CV ECHO MEAS - MPA AREA: 9.3 CM^2
BH CV ECHO MEAS - MPA DIAM: 3.5 CM
BH CV ECHO MEAS - MV A MAX VEL: 80.9 CM/SEC
BH CV ECHO MEAS - MV DEC TIME: 0.21 SEC
BH CV ECHO MEAS - MV E MAX VEL: 57.3 CM/SEC
BH CV ECHO MEAS - MV E/A: 0.71
BH CV ECHO MEAS - PA ACC SLOPE: 1051 CM/SEC^2
BH CV ECHO MEAS - PA ACC TIME: 0.07 SEC
BH CV ECHO MEAS - PA PR(ACCEL): 47.3 MMHG
BH CV ECHO MEAS - PI END-D VEL: 92.9 CM/SEC
BH CV ECHO MEAS - PULM A REVS VEL: 37 CM/SEC
BH CV ECHO MEAS - PULM DIAS VEL: 32.6 CM/SEC
BH CV ECHO MEAS - PULM S/D: 1.3
BH CV ECHO MEAS - PULM SYS VEL: 41 CM/SEC
BH CV ECHO MEAS - SI(CUBED): 27.8 ML/M^2
BH CV ECHO MEAS - SI(MOD-SP2): 24.7 ML/M^2
BH CV ECHO MEAS - SI(MOD-SP4): 26.8 ML/M^2
BH CV ECHO MEAS - SI(TEICH): 27.6 ML/M^2
BH CV ECHO MEAS - SV(CUBED): 53 ML
BH CV ECHO MEAS - SV(MOD-SP2): 47 ML
BH CV ECHO MEAS - SV(MOD-SP4): 51 ML
BH CV ECHO MEAS - SV(TEICH): 52.5 ML
BH CV ECHO MEAS - TAPSE (>1.6): 1.9 CM2
BH CV ECHO MEAS - TR MAX PG: 24 MMHG
BH CV ECHO MEAS - TR MAX VEL: 245 CM/SEC
BH CV ECHO MEASUREMENTS AVERAGE E/E' RATIO: 13.64
BH CV VAS BP LEFT ARM: NORMAL MMHG
BH CV XLRA - RV BASE: 4.1 CM
BH CV XLRA - RV LENGTH: 7.2 CM
BH CV XLRA - RV MID: 3.4 CM
BH CV XLRA - TDI S': 16.8 CM/SEC
LEFT ATRIUM VOLUME INDEX: 22.1 ML/M^2
LEFT ATRIUM VOLUME: 42 ML
LV EF 2D ECHO EST: 65 %

## 2019-07-08 PROCEDURE — 93306 TTE W/DOPPLER COMPLETE: CPT | Performed by: INTERNAL MEDICINE

## 2019-07-08 PROCEDURE — 93306 TTE W/DOPPLER COMPLETE: CPT

## 2019-07-10 ENCOUNTER — TELEPHONE (OUTPATIENT)
Dept: PULMONOLOGY | Facility: CLINIC | Age: 74
End: 2019-07-10

## 2019-07-10 NOTE — TELEPHONE ENCOUNTER
Patient has multiple labs that were ordered by Dr Kofi Fatima that have not been completed. I called patient to discuss this with her. She agrees to come into the office to have these drawn on Friday.

## 2019-07-12 ENCOUNTER — LAB (OUTPATIENT)
Dept: PULMONOLOGY | Facility: CLINIC | Age: 74
End: 2019-07-12

## 2019-07-12 DIAGNOSIS — J45.998 OTHER ASTHMA: ICD-10-CM

## 2019-07-12 DIAGNOSIS — J45.31 MILD PERSISTENT ASTHMA WITH ACUTE EXACERBATION: ICD-10-CM

## 2019-07-12 DIAGNOSIS — R91.8 LUNG NODULES: ICD-10-CM

## 2019-07-12 LAB — CHROMATIN AB SERPL-ACNC: <10 IU/ML (ref 0–14)

## 2019-07-12 PROCEDURE — 86003 ALLG SPEC IGE CRUDE XTRC EA: CPT | Performed by: INTERNAL MEDICINE

## 2019-07-12 PROCEDURE — 86038 ANTINUCLEAR ANTIBODIES: CPT | Performed by: INTERNAL MEDICINE

## 2019-07-12 PROCEDURE — 86671 FUNGUS NES ANTIBODY: CPT | Performed by: INTERNAL MEDICINE

## 2019-07-12 PROCEDURE — 36415 COLL VENOUS BLD VENIPUNCTURE: CPT | Performed by: INTERNAL MEDICINE

## 2019-07-12 PROCEDURE — 86606 ASPERGILLUS ANTIBODY: CPT | Performed by: INTERNAL MEDICINE

## 2019-07-12 PROCEDURE — 82164 ANGIOTENSIN I ENZYME TEST: CPT | Performed by: INTERNAL MEDICINE

## 2019-07-12 PROCEDURE — 86331 IMMUNODIFFUSION OUCHTERLONY: CPT | Performed by: INTERNAL MEDICINE

## 2019-07-12 PROCEDURE — 86602 ANTINOMYCES ANTIBODY: CPT | Performed by: INTERNAL MEDICINE

## 2019-07-12 PROCEDURE — 86256 FLUORESCENT ANTIBODY TITER: CPT | Performed by: INTERNAL MEDICINE

## 2019-07-12 PROCEDURE — 83520 IMMUNOASSAY QUANT NOS NONAB: CPT | Performed by: INTERNAL MEDICINE

## 2019-07-12 PROCEDURE — 86431 RHEUMATOID FACTOR QUANT: CPT | Performed by: INTERNAL MEDICINE

## 2019-07-12 PROCEDURE — 86698 HISTOPLASMA ANTIBODY: CPT | Performed by: INTERNAL MEDICINE

## 2019-07-12 PROCEDURE — 86609 BACTERIUM ANTIBODY: CPT | Performed by: INTERNAL MEDICINE

## 2019-07-15 ENCOUNTER — CONSULT (OUTPATIENT)
Dept: CARDIOLOGY | Facility: CLINIC | Age: 74
End: 2019-07-15

## 2019-07-15 VITALS
HEIGHT: 63 IN | OXYGEN SATURATION: 97 % | HEART RATE: 72 BPM | BODY MASS INDEX: 34.16 KG/M2 | DIASTOLIC BLOOD PRESSURE: 70 MMHG | WEIGHT: 192.8 LBS | SYSTOLIC BLOOD PRESSURE: 130 MMHG

## 2019-07-15 DIAGNOSIS — I10 ESSENTIAL HYPERTENSION: Chronic | ICD-10-CM

## 2019-07-15 DIAGNOSIS — R06.09 DYSPNEA ON EXERTION: Primary | ICD-10-CM

## 2019-07-15 DIAGNOSIS — E78.00 PURE HYPERCHOLESTEROLEMIA: ICD-10-CM

## 2019-07-15 LAB
ACE SERPL-CCNC: 18 U/L (ref 14–82)
ANA SER QL: NEGATIVE
C-ANCA TITR SER IF: NORMAL TITER
MYELOPEROXIDASE AB SER-ACNC: <9 U/ML (ref 0–9)
P-ANCA ATYPICAL TITR SER IF: NORMAL TITER
P-ANCA TITR SER IF: NORMAL TITER
PROTEINASE3 AB SER IA-ACNC: <3.5 U/ML (ref 0–3.5)

## 2019-07-15 PROCEDURE — 99204 OFFICE O/P NEW MOD 45 MIN: CPT | Performed by: INTERNAL MEDICINE

## 2019-07-15 PROCEDURE — 93000 ELECTROCARDIOGRAM COMPLETE: CPT | Performed by: INTERNAL MEDICINE

## 2019-07-15 RX ORDER — BISOPROLOL FUMARATE 5 MG/1
2.5 TABLET, FILM COATED ORAL DAILY
Qty: 45 TABLET | Refills: 3 | Status: SHIPPED | OUTPATIENT
Start: 2019-07-15 | End: 2020-02-10 | Stop reason: SDUPTHER

## 2019-07-15 RX ORDER — LOSARTAN POTASSIUM 100 MG/1
100 TABLET ORAL DAILY
Qty: 90 TABLET | Refills: 3 | Status: SHIPPED | OUTPATIENT
Start: 2019-07-15 | End: 2021-04-05

## 2019-07-15 NOTE — PROGRESS NOTES
Subjective:     Encounter Date:07/15/2019    Primary Care Physician: Ly Funez MD      Patient ID: Ruby Pettit is a 74 y.o. female.    Chief Complaint:Shortness of Breath; Dizziness; and pain in legs    PROBLEM LIST:  1. Dyspnea  a. 2016 normal myocardial perfusion study  b. 9/2016 echo EF 65%.  Mild mitral regurgitation.  c. 1/2019 echo EF 65%.  RVSP 40 mmHg.  d. 7/8/2019 echo EF greater than 75% with mid cavity obliteration.  Valves normal  2. Hypertension  3. Dyslipidemia  4. Diabetes mellitus  a. With neuropathy  5. Anxiety/depression  6. Arthritis  7. Hypothyroidism  8. GERD  9. Surgeries:  a. Abdominal surgery  b. Appendectomy  c. Cholecystectomy  d. Liver surgery  e. Hysterectomy  f. Thyroid surgery  g. Tubal ligation   h. Right wrist surgery.      No Known Allergies      Current Outpatient Medications:   •  aspirin 81 MG EC tablet, Take 81 mg by mouth Every Night., Disp: , Rfl:   •  cetirizine (zyrTEC) 10 MG tablet, Take 10 mg by mouth Every Night., Disp: , Rfl:   •  citalopram (CeleXA) 20 MG tablet, Take 20 mg by mouth Every Night., Disp: , Rfl:   •  gabapentin (NEURONTIN) 800 MG tablet, Take 800 mg by mouth 2 (Two) Times a Day., Disp: , Rfl:   •  glimepiride (AMARYL) 1 MG tablet, Take 1 mg by mouth Daily With Lunch. Daily at noon, Disp: , Rfl:   •  HYDROcodone-acetaminophen (NORCO) 5-325 MG per tablet, Take 1 tablet by mouth 2 (Two) Times a Day., Disp: , Rfl:   •  hydrOXYzine (ATARAX) 25 MG tablet, Take 25-50 mg by mouth Every Night., Disp: , Rfl:   •  hyoscyamine (LEVSIN) 0.125 MG SL tablet, Take 0.125 mg by mouth Every 4 (Four) Hours As Needed for Cramping., Disp: , Rfl:   •  levothyroxine (SYNTHROID, LEVOTHROID) 50 MCG tablet, Take 50 mcg by mouth Daily., Disp: , Rfl:   •  lovastatin (MEVACOR) 20 MG tablet, Take 20 mg by mouth Every Night., Disp: , Rfl:   •  omeprazole (priLOSEC) 20 MG capsule, Take 20 mg by mouth 2 (Two) Times a Day., Disp: , Rfl:   •  bisoprolol (ZEBeta) 5 MG tablet, Take  "0.5 tablets by mouth Daily., Disp: 45 tablet, Rfl: 3  •  losartan (COZAAR) 100 MG tablet, Take 1 tablet by mouth Daily., Disp: 90 tablet, Rfl: 3        History of Present Illness    Patient is a 74-year-old  female who we are seeing today for further evaluation of dyspnea.  Patient notes that with some activity she will have shortness of breath on exertion.  At times she will also have associated wheezing.  She has not had an ischemic evaluation in a few years.  She has noted this more in the last 6 to 9 months.  She also complains of being \"tired all the time\".  She complains of having \"no energy\".  Denies any chest pain, pressure, tightness.  Denies any lower extremity edema.  Has noted some recurrent lightheadedness which is primarily positional related.  She notes that a few months ago she had a syncopal episode.  She was at a restaurant eating and became sick to her stomach.  She was walking to the bathroom and then began to feel as if she was going to pass out.  She notes that she leaned against the wall and lowered herself and then had a syncopal event.  She has not had this since that time.  She is underwent both gastrointestinal and pulmonary evaluation which were reportedly normal.  There was no clear cause for her symptoms noted.  She has been subsequently referred here.  She had an echocardiogram with findings as above in the problem list.    The following portions of the patient's history were reviewed and updated as appropriate: allergies, current medications, past family history, past medical history, past social history, past surgical history and problem list.    Family History   Problem Relation Age of Onset   • Ovarian cancer Mother 19   • Emphysema Father    • COPD Father    • No Known Problems Sister    • Breast cancer Neg Hx        Social History     Tobacco Use   • Smoking status: Never Smoker   • Smokeless tobacco: Never Used   Substance Use Topics   • Alcohol use: Yes     " "Alcohol/week: 0.0 - 1.2 oz     Comment: seldom    • Drug use: No         Review of Systems   Constitution: Positive for malaise/fatigue. Negative for fever and weakness.   HENT: Positive for hearing loss. Negative for nosebleeds.    Eyes: Negative for redness and visual disturbance.   Cardiovascular: Negative for orthopnea, palpitations and paroxysmal nocturnal dyspnea.   Respiratory: Positive for cough, shortness of breath and wheezing. Negative for snoring and sputum production.    Hematologic/Lymphatic: Negative for bleeding problem.   Skin: Negative for flushing, itching and rash.   Musculoskeletal: Positive for arthritis. Negative for falls, joint pain and muscle cramps.   Gastrointestinal: Positive for change in bowel habit. Negative for abdominal pain, diarrhea, heartburn, nausea and vomiting.   Genitourinary: Negative for hematuria.   Neurological: Positive for excessive daytime sleepiness and light-headedness. Negative for dizziness, headaches and tremors.   Psychiatric/Behavioral: Positive for depression and memory loss. Negative for substance abuse. The patient is nervous/anxious.           Objective:    height is 160 cm (63\") and weight is 87.5 kg (192 lb 12.8 oz). Her blood pressure is 130/70 and her pulse is 72. Her oxygen saturation is 97%.         Physical Exam   Constitutional: She is oriented to person, place, and time. She appears well-developed and well-nourished.   HENT:   Head: Normocephalic and atraumatic.   Mouth/Throat: Oropharynx is clear and moist.   Eyes: Conjunctivae are normal. Pupils are equal, round, and reactive to light.   Neck: Normal carotid pulses and no JVD present. Carotid bruit is not present. No thyromegaly present.   Cardiovascular: Normal rate, regular rhythm, S1 normal and S2 normal. Exam reveals no gallop and no friction rub.   No murmur heard.  Pulses:       Carotid pulses are 2+ on the right side, and 2+ on the left side.       Dorsalis pedis pulses are 2+ on the right " side, and 2+ on the left side.        Posterior tibial pulses are 2+ on the right side, and 2+ on the left side.   Pulmonary/Chest: No respiratory distress. She has no wheezes. She has no rales. She exhibits no tenderness.   Abdominal: She exhibits no distension, no abdominal bruit and no mass. There is no hepatosplenomegaly. There is no tenderness. There is no rebound.   Musculoskeletal: She exhibits no edema, tenderness or deformity.   Lymphadenopathy:     She has no cervical adenopathy.   Neurological: She is alert and oriented to person, place, and time. She has normal strength.   Skin: Skin is warm and dry. No rash noted. No cyanosis. Nails show no clubbing.   Psychiatric: She has a normal mood and affect. Cognition and memory are normal.         ECG 12 Lead  Date/Time: 7/15/2019 11:13 AM  Performed by: Checo Drake MD  Authorized by: Checo Drake MD   Comparison: compared with previous ECG from 1/16/2019  Similar to previous ECG  Rhythm: sinus rhythm  Comments: Nonspecific T wave abnormality.                  Assessment:   Assessment/Plan      Ruby was seen today for shortness of breath, dizziness and pain in legs.    Diagnoses and all orders for this visit:    Dyspnea on exertion  -     ECG 12 Lead    Essential hypertension    Pure hypercholesterolemia      1.  Dyspnea on exertion, new onset, possible angina equivalent  2.  Diabetes with peripheral neuropathy  3.  Dyslipidemia on low-dose statin  4.  Hypertension controlled  5.  Hyperdynamic LV function with diastolic dysfunction noted by echocardiogram.    Recommendations   #1 we will check a myocardial perfusion study to rule out myocardial ischemia.  2.  Her symptoms may be due to some diastolic dysfunction/hyperdynamic LV function.  As such she is likely worsened by vasodilators and diuretics.  We will discontinue her HCTZ, and continue her on her losartan 100 mg daily.  3.  Discontinue amlodipine  4.  Add bisoprolol 2.5 mg daily  5.  If above is  negative, consider sleep apnea evaluation   revisit in 1 month's time after the above         Migdalia OLEA scribed portions of this dictation for Dr. Checo Drake.    Dictated utilizing Dragon dictation

## 2019-07-16 LAB — H CAPSUL AB TITR SER ID: NEGATIVE {TITER}

## 2019-07-17 LAB
A ALTERNATA IGE QN: <0.1 KU/L
A FUMIGATUS IGE QN: <0.1 KU/L
A NIGER IGE QN: <0.1 KU/L
AMER ROACH IGE QN: <0.1 KU/L
BAHIA GRASS IGE QN: <0.1 KU/L
BERMUDA GRASS IGE QN: <0.1 KU/L
BOXELDER IGE QN: <0.1 KU/L
C HERBARUM IGE QN: <0.1 KU/L
CAT DANDER IGG QN: <0.1 KU/L
CMN PIGWEED IGE QN: <0.1 KU/L
COMMON RAGWEED IGE QN: <0.1 KU/L
CONV CLASS DESCRIPTION: NORMAL
D FARINAE IGE QN: <0.1 KU/L
D PTERONYSS IGE QN: <0.1 KU/L
DOG DANDER IGE QN: <0.1 KU/L
ENGL PLANTAIN IGE QN: <0.1 KU/L
HAZELNUT POLN IGE QN: <0.1 KU/L
JOHNSON GRASS IGE QN: <0.1 KU/L
KENT BLUE GRASS IGE QN: <0.1 KU/L
M RACEMOSUS IGE QN: <0.1 KU/L
MT JUNIPER IGE QN: <0.1 KU/L
MUGWORT IGE QN: <0.1 KU/L
NETTLE IGE QN: <0.1 KU/L
P NOTATUM IGE QN: <0.1 KU/L
S BOTRYOSUM IGE QN: <0.1 KU/L
SHEEP SORREL IGE QN: <0.1 KU/L
SWEET GUM IGE QN: <0.1 KU/L
T011-IGE MAPLE LEAF SYCAMORE: <0.1 KU/L
WHITE ELM IGE QN: <0.1 KU/L
WHITE HICKORY IGE QN: <0.1 KU/L
WHITE MULBERRY IGE QN: <0.1 KU/L
WHITE OAK IGE QN: <0.1 KU/L

## 2019-07-18 LAB
A FLAVUS AB SER QL ID: NEGATIVE
A FUMIGATUS AB SER QL ID: NEGATIVE
A NIGER AB SER QL ID: NEGATIVE

## 2019-07-19 LAB
A FUMIGATUS1 AB SER QL ID: NEGATIVE
A PULLULANS AB SER QL: NEGATIVE
LACEYELLA SACCHARI AB SER QL: NEGATIVE
MICROPOLYSPORA FAENI: NEGATIVE
PIGEON SERUM AB QL ID: NEGATIVE
T VULGARIS AB SER QL ID: NEGATIVE

## 2019-07-29 ENCOUNTER — HOSPITAL ENCOUNTER (OUTPATIENT)
Dept: CARDIOLOGY | Facility: HOSPITAL | Age: 74
Discharge: HOME | End: 2019-07-29

## 2019-07-29 VITALS — HEIGHT: 63 IN | WEIGHT: 192.9 LBS | BODY MASS INDEX: 34.18 KG/M2

## 2019-07-29 DIAGNOSIS — R06.09 DYSPNEA ON EXERTION: ICD-10-CM

## 2019-07-29 PROCEDURE — 93018 CV STRESS TEST I&R ONLY: CPT | Performed by: INTERNAL MEDICINE

## 2019-07-29 PROCEDURE — 78452 HT MUSCLE IMAGE SPECT MULT: CPT

## 2019-07-29 PROCEDURE — 25010000002 REGADENOSON 0.4 MG/5ML SOLUTION: Performed by: INTERNAL MEDICINE

## 2019-07-29 PROCEDURE — 78452 HT MUSCLE IMAGE SPECT MULT: CPT | Performed by: INTERNAL MEDICINE

## 2019-07-29 PROCEDURE — 0 TECHNETIUM SESTAMIBI: Performed by: INTERNAL MEDICINE

## 2019-07-29 PROCEDURE — A9500 TC99M SESTAMIBI: HCPCS | Performed by: INTERNAL MEDICINE

## 2019-07-29 PROCEDURE — 93017 CV STRESS TEST TRACING ONLY: CPT

## 2019-07-29 RX ORDER — ALBUTEROL SULFATE 90 UG/1
2 AEROSOL, METERED RESPIRATORY (INHALATION)
Status: DISCONTINUED | OUTPATIENT
Start: 2019-07-29 | End: 2019-07-29

## 2019-07-29 RX ORDER — ALBUTEROL SULFATE 90 UG/1
2 AEROSOL, METERED RESPIRATORY (INHALATION) ONCE
Status: COMPLETED | OUTPATIENT
Start: 2019-07-29 | End: 2019-07-29

## 2019-07-29 RX ADMIN — TECHNETIUM TC 99M SESTAMIBI 1 DOSE: 1 INJECTION INTRAVENOUS at 10:00

## 2019-07-29 RX ADMIN — REGADENOSON 0.4 MG: 0.08 INJECTION, SOLUTION INTRAVENOUS at 10:00

## 2019-07-29 RX ADMIN — TECHNETIUM TC 99M SESTAMIBI 1 DOSE: 1 INJECTION INTRAVENOUS at 08:20

## 2019-07-29 RX ADMIN — ALBUTEROL SULFATE 2 PUFF: 108 AEROSOL, METERED RESPIRATORY (INHALATION) at 09:55

## 2019-07-30 ENCOUNTER — TELEPHONE (OUTPATIENT)
Dept: CARDIOLOGY | Facility: CLINIC | Age: 74
End: 2019-07-30

## 2019-07-30 LAB
BH CV STRESS BP STAGE 2: NORMAL
BH CV STRESS BP STAGE 4: NORMAL
BH CV STRESS COMMENTS STAGE 1: NORMAL
BH CV STRESS DOSE REGADENOSON STAGE 1: 0.4
BH CV STRESS DURATION MIN STAGE 1: 1
BH CV STRESS DURATION MIN STAGE 2: 1
BH CV STRESS DURATION MIN STAGE 3: 1
BH CV STRESS DURATION MIN STAGE 4: 1
BH CV STRESS DURATION SEC STAGE 1: 0
BH CV STRESS DURATION SEC STAGE 2: 0
BH CV STRESS DURATION SEC STAGE 3: 0
BH CV STRESS DURATION SEC STAGE 4: 0
BH CV STRESS HR STAGE 1: 69
BH CV STRESS HR STAGE 2: 82
BH CV STRESS HR STAGE 3: 77
BH CV STRESS HR STAGE 4: 75
BH CV STRESS PROTOCOL 1: NORMAL
BH CV STRESS RECOVERY BP: NORMAL MMHG
BH CV STRESS RECOVERY HR: 70 BPM
BH CV STRESS STAGE 1: 1
BH CV STRESS STAGE 2: 2
BH CV STRESS STAGE 3: 3
BH CV STRESS STAGE 4: 4
LV EF NUC BP: 64 %
MAXIMAL PREDICTED HEART RATE: 146 BPM
PERCENT MAX PREDICTED HR: 63.01 %
STRESS BASELINE BP: NORMAL MMHG
STRESS BASELINE HR: 56 BPM
STRESS PERCENT HR: 74 %
STRESS POST ESTIMATED WORKLOAD: 1 METS
STRESS POST EXERCISE DUR MIN: 4 MIN
STRESS POST EXERCISE DUR SEC: 0 SEC
STRESS POST PEAK BP: NORMAL MMHG
STRESS POST PEAK HR: 92 BPM
STRESS TARGET HR: 124 BPM

## 2019-08-09 ENCOUNTER — TELEPHONE (OUTPATIENT)
Dept: PULMONOLOGY | Facility: CLINIC | Age: 74
End: 2019-08-09

## 2019-08-09 ENCOUNTER — OFFICE VISIT (OUTPATIENT)
Dept: PULMONOLOGY | Facility: CLINIC | Age: 74
End: 2019-08-09

## 2019-08-09 VITALS
HEIGHT: 62 IN | BODY MASS INDEX: 35.15 KG/M2 | TEMPERATURE: 98.7 F | SYSTOLIC BLOOD PRESSURE: 128 MMHG | WEIGHT: 191 LBS | HEART RATE: 78 BPM | DIASTOLIC BLOOD PRESSURE: 80 MMHG | OXYGEN SATURATION: 96 %

## 2019-08-09 DIAGNOSIS — R05.9 COUGH: Primary | ICD-10-CM

## 2019-08-09 DIAGNOSIS — J30.2 SEASONAL AND PERENNIAL ALLERGIC RHINITIS: ICD-10-CM

## 2019-08-09 DIAGNOSIS — K21.9 CHRONIC GERD: ICD-10-CM

## 2019-08-09 DIAGNOSIS — J30.89 SEASONAL AND PERENNIAL ALLERGIC RHINITIS: ICD-10-CM

## 2019-08-09 DIAGNOSIS — Z87.09 HISTORY OF ASTHMA: ICD-10-CM

## 2019-08-09 PROCEDURE — 99213 OFFICE O/P EST LOW 20 MIN: CPT | Performed by: NURSE PRACTITIONER

## 2019-08-09 RX ORDER — ALBUTEROL SULFATE 90 UG/1
2 AEROSOL, METERED RESPIRATORY (INHALATION) EVERY 4 HOURS PRN
Qty: 18 G | Refills: 11 | Status: SHIPPED | OUTPATIENT
Start: 2019-08-09 | End: 2019-08-09 | Stop reason: ALTCHOICE

## 2019-08-09 RX ORDER — ALBUTEROL SULFATE 90 UG/1
2 AEROSOL, METERED RESPIRATORY (INHALATION) EVERY 4 HOURS PRN
Qty: 18 G | Refills: 1
Start: 2019-08-09 | End: 2020-03-02

## 2019-08-09 RX ORDER — GLIPIZIDE 10 MG/1
10 TABLET ORAL
COMMUNITY
End: 2020-11-02

## 2019-08-09 NOTE — TELEPHONE ENCOUNTER
Pharmacy called insurance will not cover Rx Albuterol Sulfate but will cover Ventolin. Pharmacy notified okay to change.

## 2019-08-09 NOTE — PROGRESS NOTES
"[facility] Pulmonary Follow up    CHIEF COMPLAINT    Follow-up on testing    Subjective   HISTORY OF PRESENT ILLNESS    Ruby Pettit is a 74 y.o.female here today for follow up on testing.      She last saw Dr. Fatima in June, with a history of persistent migratory pulmonary infiltrates.  She underwent bronchoscopy with transbronchial biopsies in March 2019.  Bronchoscopy biopsies were all negative as were cultures.  On her last visit she did follow-up with some serologic testing as well as an echocardiogram.  Her autoimmune and fungal work-up was negative.    Her echocardiogram had some mild diastolic dysfunction otherwise was normal.  She also underwent stress test on July 29 that was negative.  She did see Dr. Drake in consultation.  Who adjusted her medications.  She has a follow-up pending with Dr. Drake for continued evaluation.    She also has a follow-up CT in September on her persistent migratory pulmonary infiltrates.    She does continue to have some occasional cough with dyspnea on activity.  As well as some wheezing mostly with activity.      Patient Active Problem List   Diagnosis   • Migratory Lung nodules (\"Soft\" and solid, bilateral)   • Non-smoker   • History of asthma   • Cough   • Perennial rhinitis   • Syncope and collapse   • UTI (urinary tract infection), bacterial   • Hypertension   • T2DM (type 2 diabetes mellitus) (CMS/HCC)   • Neuropathy   • Hypokalemia   • Lesion of temporal lobe   • GERD   • Iatrogenic pneumothorax       No Known Allergies    Current Outpatient Medications:   •  aspirin 81 MG EC tablet, Take 81 mg by mouth Every Night., Disp: , Rfl:   •  bisoprolol (ZEBeta) 5 MG tablet, Take 0.5 tablets by mouth Daily., Disp: 45 tablet, Rfl: 3  •  cetirizine (zyrTEC) 10 MG tablet, Take 10 mg by mouth Every Night., Disp: , Rfl:   •  citalopram (CeleXA) 20 MG tablet, Take 20 mg by mouth Every Night., Disp: , Rfl:   •  gabapentin (NEURONTIN) 800 MG tablet, Take 800 mg by mouth 2 (Two) " Times a Day., Disp: , Rfl:   •  glimepiride (AMARYL) 1 MG tablet, Take 1 mg by mouth Daily With Lunch. Daily at noon, Disp: , Rfl:   •  glipiZIDE (GLUCOTROL) 10 MG tablet, Take 10 mg by mouth 2 (Two) Times a Day Before Meals., Disp: , Rfl:   •  HYDROcodone-acetaminophen (NORCO) 5-325 MG per tablet, Take 1 tablet by mouth 2 (Two) Times a Day., Disp: , Rfl:   •  hydrOXYzine (ATARAX) 25 MG tablet, Take 25-50 mg by mouth Every Night., Disp: , Rfl:   •  hyoscyamine (LEVSIN) 0.125 MG SL tablet, Take 0.125 mg by mouth Every 4 (Four) Hours As Needed for Cramping., Disp: , Rfl:   •  levothyroxine (SYNTHROID, LEVOTHROID) 50 MCG tablet, Take 50 mcg by mouth Daily., Disp: , Rfl:   •  losartan (COZAAR) 100 MG tablet, Take 1 tablet by mouth Daily., Disp: 90 tablet, Rfl: 3  •  lovastatin (MEVACOR) 20 MG tablet, Take 20 mg by mouth Every Night., Disp: , Rfl:   •  omeprazole (priLOSEC) 20 MG capsule, Take 20 mg by mouth 2 (Two) Times a Day., Disp: , Rfl:   •  albuterol sulfate  (90 Base) MCG/ACT inhaler, Inhale 2 puffs Every 4 (Four) Hours As Needed for Wheezing., Disp: 18 g, Rfl: 11  MEDICATION LIST AND ALLERGIES REVIEWED.    Social History     Tobacco Use   • Smoking status: Never Smoker   • Smokeless tobacco: Never Used   Substance Use Topics   • Alcohol use: Yes     Alcohol/week: 0.0 - 1.2 oz     Comment: seldom    • Drug use: No       FAMILY AND SOCIAL HISTORY REVIEWED.    Review of Systems   Constitutional: Positive for fatigue. Negative for chills, fever and unexpected weight change.   HENT: Negative for congestion, nosebleeds, postnasal drip, rhinorrhea, sinus pressure and trouble swallowing.    Respiratory: Positive for cough, shortness of breath and wheezing. Negative for chest tightness.    Cardiovascular: Negative for chest pain and leg swelling.   Gastrointestinal: Negative for abdominal pain, constipation, diarrhea, nausea and vomiting.   Genitourinary: Negative for dysuria, frequency, hematuria and urgency.  "  Musculoskeletal: Positive for arthralgias. Negative for myalgias.   Neurological: Negative for dizziness, weakness, numbness and headaches.   All other systems reviewed and are negative.  .  Objective   /80   Pulse 78   Temp 98.7 °F (37.1 °C)   Ht 157.5 cm (62\")   Wt 86.6 kg (191 lb)   SpO2 96% Comment: room air at rest  BMI 34.93 kg/m²     Immunization History   Administered Date(s) Administered   • Flu Vaccine High Dose PF 65YR+ 10/05/2018       Physical Exam   Constitutional: She is oriented to person, place, and time. She appears well-developed and well-nourished.   HENT:   Head: Normocephalic and atraumatic.   Eyes: EOM are normal. Pupils are equal, round, and reactive to light.   Neck: Normal range of motion. Neck supple.   Cardiovascular: Normal rate and regular rhythm.   No murmur heard.  Pulmonary/Chest: Effort normal and breath sounds normal. No respiratory distress. She has no wheezes. She has no rales.   Abdominal: Soft. Bowel sounds are normal. She exhibits no distension.   Musculoskeletal: Normal range of motion. She exhibits no edema.   Neurological: She is alert and oriented to person, place, and time.   Skin: Skin is warm and dry. No erythema.   Psychiatric: She has a normal mood and affect. Her behavior is normal.   Vitals reviewed.        RESULTS    Labs from 7/12/19 reviewed with patient.      ECHO 7/08/2019  · Estimated EF = 65%. Near cavitary obliteration at rest with contractility  · Left ventricular systolic function is normal.  · Left ventricular diastolic dysfunction (grade I) consistent with impaired relaxation.  · Trace mitral regurgitation  · Trace to mild tricuspid regurgitation     Stress Test 7/29/19  · Myocardial perfusion imaging indicates a normal myocardial perfusion study with no evidence of ischemia.  · Left ventricular ejection fraction is normal (Calculated EF = 64%).      Assessment/Plan     PROBLEM LIST    Problem List Items Addressed This Visit        " Respiratory    Cough - Primary    Perennial rhinitis       Digestive    GERD       Other    History of asthma            DISCUSSION    We went over her testing to date.      Follow-up CT scan in September with follow-up with Dr. Fatima    She says she does have a history of asthma and is requesting a short acting bronchodilator for her episodes of exertional wheezing.  I did call in some Ventolin to her pharmacy for a trial to see if this helped the wheezing and shortness of breath with activity.    Continue follow-up with cardiology.    I spent 15 minutes with the patient. I spent > 50% percent of this time counseling and discussing diagnostic testing, evaluation, current status and management.    Nicky Negron, APRN  08/09/20199:47 AM  Electronically signed     Please note that portions of this note were completed with a voice recognition program. Efforts were made to edit the dictations, but occasionally words are mistranscribed.      CC: Ly Funez MD

## 2019-08-10 NOTE — PROGRESS NOTES
Subjective:     Encounter Date:08/12/2019    Primary Care Physician: Ly Funez MD      Patient ID: Ruby Pettit is a 74 y.o. female.    Chief Complaint:Dyspnea on exertion    PROBLEM LIST:  1. Dyspnea  a. 2016 normal myocardial perfusion study  b. 9/2016 echo EF 65%.  Mild mitral regurgitation.  c. 1/2019 echo EF 65%.  RVSP 40 mmHg.  d. 7/8/2019 echo EF greater than 75% with mid cavity obliteration.  Valves normal  e. 7/29/19 MPS no ischemia  2. Hypertension  3. Dyslipidemia  4. Diabetes mellitus  a. With neuropathy  5. Anxiety/depression  6. Arthritis  7. Hypothyroidism  8. GERD  9. Surgeries:  a. Abdominal surgery  b. Appendectomy  c. Cholecystectomy  d. Liver surgery  e. Hysterectomy  f. Thyroid surgery  g. Tubal ligation   h. Right wrist surgery.        No Known Allergies      Current Outpatient Medications:   •  albuterol sulfate  (90 Base) MCG/ACT inhaler, Inhale 2 puffs Every 4 (Four) Hours As Needed for Wheezing., Disp: 18 g, Rfl: 1  •  aspirin 81 MG EC tablet, Take 81 mg by mouth Every Night., Disp: , Rfl:   •  bisoprolol (ZEBeta) 5 MG tablet, Take 0.5 tablets by mouth Daily. (Patient taking differently: Take 5 mg by mouth Daily.), Disp: 45 tablet, Rfl: 3  •  cetirizine (zyrTEC) 10 MG tablet, Take 10 mg by mouth Every Night., Disp: , Rfl:   •  citalopram (CeleXA) 20 MG tablet, Take 20 mg by mouth Every Night., Disp: , Rfl:   •  gabapentin (NEURONTIN) 800 MG tablet, Take 800 mg by mouth 2 (Two) Times a Day., Disp: , Rfl:   •  glimepiride (AMARYL) 1 MG tablet, Take 1 mg by mouth Daily With Lunch. Daily at noon, Disp: , Rfl:   •  glipiZIDE (GLUCOTROL) 10 MG tablet, Take 10 mg by mouth 2 (Two) Times a Day Before Meals., Disp: , Rfl:   •  HYDROcodone-acetaminophen (NORCO) 5-325 MG per tablet, Take 1 tablet by mouth 2 (Two) Times a Day., Disp: , Rfl:   •  hydrOXYzine (ATARAX) 25 MG tablet, Take 25-50 mg by mouth Every Night., Disp: , Rfl:   •  hyoscyamine (LEVSIN) 0.125 MG SL tablet, Take 0.125  mg by mouth Every 4 (Four) Hours As Needed for Cramping., Disp: , Rfl:   •  levothyroxine (SYNTHROID, LEVOTHROID) 50 MCG tablet, Take 50 mcg by mouth Daily., Disp: , Rfl:   •  losartan (COZAAR) 100 MG tablet, Take 1 tablet by mouth Daily., Disp: 90 tablet, Rfl: 3  •  lovastatin (MEVACOR) 20 MG tablet, Take 20 mg by mouth Every Night., Disp: , Rfl:   •  omeprazole (priLOSEC) 20 MG capsule, Take 20 mg by mouth 2 (Two) Times a Day., Disp: , Rfl:         History of Present Illness    Patient returns today for one-month follow-up of shortness of breath.  At the last visit her HCTZ therapy was discontinued.  She was started on Zebeta.  Since that time she notes a fair improvement in her shortness of breath with exertion.  She also underwent myocardial perfusion study which was negative for ischemia.  No reported chest pain, pressure, tightness.  Denies any syncope, near syncope.  She does note some mild lower extremity edema at the end of the day.  This is improved with elevation.  It is not bothersome to her.  She was also recently seen by pulmonary and given a prescription for an albuterol inhaler.  She has not filled this yet.    The following portions of the patient's history were reviewed and updated as appropriate: allergies, current medications, past family history, past medical history, past social history, past surgical history and problem list.      Social History     Tobacco Use   • Smoking status: Never Smoker   • Smokeless tobacco: Never Used   Substance Use Topics   • Alcohol use: Yes     Alcohol/week: 0.0 - 1.2 oz     Comment: seldom    • Drug use: No         Review of Systems   Constitution: Positive for malaise/fatigue and weight gain.   Eyes: Positive for blurred vision.   Cardiovascular: Positive for leg swelling.   Respiratory: Positive for shortness of breath and wheezing.    Endocrine: Positive for polydipsia.   Hematologic/Lymphatic: Negative for bleeding problem. Does not bruise/bleed easily.  "  Skin: Positive for dry skin. Negative for rash.   Musculoskeletal: Positive for arthritis, back pain and myalgias. Negative for muscle weakness.   Gastrointestinal: Positive for bloating. Negative for heartburn, nausea and vomiting.   Genitourinary: Positive for frequency.   Neurological: Positive for difficulty with concentration, excessive daytime sleepiness and dizziness.          Objective:    height is 160 cm (63\") and weight is 85.9 kg (189 lb 6.4 oz). Her blood pressure is 126/72 and her pulse is 59. Her oxygen saturation is 96%.         Physical Exam   Constitutional: She is oriented to person, place, and time. She appears well-developed and well-nourished. No distress.   Neck: No JVD present. No tracheal deviation present.   Cardiovascular: Normal rate, regular rhythm, normal heart sounds and intact distal pulses. Exam reveals no friction rub.   No murmur heard.  Pulmonary/Chest: Effort normal and breath sounds normal. No respiratory distress.   Abdominal: Soft. Bowel sounds are normal. There is no tenderness.   Musculoskeletal: She exhibits no edema or deformity.   Neurological: She is alert and oriented to person, place, and time.   Skin: Skin is warm and dry.       Procedures          Assessment:   Assessment/Plan      Ruby was seen today for dyspnea on exertion.    Diagnoses and all orders for this visit:    Dyspnea on exertion, likely multifactorial related to weight, pulmonary disease, and hyperdynamic LVEF.  Symptoms somewhat improved with discontinuation of diuretic and initiation of beta-blocker.  No signs of ischemia.  Myocardial perfusion study reviewed with patient today.    Essential hypertension, controlled on Zebeta and losartan.      Plan:  1. Continue current medications.  2. Follow-up in 6 months time or sooner if symptoms change.       Migdalia OLEA     Dictated utilizing Dragon dictation  "

## 2019-08-12 ENCOUNTER — OFFICE VISIT (OUTPATIENT)
Dept: CARDIOLOGY | Facility: CLINIC | Age: 74
End: 2019-08-12

## 2019-08-12 VITALS
BODY MASS INDEX: 33.56 KG/M2 | SYSTOLIC BLOOD PRESSURE: 126 MMHG | DIASTOLIC BLOOD PRESSURE: 72 MMHG | WEIGHT: 189.4 LBS | HEART RATE: 59 BPM | HEIGHT: 63 IN | OXYGEN SATURATION: 96 %

## 2019-08-12 DIAGNOSIS — I10 ESSENTIAL HYPERTENSION: Chronic | ICD-10-CM

## 2019-08-12 DIAGNOSIS — R06.09 DYSPNEA ON EXERTION: Primary | ICD-10-CM

## 2019-08-12 PROCEDURE — 99213 OFFICE O/P EST LOW 20 MIN: CPT | Performed by: NURSE PRACTITIONER

## 2019-09-04 ENCOUNTER — HOSPITAL ENCOUNTER (OUTPATIENT)
Dept: CT IMAGING | Facility: HOSPITAL | Age: 74
Discharge: HOME | End: 2019-09-04
Admitting: INTERNAL MEDICINE

## 2019-09-04 DIAGNOSIS — R91.8 LUNG NODULES: ICD-10-CM

## 2019-09-04 PROCEDURE — 71250 CT THORAX DX C-: CPT

## 2019-09-10 ENCOUNTER — OFFICE VISIT (OUTPATIENT)
Dept: NEUROLOGY | Facility: CLINIC | Age: 74
End: 2019-09-10

## 2019-09-10 VITALS
WEIGHT: 186 LBS | BODY MASS INDEX: 32.96 KG/M2 | HEIGHT: 63 IN | SYSTOLIC BLOOD PRESSURE: 134 MMHG | OXYGEN SATURATION: 92 % | HEART RATE: 62 BPM | DIASTOLIC BLOOD PRESSURE: 78 MMHG

## 2019-09-10 DIAGNOSIS — R55 SYNCOPE AND COLLAPSE: Primary | ICD-10-CM

## 2019-09-10 PROCEDURE — 99213 OFFICE O/P EST LOW 20 MIN: CPT | Performed by: PSYCHIATRY & NEUROLOGY

## 2019-09-10 NOTE — PROGRESS NOTES
Subjective:    CC: Ruby Pettit is seen today for Syncope       HPI:  Current visit- patient has not had any syncopal episodes since her last visit.  Her blood pressure and her diabetes remains stable.  She does keep her self well-hydrated but wears her compression stockings only occasionally.  She saw her cardiologist recently who has maintained her on the same medications.  Patient also continues to take aspirin.  Her previous carotid Doppler did show plaques bilaterally with minimal stenosis.    Last visit-since her last visit she has not had any more episodes of syncope.  Her episodes of dizziness have improved and she only has mild lightheadedness occasionally.  She has started to wear compression stockings and is keeping herself well-hydrated.  Her blood pressure also runs normal now and she has not had to change any of her meds.  Patient did have a bronchiolar lavage last month for pulmonary nodules which was negative.      Initial wxrpj-28-oagw-old female with a history of hypertension, hyperlipidemia, diabetes mellitus type 2, anxiety, depression, arthritis presents as a hospital follow-up for syncope.  As per patient she had an episode of passing out on 1/15.  Patient was out eating lunch with her friend when she had abdominal pain and went to use the restroom.  She then felt lightheaded and passed out.  She was tremulous when she woke up but denies any shaking.  She was also back to her baseline right away and did not have any tongue bite, bowel/bladder incontinence.  She was brought to the [facility] where she had extensive testing including a CT head and MRI brain that showed a cyst in the left temporal region but no acute intracranial abnormalities.  Carotid Doppler revealed 0-49% stenosis bilaterally and echocardiogram showed an EF of 65% with mild mitral and tricuspid regurg but no PFO.  She was also found to have a UTI and treated for the same.  Her A1c in the hospital was 7.  Patient says  she has had a similar episode a few years ago again where she had abdominal pain and an episode of passing out when she went to use the bathroom.  In addition she has episodes of lightheadedness with prolonged standing or walking.    The following portions of the patient's history were reviewed today and updated as of 09/10/2019  : allergies, current medications, past family history, past medical history, past social history, past surgical history and problem list  These document will be scanned to patient's chart.      Current Outpatient Medications:   •  albuterol sulfate  (90 Base) MCG/ACT inhaler, Inhale 2 puffs Every 4 (Four) Hours As Needed for Wheezing., Disp: 18 g, Rfl: 1  •  aspirin 81 MG EC tablet, Take 81 mg by mouth Every Night., Disp: , Rfl:   •  bisoprolol (ZEBeta) 5 MG tablet, Take 0.5 tablets by mouth Daily. (Patient taking differently: Take 5 mg by mouth Daily.), Disp: 45 tablet, Rfl: 3  •  cetirizine (zyrTEC) 10 MG tablet, Take 10 mg by mouth Every Night., Disp: , Rfl:   •  citalopram (CeleXA) 20 MG tablet, Take 20 mg by mouth Every Night., Disp: , Rfl:   •  gabapentin (NEURONTIN) 800 MG tablet, Take 800 mg by mouth 2 (Two) Times a Day., Disp: , Rfl:   •  glimepiride (AMARYL) 1 MG tablet, Take 1 mg by mouth Daily With Lunch. Daily at noon, Disp: , Rfl:   •  glipiZIDE (GLUCOTROL) 10 MG tablet, Take 10 mg by mouth 2 (Two) Times a Day Before Meals., Disp: , Rfl:   •  HYDROcodone-acetaminophen (NORCO) 5-325 MG per tablet, Take 1 tablet by mouth 2 (Two) Times a Day., Disp: , Rfl:   •  hydrOXYzine (ATARAX) 25 MG tablet, Take 25-50 mg by mouth Every Night., Disp: , Rfl:   •  hyoscyamine (LEVSIN) 0.125 MG SL tablet, Take 0.125 mg by mouth Every 4 (Four) Hours As Needed for Cramping., Disp: , Rfl:   •  levothyroxine (SYNTHROID, LEVOTHROID) 50 MCG tablet, Take 50 mcg by mouth Daily., Disp: , Rfl:   •  losartan (COZAAR) 100 MG tablet, Take 1 tablet by mouth Daily., Disp: 90 tablet, Rfl: 3  •   lovastatin (MEVACOR) 20 MG tablet, Take 20 mg by mouth Every Night., Disp: , Rfl:   •  omeprazole (priLOSEC) 20 MG capsule, Take 20 mg by mouth 2 (Two) Times a Day., Disp: , Rfl:    Past Medical History:   Diagnosis Date   • Anxiety and depression    • Arthritis    • Disease of thyroid gland    • GERD (gastroesophageal reflux disease)    • Head injury due to trauma 1992    fell down stairs    • History of transfusion    • Hyperlipidemia    • Hypertension    • Liver disorder     surgery 1998 approx    • Peripheral neuropathic pain    • T2DM (type 2 diabetes mellitus) (CMS/HCC)    • Wears eyeglasses       Past Surgical History:   Procedure Laterality Date   • ABDOMINAL SURGERY     • APPENDECTOMY     • BRONCHOSCOPY N/A 2/13/2018    Procedure: BRONCHOSCOPY WITH SANDRITA ENDOBRONCHIAL ULTRASOUND WITH FLUORO;  Surgeon: Dixon Fatima MD;  Location:  [facility];  Service:    • BRONCHOSCOPY N/A 3/21/2019    Procedure: NAVIGATIONAL BRONCHOSCOPY;  Surgeon: Dixon Fatima MD;  Location:  [facility];  Service: Pulmonary   • CHOLECYSTECTOMY     • COLONOSCOPY  2019   • HYSTERECTOMY      2001   • LIVER RESECTION     • OOPHORECTOMY Bilateral     2001   • THYROID SURGERY     • TUBAL ABDOMINAL LIGATION     • WRIST SURGERY      right wrist      Family History   Problem Relation Age of Onset   • Ovarian cancer Mother 19   • Emphysema Father    • COPD Father    • No Known Problems Sister    • Breast cancer Neg Hx       Social History     Socioeconomic History   • Marital status:      Spouse name: Not on file   • Number of children: Not on file   • Years of education: Not on file   • Highest education level: Not on file   Tobacco Use   • Smoking status: Never Smoker   • Smokeless tobacco: Never Used   Substance and Sexual Activity   • Alcohol use: Yes     Alcohol/week: 0.0 - 1.2 oz     Comment: seldom    • Drug use: No   • Sexual activity: Defer   Social History Narrative    Nonsmoker    Has been exposed to  "secondhand smoke        Has 3 children    No regular alcohol use     Review of Systems   Constitutional: Positive for activity change and fatigue.   Respiratory: Positive for shortness of breath and wheezing.    Musculoskeletal: Positive for back pain.   Neurological: Positive for numbness and confusion.   Psychiatric/Behavioral: Positive for decreased concentration and depressed mood.   All other systems reviewed and are negative.      Objective:    /78   Pulse 62   Ht 160 cm (63\")   Wt 84.4 kg (186 lb)   SpO2 92%   BMI 32.95 kg/m²     Neurology Exam:    General apperance: Obese    Mental status: Alert, awake and oriented to time place and person.    Recent and Remote memory: Intact.    Attention span and Concentration: Normal.     Language and Speech: Intact- No dysarthria.    Fluency, Naming , Repitition and Comprehension:  Intact    Cranial Nerves:   CN II: Visual fields are full. Intact. Fundi - Normal, No papillederma, Pupils - RAFAEL  CN III, IV and VI: Extraocular movements are intact. Normal saccades.   CN V: Facial sensation is intact.   CN VII: Muscles of facial expression reveal no asymmetry. Intact.   CN VIII: Hearing is intact. Whispered voice intact.   CN IX and X: Palate elevates symmetrically. Intact  CN XI: Shoulder shrug is intact.   CN XII: Tongue is midline without evidence of atrophy or fasciculation.     Ophthalmoscopic exam of optic disc-normal    Motor:  Right UE muscle strength 5/5. Normal tone.     Left UE muscle strength 5/5. Normal tone.      Right LE muscle strength5/5. Normal tone.     Left LE muscle strength 5/5. Normal tone.      Sensory: Normal light touch, vibration and pinprick sensation bilaterally.    DTRs: 2+ bilaterally in upper and lower extremities.    Babinski: Negative bilaterally.    Co-ordination: Normal finger-to-nose, heel to shin B/L.    Rhomberg: Negative.    Gait: Normal.    Cardiovascular: Regular rate and rhythm without murmur, gallop or " rub.    Assessment and Plan:  1. Syncope and collapse  Most likely due to autonomic dysfunction  as a result of her diabetes  Has not had any more episodes since her initial episode.  Both blood pressure and blood glucose are well controlled  I have again asked her to start wearing above-knee moderate compression stockings for at least a few hours every day and to continue keeping herself hydrated  I have also advised her to start exercising at least 20 minutes 3-4 times a week       Return in about 9 months (around 6/10/2020).         Batool Aguila MD

## 2019-09-25 ENCOUNTER — TELEPHONE (OUTPATIENT)
Dept: PULMONOLOGY | Facility: CLINIC | Age: 74
End: 2019-09-25

## 2019-09-25 DIAGNOSIS — R93.2 ABNORMAL CT SCAN, LIVER: Primary | ICD-10-CM

## 2019-09-25 NOTE — TELEPHONE ENCOUNTER
Called patient with CT Chest results. Dr Kofi Fatima reviewed her CT scan of the chest which was okay, however there was an abnormality in her liver.     The radiologist recommended a contrast liver protocol CT scan. Patient verbalized understanding and agrees to proceed with scan, which I will order today. The patient also will need follow-up in the office sometime after scan.

## 2019-10-02 ENCOUNTER — HOSPITAL ENCOUNTER (OUTPATIENT)
Dept: CT IMAGING | Facility: HOSPITAL | Age: 74
Discharge: HOME | End: 2019-10-02
Admitting: NURSE PRACTITIONER

## 2019-10-02 DIAGNOSIS — R93.2 ABNORMAL CT SCAN, LIVER: ICD-10-CM

## 2019-10-02 LAB — CREAT BLDA-MCNC: 0.8 MG/DL (ref 0.6–1.3)

## 2019-10-02 PROCEDURE — 82565 ASSAY OF CREATININE: CPT

## 2019-10-02 PROCEDURE — 74177 CT ABD & PELVIS W/CONTRAST: CPT

## 2019-10-02 PROCEDURE — 0 IOPAMIDOL PER 1 ML: Performed by: NURSE PRACTITIONER

## 2019-10-02 RX ADMIN — IOPAMIDOL 99 ML: 755 INJECTION, SOLUTION INTRAVENOUS at 15:22

## 2019-10-04 ENCOUNTER — OFFICE VISIT (OUTPATIENT)
Dept: PULMONOLOGY | Facility: CLINIC | Age: 74
End: 2019-10-04

## 2019-10-04 VITALS
HEIGHT: 63 IN | HEART RATE: 55 BPM | DIASTOLIC BLOOD PRESSURE: 70 MMHG | WEIGHT: 185.6 LBS | OXYGEN SATURATION: 94 % | SYSTOLIC BLOOD PRESSURE: 130 MMHG | TEMPERATURE: 97.7 F | BODY MASS INDEX: 32.89 KG/M2

## 2019-10-04 DIAGNOSIS — K21.9 CHRONIC GERD: ICD-10-CM

## 2019-10-04 DIAGNOSIS — Z78.9 NON-SMOKER: ICD-10-CM

## 2019-10-04 DIAGNOSIS — R05.9 COUGH: ICD-10-CM

## 2019-10-04 DIAGNOSIS — J30.2 SEASONAL AND PERENNIAL ALLERGIC RHINITIS: ICD-10-CM

## 2019-10-04 DIAGNOSIS — R91.8 LUNG NODULES: Primary | ICD-10-CM

## 2019-10-04 DIAGNOSIS — Z87.09 HISTORY OF ASTHMA: ICD-10-CM

## 2019-10-04 DIAGNOSIS — J30.89 SEASONAL AND PERENNIAL ALLERGIC RHINITIS: ICD-10-CM

## 2019-10-04 PROCEDURE — 99214 OFFICE O/P EST MOD 30 MIN: CPT | Performed by: INTERNAL MEDICINE

## 2019-10-04 RX ORDER — MELOXICAM 7.5 MG/1
7.5 TABLET ORAL EVERY MORNING
Refills: 0 | COMMUNITY
Start: 2019-09-20 | End: 2020-03-02

## 2019-10-04 NOTE — PROGRESS NOTES
"  PULMONARY  NOTE    Chief Complaint     Migratory pulmonary infiltrates, history of asthma, perennial rhinitis    History of Present Illness     74-year-old white female returns today for follow-up.  I last saw her in June 2019.    She has been followed for what has been somewhat migratory pulmonary infiltrates and groundglass changes.  She underwent a bronchoscopy with transbronchial biopsies in March 2019.  Bronchoscopy biopsies were negative as were cultures.  She had a iatrogenic pneumothorax required short-term chest tube after the procedure.    She is undergone serial CT scans of the chest which have revealed stable or nonprogressive findings.  This includes her most recent CT scan of the chest done on 9/4/2019.  No suspicious or progressive abnormalities noted at that time.  The radiologist did reference a possible liver lesion so she underwent a contrast CT scan of the abdomen and pelvis.  This revealed a hemangioma in the liver, adrenal adenoma, and a T11 compression fracture new in comparison to a CT scan of about 5 years ago.    She has little cough or sputum production.  She is had no hemoptysis.    She does have dyspnea on exertion with intermittent wheezing.  She is asking about intensifying her asthma therapy.    After her last visit she underwent a transthoracic echocardiogram which revealed evidence of diastolic dysfunction but no other discrete cardiac abnormality.    She is a non-smoker    Patient Active Problem List   Diagnosis   • Migratory Lung nodules (\"Soft\" and solid, bilateral)   • Non-smoker   • History of asthma   • Cough   • Perennial rhinitis   • Syncope and collapse   • UTI (urinary tract infection), bacterial   • Hypertension   • T2DM (type 2 diabetes mellitus) (CMS/HCC)   • Neuropathy   • Hypokalemia   • Lesion of temporal lobe   • GERD   • Iatrogenic pneumothorax     No Known Allergies    Current Outpatient Medications:   •  albuterol sulfate  (90 Base) MCG/ACT inhaler, " Inhale 2 puffs Every 4 (Four) Hours As Needed for Wheezing., Disp: 18 g, Rfl: 1  •  aspirin 81 MG EC tablet, Take 81 mg by mouth Every Night., Disp: , Rfl:   •  bisoprolol (ZEBeta) 5 MG tablet, Take 0.5 tablets by mouth Daily. (Patient taking differently: Take 5 mg by mouth Daily.), Disp: 45 tablet, Rfl: 3  •  cetirizine (zyrTEC) 10 MG tablet, Take 10 mg by mouth Every Night., Disp: , Rfl:   •  citalopram (CeleXA) 20 MG tablet, Take 20 mg by mouth Every Night., Disp: , Rfl:   •  gabapentin (NEURONTIN) 800 MG tablet, Take 800 mg by mouth 2 (Two) Times a Day., Disp: , Rfl:   •  glimepiride (AMARYL) 1 MG tablet, Take 1 mg by mouth Daily With Lunch. Daily at noon, Disp: , Rfl:   •  glipiZIDE (GLUCOTROL) 10 MG tablet, Take 10 mg by mouth 2 (Two) Times a Day Before Meals., Disp: , Rfl:   •  HYDROcodone-acetaminophen (NORCO) 5-325 MG per tablet, Take 1 tablet by mouth 2 (Two) Times a Day., Disp: , Rfl:   •  hydrOXYzine (ATARAX) 25 MG tablet, Take 25-50 mg by mouth Every Night., Disp: , Rfl:   •  hyoscyamine (LEVSIN) 0.125 MG SL tablet, Take 0.125 mg by mouth Every 4 (Four) Hours As Needed for Cramping., Disp: , Rfl:   •  levothyroxine (SYNTHROID, LEVOTHROID) 50 MCG tablet, Take 50 mcg by mouth Daily., Disp: , Rfl:   •  losartan (COZAAR) 100 MG tablet, Take 1 tablet by mouth Daily., Disp: 90 tablet, Rfl: 3  •  lovastatin (MEVACOR) 20 MG tablet, Take 20 mg by mouth Every Night., Disp: , Rfl:   •  meloxicam (MOBIC) 7.5 MG tablet, Take 7.5 mg by mouth Every Morning., Disp: , Rfl: 0  •  omeprazole (priLOSEC) 20 MG capsule, Take 20 mg by mouth 2 (Two) Times a Day., Disp: , Rfl:   MEDICATION LIST AND ALLERGIES REVIEWED.    Family History   Problem Relation Age of Onset   • Ovarian cancer Mother 19   • Emphysema Father    • COPD Father    • No Known Problems Sister    • Breast cancer Neg Hx      Social History     Tobacco Use   • Smoking status: Never Smoker   • Smokeless tobacco: Never Used   Substance Use Topics   • Alcohol use:  "Yes     Alcohol/week: 0.0 - 1.2 oz     Comment: seldom    • Drug use: No     Social History     Social History Narrative    Nonsmoker    Has been exposed to secondhand smoke        Has 3 children    No regular alcohol use     FAMILY AND SOCIAL HISTORY REVIEWED.    Review of Systems  ALSO REFER TO SCANNED ROS SHEET FROM SAME DATE.    /70 (BP Location: Right arm, Patient Position: Sitting)   Pulse 55   Temp 97.7 °F (36.5 °C)   Ht 160 cm (63\")   Wt 84.2 kg (185 lb 9.6 oz)   SpO2 94% Comment: resting at room air  BMI 32.88 kg/m²   Physical Exam   Constitutional: She is oriented to person, place, and time. She appears well-developed. No distress.   HENT:   Head: Normocephalic and atraumatic.   Neck: No thyromegaly present.   Cardiovascular: Normal rate, regular rhythm and normal heart sounds.   No murmur heard.  Pulmonary/Chest: Effort normal and breath sounds normal. No stridor.   Abdominal: Soft. Bowel sounds are normal.   Musculoskeletal: Normal range of motion. She exhibits no edema.   Lymphadenopathy:     She has no cervical adenopathy.        Right: No supraclavicular and no epitrochlear adenopathy present.        Left: No supraclavicular and no epitrochlear adenopathy present.   Neurological: She is alert and oriented to person, place, and time.   Skin: Skin is warm and dry. She is not diaphoretic.   Psychiatric: She has a normal mood and affect. Her behavior is normal.   Nursing note and vitals reviewed.      Results     CT scan of the chest on 9/4/2019 reviewed on PACS.  Stable parenchymal abnormalities with no new or progressive changes.  Hiatal hernia and a possible liver lesion.    Subsequent CT scan of the abdomen and pelvis reviewed on PACS.  A hemangioma of the liver noted and corresponds with the lesion seen on chest CT scan.  New T11 compression fracture.    Problem List       ICD-10-CM ICD-9-CM   1. Migratory Lung nodules (\"Soft\" and solid, bilateral) R91.8 793.19   2. GERD K21.9 " 530.81   3. History of asthma Z87.09 V12.69   4. Cough R05 786.2   5. Non-smoker Z78.9 V49.89   6. Perennial rhinitis J30.89 477.9    J30.2        Discussion     We discussed her CT scan findings.  There are no new or progressive abnormalities.  Will probably does follow-up with a repeat CT scan of the chest in about 6 months or so or earlier if she has any aggressive or recurrent symptoms.    We discussed her T11 compression fracture.  She fell in her attic several years ago and has had back pain since that time.  This probably when she sustained this injury.  Probably too late to consider a kyphoplasty and her pain is better, anyway.    She had evidence of diastolic dysfunction on her echocardiogram but currently has no evidence of decompensated heart failure.    We talked about adding a new inhaler for her history of asthma.  I gave her samples of Breo from the office including written instructions, a coupon, and a prescription.  We discussed potential side effects.    I will plan to see her back in about 4 months or earlier if there are any problems in the meantime    Dixon Fatima MD  Note electronically signed    CC: Ly Funez MD

## 2020-02-10 RX ORDER — BISOPROLOL FUMARATE 5 MG/1
5 TABLET, FILM COATED ORAL DAILY
Qty: 30 TABLET | Refills: 11 | Status: SHIPPED | OUTPATIENT
Start: 2020-02-10 | End: 2021-04-05

## 2020-03-02 ENCOUNTER — OFFICE VISIT (OUTPATIENT)
Dept: CARDIOLOGY | Facility: CLINIC | Age: 75
End: 2020-03-02

## 2020-03-02 VITALS
OXYGEN SATURATION: 97 % | SYSTOLIC BLOOD PRESSURE: 126 MMHG | HEART RATE: 65 BPM | BODY MASS INDEX: 32.11 KG/M2 | DIASTOLIC BLOOD PRESSURE: 68 MMHG | HEIGHT: 63 IN | WEIGHT: 181.2 LBS

## 2020-03-02 DIAGNOSIS — R06.09 DYSPNEA ON EXERTION: Primary | ICD-10-CM

## 2020-03-02 DIAGNOSIS — E78.5 DYSLIPIDEMIA: ICD-10-CM

## 2020-03-02 DIAGNOSIS — I10 ESSENTIAL HYPERTENSION: Chronic | ICD-10-CM

## 2020-03-02 DIAGNOSIS — I65.23 CAROTID STENOSIS, BILATERAL: ICD-10-CM

## 2020-03-02 PROCEDURE — 99213 OFFICE O/P EST LOW 20 MIN: CPT | Performed by: INTERNAL MEDICINE

## 2020-03-02 NOTE — PROGRESS NOTES
[facility] Cardiology  Subjective:     Encounter Date:03/02/2020      Patient ID: Ruby Pettit is a 75 y.o. female.    Chief Complaint: LIRA      PROBLEM LIST:  1. Dyspnea:  a. Normal MPS, 2016.  b. Echo, 09/29/2016: EF 65%. Mild MR.  c. Echo, 01/16/2019: EF 65%. RVSP 40 mmHg.  d. Echo, 07/08/2019: EF > 75% with mid cavity obliteration. Valves normal.  e. MPS, 07/29/2019: No ischemia.  2. Carotid plaque:  a. Carotid duplex, 01/16/2019: Bilateral ICA stenosis 0-49%. Tortuous vessels. Vertebral flow antegrade.   3. Hypertension.  4. Dyslipidemia.  5. Diabetes mellitus:  a. With neuropathy.  6. Anxiety/depression.  7. Arthritis.  8. Hypothyroidism.  9. GERD.  10. Surgeries:  a. Abdominal surgery.  b. Appendectomy.  c. Cholecystectomy.  d. Liver surgery.  e. Hysterectomy.  f. Thyroid surgery.  g. Tubal ligation.  h. Right wrist surgery.     History of Present Illness  Ruby Pettit returns today for 6 month follow up with a history of dyspnea, carotid stenosis, and cardiac risk factors. She reports some shortness of breath and lightheadedness when lifting something heavy such as groceries, or moving too fast. She admits she does not have an exercise routine, and has been gaining some weight. She also reports occasional short episodes of chest pain. This is unrelated to exertion or shortness of breath. She is followed by Dr. Mohamud Fatima in Pulmonary for lung nodules. Patient has otherwise been feeling well overall from a cardiovascular standpoint.     No Known Allergies      Current Outpatient Medications:   •  aspirin 81 MG EC tablet, Take 81 mg by mouth Every Night., Disp: , Rfl:   •  bisoprolol (ZEBeta) 5 MG tablet, Take 1 tablet by mouth Daily., Disp: 30 tablet, Rfl: 11  •  cetirizine (zyrTEC) 10 MG tablet, Take 10 mg by mouth Every Night., Disp: , Rfl:   •  citalopram (CeleXA) 20 MG tablet, Take 20 mg by mouth Every Night., Disp: , Rfl:   •  gabapentin (NEURONTIN) 800 MG tablet, Take  800 mg by mouth 2 (Two) Times a Day., Disp: , Rfl:   •  glimepiride (AMARYL) 1 MG tablet, Take 1 mg by mouth Daily With Lunch. Daily at noon, Disp: , Rfl:   •  glipiZIDE (GLUCOTROL) 10 MG tablet, Take 10 mg by mouth 2 (Two) Times a Day Before Meals., Disp: , Rfl:   •  HYDROcodone-acetaminophen (NORCO) 5-325 MG per tablet, Take 1 tablet by mouth 2 (Two) Times a Day., Disp: , Rfl:   •  hydrOXYzine (ATARAX) 25 MG tablet, Take 25-50 mg by mouth Every Night., Disp: , Rfl:   •  hyoscyamine (LEVSIN) 0.125 MG SL tablet, Take 0.125 mg by mouth Every 4 (Four) Hours As Needed for Cramping., Disp: , Rfl:   •  levothyroxine (SYNTHROID, LEVOTHROID) 50 MCG tablet, Take 50 mcg by mouth Daily., Disp: , Rfl:   •  losartan (COZAAR) 100 MG tablet, Take 1 tablet by mouth Daily., Disp: 90 tablet, Rfl: 3  •  lovastatin (MEVACOR) 20 MG tablet, Take 20 mg by mouth Every Night., Disp: , Rfl:   •  omeprazole (priLOSEC) 20 MG capsule, Take 20 mg by mouth 2 (Two) Times a Day., Disp: , Rfl:     The following portions of the patient's history were reviewed and updated as appropriate: allergies, current medications, past family history, past medical history, past social history, past surgical history and problem list.    Review of Systems   Constitution: Positive for malaise/fatigue and weight gain.   Cardiovascular: Positive for chest pain, dyspnea on exertion and leg swelling. Negative for claudication, irregular heartbeat, orthopnea, palpitations, paroxysmal nocturnal dyspnea and syncope.   Respiratory: Positive for shortness of breath.    Endocrine: Positive for polydipsia and polyphagia.   Hematologic/Lymphatic: Negative for bleeding problem. Bruises/bleeds easily.   Skin: Positive for dry skin. Negative for rash.   Musculoskeletal: Positive for arthritis, back pain, joint pain and joint swelling. Negative for muscle weakness and myalgias.   Gastrointestinal: Positive for bloating, change in bowel habit and flatus. Negative for heartburn,  "nausea and vomiting.   Genitourinary: Positive for bladder incontinence and frequency.   Neurological: Positive for difficulty with concentration and loss of balance.   Psychiatric/Behavioral: The patient is nervous/anxious.           Objective:     Vitals:    03/02/20 1525   BP: 126/68   BP Location: Left arm   Patient Position: Sitting   Pulse: 65   SpO2: 97%   Weight: 82.2 kg (181 lb 3.2 oz)   Height: 160 cm (63\")         Physical Exam   Constitutional: She is oriented to person, place, and time. She appears well-developed and well-nourished.   HENT:   Mouth/Throat: Oropharynx is clear and moist.   Neck: No JVD present. Carotid bruit is not present. No thyromegaly present.   Cardiovascular: Regular rhythm, S1 normal, S2 normal, normal heart sounds and intact distal pulses. Exam reveals no gallop, no S3 and no S4.   No murmur heard.  Pulses:       Carotid pulses are 2+ on the right side, and 2+ on the left side.       Radial pulses are 2+ on the right side, and 2+ on the left side.   Pulmonary/Chest: Breath sounds normal.   Abdominal: Soft. Bowel sounds are normal. She exhibits no mass. There is no tenderness.   Musculoskeletal: She exhibits no edema.   Neurological: She is alert and oriented to person, place, and time.   Skin: Skin is warm and dry. No rash noted.       Lab Review:  Lab Results   Component Value Date    GLUCOSE 149 (H) 03/22/2019    BUN 15 03/22/2019    CREATININE 0.80 10/02/2019    EGFRIFNONA 91 03/22/2019    BCR 23.4 03/22/2019    K 4.0 03/22/2019    CO2 24.0 03/22/2019    CALCIUM 8.8 03/22/2019    ALBUMIN 4.02 03/22/2019    ALKPHOS 123 (H) 03/22/2019    AST 21 03/22/2019    ALT 41 (H) 03/22/2019     Lab Results   Component Value Date    WBC 10.60 03/22/2019    RBC 4.23 03/22/2019    HGB 10.8 (L) 03/22/2019    HCT 35.2 03/22/2019    MCV 83.2 03/22/2019     03/22/2019     Lab Results   Component Value Date    TSH 1.179 01/16/2019     Lab Results   Component Value Date    HGBA1C 7.00 (H) " 01/16/2019        Procedures        Assessment:   Ruby was seen today for tejeda.    Diagnoses and all orders for this visit:    Dyspnea on exertion    Essential hypertension    Dyslipidemia    Carotid stenosis, bilateral        Impression:  1. Dyspnea on exertion. This has not significantly changed since her echocardiogram and nuclear stress test last Summer. Patient does not have an exercise routine.  2. Carotid plaque, 0-49% bilateral ICA stenosis on carotid duplex in January 2019. On aspirin 81 mg daily.  3. Hypertension, well-controlled on losartan and bisoprolol.  4. Hyperlipidemia, monitored by PCP. On lovastatin 20 mg daily. Pt has an FLP scheduled with Dr. Funez on 3/16/2020.    Plan:  1. Begin routine aerobic exercise, at least 30 minutes 3 days per week. Non-weight-bearing exercise recommended to avoid jarring back/joint pain. This includes water aerobics, stationary bike, and elliptical machine. Pt advised to keep an eye out for unusual shortness of breath or chest pain on exertion.  2. I requested that the patient have Dr. Funez send me a copy of her next labs (especially FLP).  3. Continue current medications.  4. Revisit in 12 MO, or sooner as needed.    Scribed for Checo Drake MD by Lianna Esparza. 3/2/2020  3:57 PM    Checo Drake MD      Please note that portions of this note may have been completed with a voice recognition program. Efforts were made to edit the dictations, but occasionally words are mistranscribed.

## 2020-06-04 ENCOUNTER — OFFICE VISIT (OUTPATIENT)
Dept: PULMONOLOGY | Facility: CLINIC | Age: 75
End: 2020-06-04

## 2020-06-04 VITALS
WEIGHT: 182.6 LBS | OXYGEN SATURATION: 97 % | HEART RATE: 76 BPM | HEIGHT: 63 IN | DIASTOLIC BLOOD PRESSURE: 68 MMHG | BODY MASS INDEX: 32.36 KG/M2 | SYSTOLIC BLOOD PRESSURE: 122 MMHG | TEMPERATURE: 97.1 F

## 2020-06-04 DIAGNOSIS — J30.89 SEASONAL AND PERENNIAL ALLERGIC RHINITIS: ICD-10-CM

## 2020-06-04 DIAGNOSIS — Z78.9 NON-SMOKER: ICD-10-CM

## 2020-06-04 DIAGNOSIS — R91.8 LUNG NODULES: Primary | ICD-10-CM

## 2020-06-04 DIAGNOSIS — K21.9 CHRONIC GERD: ICD-10-CM

## 2020-06-04 DIAGNOSIS — Z87.09 HISTORY OF ASTHMA: ICD-10-CM

## 2020-06-04 DIAGNOSIS — J30.2 SEASONAL AND PERENNIAL ALLERGIC RHINITIS: ICD-10-CM

## 2020-06-04 DIAGNOSIS — J95.811 IATROGENIC PNEUMOTHORAX: ICD-10-CM

## 2020-06-04 PROCEDURE — 99214 OFFICE O/P EST MOD 30 MIN: CPT | Performed by: INTERNAL MEDICINE

## 2020-06-04 NOTE — PROGRESS NOTES
"  PULMONARY  NOTE    Chief Complaint     Migratory pulmonary infiltrates, history of asthma, perennial rhinitis, diastolic dysfunction    History of Present Illness     75-year-old white female returns today for follow-up.  I last saw her 10/4/2019.    She has been followed for migratory pulmonary infiltrates and groundglass changes.  Previously underwent a bronchoscopy with transbronchial biopsies in March 2019.  Biopsies were negative as were cultures.  She experienced an iatrogenic pneumothorax that required short-term chest tube placement after the procedure.    Since I last saw her she is done about the same.  No acute respiratory tract infections.  No recent antibiotics or steroids.    Most recent CT scan of the chest done 9/4/2019 revealed no suspicious intrathoracic abnormalities.  There was a liver lesion that on contrast CT scan of the abdomen/pelvis revealed a hemangioma of the liver.  She also had an adrenal adenoma and a T11 compression fracture  The compression fracture probably occurred when she fell in her attic several years ago.    She had been on Brio for asthma and felt that it helped.  Unfortunately insurance does not really cover any none generic medication very well.    She has diastolic dysfunction by transthoracic echocardiogram but has not had any increased lower extremity edema or shortness of breath    Patient Active Problem List   Diagnosis   • Migratory Lung nodules (\"Soft\" and solid, bilateral)   • Non-smoker   • History of asthma   • Cough   • Perennial rhinitis   • Syncope and collapse   • UTI (urinary tract infection), bacterial   • Hypertension   • T2DM (type 2 diabetes mellitus) (CMS/HCC)   • Neuropathy   • Hypokalemia   • Lesion of temporal lobe   • GERD   • Iatrogenic pneumothorax   • Carotid stenosis, bilateral     No Known Allergies    Current Outpatient Medications:   •  aspirin 81 MG EC tablet, Take 81 mg by mouth Every Night., Disp: , Rfl:   •  bisoprolol (ZEBeta) 5 MG " tablet, Take 1 tablet by mouth Daily., Disp: 30 tablet, Rfl: 11  •  cetirizine (zyrTEC) 10 MG tablet, Take 10 mg by mouth Every Night., Disp: , Rfl:   •  citalopram (CeleXA) 20 MG tablet, Take 20 mg by mouth Every Night., Disp: , Rfl:   •  gabapentin (NEURONTIN) 800 MG tablet, Take 800 mg by mouth 2 (Two) Times a Day., Disp: , Rfl:   •  glimepiride (AMARYL) 1 MG tablet, Take 1 mg by mouth Daily With Lunch. Daily at noon, Disp: , Rfl:   •  glipiZIDE (GLUCOTROL) 10 MG tablet, Take 10 mg by mouth 2 (Two) Times a Day Before Meals., Disp: , Rfl:   •  HYDROcodone-acetaminophen (NORCO) 5-325 MG per tablet, Take 1 tablet by mouth 2 (Two) Times a Day., Disp: , Rfl:   •  hydrOXYzine (ATARAX) 25 MG tablet, Take 25-50 mg by mouth Every Night., Disp: , Rfl:   •  hyoscyamine (LEVSIN) 0.125 MG SL tablet, Take 0.125 mg by mouth Every 4 (Four) Hours As Needed for Cramping., Disp: , Rfl:   •  levothyroxine (SYNTHROID, LEVOTHROID) 50 MCG tablet, Take 50 mcg by mouth Daily., Disp: , Rfl:   •  losartan (COZAAR) 100 MG tablet, Take 1 tablet by mouth Daily., Disp: 90 tablet, Rfl: 3  •  lovastatin (MEVACOR) 20 MG tablet, Take 20 mg by mouth Every Night., Disp: , Rfl:   •  omeprazole (priLOSEC) 20 MG capsule, Take 20 mg by mouth 2 (Two) Times a Day., Disp: , Rfl:   MEDICATION LIST AND ALLERGIES REVIEWED.    Family History   Problem Relation Age of Onset   • Ovarian cancer Mother 19   • Emphysema Father    • COPD Father    • No Known Problems Sister    • Breast cancer Neg Hx      Social History     Tobacco Use   • Smoking status: Never Smoker   • Smokeless tobacco: Never Used   Substance Use Topics   • Alcohol use: Yes     Alcohol/week: 0.0 - 2.0 standard drinks     Comment: seldom    • Drug use: No     Social History     Social History Narrative    Nonsmoker    Has been exposed to secondhand smoke        Has 3 children    No regular alcohol use     FAMILY AND SOCIAL HISTORY REVIEWED.    Review of Systems   Constitutional: Negative.   "Negative for fatigue and unexpected weight change.   HENT: Positive for rhinorrhea. Negative for postnasal drip.    Respiratory: Positive for cough and shortness of breath. Negative for wheezing.    Cardiovascular: Negative.  Negative for chest pain, palpitations and leg swelling.   Gastrointestinal: Negative for abdominal distention and nausea.   Musculoskeletal: Negative.  Negative for arthralgias and joint swelling.   Skin: Negative.  Negative for rash and wound.   Allergic/Immunologic: Negative.  Negative for immunocompromised state.   Hematological: Negative.  Negative for adenopathy.   Psychiatric/Behavioral: Negative.  Negative for sleep disturbance.     ALSO REFER TO SCANNED ROS SHEET FROM SAME DATE.    /68   Pulse 76   Temp 97.1 °F (36.2 °C)   Ht 160 cm (63\")   Wt 82.8 kg (182 lb 9.6 oz)   SpO2 97% Comment: RESTING AT ROOM AIR  BMI 32.35 kg/m²   Physical Exam   Constitutional: She is oriented to person, place, and time. She appears well-developed. No distress.   HENT:   Head: Normocephalic and atraumatic.   Neck: No thyromegaly present.   Cardiovascular: Normal rate, regular rhythm and normal heart sounds.   No murmur heard.  Pulmonary/Chest: Effort normal and breath sounds normal. No stridor.   Abdominal: Soft. Bowel sounds are normal.   Musculoskeletal: Normal range of motion. She exhibits no edema.   Lymphadenopathy:     She has no cervical adenopathy.        Right: No supraclavicular and no epitrochlear adenopathy present.        Left: No supraclavicular and no epitrochlear adenopathy present.   Neurological: She is alert and oriented to person, place, and time.   Skin: Skin is warm and dry. She is not diaphoretic.   Psychiatric: She has a normal mood and affect. Her behavior is normal.   Nursing note and vitals reviewed.      Results     Most recent CT scan of the chest from 9/4/2019 reviewed on PACS.  Hiatal hernia and chronic scarring noted with no suspicious intrathoracic " "lesions.    Chest x-ray reviewed today reveals no significant change in comparison to 6/28/2019    Problem List       ICD-10-CM ICD-9-CM   1. Migratory Lung nodules (\"Soft\" and solid, bilateral) R91.8 793.19   2. Non-smoker Z78.9 V49.89   3. Perennial rhinitis J30.89 477.9    J30.2    4. Iatrogenic pneumothorax J95.811 512.1   5. History of asthma Z87.09 V12.69   6. GERD K21.9 530.81       Discussion     She appears stable from a pulmonary standpoint.  We are having trouble finding medication that is covered by her insurance company.  I had samples of DuaKlir which I gave her today as well as albuterol on an as-needed basis.    We will follow-up on a repeat CT scan of the chest in September 2020.    I have encouraged efforts at regular exercise and weight loss.    I will plan to see her back in September for follow-up    Dixon Fatima MD  Note electronically signed    CC: Ly Funez MD  "

## 2020-06-05 DIAGNOSIS — R91.8 LUNG NODULES: Primary | ICD-10-CM

## 2020-06-09 ENCOUNTER — TELEPHONE (OUTPATIENT)
Dept: NEUROLOGY | Facility: CLINIC | Age: 75
End: 2020-06-09

## 2020-06-15 ENCOUNTER — TELEPHONE (OUTPATIENT)
Dept: NEUROLOGY | Facility: CLINIC | Age: 75
End: 2020-06-15

## 2020-06-15 ENCOUNTER — OFFICE VISIT (OUTPATIENT)
Dept: NEUROLOGY | Facility: CLINIC | Age: 75
End: 2020-06-15

## 2020-06-15 DIAGNOSIS — R55 SYNCOPE AND COLLAPSE: Primary | ICD-10-CM

## 2020-06-15 PROCEDURE — 99442 PR PHYS/QHP TELEPHONE EVALUATION 11-20 MIN: CPT | Performed by: PSYCHIATRY & NEUROLOGY

## 2020-06-15 RX ORDER — BUPROPION HYDROCHLORIDE 150 MG/1
150 TABLET ORAL DAILY
COMMUNITY

## 2020-06-15 NOTE — PROGRESS NOTES
Subjective:    CC: Ruby Pettit is seen today via telephone visit for syncope     HPI:  Current visit- since the last visit patient has not had any passing out spells.  Her diabetes is under good control but her blood pressure runs high and her systolic is most often in 150s.  Her cardiac work-up in the past including a stress test and echocardiogram did not show any significant changes.  She denies drinking a lot of water.    Last visit-patient has not had any syncopal episodes since her last visit.  Her blood pressure and her diabetes remains stable.  She does keep her self well-hydrated but wears her compression stockings only occasionally.  She saw her cardiologist recently who has maintained her on the same medications.  Patient also continues to take aspirin.  Her previous carotid Doppler did show plaques bilaterally with minimal stenosis.    Last visit-since her last visit she has not had any more episodes of syncope.  Her episodes of dizziness have improved and she only has mild lightheadedness occasionally.  She has started to wear compression stockings and is keeping herself well-hydrated.  Her blood pressure also runs normal now and she has not had to change any of her meds.  Patient did have a bronchiolar lavage last month for pulmonary nodules which was negative.      Initial [id number] female with a history of hypertension, hyperlipidemia, diabetes mellitus type 2, anxiety, depression, arthritis presents as a hospital follow-up for syncope.  As per patient she had an episode of passing out on 1/15.  Patient was out eating lunch with her friend when she had abdominal pain and went to use the restroom.  She then felt lightheaded and passed out.  She was tremulous when she woke up but denies any shaking.  She was also back to her baseline right away and did not have any tongue bite, bowel/bladder incontinence.  She was brought to the [facility] where she had extensive testing including a CT  head and MRI brain that showed a cyst in the left temporal region but no acute intracranial abnormalities.  Carotid Doppler revealed 0-49% stenosis bilaterally and echocardiogram showed an EF of 65% with mild mitral and tricuspid regurg but no PFO.  She was also found to have a UTI and treated for the same.  Her A1c in the hospital was 7.  Patient says she has had a similar episode a few years ago again where she had abdominal pain and an episode of passing out when she went to use the bathroom.  In addition she has episodes of lightheadedness with prolonged standing or walking.    The following portions of the patient's history were reviewed today and updated as of 09/10/2019  : allergies, current medications, past family history, past medical history, past social history, past surgical history and problem list  These document will be scanned to patient's chart.      Current Outpatient Medications:   •  aspirin 81 MG EC tablet, Take 81 mg by mouth Every Night., Disp: , Rfl:   •  bisoprolol (ZEBeta) 5 MG tablet, Take 1 tablet by mouth Daily., Disp: 30 tablet, Rfl: 11  •  cetirizine (zyrTEC) 10 MG tablet, Take 10 mg by mouth Every Night., Disp: , Rfl:   •  citalopram (CeleXA) 20 MG tablet, Take 20 mg by mouth Every Night., Disp: , Rfl:   •  gabapentin (NEURONTIN) 800 MG tablet, Take 800 mg by mouth 2 (Two) Times a Day., Disp: , Rfl:   •  glimepiride (AMARYL) 1 MG tablet, Take 1 mg by mouth Daily With Lunch. Daily at noon, Disp: , Rfl:   •  glipiZIDE (GLUCOTROL) 10 MG tablet, Take 10 mg by mouth 2 (Two) Times a Day Before Meals., Disp: , Rfl:   •  HYDROcodone-acetaminophen (NORCO) 5-325 MG per tablet, Take 1 tablet by mouth 2 (Two) Times a Day., Disp: , Rfl:   •  hydrOXYzine (ATARAX) 25 MG tablet, Take 25-50 mg by mouth Every Night., Disp: , Rfl:   •  hyoscyamine (LEVSIN) 0.125 MG SL tablet, Take 0.125 mg by mouth Every 4 (Four) Hours As Needed for Cramping., Disp: , Rfl:   •  levothyroxine (SYNTHROID, LEVOTHROID) 50  MCG tablet, Take 50 mcg by mouth Daily., Disp: , Rfl:   •  losartan (COZAAR) 100 MG tablet, Take 1 tablet by mouth Daily., Disp: 90 tablet, Rfl: 3  •  lovastatin (MEVACOR) 20 MG tablet, Take 20 mg by mouth Every Night., Disp: , Rfl:   •  omeprazole (priLOSEC) 20 MG capsule, Take 20 mg by mouth 2 (Two) Times a Day., Disp: , Rfl:    Past Medical History:   Diagnosis Date   • Anxiety and depression    • Arthritis    • Disease of thyroid gland    • GERD (gastroesophageal reflux disease)    • Head injury due to trauma 1992    fell down stairs    • History of transfusion    • Hyperlipidemia    • Hypertension    • Liver disorder     surgery 1998 approx    • Peripheral neuropathic pain    • T2DM (type 2 diabetes mellitus) (CMS/HCC)    • Wears eyeglasses       Past Surgical History:   Procedure Laterality Date   • ABDOMINAL SURGERY     • APPENDECTOMY     • BRONCHOSCOPY N/A 2/13/2018    Procedure: BRONCHOSCOPY WITH SANDRITA ENDOBRONCHIAL ULTRASOUND WITH FLUORO;  Surgeon: Dixon Fatima MD;  Location:  [facility] END[facility];  Service:    • BRONCHOSCOPY N/A 3/21/2019    Procedure: NAVIGATIONAL BRONCHOSCOPY;  Surgeon: Dixon Fatima MD;  Location:  [facility] ENDOSCOPY;  Service: Pulmonary   • CHOLECYSTECTOMY     • COLONOSCOPY  2019   • HYSTERECTOMY      2001   • LIVER RESECTION     • OOPHORECTOMY Bilateral     2001   • THYROID SURGERY     • TUBAL ABDOMINAL LIGATION     • WRIST SURGERY      right wrist      Family History   Problem Relation Age of Onset   • Ovarian cancer Mother 19   • Emphysema Father    • COPD Father    • No Known Problems Sister    • Breast cancer Neg Hx       Social History     Socioeconomic History   • Marital status:      Spouse name: Not on file   • Number of children: Not on file   • Years of education: Not on file   • Highest education level: Not on file   Tobacco Use   • Smoking status: Never Smoker   • Smokeless tobacco: Never Used   Substance and Sexual Activity   • Alcohol use: Yes      Alcohol/week: 0.0 - 2.0 standard drinks     Comment: seldom    • Drug use: No   • Sexual activity: Defer   Social History Narrative    Nonsmoker    Has been exposed to secondhand smoke        Has 3 children    No regular alcohol use     Review of Systems   All other systems reviewed and are negative.      Objective:    There were no vitals taken for this visit.    Neurology Exam:  Speech/mentation intact.  Rest of the examination not performed    Assessment and Plan:  1. Syncope and collapse  Most likely due to autonomic dysfunction  as a result of her diabetes  Has not had any more episodes since her initial episode.  Her blood glucose is well controlled but her blood pressure continues to remain high  I have told her to speak to her PCP about adjusting her blood pressure medications  I have also again asked her to start wearing above-knee moderate compression stockings for at least a few hours every day and to keeping herself hydrated         Return in about 1 year (around 6/15/2021).         Batool Aguila MD

## 2020-06-15 NOTE — TELEPHONE ENCOUNTER
PT CALLED IN AND STATED THAT DR SHAY BEJARANO   REQUIRED TODAYS DR'S NOTE TO INCREASE  HER BISOPROLOL FROM 5 MG THE FAX NUMBER TO SEND IT TO [phone]

## 2020-09-09 ENCOUNTER — APPOINTMENT (OUTPATIENT)
Dept: CT IMAGING | Facility: HOSPITAL | Age: 75
End: 2020-09-09

## 2020-09-11 ENCOUNTER — APPOINTMENT (OUTPATIENT)
Dept: CT IMAGING | Facility: HOSPITAL | Age: 75
End: 2020-09-11

## 2020-09-16 ENCOUNTER — HOSPITAL ENCOUNTER (OUTPATIENT)
Dept: CT IMAGING | Facility: HOSPITAL | Age: 75
Discharge: HOME | End: 2020-09-16
Admitting: INTERNAL MEDICINE

## 2020-09-16 DIAGNOSIS — R91.8 LUNG NODULES: ICD-10-CM

## 2020-09-16 PROCEDURE — 71250 CT THORAX DX C-: CPT

## 2020-10-05 ENCOUNTER — DOCUMENTATION (OUTPATIENT)
Dept: PULMONOLOGY | Facility: CLINIC | Age: 75
End: 2020-10-05

## 2020-10-05 NOTE — PROGRESS NOTES
Most recent Chest CT reviewed on PACS.  I agree she probably needs a PET for the LEFT LL density (Not RIGHT as per radiology Impression).    I've tried several times to get her on the phone - no answer and no VM.    Will keep trying.

## 2020-10-14 DIAGNOSIS — R91.8 LUNG MASS: Primary | ICD-10-CM

## 2020-10-21 ENCOUNTER — DOCUMENTATION (OUTPATIENT)
Dept: PULMONOLOGY | Facility: CLINIC | Age: 75
End: 2020-10-21

## 2020-10-21 NOTE — PROGRESS NOTES
Dr. Fatima as well as the  have reached out to . But so several times is October 5 to let her know that she has an abnormal CT scan chest as well as needing a PET scan.  Also tried to call her today and her phone went straight to voicemail.  I will go ahead and send her a certified letter regarding the need for follow-up on this abnormal CT scan.  If we are unable to reach her I will try contacting her next of kin listed on her demographics, her sister.

## 2020-11-02 ENCOUNTER — APPOINTMENT (OUTPATIENT)
Dept: GENERAL RADIOLOGY | Facility: HOSPITAL | Age: 75
End: 2020-11-02

## 2020-11-02 ENCOUNTER — APPOINTMENT (OUTPATIENT)
Dept: CT IMAGING | Facility: HOSPITAL | Age: 75
End: 2020-11-02

## 2020-11-02 ENCOUNTER — HOSPITAL ENCOUNTER (EMERGENCY)
Facility: HOSPITAL | Age: 75
Discharge: HOME | End: 2020-11-02
Attending: EMERGENCY MEDICINE | Admitting: EMERGENCY MEDICINE

## 2020-11-02 VITALS
DIASTOLIC BLOOD PRESSURE: 74 MMHG | HEIGHT: 63 IN | RESPIRATION RATE: 16 BRPM | TEMPERATURE: 98.5 F | WEIGHT: 183 LBS | SYSTOLIC BLOOD PRESSURE: 156 MMHG | OXYGEN SATURATION: 96 % | BODY MASS INDEX: 32.43 KG/M2 | HEART RATE: 74 BPM

## 2020-11-02 DIAGNOSIS — Z86.79 HISTORY OF HYPERTENSION: ICD-10-CM

## 2020-11-02 DIAGNOSIS — R53.1 GENERALIZED WEAKNESS: Primary | ICD-10-CM

## 2020-11-02 DIAGNOSIS — R55 NEAR SYNCOPE: ICD-10-CM

## 2020-11-02 DIAGNOSIS — N39.0 ACUTE UTI: ICD-10-CM

## 2020-11-02 DIAGNOSIS — K08.89 PAIN, DENTAL: ICD-10-CM

## 2020-11-02 DIAGNOSIS — Z87.09 HISTORY OF ASTHMA: ICD-10-CM

## 2020-11-02 DIAGNOSIS — R53.83 MALAISE AND FATIGUE: ICD-10-CM

## 2020-11-02 DIAGNOSIS — K02.9 DENTAL CARIES: ICD-10-CM

## 2020-11-02 DIAGNOSIS — Z03.818 ENCNTR FOR OBS FOR SUSP EXPSR TO OTH BIOLG AGENTS RULED OUT: ICD-10-CM

## 2020-11-02 DIAGNOSIS — R53.81 MALAISE AND FATIGUE: ICD-10-CM

## 2020-11-02 DIAGNOSIS — Z86.39 HISTORY OF DIABETES MELLITUS: ICD-10-CM

## 2020-11-02 LAB
ALBUMIN SERPL-MCNC: 3.9 G/DL (ref 3.5–5.2)
ALBUMIN/GLOB SERPL: 1.5 G/DL
ALP SERPL-CCNC: 162 U/L (ref 39–117)
ALT SERPL W P-5'-P-CCNC: 38 U/L (ref 1–33)
ANION GAP SERPL CALCULATED.3IONS-SCNC: 15 MMOL/L (ref 5–15)
AST SERPL-CCNC: 26 U/L (ref 1–32)
BACTERIA UR QL AUTO: ABNORMAL /HPF
BASOPHILS # BLD AUTO: 0.03 10*3/MM3 (ref 0–0.2)
BASOPHILS NFR BLD AUTO: 0.4 % (ref 0–1.5)
BILIRUB SERPL-MCNC: 0.8 MG/DL (ref 0–1.2)
BILIRUB UR QL STRIP: ABNORMAL
BUN SERPL-MCNC: 20 MG/DL (ref 8–23)
BUN/CREAT SERPL: 21.3 (ref 7–25)
CALCIUM SPEC-SCNC: 9.8 MG/DL (ref 8.6–10.5)
CHLORIDE SERPL-SCNC: 102 MMOL/L (ref 98–107)
CLARITY UR: ABNORMAL
CO2 SERPL-SCNC: 21 MMOL/L (ref 22–29)
COLOR UR: ABNORMAL
CREAT SERPL-MCNC: 0.94 MG/DL (ref 0.57–1)
DEPRECATED RDW RBC AUTO: 52.3 FL (ref 37–54)
EOSINOPHIL # BLD AUTO: 0.07 10*3/MM3 (ref 0–0.4)
EOSINOPHIL NFR BLD AUTO: 0.9 % (ref 0.3–6.2)
ERYTHROCYTE [DISTWIDTH] IN BLOOD BY AUTOMATED COUNT: 17.2 % (ref 12.3–15.4)
GFR SERPL CREATININE-BSD FRML MDRD: 58 ML/MIN/1.73
GLOBULIN UR ELPH-MCNC: 2.6 GM/DL
GLUCOSE SERPL-MCNC: 121 MG/DL (ref 65–99)
GLUCOSE UR STRIP-MCNC: NEGATIVE MG/DL
HCT VFR BLD AUTO: 39.5 % (ref 34–46.6)
HGB BLD-MCNC: 12.3 G/DL (ref 12–15.9)
HGB UR QL STRIP.AUTO: NEGATIVE
HOLD SPECIMEN: NORMAL
HOLD SPECIMEN: NORMAL
HYALINE CASTS UR QL AUTO: ABNORMAL /LPF
IMM GRANULOCYTES # BLD AUTO: 0.03 10*3/MM3 (ref 0–0.05)
IMM GRANULOCYTES NFR BLD AUTO: 0.4 % (ref 0–0.5)
KETONES UR QL STRIP: ABNORMAL
LARGE PLATELETS: NORMAL
LEUKOCYTE ESTERASE UR QL STRIP.AUTO: ABNORMAL
LYMPHOCYTES # BLD AUTO: 2.26 10*3/MM3 (ref 0.7–3.1)
LYMPHOCYTES NFR BLD AUTO: 29.2 % (ref 19.6–45.3)
MAGNESIUM SERPL-MCNC: 2 MG/DL (ref 1.6–2.4)
MCH RBC QN AUTO: 26.4 PG (ref 26.6–33)
MCHC RBC AUTO-ENTMCNC: 31.1 G/DL (ref 31.5–35.7)
MCV RBC AUTO: 84.8 FL (ref 79–97)
MONOCYTES # BLD AUTO: 0.79 10*3/MM3 (ref 0.1–0.9)
MONOCYTES NFR BLD AUTO: 10.2 % (ref 5–12)
MUCOUS THREADS URNS QL MICRO: ABNORMAL /HPF
NEUTROPHILS NFR BLD AUTO: 4.56 10*3/MM3 (ref 1.7–7)
NEUTROPHILS NFR BLD AUTO: 58.9 % (ref 42.7–76)
NITRITE UR QL STRIP: NEGATIVE
NRBC BLD AUTO-RTO: 0 /100 WBC (ref 0–0.2)
PH UR STRIP.AUTO: <=5 [PH] (ref 5–8)
PLATELET # BLD AUTO: 312 10*3/MM3 (ref 140–450)
PMV BLD AUTO: 11.9 FL (ref 6–12)
POTASSIUM SERPL-SCNC: 4.2 MMOL/L (ref 3.5–5.2)
PROT SERPL-MCNC: 6.5 G/DL (ref 6–8.5)
PROT UR QL STRIP: ABNORMAL
RBC # BLD AUTO: 4.66 10*6/MM3 (ref 3.77–5.28)
RBC # UR: ABNORMAL /HPF
RBC MORPH BLD: NORMAL
REF LAB TEST METHOD: ABNORMAL
SODIUM SERPL-SCNC: 138 MMOL/L (ref 136–145)
SP GR UR STRIP: 1.03 (ref 1–1.03)
SQUAMOUS #/AREA URNS HPF: ABNORMAL /HPF
TROPONIN T SERPL-MCNC: <0.01 NG/ML (ref 0–0.03)
TROPONIN T SERPL-MCNC: <0.01 NG/ML (ref 0–0.03)
UROBILINOGEN UR QL STRIP: ABNORMAL
WBC # BLD AUTO: 7.74 10*3/MM3 (ref 3.4–10.8)
WBC MORPH BLD: NORMAL
WBC UR QL AUTO: ABNORMAL /HPF
WHOLE BLOOD HOLD SPECIMEN: NORMAL
WHOLE BLOOD HOLD SPECIMEN: NORMAL

## 2020-11-02 PROCEDURE — 70450 CT HEAD/BRAIN W/O DYE: CPT

## 2020-11-02 PROCEDURE — 71045 X-RAY EXAM CHEST 1 VIEW: CPT

## 2020-11-02 PROCEDURE — U0004 COV-19 TEST NON-CDC HGH THRU: HCPCS | Performed by: PHYSICIAN ASSISTANT

## 2020-11-02 PROCEDURE — 85025 COMPLETE CBC W/AUTO DIFF WBC: CPT

## 2020-11-02 PROCEDURE — 85007 BL SMEAR W/DIFF WBC COUNT: CPT

## 2020-11-02 PROCEDURE — 96365 THER/PROPH/DIAG IV INF INIT: CPT

## 2020-11-02 PROCEDURE — 84484 ASSAY OF TROPONIN QUANT: CPT | Performed by: PHYSICIAN ASSISTANT

## 2020-11-02 PROCEDURE — 93005 ELECTROCARDIOGRAM TRACING: CPT

## 2020-11-02 PROCEDURE — 80053 COMPREHEN METABOLIC PANEL: CPT

## 2020-11-02 PROCEDURE — 25010000002 CEFTRIAXONE PER 250 MG: Performed by: PHYSICIAN ASSISTANT

## 2020-11-02 PROCEDURE — 93005 ELECTROCARDIOGRAM TRACING: CPT | Performed by: PHYSICIAN ASSISTANT

## 2020-11-02 PROCEDURE — 99285 EMERGENCY DEPT VISIT HI MDM: CPT

## 2020-11-02 PROCEDURE — 93005 ELECTROCARDIOGRAM TRACING: CPT | Performed by: EMERGENCY MEDICINE

## 2020-11-02 PROCEDURE — 83735 ASSAY OF MAGNESIUM: CPT

## 2020-11-02 PROCEDURE — 87086 URINE CULTURE/COLONY COUNT: CPT | Performed by: PHYSICIAN ASSISTANT

## 2020-11-02 PROCEDURE — 81001 URINALYSIS AUTO W/SCOPE: CPT | Performed by: EMERGENCY MEDICINE

## 2020-11-02 PROCEDURE — 84484 ASSAY OF TROPONIN QUANT: CPT

## 2020-11-02 RX ORDER — SODIUM CHLORIDE 0.9 % (FLUSH) 0.9 %
10 SYRINGE (ML) INJECTION AS NEEDED
Status: DISCONTINUED | OUTPATIENT
Start: 2020-11-02 | End: 2020-11-03 | Stop reason: HOSPADM

## 2020-11-02 RX ORDER — CEFDINIR 300 MG/1
300 CAPSULE ORAL 2 TIMES DAILY
Qty: 14 CAPSULE | Refills: 0 | Status: SHIPPED | OUTPATIENT
Start: 2020-11-02 | End: 2020-11-02 | Stop reason: HOSPADM

## 2020-11-02 RX ORDER — AMOXICILLIN AND CLAVULANATE POTASSIUM 875; 125 MG/1; MG/1
1 TABLET, FILM COATED ORAL EVERY 12 HOURS
Qty: 14 TABLET | Refills: 0 | Status: SHIPPED | OUTPATIENT
Start: 2020-11-02 | End: 2020-11-23

## 2020-11-02 RX ADMIN — CEFTRIAXONE SODIUM 1 G: 1 INJECTION, POWDER, FOR SOLUTION INTRAMUSCULAR; INTRAVENOUS at 22:30

## 2020-11-02 RX ADMIN — SODIUM CHLORIDE 1000 ML: 9 INJECTION, SOLUTION INTRAVENOUS at 20:16

## 2020-11-03 LAB
BACTERIA SPEC AEROBE CULT: NORMAL
SARS-COV-2 RNA RESP QL NAA+PROBE: NOT DETECTED

## 2020-11-03 NOTE — DISCHARGE INSTRUCTIONS
ER evaluation reveals normal cardiac work-up with 2 stable EKGs and 2 normal troponins.  Chest x-ray was within normal limits.  CT of the brain without contrast was also normal.  Urinalysis revealed acute urinary tract infection.  Urine culture is in process.  Rx for Augmentin 875 mg by mouth twice daily x7 days.  We also performed Covid testing on discharge and will notify patient of either positive or negative results within 2 to 4 days.  Recommend patient to increase fluid intake.  Also follow-up with dentist closely for recheck on dental pain to left lower molar.  Continue with all other current medical management.  We will refer patient to the cardiac event monitor clinic, as well as the syncope clinic for symptoms of dizziness and near syncope.  Patient is to return sooner if any worsening symptoms.

## 2020-11-03 NOTE — ED PROVIDER NOTES
"Subjective   This is a 75-year-old female that presents to the ER with generalized weakness, fatigue/malaise, and dizziness with near syncope that started this morning upon awakening.  Patient says that she has just had no energy today and feels like she \"cannot get moving\".  She went to have a bowel movement in the bathroom, and seemed like she was in there for a really long time without any results.  She became so weak that she called her son to come help her.  She denies any dysuria, urgency, or frequency.  She was finally able to have a small bowel movement.  She reported some anorexia with nausea today but denies any vomiting.  She denies any generalized body aches.  She denies any URI symptoms such as nasal congestion, rhinorrhea, sore throat, or cough.  She denies any loss of taste or smell.  She denies any chest pain, but does report some chronic shortness of breath secondary to history of asthma.  She denies any chest tightness or increased wheezing.  She denies any abdominal pain.  Patient later said in the history that she was having some mild right-sided headache.  She denies any neck pain or stiffness.  She denies any recent medication changes.  She denies any Covid exposures and has not previously been tested for Covid.  She lives alone.  After reviewing past medical history, she has history of hypertension, hyperlipidemia, type 2 diabetes mellitus, asthma, and history of migratory lung nodules and some diagnostic heart failure.  She is followed by Dr. Kofi Fatima, pulmonology.  Her last office visit there was in June, 2020.  Patient underwent bronchoscopy with biopsies in March, 2019.  Biopsies were negative as were cultures.  She experienced an iatrogenic pneumothorax that required short-term chest tube placement after the procedure.  She has done well since that time.  CT scan from 9/4/2019 revealed no suspicious intrathoracic abnormalities.  She had diastolic dysfunction on JED but patient " denies any recent increased pedal edema or unexpected weight change.  No other concerns at this time.      History provided by:  Patient  Weakness - Generalized  Duration:  12 hours  Timing:  Constant  Chronicity:  New  Context comment:  Patient awakened this morning with generalized weakness, fatigue/malaise, dizziness, and near syncope.  She denies any recent symptoms of illness.  She denies any chest pain.  Relieved by:  Nothing  Worsened by:  Nothing  Ineffective treatments:  None tried  Associated symptoms: anorexia, dizziness, headaches (mild right sided headache), nausea, near-syncope and shortness of breath (chronic per patient due to history of asthma)    Associated symptoms: no abdominal pain, no aphasia, no chest pain, no cough, no diarrhea, no difficulty walking, no dysphagia, no dysuria, no numbness in extremities, no falls, no fever, no foul-smelling urine, no frequency, no lethargy, no loss of consciousness, no sensory-motor deficit, no stroke symptoms, no syncope, no urgency, no vision change and no vomiting        Review of Systems   Constitutional: Positive for activity change, appetite change and fatigue. Negative for chills and fever.   HENT: Negative for congestion, ear pain, postnasal drip, rhinorrhea, sinus pressure, sinus pain and sore throat.         No loss of taste or smell.   Respiratory: Positive for shortness of breath (chronic per patient due to history of asthma). Negative for cough, chest tightness and wheezing.    Cardiovascular: Positive for near-syncope. Negative for chest pain, leg swelling and syncope.   Gastrointestinal: Positive for anorexia and nausea. Negative for abdominal pain, blood in stool, constipation, diarrhea, dysphagia and vomiting.   Genitourinary: Negative.  Negative for decreased urine volume, dysuria, flank pain, frequency and urgency.   Musculoskeletal: Negative.  Negative for back pain and falls.   Neurological: Positive for dizziness, weakness (generalized  weakness), light-headedness and headaches (mild right sided headache). Negative for loss of consciousness, syncope and speech difficulty.        Sxs of near syncope     All other systems reviewed and are negative.      Past Medical History:   Diagnosis Date   • Anxiety and depression    • Arthritis    • Disease of thyroid gland    • GERD (gastroesophageal reflux disease)    • Head injury due to trauma 1992    fell down stairs    • History of transfusion    • Hyperlipidemia    • Hypertension    • Liver disorder     surgery 1998 approx    • Peripheral neuropathic pain    • T2DM (type 2 diabetes mellitus) (CMS/HCC)    • Wears eyeglasses        No Known Allergies    Past Surgical History:   Procedure Laterality Date   • ABDOMINAL SURGERY     • APPENDECTOMY     • BRONCHOSCOPY N/A 2/13/2018    Procedure: BRONCHOSCOPY WITH SANDRITA ENDOBRONCHIAL ULTRASOUND WITH FLUORO;  Surgeon: Dixon Fatima MD;  Location:  [facility];  Service:    • BRONCHOSCOPY N/A 3/21/2019    Procedure: NAVIGATIONAL BRONCHOSCOPY;  Surgeon: Dixon Fatima MD;  Location:  [facility];  Service: Pulmonary   • CHOLECYSTECTOMY     • COLONOSCOPY  2019   • HYSTERECTOMY      2001   • LIVER RESECTION     • OOPHORECTOMY Bilateral     2001   • THYROID SURGERY     • TUBAL ABDOMINAL LIGATION     • WRIST SURGERY      right wrist       Family History   Problem Relation Age of Onset   • Ovarian cancer Mother 19   • Emphysema Father    • COPD Father    • No Known Problems Sister    • Breast cancer Neg Hx        Social History     Socioeconomic History   • Marital status:      Spouse name: Not on file   • Number of children: Not on file   • Years of education: Not on file   • Highest education level: Not on file   Tobacco Use   • Smoking status: Never Smoker   • Smokeless tobacco: Never Used   Substance and Sexual Activity   • Alcohol use: Yes     Alcohol/week: 0.0 - 2.0 standard drinks     Comment: seldom    • Drug use: No   • Sexual  activity: Defer   Social History Narrative    Nonsmoker    Has been exposed to secondhand smoke        Has 3 children    No regular alcohol use           Objective   Physical Exam  Vitals signs and nursing note reviewed.   Constitutional:       Appearance: Normal appearance.   HENT:      Head: Normocephalic and atraumatic.      Right Ear: Tympanic membrane normal.      Left Ear: Tympanic membrane normal.      Nose: Nose normal.      Mouth/Throat:      Mouth: Mucous membranes are moist.      Dentition: Dental caries present.      Pharynx: Oropharynx is clear.      Comments: Dental caries to left lower molar with tenderness, but no gingival erythema.  Mild swelling to left jaw.  No cervical lymphadenopathy.  Eyes:      Extraocular Movements: Extraocular movements intact.      Right eye: Normal extraocular motion and no nystagmus.      Left eye: Normal extraocular motion and no nystagmus.      Conjunctiva/sclera: Conjunctivae normal.      Pupils: Pupils are equal, round, and reactive to light.   Neck:      Musculoskeletal: Normal range of motion and neck supple.      Meningeal: Brudzinski's sign and Kernig's sign absent.   Cardiovascular:      Rate and Rhythm: Normal rate and regular rhythm.  No extrasystoles are present.     Pulses: Normal pulses.      Heart sounds: Normal heart sounds. No murmur.      Comments: No pedal edema to lower extremities  Pulmonary:      Effort: Pulmonary effort is normal. No tachypnea or retractions.      Breath sounds: Normal breath sounds. No transmitted upper airway sounds. No decreased breath sounds, wheezing or rhonchi.      Comments: Lungs are clear to auscultation bilaterally.  No wheezes, rhonchi, or rales.  No decreased breath sounds consistent with consolidation.  Abdominal:      General: Bowel sounds are normal. There is no distension.      Palpations: Abdomen is soft.      Tenderness: There is no abdominal tenderness. There is no right CVA tenderness, left CVA  tenderness, guarding or rebound.      Comments: Abdomen soft without distention.  Active bowel sounds.  Nontender to palpation.   Musculoskeletal: Normal range of motion.   Lymphadenopathy:      Cervical: No cervical adenopathy.      Comments: No cervical lymphadenopathy.   Skin:     General: Skin is warm and dry.   Neurological:      General: No focal deficit present.      Mental Status: She is alert.      Cranial Nerves: Cranial nerves are intact.      Sensory: Sensation is intact.      Motor: Motor function is intact.      Comments: Generalized weakness.  No focal deficits.  Smile is equal, tongue is midline.  Equal  strength 5 out of 5 and equal muscle strength lower extremities 5 out of 5.         Procedures           ED Course  ED Course as of Nov 02 2307   Mon Nov 02, 2020 2208 EKGs x2 show normal sinus rhythm.  There is no acute ST-T wave changes consistent with ischemia.  CBC and chemistries were also within normal limits.  Magnesium level is 2.0.  Troponins x2 sets are less than 0.010.  Urinalysis reveals 6-12 white blood cells, 1+ bacteria, small leukocytes.  Urine culture is in process.  We gave patient a dose of Rocephin 1 g IV.  CT of the head without contrast shows no acute intracranial abnormalities.  There is a stable benign-appearing cystic lesion of the left anterior temporal lobe.  Chest x-ray shows no acute cardiopulmonary process.  Discussed the case with Dr. Ward, ER attending physician.  We will cover patient with Omnicef 300 mg by mouth twice daily x7 days while awaiting urine culture results.  We also will refer patient to the syncope clinic on discharge.  Also will perform COVID-19 testing on discharge.  Patient is to return sooner if any worsening symptoms.  Increase fluid intake.  Continue with all other current medical management.    [FC]   2213 We also will refer patient to the cardiac event monitor clinic due to dizziness and near syncope.  Patient will benefit from  echocardiogram and Holter monitoring.    [FC]   2231 I updated patient and her daughter at the bedside about all results and need for follow-up.  Daughter reports that patient has also had a painful tooth to the left lower molar with some mild left jaw swelling.  Patient has not had a fever.  She requests an antibiotic for her tooth, as well as urinary tract infection.  After discussion with Dr. Ward, we will cover patient with Augmentin 875 mg twice daily x7 days.  Recommend close dental follow-up, as well.    [FC]      ED Course User Index  [FC] Sandhya Brown, SEYMOUR                 Recent Results (from the past 24 hour(s))   Comprehensive Metabolic Panel    Collection Time: 11/02/20  6:16 PM    Specimen: Blood   Result Value Ref Range    Glucose 121 (H) 65 - 99 mg/dL    BUN 20 8 - 23 mg/dL    Creatinine 0.94 0.57 - 1.00 mg/dL    Sodium 138 136 - 145 mmol/L    Potassium 4.2 3.5 - 5.2 mmol/L    Chloride 102 98 - 107 mmol/L    CO2 21.0 (L) 22.0 - 29.0 mmol/L    Calcium 9.8 8.6 - 10.5 mg/dL    Total Protein 6.5 6.0 - 8.5 g/dL    Albumin 3.90 3.50 - 5.20 g/dL    ALT (SGPT) 38 (H) 1 - 33 U/L    AST (SGOT) 26 1 - 32 U/L    Alkaline Phosphatase 162 (H) 39 - 117 U/L    Total Bilirubin 0.8 0.0 - 1.2 mg/dL    eGFR Non African Amer 58 (L) >60 mL/min/1.73    Globulin 2.6 gm/dL    A/G Ratio 1.5 g/dL    BUN/Creatinine Ratio 21.3 7.0 - 25.0    Anion Gap 15.0 5.0 - 15.0 mmol/L   Troponin    Collection Time: 11/02/20  6:16 PM    Specimen: Blood   Result Value Ref Range    Troponin T <0.010 0.000 - 0.030 ng/mL   Magnesium    Collection Time: 11/02/20  6:16 PM    Specimen: Blood   Result Value Ref Range    Magnesium 2.0 1.6 - 2.4 mg/dL   Light Blue Top    Collection Time: 11/02/20  6:16 PM   Result Value Ref Range    Extra Tube hold for add-on    Green Top (Gel)    Collection Time: 11/02/20  6:16 PM   Result Value Ref Range    Extra Tube Hold for add-ons.    Lavender Top    Collection Time: 11/02/20  6:16 PM   Result Value Ref Range     Extra Tube hold for add-on    Gold Top - SST    Collection Time: 11/02/20  6:16 PM   Result Value Ref Range    Extra Tube Hold for add-ons.    CBC Auto Differential    Collection Time: 11/02/20  6:16 PM    Specimen: Blood   Result Value Ref Range    WBC 7.74 3.40 - 10.80 10*3/mm3    RBC 4.66 3.77 - 5.28 10*6/mm3    Hemoglobin 12.3 12.0 - 15.9 g/dL    Hematocrit 39.5 34.0 - 46.6 %    MCV 84.8 79.0 - 97.0 fL    MCH 26.4 (L) 26.6 - 33.0 pg    MCHC 31.1 (L) 31.5 - 35.7 g/dL    RDW 17.2 (H) 12.3 - 15.4 %    RDW-SD 52.3 37.0 - 54.0 fl    MPV 11.9 6.0 - 12.0 fL    Platelets 312 140 - 450 10*3/mm3    Neutrophil % 58.9 42.7 - 76.0 %    Lymphocyte % 29.2 19.6 - 45.3 %    Monocyte % 10.2 5.0 - 12.0 %    Eosinophil % 0.9 0.3 - 6.2 %    Basophil % 0.4 0.0 - 1.5 %    Immature Grans % 0.4 0.0 - 0.5 %    Neutrophils, Absolute 4.56 1.70 - 7.00 10*3/mm3    Lymphocytes, Absolute 2.26 0.70 - 3.10 10*3/mm3    Monocytes, Absolute 0.79 0.10 - 0.90 10*3/mm3    Eosinophils, Absolute 0.07 0.00 - 0.40 10*3/mm3    Basophils, Absolute 0.03 0.00 - 0.20 10*3/mm3    Immature Grans, Absolute 0.03 0.00 - 0.05 10*3/mm3    nRBC 0.0 0.0 - 0.2 /100 WBC   Scan Slide    Collection Time: 11/02/20  6:16 PM    Specimen: Blood   Result Value Ref Range    RBC Morphology Normal Normal    WBC Morphology Normal Normal    Large Platelets Slight/1+ None Seen   Urinalysis With Microscopic If Indicated (No Culture) - Urine, Clean Catch    Collection Time: 11/02/20  9:18 PM    Specimen: Urine, Clean Catch   Result Value Ref Range    Color, UA Dark Yellow (A) Yellow, Straw    Appearance, UA Cloudy (A) Clear    pH, UA <=5.0 5.0 - 8.0    Specific Gravity, UA 1.035 (H) 1.001 - 1.030    Glucose, UA Negative Negative    Ketones, UA 15 mg/dL (1+) (A) Negative    Bilirubin, UA Moderate (2+) (A) Negative    Blood, UA Negative Negative    Protein, UA 30 mg/dL (1+) (A) Negative    Leuk Esterase, UA Small (1+) (A) Negative    Nitrite, UA Negative Negative    Urobilinogen, UA  1.0 E.U./dL 0.2 - 1.0 E.U./dL   Troponin    Collection Time: 11/02/20  9:18 PM    Specimen: Blood   Result Value Ref Range    Troponin T <0.010 0.000 - 0.030 ng/mL   Urinalysis, Microscopic Only - Urine, Clean Catch    Collection Time: 11/02/20  9:18 PM    Specimen: Urine, Clean Catch   Result Value Ref Range    RBC, UA 0-2 None Seen, 0-2 /HPF    WBC, UA 6-12 (A) None Seen, 0-2 /HPF    Bacteria, UA 1+ (A) None Seen, Trace /HPF    Squamous Epithelial Cells, UA 7-12 (A) None Seen, 0-2 /HPF    Hyaline Casts, UA 21-30 0 - 6 /LPF    Mucus, UA Large/3+ (A) None Seen, Trace /HPF    Methodology Manual Light Microscopy      Note: In addition to lab results from this visit, the labs listed above may include labs taken at another facility or during a different encounter within the last 24 hours. Please correlate lab times with ED admission and discharge times for further clarification of the services performed during this visit.    CT Head Without Contrast   Final Result   No acute intracranial abnormality and no significant change from January 2019.      Stable benign-appearing cystic lesion of the left anterior temporal lobe.               Signer Name: MADHURI Groves MD    Signed: 11/2/2020 9:13 PM    Workstation Name: [facility]     Radiology [facility]      XR Chest 1 View   Final Result   No acute cardiopulmonary findings.      Signer Name: MADHURI Groves MD    Signed: 11/2/2020 8:17 PM    Workstation Name: [facility]        Vitals:    11/02/20 2230 11/02/20 2231 11/02/20 2244 11/02/20 2252   BP: 156/74      Pulse: 76 75 74    Resp:    16   Temp:       TempSrc:       SpO2: 94% 98% 96%    Weight:       Height:         Medications   sodium chloride 0.9 % flush 10 mL (has no administration in time range)   sodium chloride 0.9 % bolus 1,000 mL (0 mL Intravenous Stopped 11/2/20 2251)   cefTRIAXone (ROCEPHIN) 1 g/100 mL 0.9% NS (MBP) (0 g Intravenous Stopped  11/2/20 2252)     ECG/EMG Results (last 24 hours)     Procedure Component Value Units Date/Time    ECG 12 Lead [[id number]] Collected: 11/02/20 1816     Updated: 11/02/20 1814        ECG 12 Lead         ECG 12 Lead                                          MDM    Final diagnoses:   Generalized weakness   Acute UTI   Malaise and fatigue   Near syncope   Encntr for obs for susp expsr to [facility] biol agents ruled out   History of hypertension   History of diabetes mellitus   History of asthma   Pain, dental   Dental caries            Sandhya Brown PA-C  11/02/20 2215       Sandhya Brown PA-C  11/02/20 2233       Sandhya Brown PA-C  11/02/20 2234       Sandhya Brown PA-C  11/02/20 2309

## 2020-11-04 ENCOUNTER — TELEPHONE (OUTPATIENT)
Dept: EMERGENCY DEPT | Facility: HOSPITAL | Age: 75
End: 2020-11-04

## 2020-11-05 ENCOUNTER — HOSPITAL ENCOUNTER (OUTPATIENT)
Dept: PET IMAGING | Facility: HOSPITAL | Age: 75
End: 2020-11-05

## 2020-11-05 ENCOUNTER — APPOINTMENT (OUTPATIENT)
Dept: PET IMAGING | Facility: HOSPITAL | Age: 75
End: 2020-11-05

## 2020-11-05 ENCOUNTER — TELEPHONE (OUTPATIENT)
Dept: CARDIOLOGY | Facility: HOSPITAL | Age: 75
End: 2020-11-05

## 2020-11-05 NOTE — TELEPHONE ENCOUNTER
Pt was referred to the Heart and Vascular by the [facility] ER. Several attempts have been made to contact the pt with no success. Letter was mailed to pt to contact the office to schedule.

## 2020-11-06 LAB
QT INTERVAL: 346 MS
QT INTERVAL: 386 MS
QTC INTERVAL: 423 MS
QTC INTERVAL: 448 MS

## 2020-11-10 ENCOUNTER — DOCUMENTATION (OUTPATIENT)
Dept: PULMONOLOGY | Facility: CLINIC | Age: 75
End: 2020-11-10

## 2020-11-10 NOTE — PROGRESS NOTES
Multiple individuals from my office, including myself, have tried to contact this patient about her CT scan findings and the need for follow-up.  At this point were disco to continue to try and work to get her into the office to discuss follow-up chest imaging.  I called again and attempted to contact her today

## 2020-11-11 ENCOUNTER — DOCUMENTATION (OUTPATIENT)
Dept: PULMONOLOGY | Facility: CLINIC | Age: 75
End: 2020-11-11

## 2020-11-11 NOTE — PROGRESS NOTES
Certified letter regarding her need for a follow-up PET scan received by patient and receipt on file.

## 2020-11-17 ENCOUNTER — OFFICE VISIT (OUTPATIENT)
Dept: CARDIOLOGY | Facility: HOSPITAL | Age: 75
End: 2020-11-17

## 2020-11-17 DIAGNOSIS — I10 ESSENTIAL HYPERTENSION: ICD-10-CM

## 2020-11-17 DIAGNOSIS — E78.5 DYSLIPIDEMIA: ICD-10-CM

## 2020-11-17 DIAGNOSIS — R06.09 DYSPNEA ON EXERTION: Primary | ICD-10-CM

## 2020-11-17 PROCEDURE — 99214 OFFICE O/P EST MOD 30 MIN: CPT | Performed by: NURSE PRACTITIONER

## 2020-11-17 NOTE — PROGRESS NOTES
"[facility]  [facility]      11/17/2020         Ruby Pettit  [address] [address]  [unfilled]    1945    Ly Funez MD    Ruby Pettit is a 75 y.o. female.      Subjective:     Chief Complaint:  Establish Care and Hypertension       HPI 75 year old female presents to the office today for ongoing evaluation of her near syncope and dyspnea on exertion.  Patient presented to [facility] ED on 11/2/2020 with complaints of near syncope she had after trying to have a bowel movement. She reports no further episodes of weakness. Suspect vasovagal. Notes ongoing dyspnea on exertion. Has an upcoming pet later this week due a change in a lung nodule.  Denies chest pain, palpitations, orthopnea, pnd, pedal edema. Notes bps at home have been 130-140s, hrs 80s      Patient Active Problem List   Diagnosis   • Migratory Lung nodules (\"Soft\" and solid, bilateral)   • Non-smoker   • History of asthma   • Cough   • Perennial rhinitis   • Syncope and collapse   • UTI (urinary tract infection), bacterial   • Hypertension   • T2DM (type 2 diabetes mellitus) (CMS/HCC)   • Neuropathy   • Hypokalemia   • Lesion of temporal lobe   • GERD   • Iatrogenic pneumothorax   • Carotid stenosis, bilateral       Past Medical History:   Diagnosis Date   • Anxiety and depression    • Arthritis    • Disease of thyroid gland    • GERD (gastroesophageal reflux disease)    • Head injury due to trauma 1992    fell down stairs    • History of transfusion    • Hyperlipidemia    • Hypertension    • Liver disorder     surgery 1998 approx    • Peripheral neuropathic pain    • T2DM (type 2 diabetes mellitus) (CMS/HCC)    • Wears eyeglasses        Past Surgical History:   Procedure Laterality Date   • ABDOMINAL SURGERY     • APPENDECTOMY     • BRONCHOSCOPY N/A 2/13/2018    Procedure: BRONCHOSCOPY WITH SANDRITA ENDOBRONCHIAL ULTRASOUND WITH FLUORO;  Surgeon: Dixon Fatima MD;  Location: "  [facility];  Service:    • BRONCHOSCOPY N/A 3/21/2019    Procedure: NAVIGATIONAL BRONCHOSCOPY;  Surgeon: Dixon Fatima MD;  Location:  [facility];  Service: Pulmonary   • CHOLECYSTECTOMY     • COLONOSCOPY  2019   • HYSTERECTOMY      2001   • LIVER RESECTION     • OOPHORECTOMY Bilateral     2001   • THYROID SURGERY     • TUBAL ABDOMINAL LIGATION     • WRIST SURGERY      right wrist       Family History   Problem Relation Age of Onset   • Ovarian cancer Mother 19   • Emphysema Father    • COPD Father    • No Known Problems Sister    • Tuberculosis Maternal Grandmother    • Tuberculosis Maternal Grandfather    • No Known Problems Paternal Grandmother    • No Known Problems Paternal Grandfather    • Breast cancer Neg Hx        Social History     Socioeconomic History   • Marital status:      Spouse name: Not on file   • Number of children: Not on file   • Years of education: Not on file   • Highest education level: Not on file   Tobacco Use   • Smoking status: Never Smoker   • Smokeless tobacco: Never Used   Substance and Sexual Activity   • Alcohol use: Yes     Alcohol/week: 0.0 - 2.0 standard drinks     Frequency: Monthly or less     Drinks per session: 1 or 2     Binge frequency: Never     Comment: seldom    • Drug use: No   • Sexual activity: Defer   Social History Narrative    Nonsmoker    Has been exposed to secondhand smoke        Has 3 children    No regular alcohol use    Caffeine: 1 cup coffee daily       No Known Allergies      Current Outpatient Medications:   •  aspirin 81 MG EC tablet, Take 81 mg by mouth Every Night., Disp: , Rfl:   •  bisoprolol (ZEBeta) 5 MG tablet, Take 1 tablet by mouth Daily., Disp: 30 tablet, Rfl: 11  •  buPROPion XL (WELLBUTRIN XL) 150 MG 24 hr tablet, Take 150 mg by mouth Daily., Disp: , Rfl:   •  cetirizine (zyrTEC) 10 MG tablet, Take 10 mg by mouth Every Night., Disp: , Rfl:   •  citalopram (CeleXA) 20 MG tablet, Take 20 mg by mouth Every  Night., Disp: , Rfl:   •  gabapentin (NEURONTIN) 800 MG tablet, Take 800 mg by mouth 3 (Three) Times a Day., Disp: , Rfl:   •  glimepiride (AMARYL) 1 MG tablet, Take 1 mg by mouth Daily With Lunch. Daily at noon, Disp: , Rfl:   •  HYDROcodone-acetaminophen (NORCO) 5-325 MG per tablet, Take 1 tablet by mouth 2 (Two) Times a Day., Disp: , Rfl:   •  hydrOXYzine (ATARAX) 25 MG tablet, Take 25-50 mg by mouth Every Night., Disp: , Rfl:   •  levothyroxine (SYNTHROID, LEVOTHROID) 50 MCG tablet, Take 50 mcg by mouth Daily., Disp: , Rfl:   •  losartan (COZAAR) 100 MG tablet, Take 1 tablet by mouth Daily., Disp: 90 tablet, Rfl: 3  •  lovastatin (MEVACOR) 20 MG tablet, Take 20 mg by mouth Every Night., Disp: , Rfl:   •  omeprazole (priLOSEC) 20 MG capsule, Take 20 mg by mouth 2 (Two) Times a Day., Disp: , Rfl:   •  amoxicillin-clavulanate (AUGMENTIN) 875-125 MG per tablet, Take 1 tablet by mouth Every 12 (Twelve) Hours., Disp: 14 tablet, Rfl: 0  •  hyoscyamine (LEVSIN) 0.125 MG SL tablet, Take 0.125 mg by mouth Every 4 (Four) Hours As Needed for Cramping., Disp: , Rfl:     The following portions of the patient's history were reviewed today and updated as appropriate: allergies, current medications, past family history, past medical history, past social history, past surgical history and problem list     Review of Systems   Constitution: Negative for chills, decreased appetite, diaphoresis, fever, malaise/fatigue, night sweats, weight gain and weight loss.   HENT: Negative for congestion, hearing loss, hoarse voice and nosebleeds.    Eyes: Negative for blurred vision, visual disturbance and visual halos.   Cardiovascular: Positive for dyspnea on exertion. Negative for chest pain, claudication, cyanosis, irregular heartbeat, leg swelling, near-syncope, orthopnea, palpitations, paroxysmal nocturnal dyspnea and syncope.   Respiratory: Negative for cough, hemoptysis, shortness of breath, sleep disturbances due to breathing, snoring,  "sputum production and wheezing.    Hematologic/Lymphatic: Negative for bleeding problem. Does not bruise/bleed easily.   Skin: Negative for dry skin, itching and rash.   Musculoskeletal: Negative for arthritis, falls, joint pain, joint swelling and myalgias.   Gastrointestinal: Negative for bloating, abdominal pain, constipation, diarrhea, flatus, heartburn, hematemesis, hematochezia, melena, nausea and vomiting.   Genitourinary: Negative for dysuria, frequency, hematuria, nocturia and urgency.   Neurological: Positive for dizziness (with sudden position changes ). Negative for excessive daytime sleepiness, headaches, light-headedness, loss of balance and weakness.   Psychiatric/Behavioral: Negative for depression. The patient does not have insomnia and is not nervous/anxious.        Objective:     Vitals:    11/17/20 1234 11/17/20 1237 11/17/20 1238 11/17/20 1300   BP: (!) 181/84 156/73 166/64 134/78   BP Location: Right arm Left arm Left arm Left arm   Patient Position: Sitting Standing Sitting Sitting   Cuff Size: Adult Adult Adult    Pulse: 72 55 52    Resp:   18    Temp:   97.3 °F (36.3 °C)    TempSrc:   Temporal    SpO2: 98% 96% 98%    Weight:   82.1 kg (181 lb 1 oz)    Height:   160 cm (63\")        Body mass index is 32.07 kg/m².    Vitals signs and nursing note reviewed.   Constitutional:       General: Not in acute distress.     Appearance: Well-developed.   Eyes:      Conjunctiva/sclera: Conjunctivae normal.      Pupils: Pupils are equal, round, and reactive to light.   HENT:      Head: Normocephalic and atraumatic.   Neck:      Musculoskeletal: Normal range of motion and neck supple.      Thyroid: No thyromegaly.      Vascular: No JVD.      Trachea: No tracheal deviation.   Pulmonary:      Effort: Pulmonary effort is normal.      Breath sounds: Normal breath sounds.   Cardiovascular:      PMI at left midclavicular line. Normal rate. Regular rhythm. Normal S1. Normal S2.      Murmurs: There is no murmur. "      No gallop. No click. No rub.   Pulses:     Intact distal pulses.   Edema:     Peripheral edema absent.   Abdominal:      General: Bowel sounds are normal. There is no distension.      Palpations: Abdomen is soft.      Tenderness: There is no abdominal tenderness.   Musculoskeletal: Normal range of motion.   Skin:     General: Skin is warm and dry.   Neurological:      Mental Status: Alert and oriented to person, place, and time.   Psychiatric:         Behavior: Behavior normal. Behavior is cooperative.         Lab and Diagnostic Review:  Results for orders placed or performed during the hospital encounter of 11/02/20   Urine Culture - Urine, Urine, Clean Catch    Specimen: Urine, Clean Catch   Result Value Ref Range    Urine Culture <25,000 CFU/mL Normal Urogenital Mimi    COVID-19,[facility], NP SWAB IN [facility] VIRAL TRANSPORT MEDIA 24-30 HR TAT - Swab, Nasopharynx    Specimen: Nasopharynx; Swab   Result Value Ref Range    SARS-CoV-2 SAMMI Not Detected Not Detected   Comprehensive Metabolic Panel    Specimen: Blood   Result Value Ref Range    Glucose 121 (H) 65 - 99 mg/dL    BUN 20 8 - 23 mg/dL    Creatinine 0.94 0.57 - 1.00 mg/dL    Sodium 138 136 - 145 mmol/L    Potassium 4.2 3.5 - 5.2 mmol/L    Chloride 102 98 - 107 mmol/L    CO2 21.0 (L) 22.0 - 29.0 mmol/L    Calcium 9.8 8.6 - 10.5 mg/dL    Total Protein 6.5 6.0 - 8.5 g/dL    Albumin 3.90 3.50 - 5.20 g/dL    ALT (SGPT) 38 (H) 1 - 33 U/L    AST (SGOT) 26 1 - 32 U/L    Alkaline Phosphatase 162 (H) 39 - 117 U/L    Total Bilirubin 0.8 0.0 - 1.2 mg/dL    eGFR Non African Amer 58 (L) >60 mL/min/1.73    Globulin 2.6 gm/dL    A/G Ratio 1.5 g/dL    BUN/Creatinine Ratio 21.3 7.0 - 25.0    Anion Gap 15.0 5.0 - 15.0 mmol/L   Troponin    Specimen: Blood   Result Value Ref Range    Troponin T <0.010 0.000 - 0.030 ng/mL   Magnesium    Specimen: Blood   Result Value Ref Range    Magnesium 2.0 1.6 - 2.4 mg/dL   Urinalysis With Microscopic If Indicated (No Culture) - Urine,  Clean Catch    Specimen: Urine, Clean Catch   Result Value Ref Range    Color, UA Dark Yellow (A) Yellow, Straw    Appearance, UA Cloudy (A) Clear    pH, UA <=5.0 5.0 - 8.0    Specific Gravity, UA 1.035 (H) 1.001 - 1.030    Glucose, UA Negative Negative    Ketones, UA 15 mg/dL (1+) (A) Negative    Bilirubin, UA Moderate (2+) (A) Negative    Blood, UA Negative Negative    Protein, UA 30 mg/dL (1+) (A) Negative    Leuk Esterase, UA Small (1+) (A) Negative    Nitrite, UA Negative Negative    Urobilinogen, UA 1.0 E.U./dL 0.2 - 1.0 E.U./dL   CBC Auto Differential    Specimen: Blood   Result Value Ref Range    WBC 7.74 3.40 - 10.80 10*3/mm3    RBC 4.66 3.77 - 5.28 10*6/mm3    Hemoglobin 12.3 12.0 - 15.9 g/dL    Hematocrit 39.5 34.0 - 46.6 %    MCV 84.8 79.0 - 97.0 fL    MCH 26.4 (L) 26.6 - 33.0 pg    MCHC 31.1 (L) 31.5 - 35.7 g/dL    RDW 17.2 (H) 12.3 - 15.4 %    RDW-SD 52.3 37.0 - 54.0 fl    MPV 11.9 6.0 - 12.0 fL    Platelets 312 140 - 450 10*3/mm3    Neutrophil % 58.9 42.7 - 76.0 %    Lymphocyte % 29.2 19.6 - 45.3 %    Monocyte % 10.2 5.0 - 12.0 %    Eosinophil % 0.9 0.3 - 6.2 %    Basophil % 0.4 0.0 - 1.5 %    Immature Grans % 0.4 0.0 - 0.5 %    Neutrophils, Absolute 4.56 1.70 - 7.00 10*3/mm3    Lymphocytes, Absolute 2.26 0.70 - 3.10 10*3/mm3    Monocytes, Absolute 0.79 0.10 - 0.90 10*3/mm3    Eosinophils, Absolute 0.07 0.00 - 0.40 10*3/mm3    Basophils, Absolute 0.03 0.00 - 0.20 10*3/mm3    Immature Grans, Absolute 0.03 0.00 - 0.05 10*3/mm3    nRBC 0.0 0.0 - 0.2 /100 WBC   Scan Slide    Specimen: Blood   Result Value Ref Range    RBC Morphology Normal Normal    WBC Morphology Normal Normal    Large Platelets Slight/1+ None Seen   Troponin    Specimen: Blood   Result Value Ref Range    Troponin T <0.010 0.000 - 0.030 ng/mL   Urinalysis, Microscopic Only - Urine, Clean Catch    Specimen: Urine, Clean Catch   Result Value Ref Range    RBC, UA 0-2 None Seen, 0-2 /HPF    WBC, UA 6-12 (A) None Seen, 0-2 /HPF    Bacteria,  UA 1+ (A) None Seen, Trace /HPF    Squamous Epithelial Cells, UA 7-12 (A) None Seen, 0-2 /HPF    Hyaline Casts, UA 21-30 0 - 6 /LPF    Mucus, UA Large/3+ (A) None Seen, Trace /HPF    Methodology Manual Light Microscopy    ECG 12 Lead   Result Value Ref Range    QT Interval 346 ms    QTC Interval 423 ms   ECG 12 Lead   Result Value Ref Range    QT Interval 386 ms    QTC Interval 448 ms   Light Blue Top   Result Value Ref Range    Extra Tube hold for add-on    Green Top (Gel)   Result Value Ref Range    Extra Tube Hold for add-ons.    Lavender Top   Result Value Ref Range    Extra Tube hold for add-on    Gold Top - SST   Result Value Ref Range    Extra Tube Hold for add-ons.          Assessment and Plan:   1. Dyspnea on exertion  Followed by pulmonary   Upcoming pet later this week   - Adult Transthoracic Echo Complete W/ Cont if Necessary Per Protocol; Future    2. Essential hypertension  Well controlled on bisoprolol, losartan  HTN Education provided today including signs and symptoms, medication management, daily blood pressure monitoring. Patient encouraged to call the Heart and Valve center with any abnormal readings.     3. Dyslipidemia  Statin therapy     Will move up appointment with Dr Drake    It has been a pleasure to participate in the care of this patient.  Patient was instructed to call the [facility] Center with any questions, concerns, or worsening symptoms.    *Please note that portions of this note were completed with a voice recognition program. Efforts were made to edit the dictations, but occasionally words are mistranscribed.

## 2020-11-18 VITALS
DIASTOLIC BLOOD PRESSURE: 78 MMHG | TEMPERATURE: 97.3 F | OXYGEN SATURATION: 98 % | WEIGHT: 181.06 LBS | RESPIRATION RATE: 18 BRPM | BODY MASS INDEX: 32.08 KG/M2 | HEIGHT: 63 IN | HEART RATE: 52 BPM | SYSTOLIC BLOOD PRESSURE: 134 MMHG

## 2020-11-20 ENCOUNTER — HOSPITAL ENCOUNTER (OUTPATIENT)
Dept: PET IMAGING | Facility: HOSPITAL | Age: 75
Discharge: HOME | End: 2020-11-20

## 2020-11-20 DIAGNOSIS — R91.8 LUNG MASS: ICD-10-CM

## 2020-11-20 LAB — GLUCOSE BLDC GLUCOMTR-MCNC: 93 MG/DL (ref 70–130)

## 2020-11-20 PROCEDURE — 0 FLUDEOXYGLUCOSE F18 SOLUTION: Performed by: INTERNAL MEDICINE

## 2020-11-20 PROCEDURE — 82962 GLUCOSE BLOOD TEST: CPT

## 2020-11-20 PROCEDURE — 78815 PET IMAGE W/CT SKULL-THIGH: CPT

## 2020-11-20 PROCEDURE — A9552 F18 FDG: HCPCS | Performed by: INTERNAL MEDICINE

## 2020-11-20 RX ADMIN — FLUDEOXYGLUCOSE F18 1 DOSE: 300 INJECTION INTRAVENOUS at 09:17

## 2020-11-23 ENCOUNTER — OFFICE VISIT (OUTPATIENT)
Dept: CARDIOLOGY | Facility: CLINIC | Age: 75
End: 2020-11-23

## 2020-11-23 VITALS
BODY MASS INDEX: 32 KG/M2 | HEIGHT: 63 IN | OXYGEN SATURATION: 97 % | SYSTOLIC BLOOD PRESSURE: 150 MMHG | WEIGHT: 180.6 LBS | HEART RATE: 57 BPM | DIASTOLIC BLOOD PRESSURE: 80 MMHG

## 2020-11-23 DIAGNOSIS — I10 ESSENTIAL HYPERTENSION: ICD-10-CM

## 2020-11-23 DIAGNOSIS — R06.09 DYSPNEA ON EXERTION: Primary | ICD-10-CM

## 2020-11-23 DIAGNOSIS — E78.5 DYSLIPIDEMIA: ICD-10-CM

## 2020-11-23 DIAGNOSIS — I65.23 CAROTID STENOSIS, BILATERAL: ICD-10-CM

## 2020-11-23 PROCEDURE — 99213 OFFICE O/P EST LOW 20 MIN: CPT | Performed by: INTERNAL MEDICINE

## 2020-11-23 NOTE — PROGRESS NOTES
[facility]  Subjective:     Encounter Date: 11/23/2020      Patient ID: Ruby Pettit is a 75 y.o. female.    Chief Complaint: LIRA      PROBLEM LIST:  1. Dyspnea:  a. Normal MPS, 2016.  b. Echo, 09/29/2016: EF 65%. Mild MR.  c. Echo, 01/16/2019: EF 65%. RVSP 40 mmHg.  d. Echo, 07/08/2019: EF > 75% with mid cavity obliteration. Valves normal.  e. MPS, 07/29/2019: No ischemia.  2. Carotid plaque:  a. Carotid duplex, 01/16/2019: Bilateral ICA stenosis 0-49%. Tortuous vessels. Vertebral flow antegrade.   3. Hypertension.  4. Dyslipidemia.  5. Diabetes mellitus:  a. With neuropathy.  6. Anxiety/depression.  7. Arthritis.  8. Hypothyroidism.  9. GERD.  10. Surgeries:  a. Abdominal surgery.  b. Appendectomy.  c. Cholecystectomy.  d. Liver surgery.  e. Hysterectomy.  f. Thyroid surgery.  g. Tubal ligation.  h. Right wrist surgery.      History of Present Illness  Ruby Pettit returns today for an early follow up with a history of dyspnea, carotid stenosis, and cardiac risk factors. Since last visit, patient was seen at [facility] ED on 11/02/20 for presyncopal symptoms. Workup was overall unremarkable and patient was referred to the cardiac event monitor clinic due to her dizziness and near syncope. Since then, patient has been feeling well overall from a cardiovascular standpoint.  She says she sometimes feels dizzy when standing up from a chair, but was told that she has been educated on how to relieve those symptoms. Patient denies chest pain, shortness of breath, palpitations, edema, and syncope.      No Known Allergies      Current Outpatient Medications:   •  aspirin 81 MG EC tablet, Take 81 mg by mouth Every Night., Disp: , Rfl:   •  bisoprolol (ZEBeta) 5 MG tablet, Take 1 tablet by mouth Daily., Disp: 30 tablet, Rfl: 11  •  buPROPion XL (WELLBUTRIN XL) 150 MG 24 hr tablet, Take 150 mg by mouth Daily., Disp: , Rfl:   •  cetirizine (zyrTEC) 10 MG tablet, Take 10 mg by mouth Every  Night., Disp: , Rfl:   •  citalopram (CeleXA) 20 MG tablet, Take 20 mg by mouth Every Night., Disp: , Rfl:   •  gabapentin (NEURONTIN) 800 MG tablet, Take 800 mg by mouth 3 (Three) Times a Day., Disp: , Rfl:   •  glimepiride (AMARYL) 1 MG tablet, Take 1 mg by mouth Daily With Lunch. Daily at noon, Disp: , Rfl:   •  HYDROcodone-acetaminophen (NORCO) 5-325 MG per tablet, Take 1 tablet by mouth 2 (Two) Times a Day., Disp: , Rfl:   •  hydrOXYzine (ATARAX) 25 MG tablet, Take 25-50 mg by mouth Every Night., Disp: , Rfl:   •  hyoscyamine (LEVSIN) 0.125 MG SL tablet, Take 0.125 mg by mouth Every 4 (Four) Hours As Needed for Cramping., Disp: , Rfl:   •  levothyroxine (SYNTHROID, LEVOTHROID) 50 MCG tablet, Take 50 mcg by mouth Daily., Disp: , Rfl:   •  losartan (COZAAR) 100 MG tablet, Take 1 tablet by mouth Daily., Disp: 90 tablet, Rfl: 3  •  lovastatin (MEVACOR) 20 MG tablet, Take 20 mg by mouth Every Night., Disp: , Rfl:   •  omeprazole (priLOSEC) 20 MG capsule, Take 20 mg by mouth 2 (Two) Times a Day., Disp: , Rfl:     The following portions of the patient's history were reviewed and updated as appropriate: allergies, current medications, past family history, past medical history, past social history, past surgical history and problem list.    Review of Systems   Constitution: Positive for malaise/fatigue.   Eyes: Positive for visual disturbance.   Cardiovascular: Positive for dyspnea on exertion. Negative for chest pain, leg swelling, palpitations and syncope.   Respiratory: Positive for wheezing. Negative for shortness of breath.    Endocrine: Positive for polydipsia and polyuria.   Hematologic/Lymphatic: Bruises/bleeds easily.   Skin: Positive for dry skin. Negative for rash.   Musculoskeletal: Positive for arthritis, back pain and falls. Negative for muscle weakness and myalgias.   Gastrointestinal: Positive for change in bowel habit and flatus. Negative for heartburn, nausea and vomiting.   Genitourinary: Positive for  "frequency.   Neurological: Positive for excessive daytime sleepiness and loss of balance. Negative for dizziness, light-headedness and numbness.   Psychiatric/Behavioral: The patient is nervous/anxious.           Objective:     Vitals:    11/23/20 1340   BP: 150/80   BP Location: Right arm   Patient Position: Sitting   Pulse: 57   SpO2: 97%   Weight: 81.9 kg (180 lb 9.6 oz)   Height: 160 cm (63\")         Vitals signs reviewed.   Constitutional:       Appearance: Well-developed and not in distress.   Neck:      Thyroid: No thyromegaly.      Vascular: No carotid bruit or JVD.   Pulmonary:      Breath sounds: Normal breath sounds.   Cardiovascular:      Regular rhythm.      No gallop. No S3 and S4 gallop.   Edema:     Peripheral edema absent.   Abdominal:      General: Bowel sounds are normal.      Palpations: Abdomen is soft. There is no abdominal mass.      Tenderness: There is no abdominal tenderness.   Musculoskeletal:         General: No deformity.      Extremities: No clubbing present.  Skin:     General: Skin is warm and dry.      Findings: No rash.   Neurological:      Mental Status: Alert and oriented to person, place, and time.         Lab Review:  Lab Results   Component Value Date    GLUCOSE 121 (H) 11/02/2020    BUN 20 11/02/2020    CREATININE 0.94 11/02/2020    EGFRIFNONA 58 (L) 11/02/2020    BCR 21.3 11/02/2020    K 4.2 11/02/2020    CO2 21.0 (L) 11/02/2020    CALCIUM 9.8 11/02/2020    ALBUMIN 3.90 11/02/2020    ALKPHOS 162 (H) 11/02/2020    AST 26 11/02/2020    ALT 38 (H) 11/02/2020     Lab Results   Component Value Date    LDLDIRECT 4.0 06/11/2016      Lab Results   Component Value Date    WBC 7.74 11/02/2020    RBC 4.66 11/02/2020    HGB 12.3 11/02/2020    HCT 39.5 11/02/2020    MCV 84.8 11/02/2020     11/02/2020     Lab Results   Component Value Date    TSH 1.179 01/16/2019     Lab Results   Component Value Date    HGBA1C 7.00 (H) 01/16/2019        Procedures        Assessment:   Diagnoses and " all orders for this visit:    1. Dyspnea on exertion (Primary)    2. Essential hypertension    3. Dyslipidemia    4. Carotid stenosis, bilateral        Impression:  1. Dyspnea on exertion, this has not changed since her most recent echocardiogram and nuclear stress test.   2. Carotid plaque, 0-49% bilateral ICA stenosis on carotid duplex in January 2019. On aspirin 81 mg daily.  3. Hypertension, well controlled on losartan and bisoprolol.  4. Hyperlipidemia, monitored by PCP. On lovastatin 20 mg daily.    5. Dizzy episode, suspect vasovagal and transient hypotension.  No recurrence    Plan:  1. Stable cardiac status overall.   2. Discussed maneuvers with which to avoid/treat vasovagal symptoms  3. Continue current medications.  4. Revisit in 12 MO, or sooner as needed.    Scribed for Cheoc Drake MD by Vernon Gamboa 11/23/2020  14:24 EST    Checo Drake MD      Please note that portions of this note may have been completed with a voice recognition program. Efforts were made to edit the dictations, but occasionally words are mistranscribed.

## 2021-01-07 ENCOUNTER — TRANSCRIBE ORDERS (OUTPATIENT)
Dept: ADMINISTRATIVE | Facility: HOSPITAL | Age: 76
End: 2021-01-07

## 2021-01-07 DIAGNOSIS — R42 DIZZINESS: Primary | ICD-10-CM

## 2021-02-05 ENCOUNTER — HOSPITAL ENCOUNTER (OUTPATIENT)
Dept: GENERAL RADIOLOGY | Facility: HOSPITAL | Age: 76
Discharge: HOME | End: 2021-02-05
Admitting: INTERNAL MEDICINE

## 2021-02-05 ENCOUNTER — TRANSCRIBE ORDERS (OUTPATIENT)
Dept: ADMINISTRATIVE | Facility: HOSPITAL | Age: 76
End: 2021-02-05

## 2021-02-05 DIAGNOSIS — M25.552 LEFT HIP PAIN: ICD-10-CM

## 2021-02-05 DIAGNOSIS — M25.552 LEFT HIP PAIN: Primary | ICD-10-CM

## 2021-02-05 PROCEDURE — 73502 X-RAY EXAM HIP UNI 2-3 VIEWS: CPT

## 2021-02-08 ENCOUNTER — TELEPHONE (OUTPATIENT)
Dept: PULMONOLOGY | Facility: CLINIC | Age: 76
End: 2021-02-08

## 2021-02-08 NOTE — TELEPHONE ENCOUNTER
I tried to call MsDeshuan Wilver to see if she would schedule an appointment with Dr. Fatima.  She had no voicemail.    I did leave a message with her Sister Ms. Camp to return my call to see if she received the message.  We will try to get her appointment in the office in the near future.

## 2021-03-10 ENCOUNTER — OFFICE VISIT (OUTPATIENT)
Dept: PULMONOLOGY | Facility: CLINIC | Age: 76
End: 2021-03-10

## 2021-03-10 ENCOUNTER — HOSPITAL ENCOUNTER (OUTPATIENT)
Dept: CARDIOLOGY | Facility: HOSPITAL | Age: 76
Discharge: HOME | End: 2021-03-10
Admitting: INTERNAL MEDICINE

## 2021-03-10 VITALS
HEART RATE: 60 BPM | TEMPERATURE: 98.4 F | SYSTOLIC BLOOD PRESSURE: 140 MMHG | RESPIRATION RATE: 16 BRPM | DIASTOLIC BLOOD PRESSURE: 70 MMHG | BODY MASS INDEX: 31.18 KG/M2 | OXYGEN SATURATION: 93 % | HEIGHT: 63 IN | WEIGHT: 176 LBS

## 2021-03-10 DIAGNOSIS — R91.8 LUNG NODULES: ICD-10-CM

## 2021-03-10 DIAGNOSIS — R42 DIZZINESS: ICD-10-CM

## 2021-03-10 DIAGNOSIS — K21.9 CHRONIC GERD: ICD-10-CM

## 2021-03-10 DIAGNOSIS — Z78.9 NON-SMOKER: ICD-10-CM

## 2021-03-10 DIAGNOSIS — G62.9 NEUROPATHY: ICD-10-CM

## 2021-03-10 DIAGNOSIS — R91.1 LUNG NODULE: Primary | ICD-10-CM

## 2021-03-10 LAB
BH CV ECHO MEAS - BSA(HAYCOCK): 1.9 M^2
BH CV ECHO MEAS - BSA: 1.8 M^2
BH CV ECHO MEAS - BZI_BMI: 31.2 KILOGRAMS/M^2
BH CV ECHO MEAS - BZI_METRIC_HEIGHT: 160 CM
BH CV ECHO MEAS - BZI_METRIC_WEIGHT: 79.8 KG
BH CV XLRA MEAS LEFT DIST CCA EDV: 10.3 CM/SEC
BH CV XLRA MEAS LEFT DIST CCA PSV: 68.3 CM/SEC
BH CV XLRA MEAS LEFT DIST ICA EDV: 17.8 CM/SEC
BH CV XLRA MEAS LEFT DIST ICA PSV: 71.9 CM/SEC
BH CV XLRA MEAS LEFT ICA/CCA RATIO: 1.13
BH CV XLRA MEAS LEFT MID CCA EDV: 10.8 CM/SEC
BH CV XLRA MEAS LEFT MID CCA PSV: 72.2 CM/SEC
BH CV XLRA MEAS LEFT MID ICA EDV: 21.4 CM/SEC
BH CV XLRA MEAS LEFT MID ICA PSV: 81.4 CM/SEC
BH CV XLRA MEAS LEFT PROX CCA EDV: 14.8 CM/SEC
BH CV XLRA MEAS LEFT PROX CCA PSV: 116.1 CM/SEC
BH CV XLRA MEAS LEFT PROX ECA EDV: 13.3 CM/SEC
BH CV XLRA MEAS LEFT PROX ECA PSV: 97.1 CM/SEC
BH CV XLRA MEAS LEFT PROX ICA EDV: 12.3 CM/SEC
BH CV XLRA MEAS LEFT PROX ICA PSV: 60.9 CM/SEC
BH CV XLRA MEAS LEFT PROX SCLA EDV: 0.63 CM/SEC
BH CV XLRA MEAS LEFT PROX SCLA PSV: 99.3 CM/SEC
BH CV XLRA MEAS LEFT VERTEBRAL A EDV: 7.7 CM/SEC
BH CV XLRA MEAS LEFT VERTEBRAL A PSV: 36.9 CM/SEC
BH CV XLRA MEAS RIGHT DIST CCA EDV: 13.8 CM/SEC
BH CV XLRA MEAS RIGHT DIST CCA PSV: 65.8 CM/SEC
BH CV XLRA MEAS RIGHT DIST ICA EDV: 17.7 CM/SEC
BH CV XLRA MEAS RIGHT DIST ICA PSV: 46.2 CM/SEC
BH CV XLRA MEAS RIGHT ICA/CCA RATIO: 1.34
BH CV XLRA MEAS RIGHT MID CCA EDV: 12.3 CM/SEC
BH CV XLRA MEAS RIGHT MID CCA PSV: 67.3 CM/SEC
BH CV XLRA MEAS RIGHT MID ICA EDV: 16.7 CM/SEC
BH CV XLRA MEAS RIGHT MID ICA PSV: 70.7 CM/SEC
BH CV XLRA MEAS RIGHT PROX CCA EDV: 9.8 CM/SEC
BH CV XLRA MEAS RIGHT PROX CCA PSV: 71.7 CM/SEC
BH CV XLRA MEAS RIGHT PROX ECA EDV: 7.4 CM/SEC
BH CV XLRA MEAS RIGHT PROX ECA PSV: 52.8 CM/SEC
BH CV XLRA MEAS RIGHT PROX ICA EDV: 24.6 CM/SEC
BH CV XLRA MEAS RIGHT PROX ICA PSV: 90.4 CM/SEC
BH CV XLRA MEAS RIGHT PROX SCLA EDV: 0.79 CM/SEC
BH CV XLRA MEAS RIGHT PROX SCLA PSV: 105.3 CM/SEC
BH CV XLRA MEAS RIGHT VERTEBRAL A EDV: 13.1 CM/SEC
BH CV XLRA MEAS RIGHT VERTEBRAL A PSV: 53.3 CM/SEC
LEFT ARM BP: NORMAL MMHG
RIGHT ARM BP: NORMAL MMHG

## 2021-03-10 PROCEDURE — 99214 OFFICE O/P EST MOD 30 MIN: CPT | Performed by: INTERNAL MEDICINE

## 2021-03-10 PROCEDURE — 93880 EXTRACRANIAL BILAT STUDY: CPT

## 2021-03-10 PROCEDURE — 93880 EXTRACRANIAL BILAT STUDY: CPT | Performed by: INTERNAL MEDICINE

## 2021-03-10 NOTE — PROGRESS NOTES
"  PULMONARY  NOTE    Chief Complaint     Migratory pulmonary infiltrates, persistent hypermetabolic left lower lobe consolidation, perennial rhinitis, history of asthma, diastolic dysfunction, chronic back pain    History of Present Illness     76-year-old female returns today for follow-up  I last saw her in the office on 6/4/2020    We have communicated several times over the last few months.  We have been having difficulty getting her in and there is been delay in her getting some of the work-up that I have wanted due to communication and difficulty getting in for testing    She has had a persistent left basilar density that has shown slow but progressive interval enlargement over the last year  PET scan done in October revealed some mild but increased metabolic activity suggestive of an active process  We have try to get her into discuss this further and decide about intervention and she is just able to get to our office at this time    She continues to get around any distance in a wheelchair because of her chronic back pain    She has no regular cough or sputum production    She does have a history of asthma and has been on albuterol in the past    She has a history of reflux.  It is unclear if she has regular following reflux precautions    He has a history of asthma and has been on Breo with albuterol on an as-needed basis      Patient Active Problem List   Diagnosis   • Migratory Lung nodules (\"Soft\" and solid, bilateral)   • Non-smoker   • History of asthma   • Cough   • Perennial rhinitis   • Syncope and collapse   • UTI (urinary tract infection), bacterial   • Hypertension   • T2DM (type 2 diabetes mellitus) (CMS/HCC)   • Neuropathy   • Hypokalemia   • Lesion of temporal lobe   • GERD   • Iatrogenic pneumothorax   • Carotid stenosis, bilateral   • Hypermetabolic LLL lung nodule     No Known Allergies    Current Outpatient Medications:   •  aspirin 81 MG EC tablet, Take 81 mg by mouth Every Night., Disp: , " Rfl:   •  bisoprolol (ZEBeta) 5 MG tablet, Take 1 tablet by mouth Daily., Disp: 30 tablet, Rfl: 11  •  buPROPion XL (WELLBUTRIN XL) 150 MG 24 hr tablet, Take 150 mg by mouth Daily., Disp: , Rfl:   •  cetirizine (zyrTEC) 10 MG tablet, Take 10 mg by mouth Every Night., Disp: , Rfl:   •  citalopram (CeleXA) 20 MG tablet, Take 20 mg by mouth Every Night., Disp: , Rfl:   •  gabapentin (NEURONTIN) 800 MG tablet, Take 800 mg by mouth 3 (Three) Times a Day., Disp: , Rfl:   •  glimepiride (AMARYL) 1 MG tablet, Take 1 mg by mouth Daily With Lunch. Daily at noon, Disp: , Rfl:   •  HYDROcodone-acetaminophen (NORCO) 5-325 MG per tablet, Take 1 tablet by mouth 2 (Two) Times a Day., Disp: , Rfl:   •  hydrOXYzine (ATARAX) 25 MG tablet, Take 25-50 mg by mouth Every Night., Disp: , Rfl:   •  hyoscyamine (LEVSIN) 0.125 MG SL tablet, Take 0.125 mg by mouth Every 4 (Four) Hours As Needed for Cramping., Disp: , Rfl:   •  levothyroxine (SYNTHROID, LEVOTHROID) 50 MCG tablet, Take 50 mcg by mouth Daily., Disp: , Rfl:   •  losartan (COZAAR) 100 MG tablet, Take 1 tablet by mouth Daily., Disp: 90 tablet, Rfl: 3  •  lovastatin (MEVACOR) 20 MG tablet, Take 20 mg by mouth Every Night., Disp: , Rfl:   •  omeprazole (priLOSEC) 20 MG capsule, Take 20 mg by mouth 2 (Two) Times a Day., Disp: , Rfl:   MEDICATION LIST AND ALLERGIES REVIEWED.    Family History   Problem Relation Age of Onset   • Ovarian cancer Mother 19   • Emphysema Father    • COPD Father    • No Known Problems Sister    • Tuberculosis Maternal Grandmother    • Tuberculosis Maternal Grandfather    • No Known Problems Paternal Grandmother    • No Known Problems Paternal Grandfather    • Breast cancer Neg Hx      Social History     Tobacco Use   • Smoking status: Never Smoker   • Smokeless tobacco: Never Used   Substance Use Topics   • Alcohol use: Yes     Alcohol/week: 0.0 - 2.0 standard drinks     Comment: seldom    • Drug use: No     Social History     Social History Narrative     "Nonsmoker    Has been exposed to secondhand smoke        Has 3 children    No regular alcohol use    Caffeine: 1 cup coffee daily     FAMILY AND SOCIAL HISTORY REVIEWED.    Review of Systems  ALSO REFER TO SCANNED ROS SHEET FROM SAME DATE.    /70   Pulse 60   Temp 98.4 °F (36.9 °C)   Resp 16   Ht 160 cm (62.99\")   Wt 79.8 kg (176 lb)   SpO2 93% Comment: RA  BMI 31.18 kg/m²   Physical Exam  Vitals and nursing note reviewed.   Constitutional:       General: She is not in acute distress.     Appearance: She is well-developed. She is not diaphoretic.   HENT:      Head: Normocephalic and atraumatic.   Neck:      Thyroid: No thyromegaly.   Cardiovascular:      Rate and Rhythm: Normal rate and regular rhythm.      Heart sounds: Normal heart sounds. No murmur.   Pulmonary:      Effort: Pulmonary effort is normal.      Breath sounds: Normal breath sounds. No stridor.   Abdominal:      General: Bowel sounds are normal.      Palpations: Abdomen is soft.   Musculoskeletal:      Comments: Trace lower extremity edema   Lymphadenopathy:      Cervical: No cervical adenopathy.      Upper Body:      Right upper body: No supraclavicular or epitrochlear adenopathy.      Left upper body: No supraclavicular or epitrochlear adenopathy.   Skin:     General: Skin is warm and dry.   Neurological:      Mental Status: She is alert and oriented to person, place, and time.      Comments: Patient got around the office today in a wheelchair due to her chronic back pain   Psychiatric:         Behavior: Behavior normal.         Results     PET CT scan from 10/14/2020, CT scan of the chest from September 16, 2020 and CT scan of the chest from September 2019 reviewed on PACS.  There has been a multifocal area of consolidation in the left base that has shown slow but definite radiographic progression and exhibit some hypermetabolic activity on the PET scan from 10/14/2020    Immunization History   Administered Date(s) Administered " "  • Flu Mist 09/04/2019   • Flu Vaccine Quad PF >36MO 09/12/2017   • Fluzone High Dose =>65 Years (Vaxcare ONLY) 10/07/2016, 09/17/2018   • Hepatitis B 06/05/2000, 07/10/2000, 01/17/2014   • Pneumococcal Polysaccharide (PPSV23) 03/08/2017   • Td 06/05/2000   • Tdap 01/15/2014     Problem List       ICD-10-CM ICD-9-CM   1. Hypermetabolic LLL lung nodule  R91.1 793.11   2. Migratory Lung nodules (\"Soft\" and solid, bilateral)  R91.8 793.19   3. Non-smoker  Z78.9 V49.89   4. Neuropathy  G62.9 355.9   5. GERD  K21.9 530.81       Discussion     We discussed her chest imaging in detail.  She is a non-smoker however malignancy cannot be excluded.  Also, an indolent respiratory tract infection is a consideration as is recurrent aspiration pneumonitis    At this point, we probably need to consider biopsying this area  I talked with her about this before she appears willing to undergo biopsy at this point  She represents a high risk just given her general immobility  She was able to undergo a navigational bronchoscopy in the past, 2019, and tolerated it okay, however    Again to get a short interval CT scan of the chest and do an I-[employer] scan so that we can do navigational bronchoscopy if necessary    Further work-up based on the results of that scan    I have continue to encourage reflux precautions   She is can remain on Breo with albuterol on an as-needed basis for her history of asthma    Moderate level of Medical Decision Making complexity based on 2 or more chronic stable illnesses and prescription drug management.    Dixon Fatima MD  Note electronically signed    CC: Ly Funez MD  "

## 2021-03-11 DIAGNOSIS — R91.8 LUNG NODULES: Primary | ICD-10-CM

## 2021-04-01 ENCOUNTER — HOSPITAL ENCOUNTER (OUTPATIENT)
Dept: CT IMAGING | Facility: HOSPITAL | Age: 76
Discharge: HOME | End: 2021-04-01
Admitting: INTERNAL MEDICINE

## 2021-04-01 DIAGNOSIS — R91.8 LUNG NODULES: ICD-10-CM

## 2021-04-01 PROCEDURE — 71250 CT THORAX DX C-: CPT

## 2021-04-05 ENCOUNTER — OFFICE VISIT (OUTPATIENT)
Dept: CARDIOLOGY | Facility: CLINIC | Age: 76
End: 2021-04-05

## 2021-04-05 VITALS
HEIGHT: 63 IN | HEART RATE: 64 BPM | SYSTOLIC BLOOD PRESSURE: 144 MMHG | DIASTOLIC BLOOD PRESSURE: 68 MMHG | OXYGEN SATURATION: 94 % | WEIGHT: 177 LBS | TEMPERATURE: 98.4 F | BODY MASS INDEX: 31.36 KG/M2

## 2021-04-05 DIAGNOSIS — I10 ESSENTIAL HYPERTENSION: Primary | ICD-10-CM

## 2021-04-05 DIAGNOSIS — E78.5 DYSLIPIDEMIA: ICD-10-CM

## 2021-04-05 PROCEDURE — 99213 OFFICE O/P EST LOW 20 MIN: CPT | Performed by: NURSE PRACTITIONER

## 2021-04-05 RX ORDER — CLONIDINE HYDROCHLORIDE 0.1 MG/1
0.1 TABLET ORAL 2 TIMES DAILY PRN
COMMUNITY
End: 2021-06-15

## 2021-04-05 RX ORDER — BISOPROLOL FUMARATE 10 MG/1
10 TABLET, FILM COATED ORAL DAILY
COMMUNITY

## 2021-04-05 RX ORDER — PREGABALIN 25 MG/1
25 CAPSULE ORAL DAILY
COMMUNITY
End: 2021-06-15

## 2021-04-05 RX ORDER — LOSARTAN POTASSIUM 50 MG/1
50 TABLET ORAL DAILY
COMMUNITY
End: 2021-04-05 | Stop reason: DRUGHIGH

## 2021-04-05 NOTE — PROGRESS NOTES
Subjective:     Encounter Date:04/05/2021    Primary Care Physician: Ly Funez MD      Patient ID: Ruby Pettit is a 76 y.o. female.    Chief Complaint:Dyspnea on exertion    PROBLEM LIST:  1. Dyspnea:  a. Normal MPS, 2016.  b. Echo, 09/29/2016: EF 65%. Mild MR.  c. Echo, 01/16/2019: EF 65%. RVSP 40 mmHg.  d. Echo, 07/08/2019: EF > 75% with mid cavity obliteration. Valves normal.  e. MPS, 07/29/2019: No ischemia.  2. Carotid plaque:  a. Carotid duplex, 01/16/2019: Bilateral ICA stenosis 0-49%. Tortuous vessels. Vertebral flow antegrade.   3. Hypertension.  4. Dyslipidemia.  5. Diabetes mellitus:  a. With neuropathy.  6. Anxiety/depression.  7. Arthritis.  8. Hypothyroidism.  9. GERD.  10. Surgeries:  a. Abdominal surgery.  b. Appendectomy.  c. Cholecystectomy.  d. Liver surgery.  e. Hysterectomy.  f. Thyroid surgery.  g. Tubal ligation.  h. Right wrist surgery.        No Known Allergies      Current Outpatient Medications:   •  aspirin 81 MG EC tablet, Take 81 mg by mouth Every Night., Disp: , Rfl:   •  bisoprolol (ZEBeta) 10 MG tablet, Take 10 mg by mouth Daily., Disp: , Rfl:   •  buPROPion XL (WELLBUTRIN XL) 150 MG 24 hr tablet, Take 150 mg by mouth Daily., Disp: , Rfl:   •  cetirizine (zyrTEC) 10 MG tablet, Take 10 mg by mouth Every Night., Disp: , Rfl:   •  citalopram (CeleXA) 20 MG tablet, Take 20 mg by mouth Every Night., Disp: , Rfl:   •  cloNIDine (CATAPRES) 0.1 MG tablet, Take 0.1 mg by mouth 2 (Two) Times a Day As Needed for High Blood Pressure., Disp: , Rfl:   •  gabapentin (NEURONTIN) 800 MG tablet, Take 800 mg by mouth 3 (Three) Times a Day., Disp: , Rfl:   •  HYDROcodone-acetaminophen (NORCO) 7.5-325 MG per tablet, Take 1 tablet by mouth 2 (Two) Times a Day., Disp: , Rfl:   •  hydrOXYzine (ATARAX) 25 MG tablet, Take 25-50 mg by mouth Every Night., Disp: , Rfl:   •  hyoscyamine (LEVSIN) 0.125 MG SL tablet, Take 0.125 mg by mouth Every 6 (Six) Hours As Needed for Cramping., Disp: , Rfl:   •   levothyroxine (SYNTHROID, LEVOTHROID) 50 MCG tablet, Take 50 mcg by mouth Daily., Disp: , Rfl:   •  losartan (COZAAR) 50 MG tablet, Take 50 mg by mouth Daily., Disp: , Rfl:   •  lovastatin (MEVACOR) 20 MG tablet, Take 20 mg by mouth Every Night., Disp: , Rfl:   •  omeprazole (priLOSEC) 20 MG capsule, Take 20 mg by mouth 2 (Two) Times a Day., Disp: , Rfl:   •  pregabalin (LYRICA) 25 MG capsule, Take 25 mg by mouth Daily., Disp: , Rfl:         History of Present Illness    Patient is a 76-year-old  female who is being seen today for follow-up of hypertension.  She has known history of this.  She has recently been following with pulmonary secondary to hypermetabolic activity in her left lower lobe.  She recently had a CT scan to follow-up on this and is continuing to see pulmonary.  She denies any chest pain, pressure, tightness.  She notes chronic shortness of breath which is overall unchanged at this time per her report.  Her primary complaint is of increasing resting blood pressure.  She brings in readings today with systolic blood pressures anywhere from the 140s to 180s range.  She notes that whenever she takes her clonidine this seems to drop her blood pressure too much and make her dizzy.  No syncope.  Thinks that she recently had lab work performed with her primary care physician.  States that she has been compliant with her medications.    The following portions of the patient's history were reviewed and updated as appropriate: allergies, current medications, past family history, past medical history, past social history, past surgical history and problem list.      Social History     Tobacco Use   • Smoking status: Never Smoker   • Smokeless tobacco: Never Used   Substance Use Topics   • Alcohol use: Yes     Alcohol/week: 0.0 - 2.0 standard drinks     Comment: seldom    • Drug use: No         Review of Systems   Constitutional: Positive for malaise/fatigue and weight gain.   Cardiovascular: Positive  "for dyspnea on exertion, palpitations and syncope. Negative for chest pain and leg swelling.   Respiratory: Negative.  Negative for shortness of breath.    Endocrine: Positive for polydipsia.   Hematologic/Lymphatic: Negative for bleeding problem. Does not bruise/bleed easily.   Skin: Negative for rash.   Musculoskeletal: Negative for muscle weakness and myalgias.   Gastrointestinal: Positive for flatus. Negative for heartburn, nausea and vomiting.   Genitourinary: Positive for bladder incontinence.   Neurological: Positive for loss of balance. Negative for dizziness, light-headedness and numbness.          Objective:   /68 (BP Location: Left arm, Patient Position: Sitting, Cuff Size: Large Adult)   Pulse 64   Temp 98.4 °F (36.9 °C)   Ht 160 cm (63\")   Wt 80.3 kg (177 lb)   SpO2 94%   BMI 31.35 kg/m²         Vitals reviewed.   Constitutional:       Appearance: Healthy appearance. Well-developed and not in distress.   Neck:      Vascular: No JVD.      Trachea: No tracheal deviation.   Pulmonary:      Effort: Pulmonary effort is normal.      Breath sounds: Normal breath sounds.   Cardiovascular:      Normal rate. Regular rhythm.   Edema:     Peripheral edema absent.   Abdominal:      General: Bowel sounds are normal.      Tenderness: There is no abdominal tenderness.   Musculoskeletal:         General: No deformity. Skin:     General: Skin is warm and dry.   Neurological:      Mental Status: Alert and oriented to person, place, and time.         Procedures          Assessment:   Assessment/Plan      Diagnoses and all orders for this visit:    1. Essential hypertension (Primary), elevated.  Symptomatic with as needed clonidine.    2. Dyslipidemia, labs with primary care.  On statin.      Plan:  1. Increase losartan to 100 mg daily.  Discussed with patient she may take 2 of her 50 mg tablets at home.  2. Discussed with patient given her symptoms with the as needed clonidine wanted to gradually decrease her " blood pressure.  3. Discussed with patient to continue to monitor her blood pressure.  If after 2 weeks it is still significantly elevated will add amlodipine 5 mg daily.  4. Will attempt to obtain most recent blood work from her primary care including lipids and thyroid function.  5. Follow-up in 6 months time or sooner if needed.       Migdalia OLEA     Dictated utilizing Dragon dictation

## 2021-04-06 RX ORDER — LOSARTAN POTASSIUM 100 MG/1
100 TABLET ORAL DAILY
Qty: 30 TABLET | Refills: 11 | Status: SHIPPED | OUTPATIENT
Start: 2021-04-06 | End: 2021-06-15

## 2021-04-29 ENCOUNTER — DOCUMENTATION (OUTPATIENT)
Dept: PULMONOLOGY | Facility: CLINIC | Age: 76
End: 2021-04-29

## 2021-04-29 NOTE — PROGRESS NOTES
CT Scan reviewed.  I have tried to contact the patient by phone.    Interval progression of the LLL infiltrate is worrisome; consideration for an indolent infection (although BAL had no growth) or low grade malignancy. At this point I think only a surgical lung biopsy would be definitive and she is not a good surgical candidate.    We will make an appointment to get her into the office to discuss further.

## 2021-05-06 ENCOUNTER — OFFICE VISIT (OUTPATIENT)
Dept: PULMONOLOGY | Facility: CLINIC | Age: 76
End: 2021-05-06

## 2021-05-06 VITALS
TEMPERATURE: 98.2 F | WEIGHT: 178 LBS | DIASTOLIC BLOOD PRESSURE: 88 MMHG | OXYGEN SATURATION: 97 % | HEART RATE: 82 BPM | HEIGHT: 63 IN | BODY MASS INDEX: 31.54 KG/M2 | SYSTOLIC BLOOD PRESSURE: 140 MMHG

## 2021-05-06 DIAGNOSIS — R91.8 LUNG NODULES: ICD-10-CM

## 2021-05-06 DIAGNOSIS — K21.9 CHRONIC GERD: Primary | ICD-10-CM

## 2021-05-06 DIAGNOSIS — Z78.9 NON-SMOKER: ICD-10-CM

## 2021-05-06 DIAGNOSIS — R91.1 LUNG NODULE: ICD-10-CM

## 2021-05-06 PROCEDURE — 99214 OFFICE O/P EST MOD 30 MIN: CPT | Performed by: NURSE PRACTITIONER

## 2021-05-06 RX ORDER — AMLODIPINE BESYLATE 2.5 MG/1
TABLET ORAL
COMMUNITY
Start: 2021-04-21 | End: 2021-06-15

## 2021-05-06 RX ORDER — HYDROCHLOROTHIAZIDE 12.5 MG/1
CAPSULE, GELATIN COATED ORAL
COMMUNITY
Start: 2021-04-08 | End: 2021-06-15

## 2021-05-06 RX ORDER — AMOXICILLIN AND CLAVULANATE POTASSIUM 875; 125 MG/1; MG/1
1 TABLET, FILM COATED ORAL 2 TIMES DAILY
Qty: 28 TABLET | Refills: 0 | Status: SHIPPED | OUTPATIENT
Start: 2021-05-06 | End: 2021-06-15

## 2021-05-06 NOTE — PROGRESS NOTES
[facility] Pulmonary Follow up      Chief Complaint  Cough (f/u)    Subjective          Ruby Pettit presents to [facility] for follow-up on the CT scan of her chest.    She has been followed by Dr. Fatima for an abnormal CT scan with migratory pulmonary infiltrates and groundglass changes.  She did undergo bronchoscopy with transbronchial biopsies in March 2019 with negative cultures and pathology.    Her CT scan in September 2019 showed stable atelectasis and scarring of the right upper lobe as well as the left midlung area of scarring and atelectasis.  Her annual follow-up in September 2020 showed a slight increase in the size of the soft tissue component of the groundglass nodule in the left lung base.  Now measured around 2.4 cm in prior measure 2.0 cm.  Stable scarring identified in the right upper lobe.    She had a PET scan in November 2020 that did show a low level activity of the nodular density in the left lower lobe with a slight increase in the soft tissue component of the groundglass nodule.  Findings concerning for a low-grade malignancy.  The second groundglass nodule in the left lower lobe was slightly increased in size but was not on hypermetabolic.  The right mid lung area was nonhypermetabolic as well, suggesting an area of scarring.    She was fortunately lost to follow-up until March 2021.  At that time Dr. Fatima discussed with her that this may be a indolent respiratory infection versus a slow-growing malignancy.  They decided to do a short interval CT follow-up.    Her follow-up in April 2021 showed continued minimal interval enlargement and increased soft tissue component of the left lower lobe area.  Now measuring 3.8 x 3 cm.      We discussed the above and reviewed the CT scans today in the office.  Mrs. Pettit is a non-smoker.  She has had no significant issues including no fevers or chills, no cough or sputum production, no  "generalized malaise or fatigue, no weight loss.    She does have a chronic reflux as well as chronic dysphagia episodes.  She states she does choke frequently when she is eating.  She did have an EGD a couple years ago but has not followed up with gastroenterology since.      Objective     Vital Signs:   /88 (BP Location: Left arm, Patient Position: Sitting, Cuff Size: Adult)   Pulse 82   Temp 98.2 °F (36.8 °C) (Temporal)   Ht 160 cm (63\")   Wt 80.7 kg (178 lb)   SpO2 97% Comment: room air, resting  BMI 31.53 kg/m²       Immunization History   Administered Date(s) Administered   • Flu Mist 09/04/2019   • Flu Vaccine Quad PF >36MO 09/12/2017   • Fluzone High Dose =>65 Years ([employer]axcare ONLY) 10/07/2016, 09/17/2018, 10/07/2020   • Hepatitis B 06/05/2000, 07/10/2000, 01/17/2014   • Pneumococcal Polysaccharide (PPSV23) 03/08/2017   • Td 06/05/2000   • Tdap 01/15/2014       Physical Exam  Vitals reviewed.   Constitutional:       General: She is not in acute distress.     Appearance: She is well-developed. She is not ill-appearing.   HENT:      Head: Normocephalic and atraumatic.   Eyes:      Pupils: Pupils are equal, round, and reactive to light.   Cardiovascular:      Rate and Rhythm: Normal rate and regular rhythm.      Heart sounds: No murmur heard.     Pulmonary:      Effort: Pulmonary effort is normal. No respiratory distress.      Breath sounds: Normal breath sounds. No wheezing or rales.   Abdominal:      General: Bowel sounds are normal. There is no distension.      Palpations: Abdomen is soft.   Musculoskeletal:         General: Normal range of motion.      Cervical back: Normal range of motion and neck supple.   Skin:     General: Skin is warm and dry.      Findings: No erythema.   Neurological:      Mental Status: She is alert and oriented to person, place, and time.   Psychiatric:         Behavior: Behavior normal.          Result Review :       Data reviewed: Radiologic studies CT of chest reveiwed " "with patient in office today                    Assessment and Plan    Problem List Items Addressed This Visit        Gastrointestinal Abdominal     GERD - Primary    Relevant Orders    Ambulatory Referral to Gastroenterology       Pulmonary and Pneumonias    Migratory Lung nodules (\"Soft\" and solid, bilateral)    Hypermetabolic LLL lung nodule       Tobacco    Non-smoker            We did discuss that there are several areas of stable scarring throughout her lungs but the left lower lobe area has gotten bigger since her last CT in September 2020.  It was slightly PET positive in November 2020.    She does have evidence of possible microaspiration and chronic dysphagia.  We did discuss that this can cause recurrent groundglass opacities and nodular opacities.  She will complete a 2-week course of Augmentin to rule out aspiration as a cause of these areas and follow-up with a chest x-ray.  If the area has not improved she is agreeable for surgical biopsy.    I would also like for her to be evaluated by gastroenterology for these frequent episodes of choking and dysphagia.    We did discuss that this could still be a slow-growing indolent malignancy.  She has had a negative bronchoscopy in the past.  Dr. Fatima's recommendations were for a surgical biopsy.  She had normal PFTs in June 2019, she will need full PFTs for preoperative evaluation if that is the route we are headed.        I spent 35 minutes caring for Ruby on this date of service. This time includes time spent by me in the following activities:preparing for the visit, reviewing tests, obtaining and/or reviewing a separately obtained history, performing a medically appropriate examination and/or evaluation , counseling and educating the patient/family/caregiver, ordering medications, tests, or procedures, referring and communicating with other health care professionals , documenting information in the medical record and independently interpreting results " and communicating that information with the patient/family/caregiver  Follow Up     Return in about 3 weeks (around 5/27/2021).  Patient was given instructions and counseling regarding her condition or for health maintenance advice. Please see specific information pulled into the AVS if appropriate.     EMMIE Bradley, ACNP  [facility]  Electronically signed

## 2021-05-16 ENCOUNTER — APPOINTMENT (OUTPATIENT)
Dept: PREADMISSION TESTING | Facility: HOSPITAL | Age: 76
End: 2021-05-16

## 2021-06-07 ENCOUNTER — OFFICE VISIT (OUTPATIENT)
Dept: PULMONOLOGY | Facility: CLINIC | Age: 76
End: 2021-06-07

## 2021-06-07 VITALS
WEIGHT: 179 LBS | DIASTOLIC BLOOD PRESSURE: 62 MMHG | OXYGEN SATURATION: 97 % | SYSTOLIC BLOOD PRESSURE: 120 MMHG | BODY MASS INDEX: 31.71 KG/M2 | HEART RATE: 70 BPM | HEIGHT: 63 IN | TEMPERATURE: 98.6 F

## 2021-06-07 DIAGNOSIS — R91.8 LUNG NODULES: Primary | ICD-10-CM

## 2021-06-07 PROCEDURE — 99213 OFFICE O/P EST LOW 20 MIN: CPT | Performed by: NURSE PRACTITIONER

## 2021-06-07 NOTE — PROGRESS NOTES
[facility] Pulmonary Follow up      Chief Complaint  Cough and Follow-up    Subjective          Ruby Pettit presents to [facility] PULMONARY AND CRITICAL CARE MEDICINE for follow-up on chest x-ray.  I saw her a few weeks ago.    She has been followed by Dr. Fatima for an abnormal CT scan with migratory pulmonary infiltrates and groundglass changes.  She did undergo bronchoscopy with transbronchial biopsies in March 2019 with negative cultures and pathology.     Her CT scan in September 2019 showed stable atelectasis and scarring of the right upper lobe as well as the left midlung area of scarring and atelectasis.  Her annual follow-up in September 2020 showed a slight increase in the size of the soft tissue component of the groundglass nodule in the left lung base.  Now measured around 2.4 cm in prior measure 2.0 cm.  Stable scarring identified in the right upper lobe.     She had a PET scan in November 2020 that did show a low level activity of the nodular density in the left lower lobe with a slight increase in the soft tissue component of the groundglass nodule.  Findings concerning for a low-grade malignancy.  The second groundglass nodule in the left lower lobe was slightly increased in size but was not on hypermetabolic.  The right mid lung area was nonhypermetabolic as well, suggesting an area of scarring.     She was fortunately lost to follow-up until March 2021.  At that time Dr. Fatima discussed with her that this may be a indolent respiratory infection versus a slow-growing malignancy.  They decided to do a short interval CT follow-up.     Her follow-up in April 2021 showed continued minimal interval enlargement and increased soft tissue component of the left lower lobe area.  Now measuring 3.8 x 3 cm.    At her last appointment on the sixth we decided to complete a 2-week course of Augmentin as she was having these chronic reflux dysphagia episodes to rule out that this was a  "indolent respiratory infection.  She has a follow-up with gastroenterology in July, she states she is really had minimal episodes since I last saw her.  Today she is here for follow-up on her chest x-ray.    She is having no cough or sputum production.  No fevers or chills.  No pleuritic type pain.  She has never had any hemoptysis.    Objective     Vital Signs:   /62   Pulse 70   Temp 98.6 °F (37 °C)   Ht 160 cm (63\")   Wt 81.2 kg (179 lb)   SpO2 97% Comment: resting, room air  BMI 31.71 kg/m²       Immunization History   Administered Date(s) Administered   • COVID-19 (MODERNA) 05/14/2021   • Flu Mist 09/04/2019   • Flu Vaccine Quad PF >36MO 09/12/2017   • Fluzone High Dose =>65 Years (Vaxcare ONLY) 10/07/2016, 09/17/2018, 10/07/2020   • Hepatitis B 06/05/2000, 07/10/2000, 01/17/2014   • Pneumococcal Polysaccharide (PPSV23) 03/08/2017   • Td 06/05/2000   • Tdap 01/15/2014       Physical Exam  Vitals reviewed.   Constitutional:       Appearance: She is well-developed.   HENT:      Head: Normocephalic and atraumatic.   Eyes:      Pupils: Pupils are equal, round, and reactive to light.   Cardiovascular:      Rate and Rhythm: Normal rate and regular rhythm.      Heart sounds: No murmur heard.     Pulmonary:      Effort: Pulmonary effort is normal. No respiratory distress.      Breath sounds: Normal breath sounds. No wheezing or rales.   Abdominal:      General: Bowel sounds are normal. There is no distension.      Palpations: Abdomen is soft.   Musculoskeletal:         General: Normal range of motion.      Cervical back: Normal range of motion and neck supple.   Skin:     General: Skin is warm and dry.      Findings: No erythema.   Neurological:      Mental Status: She is alert and oriented to person, place, and time.   Psychiatric:         Behavior: Behavior normal.          Result Review :                PA and lateral chest x-ray done the office today reviewed by me  Awaiting final MD " "interpretation                 Assessment and Plan    Problem List Items Addressed This Visit        Pulmonary and Pneumonias    Migratory Lung nodules (\"Soft\" and solid, bilateral) - Primary    Relevant Orders    XR Chest PA & Lateral          We reviewed her chest x-ray today in the office.  Appears to be no significant changes in the left lower lobe nodular area.  We did discuss that Dr. Fatima's last recommendations were for surgical biopsy.  He is back in the office tomorrow I will review the chest x-ray with him and proceed with probable surgical biopsy.  She does need full PFTs for preoperative evaluation, will go ahead and get those scheduled.    She does continue to have follow-up with gastroenterology in July for her episodes of dysphagia and choking.      Follow Up     No follow-ups on file.  Patient was given instructions and counseling regarding her condition or for health maintenance advice. Please see specific information pulled into the AVS if appropriate.     EMMIE Bradley, ACNP  [facility] Pulmonary Critical Care Medicine  Electronically signed   "

## 2021-06-15 ENCOUNTER — OFFICE VISIT (OUTPATIENT)
Dept: NEUROLOGY | Facility: CLINIC | Age: 76
End: 2021-06-15

## 2021-06-15 VITALS
OXYGEN SATURATION: 97 % | BODY MASS INDEX: 31.54 KG/M2 | HEART RATE: 53 BPM | DIASTOLIC BLOOD PRESSURE: 60 MMHG | SYSTOLIC BLOOD PRESSURE: 132 MMHG | WEIGHT: 178 LBS | HEIGHT: 63 IN

## 2021-06-15 DIAGNOSIS — R55 SYNCOPE AND COLLAPSE: Primary | ICD-10-CM

## 2021-06-15 PROCEDURE — 99214 OFFICE O/P EST MOD 30 MIN: CPT | Performed by: PSYCHIATRY & NEUROLOGY

## 2021-06-15 RX ORDER — LOSARTAN POTASSIUM 100 MG/1
100 TABLET ORAL DAILY
Qty: 30 TABLET | Refills: 11
Start: 2021-06-15

## 2021-06-15 NOTE — PROGRESS NOTES
Subjective:    CC: Ruby Pettit is seen today  for syncope     HPI:  Current visit-patient denies any episodes of syncope in the past year.  She had a near syncopal episode in November after she had been sitting and straining for a bowel movement for long periods of time for which she went to the ER.  After that some of her blood pressure medications were reduced however she was told to continue losartan and bisoprolol by her cardiologist.  When I review her medication list patient has not been taking her losartan.  I have told her to speak to her cardiologist about that.  She also had a repeat carotid Doppler recently that showed minimal stenosis bilaterally.    Last visit-since the last visit patient has not had any passing out spells.  Her diabetes is under good control but her blood pressure runs high and her systolic is most often in 150s.  Her cardiac work-up in the past including a stress test and echocardiogram did not show any significant changes.  She denies drinking a lot of water.    Last visit-patient has not had any syncopal episodes since her last visit.  Her blood pressure and her diabetes remains stable.  She does keep her self well-hydrated but wears her compression stockings only occasionally.  She saw her cardiologist recently who has maintained her on the same medications.  Patient also continues to take aspirin.  Her previous carotid Doppler did show plaques bilaterally with minimal stenosis.    Last visit-since her last visit she has not had any more episodes of syncope.  Her episodes of dizziness have improved and she only has mild lightheadedness occasionally.  She has started to wear compression stockings and is keeping herself well-hydrated.  Her blood pressure also runs normal now and she has not had to change any of her meds.  Patient did have a bronchiolar lavage last month for pulmonary nodules which was negative.      Initial meepm-88-kijy-old female with a history of hypertension,  hyperlipidemia, diabetes mellitus type 2, anxiety, depression, arthritis presents as a hospital follow-up for syncope.  As per patient she had an episode of passing out on 1/15.  Patient was out eating lunch with her friend when she had abdominal pain and went to use the restroom.  She then felt lightheaded and passed out.  She was tremulous when she woke up but denies any shaking.  She was also back to her baseline right away and did not have any tongue bite, bowel/bladder incontinence.  She was brought to the [facility], [place], operated by [facility] where she had extensive testing including a CT head and MRI brain that showed a cyst in the left temporal region but no acute intracranial abnormalities.  Carotid Doppler revealed 0-49% stenosis bilaterally and echocardiogram showed an EF of 65% with mild mitral and tricuspid regurg but no PFO.  She was also found to have a UTI and treated for the same.  Her A1c in the hospital was 7.  Patient says she has had a similar episode a few years ago again where she had abdominal pain and an episode of passing out when she went to use the bathroom.  In addition she has episodes of lightheadedness with prolonged standing or walking.    The following portions of the patient's history were reviewed today and updated as of 09/10/2019  : allergies, current medications, past family history, past medical history, past social history, past surgical history and problem list  These document will be scanned to patient's chart.      Current Outpatient Medications:   •  aspirin 81 MG EC tablet, Take 81 mg by mouth Every Night., Disp: , Rfl:   •  bisoprolol (ZEBeta) 10 MG tablet, Take 10 mg by mouth Daily., Disp: , Rfl:   •  buPROPion XL (WELLBUTRIN XL) 150 MG 24 hr tablet, Take 150 mg by mouth Daily., Disp: , Rfl:   •  cetirizine (zyrTEC) 10 MG tablet, Take 10 mg by mouth Every Night., Disp: , Rfl:   •  citalopram (CeleXA) 20 MG tablet, Take 20 mg by mouth Every Night., Disp: , Rfl:   •  cloNIDine (CATAPRES) 0.1  MG tablet, Take 0.1 mg by mouth 2 (Two) Times a Day As Needed for High Blood Pressure., Disp: , Rfl:   •  gabapentin (NEURONTIN) 800 MG tablet, Take 800 mg by mouth 3 (Three) Times a Day., Disp: , Rfl:   •  HYDROcodone-acetaminophen (NORCO) 7.5-325 MG per tablet, Take 1 tablet by mouth 2 (Two) Times a Day., Disp: , Rfl:   •  hydrOXYzine (ATARAX) 25 MG tablet, Take 25-50 mg by mouth Every Night., Disp: , Rfl:   •  hyoscyamine (LEVSIN) 0.125 MG SL tablet, Take 0.125 mg by mouth Every 6 (Six) Hours As Needed for Cramping., Disp: , Rfl:   •  levothyroxine (SYNTHROID, LEVOTHROID) 50 MCG tablet, Take 50 mcg by mouth Daily., Disp: , Rfl:   •  losartan (COZAAR) 100 MG tablet, Take 1 tablet by mouth Daily., Disp: 30 tablet, Rfl: 11  •  lovastatin (MEVACOR) 20 MG tablet, Take 20 mg by mouth Every Night., Disp: , Rfl:   •  omeprazole (priLOSEC) 20 MG capsule, Take 20 mg by mouth 2 (Two) Times a Day., Disp: , Rfl:    Past Medical History:   Diagnosis Date   • Anxiety and depression    • Arthritis    • Disease of thyroid gland    • GERD (gastroesophageal reflux disease)    • Head injury due to trauma 1992    fell down stairs    • History of transfusion    • Hyperlipidemia    • Hypertension    • Liver disorder     surgery 1998 approx    • Peripheral neuropathic pain    • T2DM (type 2 diabetes mellitus) (CMS/HCC)    • Wears eyeglasses       Past Surgical History:   Procedure Laterality Date   • ABDOMINAL SURGERY     • APPENDECTOMY     • BRONCHOSCOPY N/A 2/13/2018    Procedure: BRONCHOSCOPY WITH SANDRITA ENDOBRONCHIAL ULTRASOUND WITH FLUORO;  Surgeon: Dixon Fatima MD;  Location:  [facility];  Service:    • BRONCHOSCOPY N/A 3/21/2019    Procedure: NAVIGATIONAL BRONCHOSCOPY;  Surgeon: Dixon Fatima MD;  Location:  [facility];  Service: Pulmonary   • CHOLECYSTECTOMY     • COLONOSCOPY  2019   • HYSTERECTOMY      2001   • LIVER RESECTION     • OOPHORECTOMY Bilateral     2001   • THYROID SURGERY     • TUBAL  "ABDOMINAL LIGATION     • WRIST SURGERY      right wrist      Family History   Problem Relation Age of Onset   • Ovarian cancer Mother 19   • Emphysema Father    • COPD Father    • No Known Problems Sister    • Tuberculosis Maternal Grandmother    • Tuberculosis Maternal Grandfather    • No Known Problems Paternal Grandmother    • No Known Problems Paternal Grandfather    • Breast cancer Neg Hx       Social History     Socioeconomic History   • Marital status:      Spouse name: Not on file   • Number of children: Not on file   • Years of education: Not on file   • Highest education level: Not on file   Tobacco Use   • Smoking status: Never Smoker   • Smokeless tobacco: Never Used   Substance and Sexual Activity   • Alcohol use: Yes     Alcohol/week: 0.0 - 2.0 standard drinks     Comment: seldom    • Drug use: No   • Sexual activity: Defer     Review of Systems   All other systems reviewed and are negative.      Objective:    /60   Pulse 53   Ht 160 cm (63\")   Wt 80.7 kg (178 lb)   LMP  (LMP Unknown)   SpO2 97%   BMI 31.53 kg/m²     Neurology Exam:    General apperance: Obese    Mental status: Alert, awake and oriented to time place and person.    Recent and Remote memory: Intact.    Attention span and Concentration: Normal.     Language and Speech: Intact- No dysarthria.    Fluency, Naming , Repitition and Comprehension:  Intact    Cranial Nerves:   CN II: Visual fields are full. Intact. Fundi - Normal, No papillederma, Pupils - RAFAEL  CN III, IV and VI: Extraocular movements are intact. Normal saccades.   CN V: Facial sensation is intact.   CN VII: Muscles of facial expression reveal no asymmetry. Intact.   CN VIII: Hearing is intact. Whispered voice intact.   CN IX and X: Palate elevates symmetrically. Intact  CN XI: Shoulder shrug is intact.   CN XII: Tongue is midline without evidence of atrophy or fasciculation.     Ophthalmoscopic exam of optic disc-normal    Motor:  Right UE muscle strength " 5/5. Normal tone.     Left UE muscle strength 5/5. Normal tone.      Right LE muscle strength5/5. Normal tone.     Left LE muscle strength 5/5. Normal tone.      Sensory: Normal light touch, vibration and pinprick sensation bilaterally.    DTRs: 2+ bilaterally in upper and lower extremities.    Babinski: Negative bilaterally.    Co-ordination: Normal finger-to-nose, heel to shin B/L.    Rhomberg: Negative.    Gait: Normal.  Patient could do tandem walking    Cardiovascular: Regular rate and rhythm without murmur, gallop or rub.        Assessment and Plan:  1. Syncope and collapse  Most likely due to autonomic dysfunction  as a result of her diabetes.  It has improved now.    Her blood glucose is well controlled  and her last A1c was 7  She is very confused about her medications and I went over the list with her and have asked her to call her cardiologist to clarify some of them for example at her last visit she was told to double the dose of losartan but patient has not been taking it.  I have also again asked her to start wearing above-knee moderate compression stockings for at least a few hours every day and to keeping herself hydrated        I spent over 25 minutes with the patient face to face out of which 50% that is 15 minutes of the visit was spent in reviewing her medication list and counseling.       Return in about 1 year (around 6/15/2022).         Batool Aguila MD

## 2021-06-17 ENCOUNTER — OFFICE VISIT (OUTPATIENT)
Dept: PULMONOLOGY | Facility: CLINIC | Age: 76
End: 2021-06-17

## 2021-06-17 DIAGNOSIS — R91.8 LUNG MASS: Primary | ICD-10-CM

## 2021-06-17 PROCEDURE — 94726 PLETHYSMOGRAPHY LUNG VOLUMES: CPT | Performed by: NURSE PRACTITIONER

## 2021-06-17 PROCEDURE — 94375 RESPIRATORY FLOW VOLUME LOOP: CPT | Performed by: NURSE PRACTITIONER

## 2021-06-17 PROCEDURE — 94729 DIFFUSING CAPACITY: CPT | Performed by: NURSE PRACTITIONER

## 2021-06-28 ENCOUNTER — APPOINTMENT (OUTPATIENT)
Dept: PREADMISSION TESTING | Facility: HOSPITAL | Age: 76
End: 2021-06-28

## 2021-06-28 PROCEDURE — C9803 HOPD COVID-19 SPEC COLLECT: HCPCS

## 2021-06-28 PROCEDURE — U0004 COV-19 TEST NON-CDC HGH THRU: HCPCS

## 2021-06-29 LAB — SARS-COV-2 RNA PNL SPEC NAA+PROBE: NOT DETECTED

## 2021-07-01 ENCOUNTER — OUTSIDE FACILITY SERVICE (OUTPATIENT)
Dept: GASTROENTEROLOGY | Facility: CLINIC | Age: 76
End: 2021-07-01

## 2021-07-01 PROCEDURE — 43251 EGD REMOVE LESION SNARE: CPT | Performed by: INTERNAL MEDICINE

## 2021-07-01 PROCEDURE — 88305 TISSUE EXAM BY PATHOLOGIST: CPT | Performed by: INTERNAL MEDICINE

## 2021-07-01 PROCEDURE — G0500 MOD SEDAT ENDO SERVICE >5YRS: HCPCS | Performed by: INTERNAL MEDICINE

## 2021-07-01 PROCEDURE — 43239 EGD BIOPSY SINGLE/MULTIPLE: CPT | Performed by: INTERNAL MEDICINE

## 2021-07-01 PROCEDURE — 88342 IMHCHEM/IMCYTCHM 1ST ANTB: CPT | Performed by: INTERNAL MEDICINE

## 2021-07-01 PROCEDURE — 43249 ESOPH EGD DILATION <30 MM: CPT | Performed by: INTERNAL MEDICINE

## 2021-07-02 ENCOUNTER — LAB REQUISITION (OUTPATIENT)
Dept: LAB | Facility: HOSPITAL | Age: 76
End: 2021-07-02

## 2021-07-02 DIAGNOSIS — R13.14 DYSPHAGIA, PHARYNGOESOPHAGEAL PHASE: ICD-10-CM

## 2021-07-02 DIAGNOSIS — D13.1 BENIGN NEOPLASM OF STOMACH: ICD-10-CM

## 2021-07-02 DIAGNOSIS — K44.9 DIAPHRAGMATIC HERNIA WITHOUT OBSTRUCTION OR GANGRENE: ICD-10-CM

## 2021-07-02 DIAGNOSIS — K21.9 GASTRO-ESOPHAGEAL REFLUX DISEASE WITHOUT ESOPHAGITIS: ICD-10-CM

## 2021-07-07 LAB
CYTO UR: NORMAL
LAB AP CASE REPORT: NORMAL
LAB AP CLINICAL INFORMATION: NORMAL
PATH REPORT.FINAL DX SPEC: NORMAL
PATH REPORT.GROSS SPEC: NORMAL

## 2022-02-04 ENCOUNTER — TRANSCRIBE ORDERS (OUTPATIENT)
Dept: ADMINISTRATIVE | Facility: HOSPITAL | Age: 77
End: 2022-02-04

## 2022-02-04 DIAGNOSIS — R13.10 DYSPHAGIA, UNSPECIFIED TYPE: Primary | ICD-10-CM

## 2022-02-25 ENCOUNTER — LAB (OUTPATIENT)
Dept: PREADMISSION TESTING | Facility: HOSPITAL | Age: 77
End: 2022-02-25

## 2022-02-25 DIAGNOSIS — R13.10 DYSPHAGIA, UNSPECIFIED TYPE: ICD-10-CM

## 2022-02-25 PROCEDURE — U0004 COV-19 TEST NON-CDC HGH THRU: HCPCS

## 2022-02-25 PROCEDURE — C9803 HOPD COVID-19 SPEC COLLECT: HCPCS

## 2022-02-26 LAB — SARS-COV-2 RNA PNL SPEC NAA+PROBE: NOT DETECTED

## 2022-02-28 ENCOUNTER — HOSPITAL ENCOUNTER (OUTPATIENT)
Dept: GENERAL RADIOLOGY | Facility: HOSPITAL | Age: 77
Discharge: HOME | End: 2022-02-28
Admitting: INTERNAL MEDICINE

## 2022-02-28 DIAGNOSIS — R13.10 DYSPHAGIA, UNSPECIFIED TYPE: ICD-10-CM

## 2022-02-28 PROCEDURE — 63710000001 BARIUM SULFATE 40 % PASTE: Performed by: INTERNAL MEDICINE

## 2022-02-28 PROCEDURE — 74230 X-RAY XM SWLNG FUNCJ C+: CPT

## 2022-02-28 PROCEDURE — A9270 NON-COVERED ITEM OR SERVICE: HCPCS | Performed by: INTERNAL MEDICINE

## 2022-02-28 PROCEDURE — 63710000001 BARIUM SULFATE 40 % RECONSTITUTED SUSPENSION: Performed by: INTERNAL MEDICINE

## 2022-02-28 PROCEDURE — 92611 MOTION FLUOROSCOPY/SWALLOW: CPT

## 2022-02-28 RX ADMIN — BARIUM SULFATE 100 ML: 0.81 POWDER, FOR SUSPENSION ORAL at 11:13

## 2022-02-28 RX ADMIN — BARIUM SULFATE 20 ML: 400 PASTE ORAL at 11:12

## 2023-04-24 ENCOUNTER — OFFICE VISIT (OUTPATIENT)
Dept: PULMONOLOGY | Facility: CLINIC | Age: 78
End: 2023-04-24
Payer: MEDICARE

## 2023-04-24 VITALS
OXYGEN SATURATION: 93 % | BODY MASS INDEX: 28.85 KG/M2 | HEART RATE: 57 BPM | TEMPERATURE: 98.6 F | HEIGHT: 64 IN | DIASTOLIC BLOOD PRESSURE: 78 MMHG | WEIGHT: 169 LBS | SYSTOLIC BLOOD PRESSURE: 102 MMHG

## 2023-04-24 DIAGNOSIS — R91.1 LUNG NODULE: ICD-10-CM

## 2023-04-24 DIAGNOSIS — R06.02 SHORTNESS OF BREATH: Primary | ICD-10-CM

## 2023-04-24 DIAGNOSIS — Z87.09 HISTORY OF ASTHMA: ICD-10-CM

## 2023-04-24 DIAGNOSIS — R05.9 COUGH, UNSPECIFIED TYPE: ICD-10-CM

## 2023-04-24 PROCEDURE — 3074F SYST BP LT 130 MM HG: CPT | Performed by: NURSE PRACTITIONER

## 2023-04-24 PROCEDURE — 94729 DIFFUSING CAPACITY: CPT | Performed by: NURSE PRACTITIONER

## 2023-04-24 PROCEDURE — 94375 RESPIRATORY FLOW VOLUME LOOP: CPT | Performed by: NURSE PRACTITIONER

## 2023-04-24 PROCEDURE — 94726 PLETHYSMOGRAPHY LUNG VOLUMES: CPT | Performed by: NURSE PRACTITIONER

## 2023-04-24 PROCEDURE — 1160F RVW MEDS BY RX/DR IN RCRD: CPT | Performed by: NURSE PRACTITIONER

## 2023-04-24 PROCEDURE — 99214 OFFICE O/P EST MOD 30 MIN: CPT | Performed by: NURSE PRACTITIONER

## 2023-04-24 PROCEDURE — 1159F MED LIST DOCD IN RCRD: CPT | Performed by: NURSE PRACTITIONER

## 2023-04-24 PROCEDURE — 3078F DIAST BP <80 MM HG: CPT | Performed by: NURSE PRACTITIONER

## 2023-04-24 RX ORDER — LOSARTAN POTASSIUM 50 MG/1
TABLET ORAL
COMMUNITY
Start: 2023-03-16

## 2023-04-24 NOTE — PROGRESS NOTES
"[facility] Pulmonary Follow up      Chief Complaint  Lung Nodule (F/u )    Subjective          Ruby Pettit presents to [facility] PULMONARY & CRITICAL CARE MEDICINE for follow-up on her history of an abnormal CT scan.    She was last seen in the office in June 2021.  She been followed by Dr. Fatima for an abnormal CT scan with migratory pulmonary infiltrates and groundglass changes.  She has undergone bronchoscopy with transbronchial biopsies in March 2019.  Her follow-up CT scans in 2019 and 2020 has shown stability to slight enlargement.  She had a PET scan in November 2020 that did have some low-level activity in the nodular density of the left lower lobe.  On her follow-up in March 2021 it was discussed this could be respiratory infection worsening a slow-growing malignancy and was scheduled for a short interval CT follow-up, which was last seen in April 2021.      She is here today for follow-up.  She says she has been having some wheezing as well as a dry cough especially in the evenings.  She is more short of breath with activity.  She denies any hemoptysis.  No appetite changes or weight loss.  No fevers or chills.  No pleuritic type chest pain.        Objective     Vital Signs:   /78 (BP Location: Left arm, Patient Position: Sitting, Cuff Size: Adult)   Pulse 57   Temp 98.6 °F (37 °C) (Infrared)   Ht 162.6 cm (64\")   Wt 76.7 kg (169 lb)   SpO2 93%   BMI 29.01 kg/m²       Immunization History   Administered Date(s) Administered   • COVID-19 (MODERNA) 1st, 2nd, 3rd Dose Only 03/31/2021, 05/14/2021, 06/14/2021   • COVID-19 (MODERNA) BIVALENT BOOSTER 12+YRS 11/04/2022   • COVID-19 (MODERNA) BOOSTER 12/16/2021, 06/03/2022   • Flu Vaccine Quad PF >36MO 09/12/2017   • FluLaval/Fluzone >6mos 09/12/2017   • FluMist 2-49yrs 09/04/2019   • Fluzone High Dose =>65 Years (Vaxcare ONLY) 10/07/2016, 09/17/2018, 10/07/2020, 11/12/2021   • Hepatitis B Adult/Adolescent IM 06/05/2000, 07/10/2000, " 01/17/2014   • Influenza, Unspecified 10/01/2021   • Pneumococcal Polysaccharide (PPSV23) 07/01/2015, 03/08/2017   • Td 06/05/2000   • Tdap 01/15/2014       Physical Exam  Vitals reviewed.   Constitutional:       Appearance: She is well-developed.   HENT:      Head: Normocephalic and atraumatic.   Eyes:      Pupils: Pupils are equal, round, and reactive to light.   Cardiovascular:      Rate and Rhythm: Normal rate and regular rhythm.      Heart sounds: No murmur heard.  Pulmonary:      Effort: Pulmonary effort is normal. No respiratory distress.      Breath sounds: Normal breath sounds. No wheezing or rales.   Abdominal:      General: Bowel sounds are normal. There is no distension.      Palpations: Abdomen is soft.   Musculoskeletal:         General: Normal range of motion.      Cervical back: Normal range of motion and neck supple.   Skin:     General: Skin is warm and dry.      Findings: No erythema.   Neurological:      Mental Status: She is alert and oriented to person, place, and time.   Psychiatric:         Behavior: Behavior normal.          Result Review :            PFTs done in the office today:  No obstruction or restriction, she did have difficulty with testing.  Her FVC was 2.07, 76% predicted.  Normal adjusted DLCO    PA and lateral chest x-ray done the office today reviewed by me  Awaiting final MD interpretation                 Assessment and Plan    Problem List Items Addressed This Visit        Pulmonary and Pneumonias    History of asthma    Cough    Hypermetabolic LLL lung nodule    Overview     11/2019           Relevant Orders    CT Chest Without Contrast   Other Visit Diagnoses     Shortness of breath    -  Primary    Relevant Orders    XR Chest PA & Lateral        We reviewed her chest x-ray, it does appear that left lower lobe area has enlarged since her last follow-up in 2021.  I would recommend a follow-up CT scan of the chest which I ordered today.  I will call her with the results and  the plan, also Dr. Fatima evaluate the CT scan once it is completed.    She is having a bit of a dry cough and some wheezing most likely related to seasonal allergies with her history of asthma.  I will go ahead and give her a sample of Breo to use in the next few days and see if it helps.  I discussed the use today in the office.    Follow up after the CT scan of the chest.    Follow Up     No follow-ups on file.  Patient was given instructions and counseling regarding her condition or for health maintenance advice. Please see specific information pulled into the AVS if appropriate.     I spent 32 minutes caring for Ruby on this date of service. This time includes time spent by me in the following activities:preparing for the visit, reviewing tests, obtaining and/or reviewing a separately obtained history, performing a medically appropriate examination and/or evaluation , counseling and educating the patient/family/caregiver, ordering medications, tests, or procedures, referring and communicating with other health care professionals , documenting information in the medical record and independently interpreting results and communicating that information with the patient/family/caregiver    Moderate level of Medical Decision Making complexity based on 2 or more chronic stable illnesses and prescription drug management.    EMMIE Bradley, ACNP  [facility] Pulmonary Critical Care Medicine  Electronically signed

## 2023-04-28 ENCOUNTER — HOSPITAL ENCOUNTER (OUTPATIENT)
Dept: GENERAL RADIOLOGY | Facility: HOSPITAL | Age: 78
Discharge: HOME | End: 2023-04-28
Payer: MEDICARE

## 2023-04-28 ENCOUNTER — TRANSCRIBE ORDERS (OUTPATIENT)
Dept: ADMINISTRATIVE | Facility: HOSPITAL | Age: 78
End: 2023-04-28
Payer: MEDICARE

## 2023-04-28 DIAGNOSIS — R29.898 RIGHT HAND WEAKNESS: Primary | ICD-10-CM

## 2023-04-28 PROCEDURE — 73110 X-RAY EXAM OF WRIST: CPT

## 2023-05-04 ENCOUNTER — TRANSCRIBE ORDERS (OUTPATIENT)
Dept: ADMINISTRATIVE | Facility: HOSPITAL | Age: 78
End: 2023-05-04
Payer: MEDICARE

## 2023-05-04 DIAGNOSIS — R29.898 RIGHT HAND WEAKNESS: Primary | ICD-10-CM

## 2023-06-01 ENCOUNTER — HOSPITAL ENCOUNTER (OUTPATIENT)
Dept: CT IMAGING | Facility: HOSPITAL | Age: 78
Discharge: HOME | End: 2023-06-01

## 2023-06-01 DIAGNOSIS — R91.1 LUNG NODULE: ICD-10-CM

## 2023-06-01 PROCEDURE — 71250 CT THORAX DX C-: CPT

## 2023-06-08 ENCOUNTER — PREP FOR SURGERY (OUTPATIENT)
Dept: PULMONOLOGY | Facility: CLINIC | Age: 78
End: 2023-06-08
Payer: MEDICARE

## 2023-06-08 DIAGNOSIS — R91.8 LUNG NODULES: Primary | ICD-10-CM

## 2023-06-08 RX ORDER — SODIUM CHLORIDE 9 MG/ML
40 INJECTION, SOLUTION INTRAVENOUS AS NEEDED
OUTPATIENT
Start: 2023-06-08

## 2023-06-08 RX ORDER — SODIUM CHLORIDE 0.9 % (FLUSH) 0.9 %
3 SYRINGE (ML) INJECTION EVERY 12 HOURS SCHEDULED
OUTPATIENT
Start: 2023-06-08

## 2023-06-08 RX ORDER — LIDOCAINE HYDROCHLORIDE 40 MG/ML
4 INJECTION, SOLUTION RETROBULBAR; TOPICAL ONCE
OUTPATIENT
Start: 2023-06-08 | End: 2023-06-08

## 2023-06-08 RX ORDER — SODIUM CHLORIDE 0.9 % (FLUSH) 0.9 %
10 SYRINGE (ML) INJECTION AS NEEDED
OUTPATIENT
Start: 2023-06-08

## 2023-06-08 RX ORDER — SODIUM CHLORIDE 9 MG/ML
125 INJECTION, SOLUTION INTRAVENOUS CONTINUOUS
OUTPATIENT
Start: 2023-06-08

## 2023-06-12 DIAGNOSIS — R91.8 LUNG NODULES: Primary | ICD-10-CM

## 2023-07-13 PROBLEM — C34.32: Status: ACTIVE | Noted: 2023-07-13

## 2023-07-23 DIAGNOSIS — C34.32 MALIGNANT NEOPLASM OF LOWER LOBE OF LEFT LUNG: ICD-10-CM

## 2023-07-24 ENCOUNTER — HOSPITAL ENCOUNTER (OUTPATIENT)
Dept: MRI IMAGING | Facility: HOSPITAL | Age: 78
Discharge: HOME | End: 2023-07-24
Payer: MEDICARE

## 2023-07-24 DIAGNOSIS — C34.32 MALIGNANT NEOPLASM OF LOWER LOBE OF LEFT LUNG: ICD-10-CM

## 2023-07-24 PROCEDURE — 0 GADOBENATE DIMEGLUMINE 529 MG/ML SOLUTION: Performed by: INTERNAL MEDICINE

## 2023-07-24 PROCEDURE — 72156 MRI NECK SPINE W/O & W/DYE: CPT

## 2023-07-24 PROCEDURE — 70553 MRI BRAIN STEM W/O & W/DYE: CPT

## 2023-07-24 PROCEDURE — A9577 INJ MULTIHANCE: HCPCS | Performed by: INTERNAL MEDICINE

## 2023-07-24 RX ORDER — DIAZEPAM 5 MG/1
TABLET ORAL
Qty: 10 TABLET | OUTPATIENT
Start: 2023-07-24

## 2023-07-24 RX ADMIN — GADOBENATE DIMEGLUMINE 15 ML: 529 INJECTION, SOLUTION INTRAVENOUS at 09:12

## 2023-07-27 ENCOUNTER — HOSPITAL ENCOUNTER (OUTPATIENT)
Dept: ONCOLOGY | Facility: HOSPITAL | Age: 78
Discharge: HOME | End: 2023-07-27
Admitting: INTERNAL MEDICINE
Payer: MEDICARE

## 2023-07-27 ENCOUNTER — DOCUMENTATION (OUTPATIENT)
Dept: NUTRITION | Facility: HOSPITAL | Age: 78
End: 2023-07-27
Payer: MEDICARE

## 2023-07-27 ENCOUNTER — OFFICE VISIT (OUTPATIENT)
Dept: ONCOLOGY | Facility: CLINIC | Age: 78
End: 2023-07-27
Payer: MEDICARE

## 2023-07-27 VITALS
TEMPERATURE: 96.9 F | WEIGHT: 171 LBS | DIASTOLIC BLOOD PRESSURE: 71 MMHG | BODY MASS INDEX: 32.28 KG/M2 | OXYGEN SATURATION: 97 % | HEIGHT: 61 IN | HEART RATE: 52 BPM | RESPIRATION RATE: 20 BRPM | SYSTOLIC BLOOD PRESSURE: 152 MMHG

## 2023-07-27 DIAGNOSIS — C34.32 MALIGNANT NEOPLASM OF LOWER LOBE OF LEFT LUNG: Primary | ICD-10-CM

## 2023-07-27 DIAGNOSIS — C34.32 MALIGNANT NEOPLASM OF BRONCHUS OF LEFT LOWER LOBE: ICD-10-CM

## 2023-07-27 LAB
ALBUMIN SERPL-MCNC: 3.9 G/DL (ref 3.5–5.2)
ALBUMIN/GLOB SERPL: 2.1 G/DL
ALP SERPL-CCNC: 124 U/L (ref 39–117)
ALT SERPL W P-5'-P-CCNC: 46 U/L (ref 1–33)
ANION GAP SERPL CALCULATED.3IONS-SCNC: 11 MMOL/L (ref 5–15)
AST SERPL-CCNC: 22 U/L (ref 1–32)
BASOPHILS # BLD AUTO: 0.02 10*3/MM3 (ref 0–0.2)
BASOPHILS NFR BLD AUTO: 0.3 % (ref 0–1.5)
BILIRUB SERPL-MCNC: 0.3 MG/DL (ref 0–1.2)
BUN SERPL-MCNC: 23 MG/DL (ref 8–23)
BUN/CREAT SERPL: 21.1 (ref 7–25)
CALCIUM SPEC-SCNC: 9 MG/DL (ref 8.6–10.5)
CHLORIDE SERPL-SCNC: 105 MMOL/L (ref 98–107)
CO2 SERPL-SCNC: 25 MMOL/L (ref 22–29)
CREAT SERPL-MCNC: 1.09 MG/DL (ref 0.57–1)
DEPRECATED RDW RBC AUTO: 52.1 FL (ref 37–54)
EGFRCR SERPLBLD CKD-EPI 2021: 52.1 ML/MIN/1.73
EOSINOPHIL # BLD AUTO: 0.2 10*3/MM3 (ref 0–0.4)
EOSINOPHIL NFR BLD AUTO: 3.1 % (ref 0.3–6.2)
ERYTHROCYTE [DISTWIDTH] IN BLOOD BY AUTOMATED COUNT: 15.9 % (ref 12.3–15.4)
GLOBULIN UR ELPH-MCNC: 1.9 GM/DL
GLUCOSE SERPL-MCNC: 90 MG/DL (ref 65–99)
HCT VFR BLD AUTO: 34.2 % (ref 34–46.6)
HGB BLD-MCNC: 10.6 G/DL (ref 12–15.9)
IMM GRANULOCYTES # BLD AUTO: 0.02 10*3/MM3 (ref 0–0.05)
IMM GRANULOCYTES NFR BLD AUTO: 0.3 % (ref 0–0.5)
LYMPHOCYTES # BLD AUTO: 2.15 10*3/MM3 (ref 0.7–3.1)
LYMPHOCYTES NFR BLD AUTO: 33.4 % (ref 19.6–45.3)
MCH RBC QN AUTO: 27.8 PG (ref 26.6–33)
MCHC RBC AUTO-ENTMCNC: 31 G/DL (ref 31.5–35.7)
MCV RBC AUTO: 89.8 FL (ref 79–97)
MONOCYTES # BLD AUTO: 0.51 10*3/MM3 (ref 0.1–0.9)
MONOCYTES NFR BLD AUTO: 7.9 % (ref 5–12)
NEUTROPHILS NFR BLD AUTO: 3.54 10*3/MM3 (ref 1.7–7)
NEUTROPHILS NFR BLD AUTO: 55 % (ref 42.7–76)
NRBC BLD AUTO-RTO: 0 /100 WBC (ref 0–0.2)
PLATELET # BLD AUTO: 258 10*3/MM3 (ref 140–450)
PMV BLD AUTO: 10.6 FL (ref 6–12)
POTASSIUM SERPL-SCNC: 4.9 MMOL/L (ref 3.5–5.2)
PROT SERPL-MCNC: 5.8 G/DL (ref 6–8.5)
RBC # BLD AUTO: 3.81 10*6/MM3 (ref 3.77–5.28)
SODIUM SERPL-SCNC: 141 MMOL/L (ref 136–145)
T4 FREE SERPL-MCNC: 0.93 NG/DL (ref 0.93–1.7)
TSH SERPL DL<=0.05 MIU/L-ACNC: 3.39 UIU/ML (ref 0.27–4.2)
WBC NRBC COR # BLD: 6.44 10*3/MM3 (ref 3.4–10.8)

## 2023-07-27 PROCEDURE — 84439 ASSAY OF FREE THYROXINE: CPT | Performed by: INTERNAL MEDICINE

## 2023-07-27 PROCEDURE — 84443 ASSAY THYROID STIM HORMONE: CPT | Performed by: INTERNAL MEDICINE

## 2023-07-27 PROCEDURE — 36415 COLL VENOUS BLD VENIPUNCTURE: CPT

## 2023-07-27 PROCEDURE — 85025 COMPLETE CBC W/AUTO DIFF WBC: CPT | Performed by: INTERNAL MEDICINE

## 2023-07-27 PROCEDURE — 80053 COMPREHEN METABOLIC PANEL: CPT | Performed by: INTERNAL MEDICINE

## 2023-07-27 NOTE — PROGRESS NOTES
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 There are hypermetabolic left hilar and mediastinal lymph nodes consistent with regional metastasis.     Additional groundglass and subsolid lesions within the lungs most prominently along the anterior segment of the right upper lobe demonstrate little to no FDG uptake but remain suspicious for synchronous neoplasm. Recommend attention on follow-up.     Multiple hypodense lesions within the liver without substantial FDG uptake. These appear to be consistent with hemangiomas on prior examinations.     Similar size of a left adrenal nodule with increased metabolic activity. Given the stability of size this is still most likely a benign finding but recommend continued attention on follow-up.     Hypermetabolic focus near the left C4-5 facet joint with a questionable lytic lesion in this area. Recommend follow-up with MRI of the cervical spine and/or bone scan to further evaluate.        Electronically Signed: Tolu Zavaleta    7/19/2023 6:05 PM EDT    Workstation ID: [id number]     7/28/2023 -  Chemotherapy    OP LUNG  Nivolumab 360mg /  PACLitaxel / CARBOplatin AUC=5            REASON FOR VISIT: Management of my lung cancer    HISTORY OF PRESENT ILLNESS:   78 y.o.  female presents today for follow-up after undergoing PET scan and MRI for her adenocarcinoma.  She had liquid biopsy for EGFR and ALK which were all negative.   Clinically doing well.  Denies any shortness of breath.  No headaches.  She was also presented at tumor board.     Past medical history, social history and family history was reviewed 07/27/23 and unchanged from prior visit.    Review of Systems:    Review of Systems   Constitutional: Negative.    HENT:  Negative.     Eyes: Negative.    Respiratory: Negative.     Cardiovascular: Negative.    Gastrointestinal: Negative.    Endocrine: Negative.    Genitourinary: Negative.     Musculoskeletal: Negative.    Skin: Negative.    Neurological: Negative.    Hematological: Negative.    Psychiatric/Behavioral: Negative.            Medications:        Current Outpatient Medications:     amLODIPine (NORVASC) 5 MG tablet, Take 1 tablet by mouth Daily., Disp: , Rfl:     aspirin 81 MG EC tablet, Take 1 tablet by mouth Every Night., Disp: , Rfl:     bisoprolol (ZEBeta) 10 MG tablet, Take 1 tablet by mouth Daily., Disp: , Rfl:     buPROPion XL (WELLBUTRIN XL) 150 MG 24 hr tablet, Take 1 tablet by mouth Daily., Disp: , Rfl:     cetirizine (zyrTEC) 10 MG tablet, Take 1 tablet by mouth Every Night., Disp: , Rfl:     citalopram (CeleXA) 20 MG tablet, Take 1 tablet by mouth Every Night., Disp: , Rfl:     diazePAM (VALIUM) 5 MG tablet, 1 tablet every 12 hours as needed for anxiety. Make take 2 tablets 30 min before procedure., Disp: 10 tablet, Rfl: 0    Ergocalciferol (VITAMIN D2 PO), Take  by mouth., Disp: , Rfl:     gabapentin (NEURONTIN) 800 MG tablet, Take 1 tablet by mouth 3 (Three) Times a Day., Disp: , Rfl:     hydroCHLOROthiazide (MICROZIDE) 12.5 MG capsule, Take 1 capsule by mouth Daily., Disp: , Rfl:     HYDROcodone-acetaminophen (NORCO) 7.5-325 MG per tablet, Take 1 tablet by mouth 2 (Two) Times a Day., Disp: , Rfl:     hydrOXYzine (ATARAX) 25 MG tablet, Take 1-2 tablets by mouth Every Night., Disp: , Rfl:     hyoscyamine (LEVSIN) 0.125 MG SL tablet, Take 1 tablet by mouth Every 6 (Six) Hours As Needed for Cramping., Disp: ,  "Rfl:     levothyroxine (SYNTHROID, LEVOTHROID) 50 MCG tablet, Take 1 tablet by mouth Daily., Disp: , Rfl:     losartan (COZAAR) 50 MG tablet, , Disp: , Rfl:     lovastatin (MEVACOR) 20 MG tablet, Take 1 tablet by mouth Every Night., Disp: , Rfl:     omeprazole (priLOSEC) 20 MG capsule, Take 1 capsule by mouth 2 (Two) Times a Day., Disp: , Rfl:     Pain Medications               aspirin 81 MG EC tablet Take 1 tablet by mouth Every Night.    buPROPion XL (WELLBUTRIN XL) 150 MG 24 hr tablet Take 1 tablet by mouth Daily.    citalopram (CeleXA) 20 MG tablet Take 1 tablet by mouth Every Night.    gabapentin (NEURONTIN) 800 MG tablet Take 1 tablet by mouth 3 (Three) Times a Day.    HYDROcodone-acetaminophen (NORCO) 7.5-325 MG per tablet Take 1 tablet by mouth 2 (Two) Times a Day.               ALLERGIES:    Allergies   Allergen Reactions    Augmentin [Amoxicillin-Pot Clavulanate] Diarrhea    Metformin Diarrhea    Rosuvastatin GI Intolerance         Physical Exam    VITAL SIGNS:  /71 Comment: LUE  Pulse 52   Temp 96.9 °F (36.1 °C) (Infrared)   Resp 20   Ht 154.9 cm (61\")   Wt 77.6 kg (171 lb)   LMP  (LMP Unknown)   SpO2 97% Comment: RA  BMI 32.31 kg/m²     ECOG score: 0           Wt Readings from Last 3 Encounters:   07/27/23 77.6 kg (171 lb)   07/24/23 77.1 kg (170 lb)   07/13/23 76.2 kg (168 lb)       Body mass index is 32.31 kg/m². Body surface area is 1.77 meters squared.       Performance Status: 0    General: well appearing, in no acute distress  HEENT: sclera anicteric, neck is supple  Lymphatics: no cervical, supraclavicular, or axillary adenopathy  Cardiovascular: regular rate and rhythm, no murmurs, rubs or gallops  Lungs: clear to auscultation bilaterally  Abdomen: soft, nontender, nondistended.  No palpable organomegaly  Extremities: no lower extremity edema  Skin: no rashes, lesions, bruising, or petechiae  Msk:  Shows no weakness of the large muscle groups  Psych: Mood is stable        RECENT " LABS:    Lab Results   Component Value Date    HGB 10.6 (L) 07/27/2023    HCT 34.2 07/27/2023    MCV 89.8 07/27/2023     07/27/2023    WBC 6.44 07/27/2023    NEUTROABS 3.54 07/27/2023    LYMPHSABS 2.15 07/27/2023    MONOSABS 0.51 07/27/2023    EOSABS 0.20 07/27/2023    BASOSABS 0.02 07/27/2023       Lab Results   Component Value Date    GLUCOSE 90 07/27/2023    BUN 23 07/27/2023    CREATININE 1.09 (H) 07/27/2023     07/27/2023    K 4.9 07/27/2023     07/27/2023    CO2 25.0 07/27/2023    CALCIUM 9.0 07/27/2023    PROTEINTOT 5.8 (L) 07/27/2023    ALBUMIN 3.9 07/27/2023    BILITOT 0.3 07/27/2023    ALKPHOS 124 (H) 07/27/2023    AST 22 07/27/2023    ALT 46 (H) 07/27/2023       XR Wrist 3+ View Right    Result Date: 4/30/2023  Impression: 1. There is surgical hardware in the distal right radius. The patient has an old healed fracture. There is no hardware loosening or failure. 2. 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 3. Degenerative changes. 4. Soft tissue swelling. Electronically Signed: Kirby Pineda  4/30/2023 3:18 PM EDT  Workstation ID: [id number]    CT Chest Without Contrast Diagnostic    Result Date: 6/21/2023  Stable exam including the dominant left lower lobe mass and pleural-based triangular right upper lobe lesion Electronically Signed: Souleymane Sanchez  6/21/2023 2:50 PM EDT  Workstation ID: [id number]    CT Chest Without Contrast Diagnostic    Result Date: 6/1/2023  Impression: 1. Interval significant enlargement and increase in solidity of left lower lobe pulmonary mass, now measuring 3.5 cm maximally. This is suspicious for primary lung cancer. Repeat evaluation by PET scan versus biopsy suggested. 2. Indeterminate hypodense lesions in the liver, measuring up to 6 cm. The lesions in the left hepatic lobes appear new as compared to prior study. This may represent metastatic disease, and could also be evaluated by PET scan. Electronically Signed: Piter Altamirano  6/1/2023 4:59 PM EDT  Workstation  ID: [id number]    MRI Brain With & Without Contrast    Result Date: 7/24/2023  Impression: No evidence of intracranial metastasis. A cystic finding in the left temporal lobe is stable and favored to reflect small arachnoid cyst. Electronically Signed: Destin Henry  7/24/2023 10:12 AM EDT  Workstation ID: [id number]    MRI Cervical Spine With & Without Contrast    Result Date: 7/25/2023  Impression: Recent finding involving the left-sided facet joint at C3-4 is characterized on MRI as most likely some edematous facet arthropathy. No specific evidence of osseous metastatic involvement in the cervical spine. Advanced multilevel spondylosis is noted as above including areas of severe spinal canal and neuroforaminal impingement. Electronically Signed: Destin Henry  7/25/2023 5:41 AM EDT  Workstation ID: [id number]    XR Chest 1 View    Result Date: 6/29/2023  Impression: 1. No pneumothorax status post bronchoscopy 2. Right basilar atelectasis 3. Mild pulmonary vascular congestion 4. Known left lower lobe and paramediastinal right upper lobe lesions Electronically Signed: Souleymane Sanchez  6/29/2023 9:16 AM EDT  Workstation ID: [id number]    NM PET/CT Skull Base to Mid Thigh    Result Date: 7/19/2023  Impression: Hypermetabolic left lower lobe lung mass consistent with known primary malignancy. There are hypermetabolic left hilar and mediastinal lymph nodes consistent with regional metastasis. Additional groundglass and subsolid lesions within the lungs most prominently along the anterior segment of the right upper lobe demonstrate little to no FDG uptake but remain suspicious for synchronous neoplasm. Recommend attention on follow-up. Multiple hypodense lesions within the liver without substantial FDG uptake. These appear to be consistent with hemangiomas on prior examinations. Similar size of a left adrenal nodule with increased metabolic activity. Given the stability of size this is still most likely a benign finding but  recommend continued attention on follow-up. Hypermetabolic focus near the left C4-5 facet joint with a questionable lytic lesion in this area. Recommend follow-up with MRI of the cervical spine and/or bone scan to further evaluate. Electronically Signed: Tolu Evansorty  7/19/2023 6:05 PM EDT  Workstation ID: [id number]    XR Chest PA & Lateral    Result Date: 4/25/2023  Impression: 1. No acute process. 2. Left lower lobe 3.7 cm pulmonary nodule again noted, this appears increased in size from prior chest CT on 4/1/2021, consider chest CT follow-up to reassess. 3. Chronic severe compression fracture T11. Electronically Signed: Eric Kong  4/25/2023 3:43 PM EDT  Workstation ID: [id number]    EMG & Nerve Conduction Test    Result Date: 6/26/2023  Radial neuropathy at the humeral groove on the right, mild-moderate Median neuropathy at the wrist on the right, exceedingly mild This report is transcribed using the Dragon dictation system.           Assessment/Plan    1.  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  I reviewed the PET scan and MRI with her and her sister today.  I feel that she has disease within the mediastinum that would suggest need for neoadjuvant chemotherapy.  I am going to treat her according to [id number].  Plan to treat her with 3 cycles of neoadjuvant Nivolumab with carboplatin and Taxol.  We discussed the side effects are expected from the treatment.  I will get a PET scan after 3 cycles in anticipation of surgical intervention.  Plan to start her on treatment tomorrow.        Total time of patient care on day of service including time prior to, face to face with patient, and following visit spent in reviewing records, lab results, imaging studies, discussion with patient, and documentation/charting was > 50 minutes.     Hollie Mike MD  [facility]    Return on: 08/17/23    No orders of the defined types were placed in this  encounter.      7/27/2023     Future Appointments         Provider Department Center    7/28/2023 8:00 AM PHARMACY CHEMO [facility]UCATION [facility]    7/28/2023 9:00 AM (Arrive by 8:45 AM) Shelby Velasquez APRN [facility]    7/28/2023 10:30 AM CHAIR 20 [facility]    8/17/2023 2:00 PM (Arrive by 1:45 PM) Hollie Mike MD [facility]

## 2023-07-27 NOTE — LETTER
July 27, 2023       No Recipients    Patient: Ruby Pettit   YOB: 1945   Date of Visit: 7/27/2023     Dear Ly Funez MD:       Thank you for referring Ruby Pettit to me for evaluation. Below are the relevant portions of my assessment and plan of care.    If you have questions, please do not hesitate to call me. I look forward to following Ruby along with you.         Sincerely,        Hollie Mike MD        CC:   No Recipients    Hollie Mike MD  07/27/23 1715  Sign when Signing Visit  PROBLEM LIST:  Oncology/Hematology History   Malignant neoplasm of bronchus of left lower lobe   6/26/2023 Initial Diagnosis    Malignant neoplasm of bronchus of left lower lobe     6/29/2023 Cancer Staged    Staging form: Lung, AJCC 8th Edition  - Clinical stage from 6/29/2023: Stage IIIA (cT1c, cN2, cM0) - Signed by Hollie Mike MD on 7/20/2023 7/20/2023 Molecular Testing    Molecular testing - [facility]     Negative for EGFR, BRAF and ALK     7/20/2023 Imaging    Pet Scan    IMPRESSION:  Impression:  Hypermetabolic left lower lobe lung mass consistent with known primary malignancy. There are hypermetabolic left hilar and mediastinal lymph nodes consistent with regional metastasis.     Additional groundglass and subsolid lesions within the lungs most prominently along the anterior segment of the right upper lobe demonstrate little to no FDG uptake but remain suspicious for synchronous neoplasm. Recommend attention on follow-up.     Multiple hypodense lesions within the liver without substantial FDG uptake. These appear to be consistent with hemangiomas on prior examinations.     Similar size of a left adrenal nodule with increased metabolic activity. Given the stability of size this is still most likely a benign finding but recommend continued attention on follow-up.     Hypermetabolic focus near the left C4-5 facet joint with a questionable lytic lesion in this area.  Recommend follow-up with MRI of the cervical spine and/or bone scan to further evaluate.        Electronically Signed: Tolu Zavaleta    7/19/2023 6:05 PM EDT    Workstation ID: [id number]     7/28/2023 -  Chemotherapy    OP LUNG  Nivolumab 360mg /  PACLitaxel / CARBOplatin AUC=5            REASON FOR VISIT: Management of my lung cancer    HISTORY OF PRESENT ILLNESS:   78 y.o.  female presents today for follow-up after undergoing PET scan and MRI for her adenocarcinoma.  She had liquid biopsy for EGFR and ALK which were all negative.  Clinically doing well.  Denies any shortness of breath.  No headaches.  She was also presented at tumor board.     Past medical history, social history and family history was reviewed 07/27/23 and unchanged from prior visit.    Review of Systems:    Review of Systems   Constitutional: Negative.    HENT:  Negative.     Eyes: Negative.    Respiratory: Negative.     Cardiovascular: Negative.    Gastrointestinal: Negative.    Endocrine: Negative.    Genitourinary: Negative.     Musculoskeletal: Negative.    Skin: Negative.    Neurological: Negative.    Hematological: Negative.    Psychiatric/Behavioral: Negative.            Medications:        Current Outpatient Medications:   •  amLODIPine (NORVASC) 5 MG tablet, Take 1 tablet by mouth Daily., Disp: , Rfl:   •  aspirin 81 MG EC tablet, Take 1 tablet by mouth Every Night., Disp: , Rfl:   •  bisoprolol (ZEBeta) 10 MG tablet, Take 1 tablet by mouth Daily., Disp: , Rfl:   •  buPROPion XL (WELLBUTRIN XL) 150 MG 24 hr tablet, Take 1 tablet by mouth Daily., Disp: , Rfl:   •  cetirizine (zyrTEC) 10 MG tablet, Take 1 tablet by mouth Every Night., Disp: , Rfl:   •  citalopram (CeleXA) 20 MG tablet, Take 1 tablet by mouth Every Night., Disp: , Rfl:   •  diazePAM (VALIUM) 5 MG tablet, 1 tablet every 12 hours as needed for anxiety. Make take 2 tablets 30 min before procedure., Disp: 10 tablet, Rfl: 0  •  Ergocalciferol (VITAMIN D2 PO), Take  by mouth.,  "Disp: , Rfl:   •  gabapentin (NEURONTIN) 800 MG tablet, Take 1 tablet by mouth 3 (Three) Times a Day., Disp: , Rfl:   •  hydroCHLOROthiazide (MICROZIDE) 12.5 MG capsule, Take 1 capsule by mouth Daily., Disp: , Rfl:   •  HYDROcodone-acetaminophen (NORCO) 7.5-325 MG per tablet, Take 1 tablet by mouth 2 (Two) Times a Day., Disp: , Rfl:   •  hydrOXYzine (ATARAX) 25 MG tablet, Take 1-2 tablets by mouth Every Night., Disp: , Rfl:   •  hyoscyamine (LEVSIN) 0.125 MG SL tablet, Take 1 tablet by mouth Every 6 (Six) Hours As Needed for Cramping., Disp: , Rfl:   •  levothyroxine (SYNTHROID, LEVOTHROID) 50 MCG tablet, Take 1 tablet by mouth Daily., Disp: , Rfl:   •  losartan (COZAAR) 50 MG tablet, , Disp: , Rfl:   •  lovastatin (MEVACOR) 20 MG tablet, Take 1 tablet by mouth Every Night., Disp: , Rfl:   •  omeprazole (priLOSEC) 20 MG capsule, Take 1 capsule by mouth 2 (Two) Times a Day., Disp: , Rfl:     Pain Medications               aspirin 81 MG EC tablet Take 1 tablet by mouth Every Night.    buPROPion XL (WELLBUTRIN XL) 150 MG 24 hr tablet Take 1 tablet by mouth Daily.    citalopram (CeleXA) 20 MG tablet Take 1 tablet by mouth Every Night.    gabapentin (NEURONTIN) 800 MG tablet Take 1 tablet by mouth 3 (Three) Times a Day.    HYDROcodone-acetaminophen (NORCO) 7.5-325 MG per tablet Take 1 tablet by mouth 2 (Two) Times a Day.               ALLERGIES:    Allergies   Allergen Reactions   • Augmentin [Amoxicillin-Pot Clavulanate] Diarrhea   • Metformin Diarrhea   • Rosuvastatin GI Intolerance         Physical Exam    VITAL SIGNS:  /71 Comment: LUE  Pulse 52   Temp 96.9 °F (36.1 °C) (Infrared)   Resp 20   Ht 154.9 cm (61\")   Wt 77.6 kg (171 lb)   LMP  (LMP Unknown)   SpO2 97% Comment: RA  BMI 32.31 kg/m²     ECOG score: 0           Wt Readings from Last 3 Encounters:   07/27/23 77.6 kg (171 lb)   07/24/23 77.1 kg (170 lb)   07/13/23 76.2 kg (168 lb)       Body mass index is 32.31 kg/m². Body surface area is 1.77 " meters squared.       Performance Status: 0    General: well appearing, in no acute distress  HEENT: sclera anicteric, neck is supple  Lymphatics: no cervical, supraclavicular, or axillary adenopathy  Cardiovascular: regular rate and rhythm, no murmurs, rubs or gallops  Lungs: clear to auscultation bilaterally  Abdomen: soft, nontender, nondistended.  No palpable organomegaly  Extremities: no lower extremity edema  Skin: no rashes, lesions, bruising, or petechiae  Msk:  Shows no weakness of the large muscle groups  Psych: Mood is stable        RECENT LABS:    Lab Results   Component Value Date    HGB 10.6 (L) 07/27/2023    HCT 34.2 07/27/2023    MCV 89.8 07/27/2023     07/27/2023    WBC 6.44 07/27/2023    NEUTROABS 3.54 07/27/2023    LYMPHSABS 2.15 07/27/2023    MONOSABS 0.51 07/27/2023    EOSABS 0.20 07/27/2023    BASOSABS 0.02 07/27/2023       Lab Results   Component Value Date    GLUCOSE 90 07/27/2023    BUN 23 07/27/2023    CREATININE 1.09 (H) 07/27/2023     07/27/2023    K 4.9 07/27/2023     07/27/2023    CO2 25.0 07/27/2023    CALCIUM 9.0 07/27/2023    PROTEINTOT 5.8 (L) 07/27/2023    ALBUMIN 3.9 07/27/2023    BILITOT 0.3 07/27/2023    ALKPHOS 124 (H) 07/27/2023    AST 22 07/27/2023    ALT 46 (H) 07/27/2023       XR Wrist 3+ View Right    Result Date: 4/30/2023  Impression: 1. There is surgical hardware in the distal right radius. The patient has an old healed fracture. There is no hardware loosening or failure. 2. 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 3. Degenerative changes. 4. Soft tissue swelling. Electronically Signed: Kirby Pineda  4/30/2023 3:18 PM EDT  Workstation ID: [id number]    CT Chest Without Contrast Diagnostic    Result Date: 6/21/2023  Stable exam including the dominant left lower lobe mass and pleural-based triangular right upper lobe lesion Electronically Signed: Souleymane Sanchez  6/21/2023 2:50 PM EDT  Workstation ID: [id number]    CT Chest Without Contrast  Diagnostic    Result Date: 6/1/2023  Impression: 1. Interval significant enlargement and increase in solidity of left lower lobe pulmonary mass, now measuring 3.5 cm maximally. This is suspicious for primary lung cancer. Repeat evaluation by PET scan versus biopsy suggested. 2. Indeterminate hypodense lesions in the liver, measuring up to 6 cm. The lesions in the left hepatic lobes appear new as compared to prior study. This may represent metastatic disease, and could also be evaluated by PET scan. Electronically Signed: Piter Altamirano  6/1/2023 4:59 PM EDT  Workstation ID: [id number]    MRI Brain With & Without Contrast    Result Date: 7/24/2023  Impression: No evidence of intracranial metastasis. A cystic finding in the left temporal lobe is stable and favored to reflect small arachnoid cyst. Electronically Signed: Destin Henry  7/24/2023 10:12 AM EDT  Workstation ID: [id number]    MRI Cervical Spine With & Without Contrast    Result Date: 7/25/2023  Impression: Recent finding involving the left-sided facet joint at C3-4 is characterized on MRI as most likely some edematous facet arthropathy. No specific evidence of osseous metastatic involvement in the cervical spine. Advanced multilevel spondylosis is noted as above including areas of severe spinal canal and neuroforaminal impingement. Electronically Signed: Destin Henry  7/25/2023 5:41 AM EDT  Workstation ID: [id number]    XR Chest 1 View    Result Date: 6/29/2023  Impression: 1. No pneumothorax status post bronchoscopy 2. Right basilar atelectasis 3. Mild pulmonary vascular congestion 4. Known left lower lobe and paramediastinal right upper lobe lesions Electronically Signed: Souleymane Sanchez  6/29/2023 9:16 AM EDT  Workstation ID: [id number]    NM PET/CT Skull Base to Mid Thigh    Result Date: 7/19/2023  Impression: Hypermetabolic left lower lobe lung mass consistent with known primary malignancy. There are hypermetabolic left hilar and mediastinal lymph nodes consistent  with regional metastasis. Additional groundglass and subsolid lesions within the lungs most prominently along the anterior segment of the right upper lobe demonstrate little to no FDG uptake but remain suspicious for synchronous neoplasm. Recommend attention on follow-up. Multiple hypodense lesions within the liver without substantial FDG uptake. These appear to be consistent with hemangiomas on prior examinations. Similar size of a left adrenal nodule with increased metabolic activity. Given the stability of size this is still most likely a benign finding but recommend continued attention on follow-up. Hypermetabolic focus near the left C4-5 facet joint with a questionable lytic lesion in this area. Recommend follow-up with MRI of the cervical spine and/or bone scan to further evaluate. Electronically Signed: Tolu Zavaleta  7/19/2023 6:05 PM EDT  Workstation ID: [id number]    XR Chest PA & Lateral    Result Date: 4/25/2023  Impression: 1. No acute process. 2. Left lower lobe 3.7 cm pulmonary nodule again noted, this appears increased in size from prior chest CT on 4/1/2021, consider chest CT follow-up to reassess. 3. Chronic severe compression fracture T11. Electronically Signed: Eric Kong  4/25/2023 3:43 PM EDT  Workstation ID: [id number]    EMG & Nerve Conduction Test    Result Date: 6/26/2023  Radial neuropathy at the humeral groove on the right, mild-moderate Median neuropathy at the wrist on the right, exceedingly mild This report is transcribed using the Dragon dictation system.           Assessment/Plan    1.  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  I reviewed the PET scan and MRI with her and her sister today.  I feel that she has disease within the mediastinum that would suggest need for neoadjuvant chemotherapy.  I am going to treat her according to [id number].  Plan to treat her with 3 cycles of neoadjuvant Nivolumab with carboplatin and Taxol.  We discussed the side  effects are expected from the treatment.  I will get a PET scan after 3 cycles in anticipation of surgical intervention.  Plan to start her on treatment tomorrow.        Total time of patient care on day of service including time prior to, face to face with patient, and following visit spent in reviewing records, lab results, imaging studies, discussion with patient, and documentation/charting was > 50 minutes.     Hollie Mike MD  [facility]    Return on: 08/17/23    No orders of the defined types were placed in this encounter.      7/27/2023     Future Appointments         Provider Department Center    7/28/2023 8:00 AM PHARMACY [facility]    7/28/2023 9:00 AM (Arrive by 8:45 AM) Shelby Velasquez APRN [facility]    7/28/2023 10:30 AM CHAIR 20 [facility]    8/17/2023 2:00 PM (Arrive by 1:45 PM) Hollie Mike MD [facility]

## 2023-07-28 ENCOUNTER — HOSPITAL ENCOUNTER (OUTPATIENT)
Dept: ONCOLOGY | Facility: HOSPITAL | Age: 78
Discharge: HOME | End: 2023-07-28
Payer: MEDICARE

## 2023-07-28 ENCOUNTER — EDUCATION (OUTPATIENT)
Dept: ONCOLOGY | Facility: HOSPITAL | Age: 78
End: 2023-07-28
Payer: MEDICARE

## 2023-07-28 ENCOUNTER — OFFICE VISIT (OUTPATIENT)
Dept: ONCOLOGY | Facility: CLINIC | Age: 78
End: 2023-07-28
Payer: MEDICARE

## 2023-07-28 VITALS
HEIGHT: 61 IN | TEMPERATURE: 97.2 F | DIASTOLIC BLOOD PRESSURE: 72 MMHG | SYSTOLIC BLOOD PRESSURE: 151 MMHG | OXYGEN SATURATION: 96 % | WEIGHT: 172 LBS | HEART RATE: 54 BPM | RESPIRATION RATE: 18 BRPM | BODY MASS INDEX: 32.47 KG/M2

## 2023-07-28 VITALS — SYSTOLIC BLOOD PRESSURE: 128 MMHG | DIASTOLIC BLOOD PRESSURE: 52 MMHG | HEART RATE: 56 BPM

## 2023-07-28 DIAGNOSIS — C34.32 MALIGNANT NEOPLASM OF BRONCHUS OF LEFT LOWER LOBE: Primary | ICD-10-CM

## 2023-07-28 PROCEDURE — 96415 CHEMO IV INFUSION ADDL HR: CPT

## 2023-07-28 PROCEDURE — 99214 OFFICE O/P EST MOD 30 MIN: CPT | Performed by: NURSE PRACTITIONER

## 2023-07-28 PROCEDURE — 25010000002 CARBOPLATIN PER 50 MG: Performed by: INTERNAL MEDICINE

## 2023-07-28 PROCEDURE — 25010000002 FOSAPREPITANT PER 1 MG: Performed by: INTERNAL MEDICINE

## 2023-07-28 PROCEDURE — 96417 CHEMO IV INFUS EACH ADDL SEQ: CPT

## 2023-07-28 PROCEDURE — 25010000002 LORAZEPAM PER 2 MG: Performed by: INTERNAL MEDICINE

## 2023-07-28 PROCEDURE — 96365 THER/PROPH/DIAG IV INF INIT: CPT

## 2023-07-28 PROCEDURE — 25010000002 PACLITAXEL PER 1 MG: Performed by: INTERNAL MEDICINE

## 2023-07-28 PROCEDURE — 3078F DIAST BP <80 MM HG: CPT | Performed by: NURSE PRACTITIONER

## 2023-07-28 PROCEDURE — 96413 CHEMO IV INFUSION 1 HR: CPT

## 2023-07-28 PROCEDURE — 25010000002 PALONOSETRON PER 25 MCG: Performed by: INTERNAL MEDICINE

## 2023-07-28 PROCEDURE — 25010000002 DEXAMETHASONE SODIUM PHOSPHATE 100 MG/10ML SOLUTION: Performed by: INTERNAL MEDICINE

## 2023-07-28 PROCEDURE — 63710000001 OLANZAPINE 5 MG TABLET: Performed by: INTERNAL MEDICINE

## 2023-07-28 PROCEDURE — 25010000002 NIVOLUMAB 240 MG/24ML SOLUTION 24 ML VIAL: Performed by: INTERNAL MEDICINE

## 2023-07-28 PROCEDURE — 25010000002 DIPHENHYDRAMINE PER 50 MG: Performed by: INTERNAL MEDICINE

## 2023-07-28 PROCEDURE — 1126F AMNT PAIN NOTED NONE PRSNT: CPT | Performed by: NURSE PRACTITIONER

## 2023-07-28 PROCEDURE — 25010000002 NIVOLUMAB 40 MG/4ML SOLUTION 4 ML VIAL: Performed by: INTERNAL MEDICINE

## 2023-07-28 PROCEDURE — A9270 NON-COVERED ITEM OR SERVICE: HCPCS | Performed by: INTERNAL MEDICINE

## 2023-07-28 PROCEDURE — 96367 TX/PROPH/DG ADDL SEQ IV INF: CPT

## 2023-07-28 PROCEDURE — 3077F SYST BP >= 140 MM HG: CPT | Performed by: NURSE PRACTITIONER

## 2023-07-28 PROCEDURE — 96375 TX/PRO/DX INJ NEW DRUG ADDON: CPT

## 2023-07-28 PROCEDURE — 96374 THER/PROPH/DIAG INJ IV PUSH: CPT

## 2023-07-28 RX ORDER — OLANZAPINE 5 MG/1
5 TABLET ORAL ONCE
Status: COMPLETED | OUTPATIENT
Start: 2023-07-28 | End: 2023-07-28

## 2023-07-28 RX ORDER — LORAZEPAM 2 MG/ML
0.5 INJECTION INTRAMUSCULAR ONCE
Status: COMPLETED | OUTPATIENT
Start: 2023-07-28 | End: 2023-07-28

## 2023-07-28 RX ORDER — PALONOSETRON 0.05 MG/ML
0.25 INJECTION, SOLUTION INTRAVENOUS ONCE
Status: COMPLETED | OUTPATIENT
Start: 2023-07-28 | End: 2023-07-28

## 2023-07-28 RX ORDER — ONDANSETRON HYDROCHLORIDE 8 MG/1
8 TABLET, FILM COATED ORAL 3 TIMES DAILY PRN
Qty: 30 TABLET | Refills: 2 | Status: SHIPPED | OUTPATIENT
Start: 2023-07-28

## 2023-07-28 RX ORDER — SODIUM CHLORIDE 9 MG/ML
250 INJECTION, SOLUTION INTRAVENOUS ONCE
Status: COMPLETED | OUTPATIENT
Start: 2023-07-28 | End: 2023-07-28

## 2023-07-28 RX ORDER — OLANZAPINE 5 MG/1
5 TABLET ORAL NIGHTLY
Qty: 9 TABLET | Refills: 0 | Status: SHIPPED | OUTPATIENT
Start: 2023-07-28

## 2023-07-28 RX ORDER — FAMOTIDINE 10 MG/ML
20 INJECTION, SOLUTION INTRAVENOUS ONCE
Status: COMPLETED | OUTPATIENT
Start: 2023-07-28 | End: 2023-07-28

## 2023-07-28 RX ADMIN — DIPHENHYDRAMINE HYDROCHLORIDE 50 MG: 50 INJECTION INTRAMUSCULAR; INTRAVENOUS at 11:26

## 2023-07-28 RX ADMIN — LORAZEPAM 0.5 MG: 2 INJECTION INTRAMUSCULAR; INTRAVENOUS at 12:44

## 2023-07-28 RX ADMIN — SODIUM CHLORIDE 360 MG: 9 INJECTION, SOLUTION INTRAVENOUS at 10:36

## 2023-07-28 RX ADMIN — PALONOSETRON HYDROCHLORIDE 0.25 MG: 0.25 INJECTION, SOLUTION INTRAVENOUS at 12:01

## 2023-07-28 RX ADMIN — OLANZAPINE 5 MG: 5 TABLET, FILM COATED ORAL at 11:23

## 2023-07-28 RX ADMIN — SODIUM CHLORIDE 150 MG: 9 INJECTION, SOLUTION INTRAVENOUS at 11:25

## 2023-07-28 RX ADMIN — FAMOTIDINE 20 MG: 10 INJECTION INTRAVENOUS at 11:23

## 2023-07-28 RX ADMIN — SODIUM CHLORIDE 250 ML: 9 INJECTION, SOLUTION INTRAVENOUS at 10:35

## 2023-07-28 RX ADMIN — CARBOPLATIN 390 MG: 10 INJECTION INTRAVENOUS at 16:14

## 2023-07-28 RX ADMIN — DEXAMETHASONE SODIUM PHOSPHATE 20 MG: 10 INJECTION, SOLUTION INTRAMUSCULAR; INTRAVENOUS at 11:33

## 2023-07-28 RX ADMIN — PACLITAXEL 305 MG: 6 INJECTION, SOLUTION INTRAVENOUS at 12:31

## 2023-07-28 NOTE — PLAN OF CARE
Outpatient Infusion  [address] [place] [address]  [phone]      CHEMOTHERAPY EDUCATION    NAME:  Ruby Pettit      : 1945           DATE: 23    Medication Education Sheets: (select all that apply)  Carboplatin, Nivolumab, and Paclitaxel    Other Education Sheets: (select all that apply)  Blood Pressure Log, CINV, Diarrhea, HOPA Immune Checkpoint Inhibitors, Checkpoint Inhibitor Wallet Card, and Symptom Tracker Sheet and FARHEEN Information    Chemotherapy Regimen:   OP LUNG  Nivolumab 360mg /  PACLitaxel / CARBOplatin AUC=5  every 21 days x 3 cycles        Paclitaxel and Carboplatin Education    TOPICS EDUCATION PROVIDED COMMENTS   ANEMIA:  role of RBC, cause, s/s, ways to manage, role of transfusion [x] Reviewed the role of RBC and the use of transfusions if hemoglobin decreases too much.  Patient to notify us if she experiences shortness of breath, dizziness, or palpitations.  Also let patient know she could feel more tired than usual and to try to stay active, but rest if she needs to.    THROMBOCYTOPENIA:  role of platelet, cause, s/s, ways to prevent bleeding, things to avoid, when to seek help [x] Reviewed the role of platelets in blood clotting and when to call clinic (bloody nose that bleeds for 5 minutes despite pressure, a cut that won't stop bleeding despite pressure, gums that bleed excessively with brushing or flossing). Recommended using an electric razor, soft bristle toothbrush, and blowing the nose gently.    NEUTROPENIA:  role of WBC, cause, infection precautions, s/s of infection, when to call MD [x] Reviewed the role of WBC, good infection prevention practices, and when to call the clinic (temperature 100.4F, sore throat, burning urination, etc)  COVID Vaccines:  6  Flu Vaccine:  flu vaccine received   NUTRITION & APPETITE CHANGES:  importance of maintaining healthy diet & weight, ways to manage to improve intake, dietary consult, exercise  regimen, electrolyte and/or blood glucose abnormalities [x] Metallic Taste: Informed patient of the potential for developing metallic taste and smell. Recommended flavoring water if it tastes metallic, using plastic utensils instead of metal utensils, and avoiding drinking from a metal can or cup to help mitigate this adverse effect.   DIARRHEA:  causes, s/s of dehydration, ways to manage, dietary changes, when to call MD [x] Chemotherapy : Discussed the risk of diarrhea. Instructed patient on use OTC loperamide with diarrhea onset, but emphasized the importance of calling the clinic if 4-6 episodes in 24 hours not relieved by OTC loperamide.   NAUSEA & VOMITING:  cause, use of antiemetics, dietary changes, when to call MD [x] Emetic risk: High  Premeds: Dexamethasone, Fosaprepitant, Olanzapine, and Palonosetron  Scheduled home meds: Olanzapine 5 mg by mouth at night on days 2, 3, 4 of each cycle to prevent delayed nausea.  Take this even if you do not feel nauseous.  PRN home meds: Ondansetron 8 mg PO TID PRN N/V  Pharmacy home meds sent to: Requested they be sent to [facility] in [facility].  Shelby Velasquez notified.    Instructed the patient to take a dose of the PRN medication at the first onset of nausea and if it's not working to call us for additional medications.  Also provided non-drug measures to mitigate nausea.   MOUTH SORES:  causes, oral care, ways to manage [x] Mouth sores can be prevented by making a mouth wash mixture of salt, baking soda, and water. The patient was instructed to swish and spit four times daily after meals and before bedtime. Also recommended using a soft bristle toothbrush and avoiding alcohol-containing OTC mouthwashes.    ALOPECIA:  cause, ways to manage, resources [x] Discussed the possibility of hair loss with the patient. Informed patient that she could request a prescription for a wig if desired and most of the cost is usually covered by insurance. Recommended covering  the head with a hat and/or protecting the skin on the head with SPF 30 or higher.    NERVOUS SYSTEM CHANGES:  causes, s/s, neuropathies, cognitive changes, ways to manage [x] discussed the adverse effect of peripheral neuropathy and signs/symptoms associated with this adverse effect. Instructed patient to call if symptoms become more frequent or worsen.    PAIN:  causes, ways to manage [x] Chemo: Discussed muscle and joint aches/pains with chemotherapy, and recommended the use of OTC pain relief with ibuprofen or acetaminophen if needed.   SKIN & NAIL CHANGES:  cause, s/s, ways to manage [] Chemotherapy: Informed the patient of the possibility for dark or white lines on finger and toenails during treatment, and that nails may become brittle and even fall off in extreme cases. This will likely reverse after treatment concludes.    INFUSION RELATED REACTIONS or INJECTION-SITE REACTION:  Cause, s/s, anaphylaxis, monitoring, etc. [x] Premeds: Dexamethasone, Diphenhydramine, and Famotidine    Recommended she bring a  with her to appointments because the Benadryl will probably make her sleepy.    Discussed the risk of an infusion reaction and symptoms such as: fever, chills, dizziness, itchiness or rash, flushing, trouble breathing, wheezing, sudden back pain, or feeling faint.  Instructed the patient to notify her nurse if she starts feeling weird at any point during her infusion.    Reviewed how infusion reactions are managed.   MISCELLANEOUS:  drug interactions, administration, labs, etc. [x] Discussed chemotherapy schedule, lab draws, infusion times, and total expected visit time.   DDIs: several with olanzapine, some with paclitaxel    -paclitaxel can lower blood pressure and she takes several medications for HTN (amlodipine, bisoprolol, hydrochlorothiazide, losartan) and olanzapine can also lower blood pressure.  She does check BP at home.  I gave her a log that she can use to track BP and she will call the  clinic if she's having low blood pressure or dizziness    -olanzapine -- due to short term use and the fact that we cannot use steroids (in place of olanzapine) which would compromize efficacy of nivolumab, olanzapine will be used, despite the drug-drug interactions    ---anticholinergic side effects (tremors, dry mouth, urinary retention) due to an interaction with cetirizine, hydrochlorothiazide, hyoscyamine  ---CNS depression side effects due to an interaction with hydroxyzine, Norco, cetirizine, gabapentin  ---QTc prolongation (dizziness, heart palpitation) due to the interaction with citalopram and PRN ondansetron  ---decreased seizure threshold due to an interaction with bupropion  ---serotonin syndrome (mental status changes, muscle rigidity) due to an interaction with citalopram  ---can increase the side effects of hydrochlorothiazide - her electrolytes will be monitored and she will be self-monitoring blood pressure    Drug-food interactions: None  Lab draws: On or before day 1 of each cycle, no sooner than 3 days early.   INFERTILITY & SEXUALITY:    causes, fertility preservation options, sexuality changes, ways to manage, importance of birth control [x] IV Oncology Therapy: Reviewed safe sex practices and the importance of minimizing exposure to body fluids for 48 hours after each dose of IV oncology therapy., The patient is not of childbearing potential.   HOME CARE:  storing of oral chemo, how to manage bodily fluids [x] IV - Counseled on management of soiled linens and proper flush technique.  Discussed how to manage all the side effects at home and advised when to contact the MD office         Opdivo (nivolumab) Education    TOPICS EDUCATION PROVIDED COMMENTS   DIARRHEA:  causes, s/s of dehydration, ways to manage, dietary changes, when to call MD [x] Discussed risk of diarrhea and immune related colitis. Instructed patient to call MD if 4-6 episodes in 24 hours and discussed role of high dose  steroids for the treatment of immune related colitis.    NAUSEA & VOMITING:  cause, use of antiemetics, dietary changes, when to call MD [x] Emetic risk: Low  Premeds: None  Scheduled home meds: None  PRN home meds: Ondansetron 8 mg PO TID PRN N/V    Instructed the patient to take a dose of the PRN medication at the first onset of nausea and if it's not working to call us for additional medications.  Also provided non-drug measures to mitigate nausea.   NERVOUS SYSTEM CHANGES:  causes, s/s, neuropathies, cognitive changes, ways to manage [x] Discussed the less common, but possible risk of immune cells attacking the nerves.  I described nerve pain and instructed the patient to call clinic if new or worsening nerve pain developed.   PAIN:  causes, ways to manage [x] Discussed the less common, but possible risk of immune cells attacking the muscles.  The patient was instructed to call clinic for new or worsening muscle weakness or pain.   SKIN & NAIL CHANGES:  cause, s/s, ways to manage [x] Discussed potential for a rash or itchy skin from immunotherapy, offered nonpharmacologic prevention strategies, and instructed patient to call if a rash develops that worsens or gets larger.   ORGAN TOXICITIES:  cause, s/s, need for diagnostic tests, labs, when to notify MD [x] Discussed potential effects on organ systems, monitoring, diagnostic tests, labs, and when to notify their MD. Discussed the signs/symptoms of the following: cardiotoxicity, central neurotoxicity (confusion, vision changes, stiff neck, seizure), hepatotoxicity, hormone gland changes (most commonly the thyroid), lung changes and nephrotoxicity   INFUSION RELATED REACTIONS or INJECTION-SITE REACTION:  Cause, s/s, anaphylaxis, monitoring, etc. [x] Premeds: None    Discussed the uncommon, but possible risk of an infusion reaction and symptoms such as: fever, chills, dizziness, itchiness or rash, flushing, trouble breathing, wheezing, sudden back pain, or feeling  faint.  Instructed the patient to notify their nurse if they start feeling weird at any point during their infusion.    Reviewed how infusion reactions are managed.       Medications:  Prior to Admission medications    Medication Sig Start Date End Date Taking? Authorizing Provider   amLODIPine (NORVASC) 5 MG tablet Take 1 tablet by mouth Daily.    Naresh Jordan MD   aspirin 81 MG EC tablet Take 1 tablet by mouth Every Night. 11/26/13   Naresh Jordan MD   bisoprolol (ZEBeta) 10 MG tablet Take 1 tablet by mouth Daily.    Naresh Jordan MD   buPROPion XL (WELLBUTRIN XL) 150 MG 24 hr tablet Take 1 tablet by mouth Daily.    Naresh Jordan MD   cetirizine (zyrTEC) 10 MG tablet Take 1 tablet by mouth Every Night.    Naresh Jordan MD   citalopram (CeleXA) 20 MG tablet Take 1 tablet by mouth Every Night. 2/15/18   Naresh Jordan MD   diazePAM (VALIUM) 5 MG tablet 1 tablet every 12 hours as needed for anxiety. Make take 2 tablets 30 min before procedure. 7/13/23   Hollie Mike MD   Ergocalciferol (VITAMIN D2 PO) Take  by mouth.    Naresh Jordan MD   gabapentin (NEURONTIN) 800 MG tablet Take 1 tablet by mouth 3 (Three) Times a Day. 12/21/17   Naresh Jordan MD   hydroCHLOROthiazide (MICROZIDE) 12.5 MG capsule Take 1 capsule by mouth Daily.    Naresh Jordan MD   HYDROcodone-acetaminophen (NORCO) 7.5-325 MG per tablet Take 1 tablet by mouth 2 (Two) Times a Day. 12/8/17   Naresh Jordan MD   hydrOXYzine (ATARAX) 25 MG tablet Take 1-2 tablets by mouth Every Night. 12/19/17   Naresh Jordan MD   hyoscyamine (LEVSIN) 0.125 MG SL tablet Take 1 tablet by mouth Every 6 (Six) Hours As Needed for Cramping.    Naresh Jordan MD   levothyroxine (SYNTHROID, LEVOTHROID) 50 MCG tablet Take 1 tablet by mouth Daily.    Naresh Jordan MD   losartan (COZAAR) 50 MG tablet  3/16/23   Naresh Jordan MD   lovastatin (MEVACOR) 20 MG tablet  Take 1 tablet by mouth Every Night. 11/7/17   Provider, MD Naresh   omeprazole (priLOSEC) 20 MG capsule Take 1 capsule by mouth 2 (Two) Times a Day. 11/18/17   Provider, MD Naresh       Notes: All questions and concerns were addressed. Provided a personalized treatment calendar to patient (includes treatment and lab schedule). Provided patient with contact information for the pharmacist and clinic while instructing them to call if any questions or concerns arise. Informed consent for treatment was obtained. Patient and her sister were receptive to information and expressed understanding.     Rebekah Velasquez, PharmD, [facility]  Oncology Clinical Pharmacist   Phone: [phone]    7/28/2023  08:24 EDT

## 2023-07-28 NOTE — PROGRESS NOTES
CHEMOTHERAPY PREPARATION    Ruby Pettit  [id number]  1945    Chief Complaint: Treatment preparation and needs assessment    History of present illness:  Ruby Pettit is a 78 y.o. year old female who is here today with her sister for treatment preparation and needs assessment.  The patient has been diagnosed with lung cancer and is scheduled to begin treatment with  Nivolumab /  PACLitaxel / CARBOplatin.  She is anxious about starting treatment today.      Oncology History:    Oncology/Hematology History   Malignant neoplasm of bronchus of left lower lobe   6/26/2023 Initial Diagnosis    Malignant neoplasm of bronchus of left lower lobe     6/29/2023 Cancer Staged    Staging form: Lung, AJCC 8th Edition  - Clinical stage from 6/29/2023: Stage IIIA (cT1c, cN2, cM0) - Signed by Hollie Mike MD on 7/20/2023 7/20/2023 Molecular Testing    Molecular testing - [facility]     Negative for EGFR, BRAF and ALK     7/20/2023 Imaging    Pet Scan    IMPRESSION:  Impression:  Hypermetabolic left lower lobe lung mass consistent with known primary malignancy. There are hypermetabolic left hilar and mediastinal lymph nodes consistent with regional metastasis.     Additional groundglass and subsolid lesions within the lungs most prominently along the anterior segment of the right upper lobe demonstrate little to no FDG uptake but remain suspicious for synchronous neoplasm. Recommend attention on follow-up.     Multiple hypodense lesions within the liver without substantial FDG uptake. These appear to be consistent with hemangiomas on prior examinations.     Similar size of a left adrenal nodule with increased metabolic activity. Given the stability of size this is still most likely a benign finding but recommend continued attention on follow-up.     Hypermetabolic focus near the left C4-5 facet joint with a questionable lytic lesion in this area. Recommend follow-up with MRI of the cervical spine and/or bone scan  to further evaluate.        Electronically Signed: Tolu Stephensy    7/19/2023 6:05 PM EDT    Workstation ID: [id number]     7/28/2023 -  Chemotherapy    OP LUNG  Nivolumab 360mg /  PACLitaxel / CARBOplatin AUC=5            The current medication list and allergy list were reviewed and reconciled.     Past Medical History, Past Surgical History, Social History, Family History have been reviewed and are without significant changes except as mentioned.    Review of Systems:    Review of Systems   Constitutional:  Positive for fatigue. Negative for appetite change, fever and unexpected weight change.   HENT:  Negative for mouth sores, sore throat and trouble swallowing.    Respiratory:  Negative for cough, shortness of breath and wheezing.    Cardiovascular:  Negative for chest pain, palpitations and leg swelling.   Gastrointestinal:  Negative for abdominal distention, abdominal pain, constipation, diarrhea, nausea and vomiting.   Genitourinary:  Negative for difficulty urinating, dysuria and frequency.   Musculoskeletal:  Negative for arthralgias.   Skin:  Negative for pallor, rash and wound.   Neurological:  Negative for dizziness and weakness.   Hematological:  Does not bruise/bleed easily.   Psychiatric/Behavioral:  Positive for sleep disturbance. Negative for confusion. The patient is nervous/anxious.      Physical Exam:    Vitals:    07/28/23 0928   BP: 151/72   Pulse: 54   Resp: 18   Temp: 97.2 °F (36.2 °C)   SpO2: 96%     Vitals:    07/28/23 0928   PainSc: 0-No pain          ECOG: (1) Restricted in Physically Strenuous Activity, Ambulatory & Able to Do Work of Light Nature    General: well appearing, in no acute distress  Cardiovascular: regular rate and rhythm, no murmurs noted  Lungs: clear to auscultation bilaterally, respirations even and unlabored  Extremities: no lower extremity edema  Skin: no rashes, lesions, bruising, or petechiae  Psych: Mood is stable          NEEDS ASSESSMENTS    Genetics  The  patient's new diagnosis and family history have been reviewed for genetic counseling needs. A genetic referral is not recommended.     Psychosocial  The patient has completed a PHQ-9 Depression Screening and the Distress Thermometer (DT) today.   PHQ-9 results show 5-9 (Mild Depression). The patient scored their distress today as 7 on a scale of 0-10 with 0 being no distress and 10 being extreme distress.   Problems marked by the patient as being an issue for them within the last week include practical problems, emotional problems, and physical problems.   Results were reviewed along with psychosocial resources offered by our cancer center.  Our oncology social worker will be flagged for a DT score of 4 or above, and a same day call will be made for a score of 9 or 10.  A mental health referral is offered at this time. The patient is not interested in a referral to [facility].   Copies of patient's questionnaires will be scanned into EMR for details and further reference.    Barriers to care  A barriers form was also completed by the patient today. We discussed services offered by our facility to help her have adequate access to care. The patient was given the name and contact information for our Oncology Social Worker, Rebekah King.  Based upon barriers assessment today, the patient will require a follow-up call from the  to further discuss needs.  Referral placed.    A copy of the barriers form will also be scanned into EMR for details and further reference.     VAD Assessment  The patient and I discussed planned intervenous chemotherapy as well as other IV treatments that are often needed throughout the course of treatment. These may include, but are not limited to blood transfusions, antibiotics, and IV hydration. The vasculature does appear to be adequate for multiple peripheral IVs throughout their treatment course.     Advanced Care Planning  The patient and I discussed advanced care  "planning, \"Conversations that Matter\".   This service was offered, free of charge, for development of advance directives with a certified ACP facilitator.  The patient does not have an up-to-date advanced directive. This document is not on file with our office. The patient is not interested in an appointment with one of our facilitators to create or update their advanced directives.      Palliative Care  The patient and I discussed palliative care services. Palliative care is not the same as Hospice care. This is specialized medical care for people living with serious illness with the goal of improving quality of life for the patient and their family. [facility] offers our patients outpatient palliative care early along with their treatment to assist in coordination of care, symptom management, pain management, and medical decision making.  Oncology criteria for palliative care referral is not met at this time. The patient is not interested in a palliative care consultation.     Additional Referral needs  none      CHEMOTHERAPY EDUCATION    Chemotherapy education completed and consent obtained per oncology pharmacist.  See their documentation for further details.    Booklets Given: Chemotherapy and You [x]  Nutrition for the Patient with Cancer During Treatment [x]    Sexuality/Fertility Books []     Chemotherapy Regimen:   Treatment Plans       Name Type Plan Dates Plan Provider         Active    OP LUNG  Nivolumab 360mg /  PACLitaxel / CARBOplatin AUC=5  ONCOLOGY TREATMENT  7/27/2023 - Present Hollie Mike MD                      Chemotherapy education comprehension reviewed. Questions answered.      Assessment and Plan:    Diagnoses and all orders for this visit:    1. Malignant neoplasm of bronchus of left lower lobe (Primary)  -     Ambulatory Referral to ONC Social Work  -     OLANZapine (zyPREXA) 5 MG tablet; Take 1 tablet by mouth Every Night. Take on days 2, 3 and 4 after chemotherapy x 3 cycles for " delayed nausea prevention.  Dispense: 9 tablet; Refill: 0  -     ondansetron (ZOFRAN) 8 MG tablet; Take 1 tablet by mouth 3 (Three) Times a Day As Needed for Nausea or Vomiting.  Dispense: 30 tablet; Refill: 2      The patient and I have reviewed their cancer diagnosis and scheduled treatment plan. Needs assessment was completed including genetics, psychosocial needs, barriers to care, VAD evaluation, advanced care planning, and palliative care services. Referrals have been ordered as appropriate based upon our evaluation and patient desires.     Chemotherapy teaching was also completed today per pharmacy.  Adequate time was given to answer questions.  Patient and family are aware of their care team members and contact information if they have questions or problems throughout the treatment course. The patient is adequately prepared to begin treatment as scheduled today.     Reviewed with patient education regarding olanzapine and Zofran prescriptions sent to pharmacy.       Patient had pretreatment labs drawn.    I spent 30 minutes caring for Ruby on this date of service. This time includes time spent by me in the following activities: preparing for the visit, reviewing tests, performing a medically appropriate examination and/or evaluation, counseling and educating the patient/family/caregiver, ordering medications, tests, or procedures, referring and communicating with other health care professionals, documenting information in the medical record, and care coordination.     Shelby Velasquez, APRN  07/28/23

## 2023-07-28 NOTE — PROGRESS NOTES
Severe restless leg syndrome noted in patient.  Dr. Resendez notified, orders received to give Ativan .5mg IV

## 2023-07-31 ENCOUNTER — TELEPHONE (OUTPATIENT)
Dept: OTHER | Facility: HOSPITAL | Age: 78
End: 2023-07-31
Payer: MEDICARE

## 2023-07-31 ENCOUNTER — TELEPHONE (OUTPATIENT)
Dept: ONCOLOGY | Facility: CLINIC | Age: 78
End: 2023-07-31

## 2023-07-31 NOTE — TELEPHONE ENCOUNTER
Caller: JOSÉ    Best call back number: [phone]    What is the best time to reach you: ANYTIME    Who are you requesting to speak with (clinical staff, provider,  specific staff member): CLINICAL     Do you know the name of the person who called: ARVIN     What was the call regarding: ARVIN CALLING FROM [employer] WITH AN [employer]  FOR THE [id number] CDX TEST, THIS IS OPTIONAL YOU CAN CALL ARVIN NEED TO CALL HER BY 3PM EST TODAY 7/31/2023.    CALL HER BACK TO DISCUSS, REFERENCE NUMBER [id number]

## 2023-07-31 NOTE — TELEPHONE ENCOUNTER
Caller: JOSÉ/ ARVIN    Relationship: JOSÉ    Best call back number: [phone]       Who are you requesting to speak with (clinical staff, provider,  specific staff member): CLINICAL      What was the call regarding: [facility] CANCER DX HAS BEEN DENIED AT THIS TIME. RETURN CALL UNNECESSARY AT THIS TIME. JOSÉ WILL MAIL OUT INFORMATION FOR ADDITIONAL APPEAL

## 2023-08-03 ENCOUNTER — TELEPHONE (OUTPATIENT)
Dept: ONCOLOGY | Facility: CLINIC | Age: 78
End: 2023-08-03
Payer: MEDICARE

## 2023-08-04 ENCOUNTER — TELEPHONE (OUTPATIENT)
Dept: ONCOLOGY | Facility: CLINIC | Age: 78
End: 2023-08-04
Payer: MEDICARE

## 2023-08-15 ENCOUNTER — TELEPHONE (OUTPATIENT)
Dept: ONCOLOGY | Facility: CLINIC | Age: 78
End: 2023-08-15
Payer: MEDICARE

## 2023-08-15 DIAGNOSIS — C34.32 MALIGNANT NEOPLASM OF BRONCHUS OF LEFT LOWER LOBE: Primary | ICD-10-CM

## 2023-08-15 NOTE — TELEPHONE ENCOUNTER
Patient called she states she is losing hair in large bunches, left hand is numb all the time, and tingles she has lost strength in it as well, also has 2 places on the back of her head that is tender to the touch, like sores. Please call.

## 2023-08-15 NOTE — TELEPHONE ENCOUNTER
Returned call to patient. At this time discussing with her that the Taxol can cause the numbness and causing her hand  issues. Discussing with her that when she comes in on Thursday we can discuss with Dr. Resendez. Patient also informed we can do Gabapentin. Patient reporting that she already take 800 mg tid. Discussing with her that Dr. Resendez will decide on dose reduce or discontinue. Nurse and patient talked about wig process. Patient will be give all of the paper work at that time for wig script.

## 2023-08-16 ENCOUNTER — TELEPHONE (OUTPATIENT)
Dept: ONCOLOGY | Facility: CLINIC | Age: 78
End: 2023-08-16
Payer: MEDICARE

## 2023-08-16 DIAGNOSIS — C34.32 MALIGNANT NEOPLASM OF BRONCHUS OF LEFT LOWER LOBE: Primary | ICD-10-CM

## 2023-08-16 NOTE — TELEPHONE ENCOUNTER
Returned call to patient. At this time patient asking what time she needs to be at clinic for Lab appointment time given at this time.

## 2023-08-17 ENCOUNTER — HOSPITAL ENCOUNTER (OUTPATIENT)
Dept: GENERAL RADIOLOGY | Facility: HOSPITAL | Age: 78
Discharge: HOME | End: 2023-08-17
Payer: MEDICARE

## 2023-08-17 ENCOUNTER — HOSPITAL ENCOUNTER (OUTPATIENT)
Dept: ONCOLOGY | Facility: HOSPITAL | Age: 78
Discharge: HOME | End: 2023-08-17
Payer: MEDICARE

## 2023-08-17 ENCOUNTER — OFFICE VISIT (OUTPATIENT)
Dept: ONCOLOGY | Facility: CLINIC | Age: 78
End: 2023-08-17
Payer: MEDICARE

## 2023-08-17 VITALS
RESPIRATION RATE: 24 BRPM | WEIGHT: 170 LBS | OXYGEN SATURATION: 98 % | TEMPERATURE: 96.9 F | SYSTOLIC BLOOD PRESSURE: 127 MMHG | DIASTOLIC BLOOD PRESSURE: 66 MMHG | HEIGHT: 61 IN | BODY MASS INDEX: 32.1 KG/M2 | HEART RATE: 56 BPM

## 2023-08-17 DIAGNOSIS — C34.32 MALIGNANT NEOPLASM OF BRONCHUS OF LEFT LOWER LOBE: Primary | ICD-10-CM

## 2023-08-17 DIAGNOSIS — C34.32 MALIGNANT NEOPLASM OF BRONCHUS OF LEFT LOWER LOBE: ICD-10-CM

## 2023-08-17 DIAGNOSIS — C34.32 PRIMARY CANCER OF LEFT LOWER LOBE OF LUNG: Primary | ICD-10-CM

## 2023-08-17 LAB
ALBUMIN SERPL-MCNC: 3.6 G/DL (ref 3.5–5.2)
ALBUMIN/GLOB SERPL: 1.4 G/DL
ALP SERPL-CCNC: 140 U/L (ref 39–117)
ALT SERPL W P-5'-P-CCNC: 22 U/L (ref 1–33)
ANION GAP SERPL CALCULATED.3IONS-SCNC: 11 MMOL/L (ref 5–15)
AST SERPL-CCNC: 18 U/L (ref 1–32)
BASOPHILS # BLD AUTO: 0.03 10*3/MM3 (ref 0–0.2)
BASOPHILS NFR BLD AUTO: 0.5 % (ref 0–1.5)
BILIRUB SERPL-MCNC: 0.2 MG/DL (ref 0–1.2)
BUN SERPL-MCNC: 21 MG/DL (ref 8–23)
BUN/CREAT SERPL: 20.4 (ref 7–25)
CALCIUM SPEC-SCNC: 9.1 MG/DL (ref 8.6–10.5)
CHLORIDE SERPL-SCNC: 106 MMOL/L (ref 98–107)
CO2 SERPL-SCNC: 24 MMOL/L (ref 22–29)
CREAT SERPL-MCNC: 1.03 MG/DL (ref 0.57–1)
DEPRECATED RDW RBC AUTO: 56.2 FL (ref 37–54)
EGFRCR SERPLBLD CKD-EPI 2021: 55.8 ML/MIN/1.73
EOSINOPHIL # BLD AUTO: 0.19 10*3/MM3 (ref 0–0.4)
EOSINOPHIL NFR BLD AUTO: 3.5 % (ref 0.3–6.2)
ERYTHROCYTE [DISTWIDTH] IN BLOOD BY AUTOMATED COUNT: 16.9 % (ref 12.3–15.4)
GLOBULIN UR ELPH-MCNC: 2.5 GM/DL
GLUCOSE SERPL-MCNC: 123 MG/DL (ref 65–99)
HCT VFR BLD AUTO: 34.4 % (ref 34–46.6)
HGB BLD-MCNC: 10.6 G/DL (ref 12–15.9)
IMM GRANULOCYTES # BLD AUTO: 0.02 10*3/MM3 (ref 0–0.05)
IMM GRANULOCYTES NFR BLD AUTO: 0.4 % (ref 0–0.5)
LYMPHOCYTES # BLD AUTO: 1.78 10*3/MM3 (ref 0.7–3.1)
LYMPHOCYTES NFR BLD AUTO: 32.5 % (ref 19.6–45.3)
MCH RBC QN AUTO: 28.4 PG (ref 26.6–33)
MCHC RBC AUTO-ENTMCNC: 30.8 G/DL (ref 31.5–35.7)
MCV RBC AUTO: 92.2 FL (ref 79–97)
MONOCYTES # BLD AUTO: 0.58 10*3/MM3 (ref 0.1–0.9)
MONOCYTES NFR BLD AUTO: 10.6 % (ref 5–12)
NEUTROPHILS NFR BLD AUTO: 2.87 10*3/MM3 (ref 1.7–7)
NEUTROPHILS NFR BLD AUTO: 52.5 % (ref 42.7–76)
PLATELET # BLD AUTO: 362 10*3/MM3 (ref 140–450)
PMV BLD AUTO: 10.4 FL (ref 6–12)
POTASSIUM SERPL-SCNC: 4.2 MMOL/L (ref 3.5–5.2)
PROT SERPL-MCNC: 6.1 G/DL (ref 6–8.5)
RBC # BLD AUTO: 3.73 10*6/MM3 (ref 3.77–5.28)
SODIUM SERPL-SCNC: 141 MMOL/L (ref 136–145)
T4 FREE SERPL-MCNC: 1.05 NG/DL (ref 0.93–1.7)
TSH SERPL DL<=0.05 MIU/L-ACNC: 2.93 UIU/ML (ref 0.27–4.2)
WBC NRBC COR # BLD: 5.47 10*3/MM3 (ref 3.4–10.8)

## 2023-08-17 PROCEDURE — 80053 COMPREHEN METABOLIC PANEL: CPT | Performed by: INTERNAL MEDICINE

## 2023-08-17 PROCEDURE — 36415 COLL VENOUS BLD VENIPUNCTURE: CPT

## 2023-08-17 PROCEDURE — 84439 ASSAY OF FREE THYROXINE: CPT | Performed by: INTERNAL MEDICINE

## 2023-08-17 PROCEDURE — 84443 ASSAY THYROID STIM HORMONE: CPT | Performed by: INTERNAL MEDICINE

## 2023-08-17 PROCEDURE — 85025 COMPLETE CBC W/AUTO DIFF WBC: CPT | Performed by: INTERNAL MEDICINE

## 2023-08-17 PROCEDURE — 71046 X-RAY EXAM CHEST 2 VIEWS: CPT

## 2023-08-17 RX ORDER — FAMOTIDINE 10 MG/ML
20 INJECTION, SOLUTION INTRAVENOUS ONCE
Status: CANCELLED | OUTPATIENT
Start: 2023-08-18

## 2023-08-17 RX ORDER — FAMOTIDINE 10 MG/ML
20 INJECTION, SOLUTION INTRAVENOUS AS NEEDED
Status: CANCELLED | OUTPATIENT
Start: 2023-08-18

## 2023-08-17 RX ORDER — PALONOSETRON 0.05 MG/ML
0.25 INJECTION, SOLUTION INTRAVENOUS ONCE
Status: CANCELLED | OUTPATIENT
Start: 2023-08-18

## 2023-08-17 RX ORDER — DIPHENHYDRAMINE HYDROCHLORIDE 50 MG/ML
50 INJECTION INTRAMUSCULAR; INTRAVENOUS AS NEEDED
Status: CANCELLED | OUTPATIENT
Start: 2023-08-18

## 2023-08-17 RX ORDER — SODIUM CHLORIDE 9 MG/ML
250 INJECTION, SOLUTION INTRAVENOUS ONCE
Status: CANCELLED | OUTPATIENT
Start: 2023-08-18

## 2023-08-17 NOTE — PROGRESS NOTES
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 There are hypermetabolic left hilar and mediastinal lymph nodes consistent with regional metastasis.     Additional groundglass and subsolid lesions within the lungs most prominently along the anterior segment of the right upper lobe demonstrate little to no FDG uptake but remain suspicious for synchronous neoplasm. Recommend attention on follow-up.     Multiple hypodense lesions within the liver without substantial FDG uptake. These appear to be consistent with hemangiomas on prior examinations.     Similar size of a left adrenal nodule with increased metabolic activity. Given the stability of size this is still most likely a benign finding but recommend continued attention on follow-up.     Hypermetabolic focus near the left C4-5 facet joint with a questionable lytic lesion in this area. Recommend follow-up with MRI of the cervical spine and/or bone scan to further evaluate.        Electronically Signed: Tolu Zavaleta    7/19/2023 6:05 PM EDT    Workstation ID: [id number]     7/28/2023 -  Chemotherapy    OP LUNG  Nivolumab 360mg /  PACLitaxel / CARBOplatin AUC=5          REASON FOR VISIT: Management of my lung cancer    HISTORY OF PRESENT ILLNESS:   78 y.o.  female presents today for follow-up.  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  Her first cycle had some side effects.  She is having some weakness in the left arm.  She is also having shortness of breath today.  She is hypoxic at room air.  Denies any issues with fevers.  No issues with chest pain.    Past medical history, social history and family history was reviewed 08/17/23 and unchanged from prior visit.    Review of Systems:    Review of Systems   Constitutional:  Positive for fatigue.   HENT:  Negative.     Eyes: Negative.    Respiratory:  Positive for shortness of breath.    Cardiovascular: Negative.    Gastrointestinal: Negative.    Endocrine: Negative.    Genitourinary: Negative.     Skin: Negative.    Neurological:  Positive for extremity weakness.   Hematological: Negative.    Psychiatric/Behavioral: Negative.            Medications:        Current Outpatient Medications:     amLODIPine (NORVASC) 5 MG tablet, Take 1 tablet by mouth Daily., Disp: , Rfl:     aspirin 81 MG EC tablet, Take 1 tablet by mouth Every Night., Disp: , Rfl:     bisoprolol (ZEBeta) 10 MG tablet, Take 1 tablet by mouth Daily., Disp: , Rfl:     buPROPion XL (WELLBUTRIN XL) 150 MG 24 hr tablet, Take 1 tablet by mouth Daily., Disp: , Rfl:     cetirizine (zyrTEC) 10 MG tablet, Take 1 tablet by mouth Every Night., Disp: , Rfl:     citalopram (CeleXA) 20 MG tablet, Take 1 tablet by mouth Every Night., Disp: , Rfl:     diazePAM (VALIUM) 5 MG tablet, 1 tablet every 12 hours as needed for anxiety. Make take 2 tablets 30 min before procedure., Disp: 10 tablet, Rfl: 0    Ergocalciferol (VITAMIN D2 PO), Take  by mouth., Disp: , Rfl:     gabapentin (NEURONTIN) 800 MG tablet, Take 1 tablet by mouth 3 (Three) Times a Day., Disp: , Rfl:     hydroCHLOROthiazide (MICROZIDE) 12.5 MG capsule, Take 1 capsule by mouth Daily., Disp: , Rfl:     HYDROcodone-acetaminophen (NORCO) 7.5-325 MG per tablet, Take 1 tablet by mouth 2 (Two) Times a Day., Disp: , Rfl:     hydrOXYzine (ATARAX) 25 MG tablet, Take 1-2 tablets by mouth  "Every Night., Disp: , Rfl:     hyoscyamine (LEVSIN) 0.125 MG SL tablet, Take 1 tablet by mouth Every 6 (Six) Hours As Needed for Cramping., Disp: , Rfl:     levothyroxine (SYNTHROID, LEVOTHROID) 50 MCG tablet, Take 1 tablet by mouth Daily., Disp: , Rfl:     losartan (COZAAR) 50 MG tablet, , Disp: , Rfl:     lovastatin (MEVACOR) 20 MG tablet, Take 1 tablet by mouth Every Night., Disp: , Rfl:     OLANZapine (zyPREXA) 5 MG tablet, Take 1 tablet by mouth Every Night. Take on days 2, 3 and 4 after chemotherapy x 3 cycles for delayed nausea prevention., Disp: 9 tablet, Rfl: 0    omeprazole (priLOSEC) 20 MG capsule, Take 1 capsule by mouth 2 (Two) Times a Day., Disp: , Rfl:     ondansetron (ZOFRAN) 8 MG tablet, Take 1 tablet by mouth 3 (Three) Times a Day As Needed for Nausea or Vomiting., Disp: 30 tablet, Rfl: 2    Pain Medications               aspirin 81 MG EC tablet Take 1 tablet by mouth Every Night.    buPROPion XL (WELLBUTRIN XL) 150 MG 24 hr tablet Take 1 tablet by mouth Daily.    citalopram (CeleXA) 20 MG tablet Take 1 tablet by mouth Every Night.    gabapentin (NEURONTIN) 800 MG tablet Take 1 tablet by mouth 3 (Three) Times a Day.    HYDROcodone-acetaminophen (NORCO) 7.5-325 MG per tablet Take 1 tablet by mouth 2 (Two) Times a Day.    OLANZapine (zyPREXA) 5 MG tablet Take 1 tablet by mouth Every Night. Take on days 2, 3 and 4 after chemotherapy x 3 cycles for delayed nausea prevention.               ALLERGIES:    Allergies   Allergen Reactions    Augmentin [Amoxicillin-Pot Clavulanate] Diarrhea    Metformin Diarrhea    Rosuvastatin GI Intolerance         Physical Exam    VITAL SIGNS:  /66 Comment: LUE  Pulse 56   Temp 96.9 øF (36.1 øC) (Infrared)   Resp 24   Ht 154.9 cm (61\")   Wt 77.1 kg (170 lb)   LMP  (LMP Unknown)   SpO2 98% Comment: 2L-Rest  BMI 32.12 kg/mý     ECOG score: 1           Wt Readings from Last 3 Encounters:   08/17/23 77.1 kg (170 lb)   07/28/23 78 kg (172 lb)   07/27/23 77.6 kg " (171 lb)       Body mass index is 32.12 kg/mý. Body surface area is 1.76 meters squared.       Performance Status: 0    General: well appearing, in no acute distress  HEENT: sclera anicteric, neck is supple  Lymphatics: no cervical, supraclavicular, or axillary adenopathy  Cardiovascular: regular rate and rhythm, no murmurs, rubs or gallops  Lungs: clear to auscultation bilaterally  Abdomen: soft, nontender, nondistended.  No palpable organomegaly  Extremities: no lower extremity edema  Skin: no rashes, lesions, bruising, or petechiae  Msk:  Shows no weakness of the large muscle groups  Psych: Mood is stable    85% Resting on room air  83% With activity on room air  97% With activity on 2 L oxygen       RECENT LABS:    Lab Results   Component Value Date    HGB 10.6 (L) 08/17/2023    HCT 34.4 08/17/2023    MCV 92.2 08/17/2023     08/17/2023    WBC 5.47 08/17/2023    NEUTROABS 2.87 08/17/2023    LYMPHSABS 1.78 08/17/2023    MONOSABS 0.58 08/17/2023    EOSABS 0.19 08/17/2023    BASOSABS 0.03 08/17/2023       Lab Results   Component Value Date    GLUCOSE 123 (H) 08/17/2023    BUN 21 08/17/2023    CREATININE 1.03 (H) 08/17/2023     08/17/2023    K 4.2 08/17/2023     08/17/2023    CO2 24.0 08/17/2023    CALCIUM 9.1 08/17/2023    PROTEINTOT 6.1 08/17/2023    ALBUMIN 3.6 08/17/2023    BILITOT 0.2 08/17/2023    ALKPHOS 140 (H) 08/17/2023    AST 18 08/17/2023    ALT 22 08/17/2023       XR Wrist 3+ View Right    Result Date: 4/30/2023  Impression: 1. There is surgical hardware in the distal right radius. The patient has an old healed fracture. There is no hardware loosening or failure. 2. 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 3. Degenerative changes. 4. Soft tissue swelling. Electronically Signed: Kirby Pineda  4/30/2023 3:18 PM EDT  Workstation ID: [id number]    CT Chest Without Contrast Diagnostic    Result Date: 6/21/2023  Stable exam including the dominant left lower lobe mass and pleural-based  triangular right upper lobe lesion Electronically Signed: Souleymane Sanchez  6/21/2023 2:50 PM EDT  Workstation ID: [id number]    CT Chest Without Contrast Diagnostic    Result Date: 6/1/2023  Impression: 1. Interval significant enlargement and increase in solidity of left lower lobe pulmonary mass, now measuring 3.5 cm maximally. This is suspicious for primary lung cancer. Repeat evaluation by PET scan versus biopsy suggested. 2. Indeterminate hypodense lesions in the liver, measuring up to 6 cm. The lesions in the left hepatic lobes appear new as compared to prior study. This may represent metastatic disease, and could also be evaluated by PET scan. Electronically Signed: Piter Altamirano  6/1/2023 4:59 PM EDT  Workstation ID: [id number]    MRI Brain With & Without Contrast    Result Date: 7/24/2023  Impression: No evidence of intracranial metastasis. A cystic finding in the left temporal lobe is stable and favored to reflect small arachnoid cyst. Electronically Signed: Destin Henry  7/24/2023 10:12 AM EDT  Workstation ID: [id number]    MRI Cervical Spine With & Without Contrast    Result Date: 7/25/2023  Impression: Recent finding involving the left-sided facet joint at C3-4 is characterized on MRI as most likely some edematous facet arthropathy. No specific evidence of osseous metastatic involvement in the cervical spine. Advanced multilevel spondylosis is noted as above including areas of severe spinal canal and neuroforaminal impingement. Electronically Signed: Destin Henry  7/25/2023 5:41 AM EDT  Workstation ID: [id number]    XR Chest 1 View    Result Date: 6/29/2023  Impression: 1. No pneumothorax status post bronchoscopy 2. Right basilar atelectasis 3. Mild pulmonary vascular congestion 4. Known left lower lobe and paramediastinal right upper lobe lesions Electronically Signed: Souleymane Sanchez  6/29/2023 9:16 AM EDT  Workstation ID: [id number]    NM PET/CT Skull Base to Mid Thigh    Result Date: 7/19/2023  Impression:  Hypermetabolic left lower lobe lung mass consistent with known primary malignancy. There are hypermetabolic left hilar and mediastinal lymph nodes consistent with regional metastasis. Additional groundglass and subsolid lesions within the lungs most prominently along the anterior segment of the right upper lobe demonstrate little to no FDG uptake but remain suspicious for synchronous neoplasm. Recommend attention on follow-up. Multiple hypodense lesions within the liver without substantial FDG uptake. These appear to be consistent with hemangiomas on prior examinations. Similar size of a left adrenal nodule with increased metabolic activity. Given the stability of size this is still most likely a benign finding but recommend continued attention on follow-up. Hypermetabolic focus near the left C4-5 facet joint with a questionable lytic lesion in this area. Recommend follow-up with MRI of the cervical spine and/or bone scan to further evaluate. Electronically Signed: Tolu Zavaleta  7/19/2023 6:05 PM EDT  Workstation ID: [id number]    XR Chest PA & Lateral    Result Date: 4/25/2023  Impression: 1. No acute process. 2. Left lower lobe 3.7 cm pulmonary nodule again noted, this appears increased in size from prior chest CT on 4/1/2021, consider chest CT follow-up to reassess. 3. Chronic severe compression fracture T11. Electronically Signed: Eric Kong  4/25/2023 3:43 PM EDT  Workstation ID: [id number]    EMG & Nerve Conduction Test    Result Date: 6/26/2023  Radial neuropathy at the humeral groove on the right, mild-moderate Median neuropathy at the wrist on the right, exceedingly mild This report is transcribed using the Dragon dictation system.           Assessment/Plan    1.  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  We will continue with treatment with cycle #2 plan for tomorrow with carboplatin and Taxol and nivolumab.  No dose reduction.  Plan for 3 cycles before repeat imaging.  I  suspect she is not going to be a candidate for surgical intervention unless her hypoxia improves.  I am going to get a chest x-ray to make sure she does not have a new infiltrate or other processes.  If she is not a candidate for surgical intervention then I will treat her definitively with chemotherapy and radiation with a plan for immunotherapy posttreatment.    2.  Hypoxia on room air.  We will arrange for oxygen at 2 L/min.  We will also get a chest x-ray today.    3.  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  Patient requesting cranial prosthesis prescription.  We will provide that for her today.        Total time of patient care on day of service including time prior to, face to face with patient, and following visit spent in reviewing records, lab results, imaging studies, discussion with patient, and documentation/charting was > 45 minutes.     Hollie Mike MD  [facility]ematology and On[facility]         No orders of the defined types were placed in this encounter.      8/17/2023     Future Appointments         Provider Department Center    8/17/2023 2:00 PM (Arrive by 1:45 PM) Hollie Mike MD [facility]    8/18/2023 9:00 AM CHAIR [facility] [place]

## 2023-08-18 ENCOUNTER — HOSPITAL ENCOUNTER (OUTPATIENT)
Dept: ONCOLOGY | Facility: HOSPITAL | Age: 78
Discharge: HOME | End: 2023-08-18
Payer: MEDICARE

## 2023-08-18 VITALS
BODY MASS INDEX: 32.1 KG/M2 | WEIGHT: 170 LBS | HEART RATE: 59 BPM | TEMPERATURE: 97.2 F | DIASTOLIC BLOOD PRESSURE: 58 MMHG | RESPIRATION RATE: 18 BRPM | SYSTOLIC BLOOD PRESSURE: 142 MMHG | HEIGHT: 61 IN

## 2023-08-18 DIAGNOSIS — C34.32 MALIGNANT NEOPLASM OF BRONCHUS OF LEFT LOWER LOBE: Primary | ICD-10-CM

## 2023-08-18 PROCEDURE — 96413 CHEMO IV INFUSION 1 HR: CPT

## 2023-08-18 PROCEDURE — 96367 TX/PROPH/DG ADDL SEQ IV INF: CPT

## 2023-08-18 PROCEDURE — 96415 CHEMO IV INFUSION ADDL HR: CPT

## 2023-08-18 PROCEDURE — 25010000002 NIVOLUMAB 40 MG/4ML SOLUTION 4 ML VIAL: Performed by: INTERNAL MEDICINE

## 2023-08-18 PROCEDURE — 25010000002 CARBOPLATIN PER 50 MG: Performed by: INTERNAL MEDICINE

## 2023-08-18 PROCEDURE — 25010000002 DEXAMETHASONE SODIUM PHOSPHATE 100 MG/10ML SOLUTION: Performed by: INTERNAL MEDICINE

## 2023-08-18 PROCEDURE — 94640 AIRWAY INHALATION TREATMENT: CPT

## 2023-08-18 PROCEDURE — 25010000002 NIVOLUMAB 240 MG/24ML SOLUTION 24 ML VIAL: Performed by: INTERNAL MEDICINE

## 2023-08-18 PROCEDURE — 25010000002 PACLITAXEL PER 1 MG: Performed by: INTERNAL MEDICINE

## 2023-08-18 PROCEDURE — 96375 TX/PRO/DX INJ NEW DRUG ADDON: CPT

## 2023-08-18 PROCEDURE — 94799 UNLISTED PULMONARY SVC/PX: CPT

## 2023-08-18 PROCEDURE — 25010000002 PALONOSETRON PER 25 MCG: Performed by: INTERNAL MEDICINE

## 2023-08-18 PROCEDURE — 96417 CHEMO IV INFUS EACH ADDL SEQ: CPT

## 2023-08-18 PROCEDURE — 25010000002 FOSAPREPITANT PER 1 MG: Performed by: INTERNAL MEDICINE

## 2023-08-18 RX ORDER — FAMOTIDINE 10 MG/ML
20 INJECTION, SOLUTION INTRAVENOUS ONCE
Status: COMPLETED | OUTPATIENT
Start: 2023-08-18 | End: 2023-08-18

## 2023-08-18 RX ORDER — IPRATROPIUM BROMIDE AND ALBUTEROL SULFATE 2.5; .5 MG/3ML; MG/3ML
3 SOLUTION RESPIRATORY (INHALATION) ONCE
Status: COMPLETED | OUTPATIENT
Start: 2023-08-18 | End: 2023-08-18

## 2023-08-18 RX ORDER — PALONOSETRON 0.05 MG/ML
0.25 INJECTION, SOLUTION INTRAVENOUS ONCE
Status: COMPLETED | OUTPATIENT
Start: 2023-08-18 | End: 2023-08-18

## 2023-08-18 RX ORDER — SODIUM CHLORIDE 9 MG/ML
250 INJECTION, SOLUTION INTRAVENOUS ONCE
Status: COMPLETED | OUTPATIENT
Start: 2023-08-18 | End: 2023-08-18

## 2023-08-18 RX ADMIN — DEXAMETHASONE SODIUM PHOSPHATE 20 MG: 10 INJECTION, SOLUTION INTRAMUSCULAR; INTRAVENOUS at 09:30

## 2023-08-18 RX ADMIN — FOSAPREPITANT 150 MG: 150 INJECTION, POWDER, LYOPHILIZED, FOR SOLUTION INTRAVENOUS at 09:30

## 2023-08-18 RX ADMIN — SODIUM CHLORIDE 305 MG: 9 INJECTION, SOLUTION INTRAVENOUS at 10:55

## 2023-08-18 RX ADMIN — PALONOSETRON HYDROCHLORIDE 0.25 MG: 0.25 INJECTION, SOLUTION INTRAVENOUS at 09:25

## 2023-08-18 RX ADMIN — FAMOTIDINE 20 MG: 10 INJECTION INTRAVENOUS at 09:28

## 2023-08-18 RX ADMIN — IPRATROPIUM BROMIDE AND ALBUTEROL SULFATE 3 ML: .5; 2.5 SOLUTION RESPIRATORY (INHALATION) at 14:03

## 2023-08-18 RX ADMIN — SODIUM CHLORIDE 360 MG: 9 INJECTION, SOLUTION INTRAVENOUS at 10:15

## 2023-08-18 RX ADMIN — SODIUM CHLORIDE 250 ML: 9 INJECTION, SOLUTION INTRAVENOUS at 09:25

## 2023-08-18 RX ADMIN — CARBOPLATIN 400 MG: 10 INJECTION, SOLUTION INTRAVENOUS at 14:42

## 2023-09-05 DIAGNOSIS — C34.32 MALIGNANT NEOPLASM OF BRONCHUS OF LEFT LOWER LOBE: Primary | ICD-10-CM

## 2023-09-07 ENCOUNTER — HOSPITAL ENCOUNTER (OUTPATIENT)
Dept: ONCOLOGY | Facility: HOSPITAL | Age: 78
Discharge: HOME | End: 2023-09-07
Admitting: INTERNAL MEDICINE
Payer: MEDICARE

## 2023-09-07 VITALS
SYSTOLIC BLOOD PRESSURE: 127 MMHG | HEART RATE: 57 BPM | TEMPERATURE: 97.2 F | DIASTOLIC BLOOD PRESSURE: 59 MMHG | WEIGHT: 167 LBS | BODY MASS INDEX: 31.53 KG/M2 | HEIGHT: 61 IN | RESPIRATION RATE: 18 BRPM

## 2023-09-07 DIAGNOSIS — C34.32 MALIGNANT NEOPLASM OF BRONCHUS OF LEFT LOWER LOBE: ICD-10-CM

## 2023-09-07 LAB
ALBUMIN SERPL-MCNC: 3.9 G/DL (ref 3.5–5.2)
ALBUMIN/GLOB SERPL: 1.6 G/DL
ALP SERPL-CCNC: 122 U/L (ref 39–117)
ALT SERPL W P-5'-P-CCNC: 22 U/L (ref 1–33)
ANION GAP SERPL CALCULATED.3IONS-SCNC: 10 MMOL/L (ref 5–15)
AST SERPL-CCNC: 16 U/L (ref 1–32)
BASOPHILS # BLD AUTO: 0.01 10*3/MM3 (ref 0–0.2)
BASOPHILS NFR BLD AUTO: 0.2 % (ref 0–1.5)
BILIRUB SERPL-MCNC: 0.3 MG/DL (ref 0–1.2)
BUN SERPL-MCNC: 21 MG/DL (ref 8–23)
BUN/CREAT SERPL: 24.1 (ref 7–25)
CALCIUM SPEC-SCNC: 9 MG/DL (ref 8.6–10.5)
CHLORIDE SERPL-SCNC: 105 MMOL/L (ref 98–107)
CO2 SERPL-SCNC: 25 MMOL/L (ref 22–29)
CREAT SERPL-MCNC: 0.87 MG/DL (ref 0.57–1)
DEPRECATED RDW RBC AUTO: 65.2 FL (ref 37–54)
EGFRCR SERPLBLD CKD-EPI 2021: 68.3 ML/MIN/1.73
EOSINOPHIL # BLD AUTO: 0.11 10*3/MM3 (ref 0–0.4)
EOSINOPHIL NFR BLD AUTO: 2.1 % (ref 0.3–6.2)
ERYTHROCYTE [DISTWIDTH] IN BLOOD BY AUTOMATED COUNT: 19.2 % (ref 12.3–15.4)
GLOBULIN UR ELPH-MCNC: 2.5 GM/DL
GLUCOSE SERPL-MCNC: 122 MG/DL (ref 65–99)
HCT VFR BLD AUTO: 37.1 % (ref 34–46.6)
HGB BLD-MCNC: 11.6 G/DL (ref 12–15.9)
IMM GRANULOCYTES # BLD AUTO: 0.02 10*3/MM3 (ref 0–0.05)
IMM GRANULOCYTES NFR BLD AUTO: 0.4 % (ref 0–0.5)
LYMPHOCYTES # BLD AUTO: 2.19 10*3/MM3 (ref 0.7–3.1)
LYMPHOCYTES NFR BLD AUTO: 41.2 % (ref 19.6–45.3)
MCH RBC QN AUTO: 29.4 PG (ref 26.6–33)
MCHC RBC AUTO-ENTMCNC: 31.3 G/DL (ref 31.5–35.7)
MCV RBC AUTO: 93.9 FL (ref 79–97)
MONOCYTES # BLD AUTO: 0.65 10*3/MM3 (ref 0.1–0.9)
MONOCYTES NFR BLD AUTO: 12.2 % (ref 5–12)
NEUTROPHILS NFR BLD AUTO: 2.34 10*3/MM3 (ref 1.7–7)
NEUTROPHILS NFR BLD AUTO: 43.9 % (ref 42.7–76)
PLATELET # BLD AUTO: 211 10*3/MM3 (ref 140–450)
PMV BLD AUTO: 9.9 FL (ref 6–12)
POTASSIUM SERPL-SCNC: 4.3 MMOL/L (ref 3.5–5.2)
PROT SERPL-MCNC: 6.4 G/DL (ref 6–8.5)
RBC # BLD AUTO: 3.95 10*6/MM3 (ref 3.77–5.28)
SODIUM SERPL-SCNC: 140 MMOL/L (ref 136–145)
T4 FREE SERPL-MCNC: 0.91 NG/DL (ref 0.93–1.7)
TSH SERPL DL<=0.05 MIU/L-ACNC: 2.61 UIU/ML (ref 0.27–4.2)
WBC NRBC COR # BLD: 5.32 10*3/MM3 (ref 3.4–10.8)

## 2023-09-07 PROCEDURE — 84443 ASSAY THYROID STIM HORMONE: CPT | Performed by: INTERNAL MEDICINE

## 2023-09-07 PROCEDURE — 85025 COMPLETE CBC W/AUTO DIFF WBC: CPT | Performed by: INTERNAL MEDICINE

## 2023-09-07 PROCEDURE — 84439 ASSAY OF FREE THYROXINE: CPT | Performed by: INTERNAL MEDICINE

## 2023-09-07 PROCEDURE — 36415 COLL VENOUS BLD VENIPUNCTURE: CPT

## 2023-09-07 PROCEDURE — 80053 COMPREHEN METABOLIC PANEL: CPT | Performed by: INTERNAL MEDICINE

## 2023-09-08 ENCOUNTER — OFFICE VISIT (OUTPATIENT)
Dept: ONCOLOGY | Facility: CLINIC | Age: 78
End: 2023-09-08
Payer: MEDICARE

## 2023-09-08 ENCOUNTER — HOSPITAL ENCOUNTER (OUTPATIENT)
Dept: ONCOLOGY | Facility: HOSPITAL | Age: 78
Discharge: HOME | End: 2023-09-08
Admitting: INTERNAL MEDICINE
Payer: MEDICARE

## 2023-09-08 VITALS
DIASTOLIC BLOOD PRESSURE: 66 MMHG | RESPIRATION RATE: 18 BRPM | BODY MASS INDEX: 31.72 KG/M2 | HEIGHT: 61 IN | SYSTOLIC BLOOD PRESSURE: 145 MMHG | HEART RATE: 54 BPM | TEMPERATURE: 97.5 F | OXYGEN SATURATION: 95 % | WEIGHT: 168 LBS

## 2023-09-08 VITALS — SYSTOLIC BLOOD PRESSURE: 158 MMHG | DIASTOLIC BLOOD PRESSURE: 55 MMHG | HEART RATE: 59 BPM

## 2023-09-08 DIAGNOSIS — C34.32 MALIGNANT NEOPLASM OF BRONCHUS OF LEFT LOWER LOBE: Primary | ICD-10-CM

## 2023-09-08 PROCEDURE — 25010000002 CARBOPLATIN PER 50 MG: Performed by: INTERNAL MEDICINE

## 2023-09-08 PROCEDURE — 1126F AMNT PAIN NOTED NONE PRSNT: CPT | Performed by: INTERNAL MEDICINE

## 2023-09-08 PROCEDURE — 96415 CHEMO IV INFUSION ADDL HR: CPT

## 2023-09-08 PROCEDURE — 25010000002 DEXAMETHASONE SODIUM PHOSPHATE 100 MG/10ML SOLUTION: Performed by: INTERNAL MEDICINE

## 2023-09-08 PROCEDURE — 96413 CHEMO IV INFUSION 1 HR: CPT

## 2023-09-08 PROCEDURE — 96375 TX/PRO/DX INJ NEW DRUG ADDON: CPT

## 2023-09-08 PROCEDURE — 25010000002 FOSAPREPITANT PER 1 MG: Performed by: INTERNAL MEDICINE

## 2023-09-08 PROCEDURE — 25010000002 NIVOLUMAB 240 MG/24ML SOLUTION 24 ML VIAL: Performed by: INTERNAL MEDICINE

## 2023-09-08 PROCEDURE — 99214 OFFICE O/P EST MOD 30 MIN: CPT | Performed by: INTERNAL MEDICINE

## 2023-09-08 PROCEDURE — 25010000002 DIPHENHYDRAMINE PER 50 MG: Performed by: INTERNAL MEDICINE

## 2023-09-08 PROCEDURE — 25010000002 NIVOLUMAB 40 MG/4ML SOLUTION 4 ML VIAL: Performed by: INTERNAL MEDICINE

## 2023-09-08 PROCEDURE — 96417 CHEMO IV INFUS EACH ADDL SEQ: CPT

## 2023-09-08 PROCEDURE — 3078F DIAST BP <80 MM HG: CPT | Performed by: INTERNAL MEDICINE

## 2023-09-08 PROCEDURE — 96367 TX/PROPH/DG ADDL SEQ IV INF: CPT

## 2023-09-08 PROCEDURE — 25010000002 PACLITAXEL PER 1 MG: Performed by: INTERNAL MEDICINE

## 2023-09-08 PROCEDURE — 25010000002 PALONOSETRON PER 25 MCG: Performed by: INTERNAL MEDICINE

## 2023-09-08 PROCEDURE — 3077F SYST BP >= 140 MM HG: CPT | Performed by: INTERNAL MEDICINE

## 2023-09-08 RX ORDER — FAMOTIDINE 10 MG/ML
20 INJECTION, SOLUTION INTRAVENOUS ONCE
Status: CANCELLED | OUTPATIENT
Start: 2023-09-08

## 2023-09-08 RX ORDER — DIPHENHYDRAMINE HYDROCHLORIDE 50 MG/ML
50 INJECTION INTRAMUSCULAR; INTRAVENOUS AS NEEDED
Status: DISCONTINUED | OUTPATIENT
Start: 2023-09-08 | End: 2023-09-09 | Stop reason: HOSPADM

## 2023-09-08 RX ORDER — SODIUM CHLORIDE 9 MG/ML
250 INJECTION, SOLUTION INTRAVENOUS ONCE
Status: COMPLETED | OUTPATIENT
Start: 2023-09-08 | End: 2023-09-08

## 2023-09-08 RX ORDER — FAMOTIDINE 10 MG/ML
20 INJECTION, SOLUTION INTRAVENOUS AS NEEDED
Status: CANCELLED | OUTPATIENT
Start: 2023-09-08

## 2023-09-08 RX ORDER — FAMOTIDINE 10 MG/ML
20 INJECTION, SOLUTION INTRAVENOUS ONCE
Status: COMPLETED | OUTPATIENT
Start: 2023-09-08 | End: 2023-09-08

## 2023-09-08 RX ORDER — SODIUM CHLORIDE 9 MG/ML
250 INJECTION, SOLUTION INTRAVENOUS ONCE
Status: CANCELLED | OUTPATIENT
Start: 2023-09-08

## 2023-09-08 RX ORDER — PALONOSETRON 0.05 MG/ML
0.25 INJECTION, SOLUTION INTRAVENOUS ONCE
Status: CANCELLED | OUTPATIENT
Start: 2023-09-08

## 2023-09-08 RX ORDER — PALONOSETRON 0.05 MG/ML
0.25 INJECTION, SOLUTION INTRAVENOUS ONCE
Status: COMPLETED | OUTPATIENT
Start: 2023-09-08 | End: 2023-09-08

## 2023-09-08 RX ORDER — FAMOTIDINE 10 MG/ML
20 INJECTION, SOLUTION INTRAVENOUS AS NEEDED
Status: DISCONTINUED | OUTPATIENT
Start: 2023-09-08 | End: 2023-09-09 | Stop reason: HOSPADM

## 2023-09-08 RX ORDER — DIPHENHYDRAMINE HYDROCHLORIDE 50 MG/ML
50 INJECTION INTRAMUSCULAR; INTRAVENOUS AS NEEDED
Status: CANCELLED | OUTPATIENT
Start: 2023-09-08

## 2023-09-08 RX ADMIN — SODIUM CHLORIDE 250 ML: 9 INJECTION, SOLUTION INTRAVENOUS at 09:39

## 2023-09-08 RX ADMIN — FAMOTIDINE 20 MG: 10 INJECTION INTRAVENOUS at 10:31

## 2023-09-08 RX ADMIN — PALONOSETRON HYDROCHLORIDE 0.25 MG: 0.25 INJECTION INTRAVENOUS at 10:26

## 2023-09-08 RX ADMIN — SODIUM CHLORIDE 305 MG: 9 INJECTION, SOLUTION INTRAVENOUS at 11:58

## 2023-09-08 RX ADMIN — DEXAMETHASONE SODIUM PHOSPHATE 20 MG: 10 INJECTION, SOLUTION INTRAMUSCULAR; INTRAVENOUS at 10:46

## 2023-09-08 RX ADMIN — CARBOPLATIN 450 MG: 10 INJECTION, SOLUTION INTRAVENOUS at 15:21

## 2023-09-08 RX ADMIN — DIPHENHYDRAMINE HYDROCHLORIDE 50 MG: 50 INJECTION INTRAMUSCULAR; INTRAVENOUS at 10:28

## 2023-09-08 RX ADMIN — FOSAPREPITANT 150 MG: 150 INJECTION, POWDER, LYOPHILIZED, FOR SOLUTION INTRAVENOUS at 10:48

## 2023-09-08 RX ADMIN — SODIUM CHLORIDE 360 MG: 9 INJECTION, SOLUTION INTRAVENOUS at 09:41

## 2023-09-08 NOTE — PROGRESS NOTES
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 There are hypermetabolic left hilar and mediastinal lymph nodes consistent with regional metastasis.     Additional groundglass and subsolid lesions within the lungs most prominently along the anterior segment of the right upper lobe demonstrate little to no FDG uptake but remain suspicious for synchronous neoplasm. Recommend attention on follow-up.     Multiple hypodense lesions within the liver without substantial FDG uptake. These appear to be consistent with hemangiomas on prior examinations.     Similar size of a left adrenal nodule with increased metabolic activity. Given the stability of size this is still most likely a benign finding but recommend continued attention on follow-up.     Hypermetabolic focus near the left C4-5 facet joint with a questionable lytic lesion in this area. Recommend follow-up with MRI of the cervical spine and/or bone scan to further evaluate.        Electronically Signed: Tolu Zavaleta    7/19/2023 6:05 PM EDT    Workstation ID: [id number]     7/28/2023 -  Chemotherapy    OP LUNG  Nivolumab 360mg /  PACLitaxel / CARBOplatin AUC=5            REASON FOR VISIT: Management of my lung cancer    HISTORY OF PRESENT ILLNESS:   78 y.o.  female presents today for follow-up.  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  She continues have a mild cough.  But overall she is actually tolerated it well.  Today is cycle #3.  No issues with nausea vomiting.  No significant diarrhea.  No hemoptysis.  No headaches.      Past medical history, social history and family history was reviewed 09/08/23 and unchanged from prior visit.    Review of Systems:    Review of Systems   Constitutional:  Positive for fatigue.   HENT:  Negative.     Eyes: Negative.    Respiratory:  Positive for shortness of breath.    Cardiovascular: Negative.    Gastrointestinal: Negative.    Endocrine: Negative.    Genitourinary: Negative.     Skin: Negative.    Neurological:  Positive for extremity weakness.   Hematological: Negative.    Psychiatric/Behavioral: Negative.            Medications:        Current Outpatient Medications:     amLODIPine (NORVASC) 5 MG tablet, Take 1 tablet by mouth Daily., Disp: , Rfl:     aspirin 81 MG EC tablet, Take 1 tablet by mouth Every Night., Disp: , Rfl:     bisoprolol (ZEBeta) 10 MG tablet, Take 1 tablet by mouth Daily., Disp: , Rfl:     buPROPion XL (WELLBUTRIN XL) 150 MG 24 hr tablet, Take 1 tablet by mouth Daily., Disp: , Rfl:     cetirizine (zyrTEC) 10 MG tablet, Take 1 tablet by mouth Every Night., Disp: , Rfl:     citalopram (CeleXA) 20 MG tablet, Take 1 tablet by mouth Every Night., Disp: , Rfl:     diazePAM (VALIUM) 5 MG tablet, 1 tablet every 12 hours as needed for anxiety. Make take 2 tablets 30 min before procedure., Disp: 10 tablet, Rfl: 0    Ergocalciferol (VITAMIN D2 PO), Take  by mouth., Disp: , Rfl:     gabapentin (NEURONTIN) 800 MG tablet, Take 1 tablet by mouth 3 (Three) Times a Day., Disp: , Rfl:     hydroCHLOROthiazide (MICROZIDE) 12.5 MG capsule, Take 1 capsule by mouth Daily., Disp: , Rfl:     HYDROcodone-acetaminophen (NORCO) 7.5-325 MG per tablet, Take 1 tablet by mouth 2 (Two) Times a Day., Disp: , Rfl:     hydrOXYzine (ATARAX) 25 MG tablet, Take 1-2 tablets by mouth Every Night., Disp: , Rfl:  "    hyoscyamine (LEVSIN) 0.125 MG SL tablet, Take 1 tablet by mouth Every 6 (Six) Hours As Needed for Cramping., Disp: , Rfl:     levothyroxine (SYNTHROID, LEVOTHROID) 50 MCG tablet, Take 1 tablet by mouth Daily., Disp: , Rfl:     losartan (COZAAR) 50 MG tablet, , Disp: , Rfl:     lovastatin (MEVACOR) 20 MG tablet, Take 1 tablet by mouth Every Night., Disp: , Rfl:     OLANZapine (zyPREXA) 5 MG tablet, Take 1 tablet by mouth Every Night. Take on days 2, 3 and 4 after chemotherapy x 3 cycles for delayed nausea prevention., Disp: 9 tablet, Rfl: 0    omeprazole (priLOSEC) 20 MG capsule, Take 1 capsule by mouth 2 (Two) Times a Day., Disp: , Rfl:     ondansetron (ZOFRAN) 8 MG tablet, Take 1 tablet by mouth 3 (Three) Times a Day As Needed for Nausea or Vomiting., Disp: 30 tablet, Rfl: 2    Pain Medications               aspirin 81 MG EC tablet Take 1 tablet by mouth Every Night.    buPROPion XL (WELLBUTRIN XL) 150 MG 24 hr tablet Take 1 tablet by mouth Daily.    citalopram (CeleXA) 20 MG tablet Take 1 tablet by mouth Every Night.    gabapentin (NEURONTIN) 800 MG tablet Take 1 tablet by mouth 3 (Three) Times a Day.    HYDROcodone-acetaminophen (NORCO) 7.5-325 MG per tablet Take 1 tablet by mouth 2 (Two) Times a Day.    OLANZapine (zyPREXA) 5 MG tablet Take 1 tablet by mouth Every Night. Take on days 2, 3 and 4 after chemotherapy x 3 cycles for delayed nausea prevention.               ALLERGIES:    Allergies   Allergen Reactions    Augmentin [Amoxicillin-Pot Clavulanate] Diarrhea    Metformin Diarrhea    Rosuvastatin GI Intolerance         Physical Exam    VITAL SIGNS:  /66 Comment: LUE  Pulse 54   Temp 97.5 °F (36.4 °C) (Infrared)   Resp 18   Ht 154.9 cm (61\")   Wt 76.2 kg (168 lb)   LMP  (LMP Unknown)   SpO2 95% Comment: RA  BMI 31.74 kg/m²     ECOG score: 1           Wt Readings from Last 3 Encounters:   09/08/23 76.2 kg (168 lb)   09/07/23 75.8 kg (167 lb)   08/18/23 77.1 kg (170 lb)       Body mass index " is 31.74 kg/m². Body surface area is 1.75 meters squared.       Performance Status: 0    General: well appearing, in no acute distress  HEENT: sclera anicteric, neck is supple  Lymphatics: no cervical, supraclavicular, or axillary adenopathy  Cardiovascular: regular rate and rhythm, no murmurs, rubs or gallops  Lungs: clear to auscultation bilaterally  Abdomen: soft, nontender, nondistended.  No palpable organomegaly  Extremities: no lower extremity edema  Skin: no rashes, lesions, bruising, or petechiae  Msk:  Shows no weakness of the large muscle groups  Psych: Mood is stable      RECENT LABS:    Lab Results   Component Value Date    HGB 11.6 (L) 09/07/2023    HCT 37.1 09/07/2023    MCV 93.9 09/07/2023     09/07/2023    WBC 5.32 09/07/2023    NEUTROABS 2.34 09/07/2023    LYMPHSABS 2.19 09/07/2023    MONOSABS 0.65 09/07/2023    EOSABS 0.11 09/07/2023    BASOSABS 0.01 09/07/2023       Lab Results   Component Value Date    GLUCOSE 122 (H) 09/07/2023    BUN 21 09/07/2023    CREATININE 0.87 09/07/2023     09/07/2023    K 4.3 09/07/2023     09/07/2023    CO2 25.0 09/07/2023    CALCIUM 9.0 09/07/2023    PROTEINTOT 6.4 09/07/2023    ALBUMIN 3.9 09/07/2023    BILITOT 0.3 09/07/2023    ALKPHOS 122 (H) 09/07/2023    AST 16 09/07/2023    ALT 22 09/07/2023       XR Wrist 3+ View Right    Result Date: 4/30/2023  Impression: 1. There is surgical hardware in the distal right radius. The patient has an old healed fracture. There is no hardware loosening or failure. 2. 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 3. Degenerative changes. 4. Soft tissue swelling. Electronically Signed: Kirby Pineda  4/30/2023 3:18 PM EDT  Workstation ID: [id number]    CT Chest Without Contrast Diagnostic    Result Date: 6/21/2023  Stable exam including the dominant left lower lobe mass and pleural-based triangular right upper lobe lesion Electronically Signed: Souleymane Sanchez  6/21/2023 2:50 PM EDT  Workstation ID: [id number]    CT Chest  Without Contrast Diagnostic    Result Date: 6/1/2023  Impression: 1. Interval significant enlargement and increase in solidity of left lower lobe pulmonary mass, now measuring 3.5 cm maximally. This is suspicious for primary lung cancer. Repeat evaluation by PET scan versus biopsy suggested. 2. Indeterminate hypodense lesions in the liver, measuring up to 6 cm. The lesions in the left hepatic lobes appear new as compared to prior study. This may represent metastatic disease, and could also be evaluated by PET scan. Electronically Signed: Piter Altamirano  6/1/2023 4:59 PM EDT  Workstation ID: [id number]    MRI Brain With & Without Contrast    Result Date: 7/24/2023  Impression: No evidence of intracranial metastasis. A cystic finding in the left temporal lobe is stable and favored to reflect small arachnoid cyst. Electronically Signed: Destin Henry  7/24/2023 10:12 AM EDT  Workstation ID: [id number]    MRI Cervical Spine With & Without Contrast    Result Date: 7/25/2023  Impression: Recent finding involving the left-sided facet joint at C3-4 is characterized on MRI as most likely some edematous facet arthropathy. No specific evidence of osseous metastatic involvement in the cervical spine. Advanced multilevel spondylosis is noted as above including areas of severe spinal canal and neuroforaminal impingement. Electronically Signed: Destin Henry  7/25/2023 5:41 AM EDT  Workstation ID: [id number]    XR Chest 1 View    Result Date: 6/29/2023  Impression: 1. No pneumothorax status post bronchoscopy 2. Right basilar atelectasis 3. Mild pulmonary vascular congestion 4. Known left lower lobe and paramediastinal right upper lobe lesions Electronically Signed: Souleymane Sanchez  6/29/2023 9:16 AM EDT  Workstation ID: [id number]    NM PET/CT Skull Base to Mid Thigh    Result Date: 7/19/2023  Impression: Hypermetabolic left lower lobe lung mass consistent with known primary malignancy. There are hypermetabolic left hilar and mediastinal lymph  nodes consistent with regional metastasis. Additional groundglass and subsolid lesions within the lungs most prominently along the anterior segment of the right upper lobe demonstrate little to no FDG uptake but remain suspicious for synchronous neoplasm. Recommend attention on follow-up. Multiple hypodense lesions within the liver without substantial FDG uptake. These appear to be consistent with hemangiomas on prior examinations. Similar size of a left adrenal nodule with increased metabolic activity. Given the stability of size this is still most likely a benign finding but recommend continued attention on follow-up. Hypermetabolic focus near the left C4-5 facet joint with a questionable lytic lesion in this area. Recommend follow-up with MRI of the cervical spine and/or bone scan to further evaluate. Electronically Signed: Tolu Zavaleta  7/19/2023 6:05 PM EDT  Workstation ID: [id number]    XR Chest PA & Lateral    Result Date: 4/25/2023  Impression: 1. No acute process. 2. Left lower lobe 3.7 cm pulmonary nodule again noted, this appears increased in size from prior chest CT on 4/1/2021, consider chest CT follow-up to reassess. 3. Chronic severe compression fracture T11. Electronically Signed: Eric Kong  4/25/2023 3:43 PM EDT  Workstation ID: [id number]    EMG & Nerve Conduction Test    Result Date: 6/26/2023  Radial neuropathy at the humeral groove on the right, mild-moderate Median neuropathy at the wrist on the right, exceedingly mild This report is transcribed using the Dragon dictation system.           Assessment/Plan    1.  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  We will continue with treatment with cycle #3 with carboplatin and Taxol and nivolumab.  No dose reduction.  Her hypoxemia has cleared up.  So I think she would be a reasonable candidate for surgery.  However we will have to get PFTs done at that time.  Plan for PET scan prior to next visit to make sure that she  would be a surgical candidate or not.   if she is not a candidate for surgical intervention then I will treat her definitively with chemotherapy and radiation with a plan for immunotherapy posttreatment.    2.  Hypoxemia.  Her breathing is cleared up and she is not requiring oxygen at this time.    3.  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          Total time of patient care on day of service including time prior to, face to face with patient, and following visit spent in reviewing records, lab results, imaging studies, discussion with patient, and documentation/charting was > 32 minutes.     Hollie Mike MD  [facility]    Return on: 09/28/23  Return in (Approximately): 3 weeks    Orders Placed This Encounter   Procedures    NM PET/CT Skull Base to Mid Thigh       9/8/2023     Future Appointments         Provider Department Center    9/8/2023 9:00 AM (Arrive by 8:45 AM) Hollie Mike MD [facility]    9/8/2023 9:30 AM [facility]    9/28/2023 9:45 AM (Arrive by 9:30 AM) Hollie Mike MD [facility]

## 2023-09-11 ENCOUNTER — TELEPHONE (OUTPATIENT)
Dept: ONCOLOGY | Facility: CLINIC | Age: 78
End: 2023-09-11
Payer: MEDICARE

## 2023-09-11 NOTE — TELEPHONE ENCOUNTER
Distress Screening Follow-up    Name: Ruby Pettit    : 1945    Diagnosis: Malignant neoplasm of bronchus of left lower lobe    Location of Distress Screening: Other- Infusion    Distress Level: 5 (2023 12:30 PM)      Physical Concerns:  Sleep: Y  Substance abuse: N  Fatigue: Y  Tobacco use: N  Memory or concentration: Y  Sexual health: N  Changes in eating: N  Loss or change of physical abilities: N  Pain: Y        Emotional Concerns:  Worry or anxiety: Y  Sadness or depression: Y  Loss of interest or enjoyment: N  Grief or loss: N  Fear: Y  Loneliness: N  Anger: N  Changes in appearance: Y  Feelings of worthlessness or being a burden: N      Social Concerns:  Relationship with spouse or partner: N  Relationship with children: N  Relationship with family members: N  Relationship with friends or coworkers: N  Communication with health care team: N  Ability to have children: N      Practical Concerns:  Taking care of myself: N  Taking care of others: N  Work: N  School: N  Housing: N  Finances: Y  Insurance: Y  Transportation: N  : N  Having enough food: N  Access to medicine: Y  Treatment decisions: N      Spiritual Concerns:  Sense of meaning or purpose: N  Changes in sharif or beliefs: N  Death, dying or afterlife: N  Conflict between beliefs and cancer treatments: N  Relationship with the sacred: Y  Ritual or dietary needs: N       Interventions:     Comments:  MACIEJ called to follow up with pt regarding recent distress screen results. SW left a VM with pt introducing self, and offering additional support. MACIEJ provided call back number, ([phone]) and encouraged pt to reach out at her convenience.     MACIEJ will remain available to offer support.     HANNA Mora  Oncology Social Worker

## 2023-09-14 ENCOUNTER — TELEPHONE (OUTPATIENT)
Dept: ONCOLOGY | Facility: CLINIC | Age: 78
End: 2023-09-14
Payer: MEDICARE

## 2023-09-14 DIAGNOSIS — C34.32 MALIGNANT NEOPLASM OF BRONCHUS OF LEFT LOWER LOBE: Primary | ICD-10-CM

## 2023-09-14 NOTE — TELEPHONE ENCOUNTER
Patient called she is so sick, and can't keep anything down she needs some help with nutrition. Please call.

## 2023-09-15 ENCOUNTER — TELEPHONE (OUTPATIENT)
Dept: ONCOLOGY | Facility: CLINIC | Age: 78
End: 2023-09-15
Payer: MEDICARE

## 2023-09-15 ENCOUNTER — DOCUMENTATION (OUTPATIENT)
Dept: NUTRITION | Facility: HOSPITAL | Age: 78
End: 2023-09-15
Payer: MEDICARE

## 2023-09-15 ENCOUNTER — APPOINTMENT (OUTPATIENT)
Dept: CT IMAGING | Facility: HOSPITAL | Age: 78
End: 2023-09-15
Payer: MEDICARE

## 2023-09-15 ENCOUNTER — HOSPITAL ENCOUNTER (EMERGENCY)
Facility: HOSPITAL | Age: 78
Discharge: HOME | End: 2023-09-15
Attending: EMERGENCY MEDICINE
Payer: MEDICARE

## 2023-09-15 VITALS
WEIGHT: 167 LBS | SYSTOLIC BLOOD PRESSURE: 147 MMHG | OXYGEN SATURATION: 96 % | BODY MASS INDEX: 29.59 KG/M2 | HEIGHT: 63 IN | DIASTOLIC BLOOD PRESSURE: 84 MMHG | HEART RATE: 52 BPM | TEMPERATURE: 98.1 F | RESPIRATION RATE: 16 BRPM

## 2023-09-15 DIAGNOSIS — D64.9 ANEMIA, UNSPECIFIED TYPE: ICD-10-CM

## 2023-09-15 DIAGNOSIS — N39.0 ACUTE UTI: ICD-10-CM

## 2023-09-15 DIAGNOSIS — R10.32 LEFT LOWER QUADRANT ABDOMINAL PAIN: Primary | ICD-10-CM

## 2023-09-15 DIAGNOSIS — R10.9 ACUTE LEFT FLANK PAIN: ICD-10-CM

## 2023-09-15 DIAGNOSIS — K63.89 EPIPLOIC APPENDAGITIS: ICD-10-CM

## 2023-09-15 LAB
ALBUMIN SERPL-MCNC: 3.8 G/DL (ref 3.5–5.2)
ALBUMIN/GLOB SERPL: 1.6 G/DL
ALP SERPL-CCNC: 112 U/L (ref 39–117)
ALT SERPL W P-5'-P-CCNC: 24 U/L (ref 1–33)
ANION GAP SERPL CALCULATED.3IONS-SCNC: 10 MMOL/L (ref 5–15)
AST SERPL-CCNC: 16 U/L (ref 1–32)
BACTERIA UR QL AUTO: ABNORMAL /HPF
BASOPHILS # BLD MANUAL: 0.03 10*3/MM3 (ref 0–0.2)
BASOPHILS NFR BLD MANUAL: 1 % (ref 0–1.5)
BILIRUB SERPL-MCNC: 0.4 MG/DL (ref 0–1.2)
BILIRUB UR QL STRIP: NEGATIVE
BUN SERPL-MCNC: 18 MG/DL (ref 8–23)
BUN/CREAT SERPL: 18 (ref 7–25)
CALCIUM SPEC-SCNC: 9 MG/DL (ref 8.6–10.5)
CHLORIDE SERPL-SCNC: 106 MMOL/L (ref 98–107)
CLARITY UR: ABNORMAL
CO2 SERPL-SCNC: 23 MMOL/L (ref 22–29)
COLOR UR: YELLOW
CREAT SERPL-MCNC: 1 MG/DL (ref 0.57–1)
D-LACTATE SERPL-SCNC: 1.5 MMOL/L (ref 0.5–2)
DEPRECATED RDW RBC AUTO: 65.1 FL (ref 37–54)
EGFRCR SERPLBLD CKD-EPI 2021: 57.8 ML/MIN/1.73
EOSINOPHIL # BLD MANUAL: 0.03 10*3/MM3 (ref 0–0.4)
EOSINOPHIL NFR BLD MANUAL: 1 % (ref 0.3–6.2)
ERYTHROCYTE [DISTWIDTH] IN BLOOD BY AUTOMATED COUNT: 18.9 % (ref 12.3–15.4)
GLOBULIN UR ELPH-MCNC: 2.4 GM/DL
GLUCOSE SERPL-MCNC: 98 MG/DL (ref 65–99)
GLUCOSE UR STRIP-MCNC: NEGATIVE MG/DL
HCT VFR BLD AUTO: 32.6 % (ref 34–46.6)
HGB BLD-MCNC: 10.2 G/DL (ref 12–15.9)
HGB UR QL STRIP.AUTO: ABNORMAL
HOLD SPECIMEN: NORMAL
HYALINE CASTS UR QL AUTO: ABNORMAL /LPF
KETONES UR QL STRIP: NEGATIVE
LEUKOCYTE ESTERASE UR QL STRIP.AUTO: ABNORMAL
LIPASE SERPL-CCNC: 24 U/L (ref 13–60)
LYMPHOCYTES # BLD MANUAL: 1.7 10*3/MM3 (ref 0.7–3.1)
LYMPHOCYTES NFR BLD MANUAL: 1 % (ref 5–12)
MCH RBC QN AUTO: 29.3 PG (ref 26.6–33)
MCHC RBC AUTO-ENTMCNC: 31.3 G/DL (ref 31.5–35.7)
MCV RBC AUTO: 93.7 FL (ref 79–97)
MONOCYTES # BLD: 0.03 10*3/MM3 (ref 0.1–0.9)
MUCOUS THREADS URNS QL MICRO: ABNORMAL /HPF
NEUTROPHILS # BLD AUTO: 1.09 10*3/MM3 (ref 1.7–7)
NEUTROPHILS NFR BLD MANUAL: 38 % (ref 42.7–76)
NITRITE UR QL STRIP: NEGATIVE
OVALOCYTES BLD QL SMEAR: ABNORMAL
PH UR STRIP.AUTO: 5.5 [PH] (ref 5–8)
PLAT MORPH BLD: NORMAL
PLATELET # BLD AUTO: 147 10*3/MM3 (ref 140–450)
PMV BLD AUTO: 10.8 FL (ref 6–12)
POTASSIUM SERPL-SCNC: 3.9 MMOL/L (ref 3.5–5.2)
PROT SERPL-MCNC: 6.2 G/DL (ref 6–8.5)
PROT UR QL STRIP: ABNORMAL
RBC # BLD AUTO: 3.48 10*6/MM3 (ref 3.77–5.28)
RBC # UR STRIP: ABNORMAL /HPF
REF LAB TEST METHOD: ABNORMAL
SODIUM SERPL-SCNC: 139 MMOL/L (ref 136–145)
SP GR UR STRIP: 1.02 (ref 1–1.03)
SQUAMOUS #/AREA URNS HPF: ABNORMAL /HPF
UROBILINOGEN UR QL STRIP: ABNORMAL
VARIANT LYMPHS NFR BLD MANUAL: 18 % (ref 0–5)
VARIANT LYMPHS NFR BLD MANUAL: 41 % (ref 19.6–45.3)
WBC # UR STRIP: ABNORMAL /HPF
WBC MORPH BLD: NORMAL
WBC NRBC COR # BLD: 2.88 10*3/MM3 (ref 3.4–10.8)
WHOLE BLOOD HOLD COAG: NORMAL
WHOLE BLOOD HOLD SPECIMEN: NORMAL

## 2023-09-15 PROCEDURE — 87086 URINE CULTURE/COLONY COUNT: CPT | Performed by: EMERGENCY MEDICINE

## 2023-09-15 PROCEDURE — 83605 ASSAY OF LACTIC ACID: CPT | Performed by: EMERGENCY MEDICINE

## 2023-09-15 PROCEDURE — 80053 COMPREHEN METABOLIC PANEL: CPT | Performed by: EMERGENCY MEDICINE

## 2023-09-15 PROCEDURE — 85007 BL SMEAR W/DIFF WBC COUNT: CPT | Performed by: EMERGENCY MEDICINE

## 2023-09-15 PROCEDURE — 87186 SC STD MICRODIL/AGAR DIL: CPT | Performed by: EMERGENCY MEDICINE

## 2023-09-15 PROCEDURE — 36415 COLL VENOUS BLD VENIPUNCTURE: CPT

## 2023-09-15 PROCEDURE — 25010000002 PIPERACILLIN SOD-TAZOBACTAM PER 1 G: Performed by: EMERGENCY MEDICINE

## 2023-09-15 PROCEDURE — 25510000001 IOPAMIDOL 61 % SOLUTION: Performed by: EMERGENCY MEDICINE

## 2023-09-15 PROCEDURE — 99285 EMERGENCY DEPT VISIT HI MDM: CPT

## 2023-09-15 PROCEDURE — 83690 ASSAY OF LIPASE: CPT | Performed by: EMERGENCY MEDICINE

## 2023-09-15 PROCEDURE — 96365 THER/PROPH/DIAG IV INF INIT: CPT

## 2023-09-15 PROCEDURE — 81001 URINALYSIS AUTO W/SCOPE: CPT | Performed by: EMERGENCY MEDICINE

## 2023-09-15 PROCEDURE — 74177 CT ABD & PELVIS W/CONTRAST: CPT

## 2023-09-15 PROCEDURE — 85025 COMPLETE CBC W/AUTO DIFF WBC: CPT | Performed by: EMERGENCY MEDICINE

## 2023-09-15 PROCEDURE — 87040 BLOOD CULTURE FOR BACTERIA: CPT | Performed by: EMERGENCY MEDICINE

## 2023-09-15 PROCEDURE — 82565 ASSAY OF CREATININE: CPT

## 2023-09-15 PROCEDURE — 87077 CULTURE AEROBIC IDENTIFY: CPT | Performed by: EMERGENCY MEDICINE

## 2023-09-15 RX ORDER — CEFDINIR 300 MG/1
300 CAPSULE ORAL 2 TIMES DAILY
Qty: 14 CAPSULE | Refills: 0 | Status: SHIPPED | OUTPATIENT
Start: 2023-09-15 | End: 2023-09-22

## 2023-09-15 RX ORDER — SACCHAROMYCES BOULARDII 250 MG
250 CAPSULE ORAL 2 TIMES DAILY
Qty: 20 CAPSULE | Refills: 0 | Status: SHIPPED | OUTPATIENT
Start: 2023-09-15 | End: 2023-09-25

## 2023-09-15 RX ORDER — ONDANSETRON 4 MG/1
4 TABLET, FILM COATED ORAL EVERY 6 HOURS PRN
Qty: 8 TABLET | Refills: 0 | Status: SHIPPED | OUTPATIENT
Start: 2023-09-15

## 2023-09-15 RX ORDER — SODIUM CHLORIDE 9 MG/ML
10 INJECTION INTRAVENOUS AS NEEDED
Status: DISCONTINUED | OUTPATIENT
Start: 2023-09-15 | End: 2023-09-16 | Stop reason: HOSPADM

## 2023-09-15 RX ORDER — FENTANYL CITRATE 50 UG/ML
25 INJECTION, SOLUTION INTRAMUSCULAR; INTRAVENOUS ONCE
Status: DISCONTINUED | OUTPATIENT
Start: 2023-09-15 | End: 2023-09-16 | Stop reason: HOSPADM

## 2023-09-15 RX ADMIN — IOPAMIDOL 85 ML: 612 INJECTION, SOLUTION INTRAVENOUS at 18:03

## 2023-09-15 RX ADMIN — PIPERACILLIN SODIUM AND TAZOBACTAM SODIUM 3.38 G: 3; .375 INJECTION, SOLUTION INTRAVENOUS at 21:08

## 2023-09-15 RX ADMIN — SODIUM CHLORIDE 500 ML: 9 INJECTION, SOLUTION INTRAVENOUS at 21:08

## 2023-09-15 NOTE — TELEPHONE ENCOUNTER
Patient called she is having severe constant pain on the left side under her armpit and toward her back. Please call.

## 2023-09-15 NOTE — PROGRESS NOTES
ONC Nutrition    Diagnosis:  Stage IIIIA adenocarcinoma of the left lower lobe of the lung      Hypermetabolic left lower lobe lung mass consistent with known primary malignancy. There are hypermetabolic left hilar and mediastinal lymph nodes consistent with regional metastasis      Additional groundglass and subsolid lesions within the lungs most prominently along the anterior segment of the right upper lobe demonstrate little to no FDG uptake but remain suspicious for synchronous neoplasm.      Hypermetabolic focus near the left C4-5 facet joint with a questionable lytic lesion in this area.     Surgery:  PET scan after 3 cycles in anticipation of surgical intervention      Chemotherapy:  Checkmate-816 / 3 cycles of neoadjuvant Nivolumab with Carboplatin and Taxol - treatment completed 9/8/23    Weight 168 lbs    Phone consultation with patient regarding nausea that continues post completion of her last cycle.  No vomiting.    Patient has only been taking her anti-nausea medication 1x per day in the morning.  She states that she now knows that she should be taking it as prescribed prior to meals and will begin that today.    Reviewed current oral intake, making suggestions for appropriate food choices, encouraging her to eat something every 1-2 hours throughout the day, avoiding large quantities of liquids with meals, etc.  Patient was very appreciative of the phone consult; she has RD name and number for any additional questions or if the nausea continues to be unmanaged.    Patient education material mailed to the patient.

## 2023-09-15 NOTE — ED PROVIDER NOTES
Subjective   History of Present Illness    Review of Systems    Past Medical History:   Diagnosis Date    Anxiety and depression     Arthritis     Disease of thyroid gland     GERD (gastroesophageal reflux disease)     Head injury due to trauma 1992    fell down stairs     History of transfusion     NO REACTIONS    Hyperlipidemia     Hypertension     Liver disorder     surgery 1998 approx , BLOOD CLOTS    Peripheral neuropathic pain     T2DM (type 2 diabetes mellitus)     Wears eyeglasses        Allergies   Allergen Reactions    Augmentin [Amoxicillin-Pot Clavulanate] Diarrhea    Metformin Diarrhea    Rosuvastatin GI Intolerance       Past Surgical History:   Procedure Laterality Date    ABDOMINAL SURGERY      LIVER RESECTION    APPENDECTOMY      BRONCHOSCOPY N/A 02/13/2018    Procedure: BRONCHOSCOPY WITH SANDRITA ENDOBRONCHIAL ULTRASOUND WITH FLUORO;  Surgeon: Dixon Fatima MD;  Location:  [facility];  Service:     BRONCHOSCOPY N/A 03/21/2019    Procedure: NAVIGATIONAL BRONCHOSCOPY;  Surgeon: Dixon Fatima MD;  Location:  [facility];  Service: Pulmonary    BRONCHOSCOPY WITH ION ROBOTIC ASSIST N/A 6/29/2023    Procedure: NAVIGATIONAL BRONCHOSCOPY WITH ION ROBOT;  Surgeon: Dixon Fatima MD;  Location:  [facility];  Service: Robotics - Pulmonary;  Laterality: N/A;  Ion cath 3 - 0010,  3 - 0013.    CHOLECYSTECTOMY      COLONOSCOPY  2019    HYSTERECTOMY      2001    LIVER RESECTION      OOPHORECTOMY Bilateral     2001    ORIF WRIST FRACTURE Right     THYROID SURGERY      TUBAL ABDOMINAL LIGATION         Family History   Problem Relation Age of Onset    Ovarian cancer Mother 19    Emphysema Father     COPD Father     No Known Problems Sister     Tuberculosis Maternal Grandmother     Tuberculosis Maternal Grandfather     No Known Problems Paternal Grandmother     No Known Problems Paternal Grandfather     Breast cancer Neg Hx        Social History     Socioeconomic History     Marital status:    Tobacco Use    Smoking status: Never    Smokeless tobacco: Never   Vaping Use    Vaping Use: Never used   Substance and Sexual Activity    Alcohol use: Yes     Alcohol/week: 0.0 - 2.0 standard drinks     Comment: seldom     Drug use: No    Sexual activity: Defer           Objective   Physical Exam    Procedures           ED Course      Recent Results (from the past 24 hour(s))   Comprehensive Metabolic Panel    Collection Time: 09/15/23  4:51 PM    Specimen: Blood   Result Value Ref Range    Glucose 98 65 - 99 mg/dL    BUN 18 8 - 23 mg/dL    Creatinine 1.00 0.57 - 1.00 mg/dL    Sodium 139 136 - 145 mmol/L    Potassium 3.9 3.5 - 5.2 mmol/L    Chloride 106 98 - 107 mmol/L    CO2 23.0 22.0 - 29.0 mmol/L    Calcium 9.0 8.6 - 10.5 mg/dL    Total Protein 6.2 6.0 - 8.5 g/dL    Albumin 3.8 3.5 - 5.2 g/dL    ALT (SGPT) 24 1 - 33 U/L    AST (SGOT) 16 1 - 32 U/L    Alkaline Phosphatase 112 39 - 117 U/L    Total Bilirubin 0.4 0.0 - 1.2 mg/dL    Globulin 2.4 gm/dL    A/G Ratio 1.6 g/dL    BUN/Creatinine Ratio 18.0 7.0 - 25.0    Anion Gap 10.0 5.0 - 15.0 mmol/L    eGFR 57.8 (L) >60.0 mL/min/1.73   Lipase    Collection Time: 09/15/23  4:51 PM    Specimen: Blood   Result Value Ref Range    Lipase 24 13 - 60 U/L   Lactic Acid, Plasma    Collection Time: 09/15/23  4:51 PM    Specimen: Blood   Result Value Ref Range    Lactate 1.5 0.5 - 2.0 mmol/L   Green Top (Gel)    Collection Time: 09/15/23  4:51 PM   Result Value Ref Range    Extra Tube Hold for add-ons.    Lavender Top    Collection Time: 09/15/23  4:51 PM   Result Value Ref Range    Extra Tube hold for add-on    Gold Top - SST    Collection Time: 09/15/23  4:51 PM   Result Value Ref Range    Extra Tube Hold for add-ons.    Gray Top    Collection Time: 09/15/23  4:51 PM   Result Value Ref Range    Extra Tube Hold for add-ons.    Light Blue Top    Collection Time: 09/15/23  4:51 PM   Result Value Ref Range    Extra Tube Hold for add-ons.    CBC Auto  Differential    Collection Time: 09/15/23  4:51 PM    Specimen: Blood   Result Value Ref Range    WBC 2.88 (L) 3.40 - 10.80 10*3/mm3    RBC 3.48 (L) 3.77 - 5.28 10*6/mm3    Hemoglobin 10.2 (L) 12.0 - 15.9 g/dL    Hematocrit 32.6 (L) 34.0 - 46.6 %    MCV 93.7 79.0 - 97.0 fL    MCH 29.3 26.6 - 33.0 pg    MCHC 31.3 (L) 31.5 - 35.7 g/dL    RDW 18.9 (H) 12.3 - 15.4 %    RDW-SD 65.1 (H) 37.0 - 54.0 fl    MPV 10.8 6.0 - 12.0 fL    Platelets 147 140 - 450 10*3/mm3   Manual Differential    Collection Time: 09/15/23  4:51 PM    Specimen: Blood   Result Value Ref Range    Neutrophil % 38.0 (L) 42.7 - 76.0 %    Lymphocyte % 41.0 19.6 - 45.3 %    Monocyte % 1.0 (L) 5.0 - 12.0 %    Eosinophil % 1.0 0.3 - 6.2 %    Basophil % 1.0 0.0 - 1.5 %    Atypical Lymphocyte % 18.0 (H) 0.0 - 5.0 %    Neutrophils Absolute 1.09 (L) 1.70 - 7.00 10*3/mm3    Lymphocytes Absolute 1.70 0.70 - 3.10 10*3/mm3    Monocytes Absolute 0.03 (L) 0.10 - 0.90 10*3/mm3    Eosinophils Absolute 0.03 0.00 - 0.40 10*3/mm3    Basophils Absolute 0.03 0.00 - 0.20 10*3/mm3    Ovalocytes Slight/1+ None Seen    WBC Morphology Normal Normal    Platelet Morphology Normal Normal   Urinalysis With Microscopic If Indicated (No Culture) - Urine, Clean Catch    Collection Time: 09/15/23  4:59 PM    Specimen: Urine, Clean Catch   Result Value Ref Range    Color, UA Yellow Yellow, Straw    Appearance, UA Cloudy (A) Clear    pH, UA 5.5 5.0 - 8.0    Specific Gravity, UA 1.023 1.001 - 1.030    Glucose, UA Negative Negative    Ketones, UA Negative Negative    Bilirubin, UA Negative Negative    Blood, UA Trace (A) Negative    Protein, UA 30 mg/dL (1+) (A) Negative    Leuk Esterase, UA Small (1+) (A) Negative    Nitrite, UA Negative Negative    Urobilinogen, UA 1.0 E.U./dL 0.2 - 1.0 E.U./dL   Urinalysis, Microscopic Only - Urine, Clean Catch    Collection Time: 09/15/23  4:59 PM    Specimen: Urine, Clean Catch   Result Value Ref Range    RBC, UA 3-6 (A) None Seen, 0-2 /HPF    WBC, UA  "31-50 (A) None Seen, 0-2 /HPF    Bacteria, UA 4+ (A) None Seen, Trace /HPF    Squamous Epithelial Cells, UA 7-12 (A) None Seen, 0-2 /HPF    Hyaline Casts, UA None Seen 0 - 6 /LPF    Mucus, UA Small/1+ (A) None Seen, Trace /HPF    Methodology Manual Light Microscopy      Note: In addition to lab results from this visit, the labs listed above may include labs taken at another facility or during a different encounter within the last 24 hours. Please correlate lab times with ED admission and discharge times for further clarification of the services performed during this visit.    CT Abdomen Pelvis With Contrast   Final Result   Impression:   1.Proximal descending colonic epiploic appendagitis.   2.Decrease in size of left lower lobe pulmonary mass.   3.Other relatively stable findings in the abdomen and pelvis.            Electronically Signed: Long Casanova MD     9/15/2023 5:19 PM CDT     Workstation ID: [id number]        Vitals:    09/15/23 1544 09/15/23 2149   BP: 114/77 147/84   BP Location: Left arm    Patient Position: Sitting    Pulse: 61 52   Resp: 20 16   Temp: 98.1 °F (36.7 °C)    TempSrc: Oral    SpO2: 98% 96%   Weight: 75.8 kg (167 lb)    Height: 160 cm (63\")      Medications   sodium chloride 0.9 % bolus 500 mL (0 mL Intravenous Stopped 9/15/23 2142)   iopamidol (ISOVUE-300) 61 % injection 100 mL (85 mL Intravenous Given 9/15/23 1803)   piperacillin-tazobactam (ZOSYN) 3.375 g in iso-osmotic dextrose 50 ml (premix) (0 g Intravenous Stopped 9/15/23 2142)     ECG/EMG Results (last 24 hours)       ** No results found for the last 24 hours. **          No orders to display                                            Medical Decision Making  Problems Addressed:  Acute left flank pain: complicated acute illness or injury  Acute UTI: complicated acute illness or injury  Anemia, unspecified type: complicated acute illness or injury  Epiploic appendagitis: complicated acute illness or injury  Left lower quadrant " abdominal pain: complicated acute illness or injury    Amount and/or Complexity of Data Reviewed  Labs: ordered.  Radiology: ordered.    Risk  OTC drugs.  Prescription drug management.        Final diagnoses:   None       ED Disposition  ED Disposition       None            No follow-up provider specified.       Medication List      No changes were made to your prescriptions during this visit.          Addressed:  Acute left flank pain: complicated acute illness or injury  Acute UTI: complicated acute illness or injury  Anemia, unspecified type: complicated acute illness or injury  Epiploic appendagitis: complicated acute illness or injury  Left lower quadrant abdominal pain: complicated acute illness or injury    Amount and/or Complexity of Data Reviewed  External Data Reviewed: notes.  Labs: ordered. Decision-making details documented in ED Course.  Radiology: ordered and independent interpretation performed. Decision-making details documented in ED Course.    Risk  OTC drugs.  Prescription drug management.        Final diagnoses:   Left lower quadrant abdominal pain   Acute left flank pain   Acute UTI   Epiploic appendagitis   Anemia, unspecified type       ED Disposition  ED Disposition       ED Disposition   Discharge    Condition   Stable    Comment   --           DISCHARGE    Patient discharged in stable condition.    Reviewed implications of results, diagnosis, meds, responsibility to follow up, warning signs and symptoms of possible worsening, potential complications and reasons to return to ER.    Patient/Family voiced understanding of above instructions.    Discussed plan for discharge, as there is no emergent indication for admission.  Pt/family is agreeable and understands need for follow up and possible repeat testing.  Pt/family is aware that discharge does not mean that nothing is wrong but that it indicates no emergency is currently present that requires admission and they must continue care with follow-up as given below or with a physician of their choice.     FOLLOW-UP  Ly Funez MD  [address] [address] [phone]    Schedule an appointment as soon as possible for a visit       [facility] EMERGENCY DEPARTMENT  [address] [facility] [id number] [phone]    If symptoms worsen         Medication List        New  Prescriptions      saccharomyces boulardii 250 MG capsule  Commonly known as: FLORASTOR  Take 1 capsule by mouth 2 (Two) Times a Day for 10 days.            Changed      * ondansetron 8 MG tablet  Commonly known as: ZOFRAN  Take 1 tablet by mouth 3 (Three) Times a Day As Needed for Nausea or Vomiting.  What changed: Another medication with the same name was added. Make sure you understand how and when to take each.     * ondansetron 4 MG tablet  Commonly known as: ZOFRAN  Take 1 tablet by mouth Every 6 (Six) Hours As Needed for Nausea or Vomiting.  What changed: You were already taking a medication with the same name, and this prescription was added. Make sure you understand how and when to take each.           * This list has 2 medication(s) that are the same as other medications prescribed for you. Read the directions carefully, and ask your doctor or other care provider to review them with you.                ASK your doctor about these medications      cefdinir 300 MG capsule  Commonly known as: OMNICEF  Take 1 capsule by mouth 2 (Two) Times a Day for 7 days.  Ask about: Should I take this medication?               Where to Get Your Medications        These medications were sent to [facility] [id number] - [place], [place] - [facility] [place] [address] AT [facility] [address] - [phone]  - [phone] [place] [address] [address], [facility] [address]      Phone: [phone]   cefdinir 300 MG capsule  ondansetron 4 MG tablet  saccharomyces boulardii 250 MG capsule            Davey Wallace DO  09/25/23 1411

## 2023-09-18 LAB
BACTERIA SPEC AEROBE CULT: ABNORMAL
CREAT BLDA-MCNC: 1 MG/DL (ref 0.6–1.3)

## 2023-09-20 LAB
BACTERIA SPEC AEROBE CULT: NORMAL
BACTERIA SPEC AEROBE CULT: NORMAL

## 2023-09-26 ENCOUNTER — HOSPITAL ENCOUNTER (OUTPATIENT)
Dept: PET IMAGING | Facility: HOSPITAL | Age: 78
Discharge: HOME | End: 2023-09-26
Payer: MEDICARE

## 2023-09-26 DIAGNOSIS — C34.32 MALIGNANT NEOPLASM OF BRONCHUS OF LEFT LOWER LOBE: ICD-10-CM

## 2023-09-26 LAB — GLUCOSE BLDC GLUCOMTR-MCNC: 95 MG/DL (ref 70–130)

## 2023-09-26 PROCEDURE — 0 FLUDEOXYGLUCOSE F18 SOLUTION: Performed by: INTERNAL MEDICINE

## 2023-09-26 PROCEDURE — 82948 REAGENT STRIP/BLOOD GLUCOSE: CPT

## 2023-09-26 PROCEDURE — 78815 PET IMAGE W/CT SKULL-THIGH: CPT

## 2023-09-26 PROCEDURE — A9552 F18 FDG: HCPCS | Performed by: INTERNAL MEDICINE

## 2023-09-26 RX ADMIN — FLUDEOXYGLUCOSE F18 1 DOSE: 300 INJECTION INTRAVENOUS at 09:10

## 2023-09-28 ENCOUNTER — OFFICE VISIT (OUTPATIENT)
Dept: ONCOLOGY | Facility: CLINIC | Age: 78
End: 2023-09-28
Payer: MEDICARE

## 2023-09-28 VITALS
DIASTOLIC BLOOD PRESSURE: 76 MMHG | BODY MASS INDEX: 31.53 KG/M2 | HEIGHT: 61 IN | OXYGEN SATURATION: 95 % | WEIGHT: 167 LBS | SYSTOLIC BLOOD PRESSURE: 166 MMHG | RESPIRATION RATE: 16 BRPM | HEART RATE: 57 BPM | TEMPERATURE: 96.9 F

## 2023-09-28 DIAGNOSIS — C34.32 MALIGNANT NEOPLASM OF BRONCHUS OF LEFT LOWER LOBE: Primary | ICD-10-CM

## 2023-09-28 NOTE — LETTER
September 28, 2023       No Recipients    Patient: Ruby Pettit   YOB: 1945   Date of Visit: 9/28/2023     Dear Ly Funez MD:       Thank you for referring Ruby Pettit to me for evaluation. Below are the relevant portions of my assessment and plan of care.    If you have questions, please do not hesitate to call me. I look forward to following Ruby along with you.         Sincerely,        Hollie Mike MD        CC:   No Recipients    Hollie Mike MD  09/28/23 1003  Sign when Signing Visit  PROBLEM LIST:  Oncology/Hematology History   Malignant neoplasm of bronchus of left lower lobe   6/26/2023 Initial Diagnosis    Malignant neoplasm of bronchus of left lower lobe     6/29/2023 Cancer Staged    Staging form: Lung, AJCC 8th Edition  - Clinical stage from 6/29/2023: Stage IIIA (cT1c, cN2, cM0) - Signed by Hollie Mike MD on 7/20/2023 7/20/2023 Molecular Testing    Molecular testing - [facility]     Negative for EGFR, BRAF and ALK     7/20/2023 Imaging    Pet Scan    IMPRESSION:  Impression:  Hypermetabolic left lower lobe lung mass consistent with known primary malignancy. There are hypermetabolic left hilar and mediastinal lymph nodes consistent with regional metastasis.     Additional groundglass and subsolid lesions within the lungs most prominently along the anterior segment of the right upper lobe demonstrate little to no FDG uptake but remain suspicious for synchronous neoplasm. Recommend attention on follow-up.     Multiple hypodense lesions within the liver without substantial FDG uptake. These appear to be consistent with hemangiomas on prior examinations.     Similar size of a left adrenal nodule with increased metabolic activity. Given the stability of size this is still most likely a benign finding but recommend continued attention on follow-up.     Hypermetabolic focus near the left C4-5 facet joint with a questionable lytic lesion in this area.  Recommend follow-up with MRI of the cervical spine and/or bone scan to further evaluate.        Electronically Signed: Tolu Zavaleta    7/19/2023 6:05 PM EDT    Workstation ID: [id number]     7/28/2023 - 9/8/2023 Chemotherapy    OP LUNG  Nivolumab 360mg /  PACLitaxel / CARBOplatin AUC=5     Completed 3 cycles neoadjuvantly     9/26/2023 Imaging    NM PET/CT Skull Base to Mid Thigh    Result Date: 9/27/2023  Impression: 1.The left lower lobe mass is significantly decreased in size and metabolic activity, indicating a positive response to therapy. There is also resolution of previously seen hypermetabolic subcarinal lymph node and stable size of a mildly enlarged left hilar lymph node without metabolic activity. 2.Hypermetabolic focus associated with a lucency at the left C3-C4 facet joint. This could represent a metastatic lesion or degenerative erosion. No additional hypermetabolic bone lesions. 3.Stable triangular focus of airspace disease in the anterior right upper lobe with low level metabolic activity. This is favored to represent an area of pneumonia or inflammation. Metastatic disease is considered less likely. Continued follow-up is recommended. Electronically Signed: Sergio Spivey MD  9/27/2023 9:03 AM EDT  Workstation ID: [id number]        Surgery    Surgery       Procedure:  Surgical consult with Dr. Reynolds pending s/p neoadjuvant chemoimmunotherapy per [facility] [id number]         REASON FOR VISIT: Management of my lung cancer    HISTORY OF PRESENT ILLNESS:   78 y.o.  female presents today for follow-up.  She has stage III disease with an anterior node that was positive.  I opted to treat her with neoadjuvant chemotherapy per [facility] [id number].  She does have an N2 node that is positive.  She has tolerated treatment though albeit with significant side effects.  Her breathing is back to normal.  She presents after having a PET scan done.    Past medical history, social history and family history was reviewed  09/28/23 and unchanged from prior visit.    Review of Systems:    Review of Systems   Constitutional:  Positive for fatigue.   HENT:  Negative.     Eyes: Negative.    Respiratory:  Positive for shortness of breath.    Cardiovascular: Negative.    Gastrointestinal: Negative.    Endocrine: Negative.    Genitourinary: Negative.     Skin: Negative.    Neurological:  Positive for extremity weakness.   Hematological: Negative.    Psychiatric/Behavioral: Negative.            Medications:        Current Outpatient Medications:   •  amLODIPine (NORVASC) 5 MG tablet, Take 1 tablet by mouth Daily., Disp: , Rfl:   •  aspirin 81 MG EC tablet, Take 1 tablet by mouth Every Night., Disp: , Rfl:   •  bisoprolol (ZEBeta) 10 MG tablet, Take 1 tablet by mouth Daily., Disp: , Rfl:   •  buPROPion XL (WELLBUTRIN XL) 150 MG 24 hr tablet, Take 1 tablet by mouth Daily., Disp: , Rfl:   •  cetirizine (zyrTEC) 10 MG tablet, Take 1 tablet by mouth Every Night., Disp: , Rfl:   •  citalopram (CeleXA) 20 MG tablet, Take 1 tablet by mouth Every Night., Disp: , Rfl:   •  diazePAM (VALIUM) 5 MG tablet, 1 tablet every 12 hours as needed for anxiety. Make take 2 tablets 30 min before procedure., Disp: 10 tablet, Rfl: 0  •  Ergocalciferol (VITAMIN D2 PO), Take  by mouth., Disp: , Rfl:   •  gabapentin (NEURONTIN) 800 MG tablet, Take 1 tablet by mouth 3 (Three) Times a Day., Disp: , Rfl:   •  hydroCHLOROthiazide (MICROZIDE) 12.5 MG capsule, Take 1 capsule by mouth Daily., Disp: , Rfl:   •  HYDROcodone-acetaminophen (NORCO) 7.5-325 MG per tablet, Take 1 tablet by mouth 2 (Two) Times a Day., Disp: , Rfl:   •  hydrOXYzine (ATARAX) 25 MG tablet, Take 1-2 tablets by mouth Every Night., Disp: , Rfl:   •  hyoscyamine (LEVSIN) 0.125 MG SL tablet, Take 1 tablet by mouth Every 6 (Six) Hours As Needed for Cramping., Disp: , Rfl:   •  levothyroxine (SYNTHROID, LEVOTHROID) 50 MCG tablet, Take 1 tablet by mouth Daily., Disp: , Rfl:   •  losartan (COZAAR) 50 MG tablet, ,  "Disp: , Rfl:   •  lovastatin (MEVACOR) 20 MG tablet, Take 1 tablet by mouth Every Night., Disp: , Rfl:   •  omeprazole (priLOSEC) 20 MG capsule, Take 1 capsule by mouth 2 (Two) Times a Day., Disp: , Rfl:   •  ondansetron (ZOFRAN) 4 MG tablet, Take 1 tablet by mouth Every 6 (Six) Hours As Needed for Nausea or Vomiting., Disp: 8 tablet, Rfl: 0    Pain Medications               aspirin 81 MG EC tablet Take 1 tablet by mouth Every Night.    buPROPion XL (WELLBUTRIN XL) 150 MG 24 hr tablet Take 1 tablet by mouth Daily.    citalopram (CeleXA) 20 MG tablet Take 1 tablet by mouth Every Night.    gabapentin (NEURONTIN) 800 MG tablet Take 1 tablet by mouth 3 (Three) Times a Day.    HYDROcodone-acetaminophen (NORCO) 7.5-325 MG per tablet Take 1 tablet by mouth 2 (Two) Times a Day.               ALLERGIES:    Allergies   Allergen Reactions   • Augmentin [Amoxicillin-Pot Clavulanate] Diarrhea     Has tolerated Ceftriaxone, Cefdinir, Cefuroxime.   • Metformin Diarrhea   • Rosuvastatin GI Intolerance         Physical Exam    VITAL SIGNS:  /76 Comment: LUE  Pulse 57   Temp 96.9 °F (36.1 °C) (Infrared)   Resp 16   Ht 154.9 cm (61\")   Wt 75.8 kg (167 lb)   LMP  (LMP Unknown)   SpO2 95% Comment: RA  BMI 31.55 kg/m²     ECOG score: 1           Wt Readings from Last 3 Encounters:   09/28/23 75.8 kg (167 lb)   09/15/23 75.8 kg (167 lb)   09/08/23 76.2 kg (168 lb)       Body mass index is 31.55 kg/m². Body surface area is 1.75 meters squared.       Performance Status: 0    General: well appearing, in no acute distress  HEENT: sclera anicteric, neck is supple  Lymphatics: no cervical, supraclavicular, or axillary adenopathy  Cardiovascular: regular rate and rhythm, no murmurs, rubs or gallops  Lungs: clear to auscultation bilaterally  Abdomen: soft, nontender, nondistended.  No palpable organomegaly  Extremities: no lower extremity edema  Skin: no rashes, lesions, bruising, or petechiae  Msk:  Shows no weakness of the large " muscle groups  Psych: Mood is stable      RECENT LABS:    Lab Results   Component Value Date    HGB 10.2 (L) 09/15/2023    HCT 32.6 (L) 09/15/2023    MCV 93.7 09/15/2023     09/15/2023    WBC 2.88 (L) 09/15/2023    NEUTROABS 1.09 (L) 09/15/2023    LYMPHSABS 2.19 09/07/2023    MONOSABS 0.65 09/07/2023    EOSABS 0.03 09/15/2023    BASOSABS 0.03 09/15/2023       Lab Results   Component Value Date    GLUCOSE 98 09/15/2023    BUN 18 09/15/2023    CREATININE 1.00 09/15/2023     09/15/2023    K 3.9 09/15/2023     09/15/2023    CO2 23.0 09/15/2023    CALCIUM 9.0 09/15/2023    PROTEINTOT 6.2 09/15/2023    ALBUMIN 3.8 09/15/2023    BILITOT 0.4 09/15/2023    ALKPHOS 112 09/15/2023    AST 16 09/15/2023    ALT 24 09/15/2023           Assessment/Plan    1.  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  PET scan shows resolution of the N2 node.  She has had a significant improvement in her disease in the left lower lobe.  I am going to at this time send her to CT surgery to evaluate for surgery.  If she is not a surgical candidate then plan to treat her with definitive chemoradiation followed by immunotherapy.    2.  Hypoxemia.  Her breathing is cleared up and she is not requiring oxygen at this time.    3.  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      4.  C3-C4 lesion on PET scan.  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  Had not changed with treatments.  Likely benign rather than malignant.        Total time of patient care on day of service including time prior to, face to face with patient, and following visit spent in reviewing records, lab results, imaging studies, discussion with patient, and documentation/charting was > 46 minutes.     Hollie Mike MD  [facility] Hematology and Oncology         Orders Placed This Encounter   Procedures   • Ambulatory Referral to Cardiothoracic Surgery       9/28/2023     Future Appointments         Provider Department Center     10/17/2023 2:00 PM Dixon Reynolds MD [facility]ARD[facility]AC[facility]GERY [facility]

## 2023-09-28 NOTE — PROGRESS NOTES
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 There are hypermetabolic left hilar and mediastinal lymph nodes consistent with regional metastasis.     Additional groundglass and subsolid lesions within the lungs most prominently along the anterior segment of the right upper lobe demonstrate little to no FDG uptake but remain suspicious for synchronous neoplasm. Recommend attention on follow-up.     Multiple hypodense lesions within the liver without substantial FDG uptake. These appear to be consistent with hemangiomas on prior examinations.     Similar size of a left adrenal nodule with increased metabolic activity. Given the stability of size this is still most likely a benign finding but recommend continued attention on follow-up.     Hypermetabolic focus near the left C4-5 facet joint with a questionable lytic lesion in this area. Recommend follow-up with MRI of the cervical spine and/or bone scan to further evaluate.        Electronically Signed: Tolu Zavaleta    7/19/2023 6:05 PM EDT    Workstation ID: [id number]     7/28/2023 - 9/8/2023 Chemotherapy    OP LUNG  Nivolumab 360mg /  PACLitaxel / CARBOplatin AUC=5     Completed 3 cycles neoadjuvantly     9/26/2023 Imaging    NM PET/CT Skull Base to Mid Thigh    Result Date: 9/27/2023  Impression: 1.The left lower lobe mass is significantly decreased in size and metabolic activity, indicating a positive response  to therapy. There is also resolution of previously seen hypermetabolic subcarinal lymph node and stable size of a mildly enlarged left hilar lymph node without metabolic activity. 2.Hypermetabolic focus associated with a lucency at the left C3-C4 facet joint. This could represent a metastatic lesion or degenerative erosion. No additional hypermetabolic bone lesions. 3.Stable triangular focus of airspace disease in the anterior right upper lobe with low level metabolic activity. This is favored to represent an area of pneumonia or inflammation. Metastatic disease is considered less likely. Continued follow-up is recommended. Electronically Signed: Sergio Spivey MD  9/27/2023 9:03 AM EDT  Workstation ID: [id number]        Surgery    Surgery       Procedure:  Surgical consult with Dr. Reynolds pending s/p neoadjuvant chemoimmunotherapy per [id number]         REASON FOR VISIT: Management of my lung cancer    HISTORY OF PRESENT ILLNESS:   78 y.o.  female presents today for follow-up.  She has stage III disease with an anterior node that was positive.  I opted to treat her with neoadjuvant chemotherapy per [id number].  She does have an N2 node that is positive.  She has tolerated treatment though albeit with significant side effects.  Her breathing is back to normal.  She presents after having a PET scan done.    Past medical history, social history and family history was reviewed 09/28/23 and unchanged from prior visit.    Review of Systems:    Review of Systems   Constitutional:  Positive for fatigue.   HENT:  Negative.     Eyes: Negative.    Respiratory:  Positive for shortness of breath.    Cardiovascular: Negative.    Gastrointestinal: Negative.    Endocrine: Negative.    Genitourinary: Negative.     Skin: Negative.    Neurological:  Positive for extremity weakness.   Hematological: Negative.    Psychiatric/Behavioral: Negative.            Medications:        Current Outpatient Medications:     amLODIPine  (NORVASC) 5 MG tablet, Take 1 tablet by mouth Daily., Disp: , Rfl:     aspirin 81 MG EC tablet, Take 1 tablet by mouth Every Night., Disp: , Rfl:     bisoprolol (ZEBeta) 10 MG tablet, Take 1 tablet by mouth Daily., Disp: , Rfl:     buPROPion XL (WELLBUTRIN XL) 150 MG 24 hr tablet, Take 1 tablet by mouth Daily., Disp: , Rfl:     cetirizine (zyrTEC) 10 MG tablet, Take 1 tablet by mouth Every Night., Disp: , Rfl:     citalopram (CeleXA) 20 MG tablet, Take 1 tablet by mouth Every Night., Disp: , Rfl:     diazePAM (VALIUM) 5 MG tablet, 1 tablet every 12 hours as needed for anxiety. Make take 2 tablets 30 min before procedure., Disp: 10 tablet, Rfl: 0    Ergocalciferol (VITAMIN D2 PO), Take  by mouth., Disp: , Rfl:     gabapentin (NEURONTIN) 800 MG tablet, Take 1 tablet by mouth 3 (Three) Times a Day., Disp: , Rfl:     hydroCHLOROthiazide (MICROZIDE) 12.5 MG capsule, Take 1 capsule by mouth Daily., Disp: , Rfl:     HYDROcodone-acetaminophen (NORCO) 7.5-325 MG per tablet, Take 1 tablet by mouth 2 (Two) Times a Day., Disp: , Rfl:     hydrOXYzine (ATARAX) 25 MG tablet, Take 1-2 tablets by mouth Every Night., Disp: , Rfl:     hyoscyamine (LEVSIN) 0.125 MG SL tablet, Take 1 tablet by mouth Every 6 (Six) Hours As Needed for Cramping., Disp: , Rfl:     levothyroxine (SYNTHROID, LEVOTHROID) 50 MCG tablet, Take 1 tablet by mouth Daily., Disp: , Rfl:     losartan (COZAAR) 50 MG tablet, , Disp: , Rfl:     lovastatin (MEVACOR) 20 MG tablet, Take 1 tablet by mouth Every Night., Disp: , Rfl:     omeprazole (priLOSEC) 20 MG capsule, Take 1 capsule by mouth 2 (Two) Times a Day., Disp: , Rfl:     ondansetron (ZOFRAN) 4 MG tablet, Take 1 tablet by mouth Every 6 (Six) Hours As Needed for Nausea or Vomiting., Disp: 8 tablet, Rfl: 0    Pain Medications               aspirin 81 MG EC tablet Take 1 tablet by mouth Every Night.    buPROPion XL (WELLBUTRIN XL) 150 MG 24 hr tablet Take 1 tablet by mouth Daily.    citalopram (CeleXA) 20 MG tablet  "Take 1 tablet by mouth Every Night.    gabapentin (NEURONTIN) 800 MG tablet Take 1 tablet by mouth 3 (Three) Times a Day.    HYDROcodone-acetaminophen (NORCO) 7.5-325 MG per tablet Take 1 tablet by mouth 2 (Two) Times a Day.               ALLERGIES:    Allergies   Allergen Reactions    Augmentin [Amoxicillin-Pot Clavulanate] Diarrhea     Has tolerated Ceftriaxone, Cefdinir, Cefuroxime.    Metformin Diarrhea    Rosuvastatin GI Intolerance         Physical Exam    VITAL SIGNS:  /76 Comment: LUE  Pulse 57   Temp 96.9 °F (36.1 °C) (Infrared)   Resp 16   Ht 154.9 cm (61\")   Wt 75.8 kg (167 lb)   LMP  (LMP Unknown)   SpO2 95% Comment: RA  BMI 31.55 kg/m²     ECOG score: 1           Wt Readings from Last 3 Encounters:   09/28/23 75.8 kg (167 lb)   09/15/23 75.8 kg (167 lb)   09/08/23 76.2 kg (168 lb)       Body mass index is 31.55 kg/m². Body surface area is 1.75 meters squared.       Performance Status: 0    General: well appearing, in no acute distress  HEENT: sclera anicteric, neck is supple  Lymphatics: no cervical, supraclavicular, or axillary adenopathy  Cardiovascular: regular rate and rhythm, no murmurs, rubs or gallops  Lungs: clear to auscultation bilaterally  Abdomen: soft, nontender, nondistended.  No palpable organomegaly  Extremities: no lower extremity edema  Skin: no rashes, lesions, bruising, or petechiae  Msk:  Shows no weakness of the large muscle groups  Psych: Mood is stable      RECENT LABS:    Lab Results   Component Value Date    HGB 10.2 (L) 09/15/2023    HCT 32.6 (L) 09/15/2023    MCV 93.7 09/15/2023     09/15/2023    WBC 2.88 (L) 09/15/2023    NEUTROABS 1.09 (L) 09/15/2023    LYMPHSABS 2.19 09/07/2023    MONOSABS 0.65 09/07/2023    EOSABS 0.03 09/15/2023    BASOSABS 0.03 09/15/2023       Lab Results   Component Value Date    GLUCOSE 98 09/15/2023    BUN 18 09/15/2023    CREATININE 1.00 09/15/2023     09/15/2023    K 3.9 09/15/2023     09/15/2023    CO2 23.0 " 09/15/2023    CALCIUM 9.0 09/15/2023    PROTEINTOT 6.2 09/15/2023    ALBUMIN 3.8 09/15/2023    BILITOT 0.4 09/15/2023    ALKPHOS 112 09/15/2023    AST 16 09/15/2023    ALT 24 09/15/2023           Assessment/Plan    1.  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  PET scan shows resolution of the N2 node.  She has had a significant improvement in her disease in the left lower lobe.  I am going to at this time send her to CT surgery to evaluate for surgery.  If she is not a surgical candidate then plan to treat her with definitive chemoradiation followed by immunotherapy.    2.  Hypoxemia.  Her breathing is cleared up and she is not requiring oxygen at this time.    3.  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      4.  C3-C4 lesion on PET scan.  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  Had not changed with treatments.  Likely benign rather than malignant.        Total time of patient care on day of service including time prior to, face to face with patient, and following visit spent in reviewing records, lab results, imaging studies, discussion with patient, and documentation/charting was > 46 minutes.     Hollie Mike MD  [facility] Hematology and Oncology         Orders Placed This Encounter   Procedures    Ambulatory Referral to Cardiothoracic Surgery       9/28/2023     Future Appointments         Provider Department Center    10/17/2023 2:00 PM Dixon Reynolds MD [facility]

## 2023-10-17 ENCOUNTER — OFFICE VISIT (OUTPATIENT)
Dept: CARDIAC SURGERY | Facility: CLINIC | Age: 78
End: 2023-10-17
Payer: MEDICARE

## 2023-10-17 ENCOUNTER — NURSE NAVIGATOR (OUTPATIENT)
Dept: ONCOLOGY | Facility: CLINIC | Age: 78
End: 2023-10-17
Payer: MEDICARE

## 2023-10-17 VITALS
OXYGEN SATURATION: 95 % | BODY MASS INDEX: 28.88 KG/M2 | DIASTOLIC BLOOD PRESSURE: 90 MMHG | HEIGHT: 63 IN | HEART RATE: 58 BPM | WEIGHT: 163 LBS | SYSTOLIC BLOOD PRESSURE: 160 MMHG | TEMPERATURE: 98.4 F

## 2023-10-17 DIAGNOSIS — C34.32 MALIGNANT NEOPLASM OF LOWER LOBE OF LEFT LUNG: Primary | ICD-10-CM

## 2023-10-17 PROCEDURE — 1160F RVW MEDS BY RX/DR IN RCRD: CPT | Performed by: THORACIC SURGERY (CARDIOTHORACIC VASCULAR SURGERY)

## 2023-10-17 PROCEDURE — 1159F MED LIST DOCD IN RCRD: CPT | Performed by: THORACIC SURGERY (CARDIOTHORACIC VASCULAR SURGERY)

## 2023-10-17 PROCEDURE — 3080F DIAST BP >= 90 MM HG: CPT | Performed by: THORACIC SURGERY (CARDIOTHORACIC VASCULAR SURGERY)

## 2023-10-17 PROCEDURE — 3077F SYST BP >= 140 MM HG: CPT | Performed by: THORACIC SURGERY (CARDIOTHORACIC VASCULAR SURGERY)

## 2023-10-17 PROCEDURE — 99204 OFFICE O/P NEW MOD 45 MIN: CPT | Performed by: THORACIC SURGERY (CARDIOTHORACIC VASCULAR SURGERY)

## 2023-10-17 NOTE — PROGRESS NOTES
"10/17/2023  Patient Information  Ruby Pettit                                                                                          [address] [address]   1945  'PCP/Referring Physician'  Ly Funez MD  [phone]  Hollie Mike MD  [phone]  Chief Complaint   Patient presents with    Consult     NP per Dr. Resnedez for Left Lung Cancer        History of Present Illness: 78-year-old  female with a history of hypertension who presents with a newly diagnosed left lower lobe lung cancer.  The patient had x-rays that demonstrated a lung nodule.  Subsequent imaging and biopsies are consistent with adenocarcinoma of the left lower lobe.  The patient has undergone chemotherapy with Dr. Mike and she notes some subsequent fatigue, weight loss of 8 pounds, alopecia and brain fog.  Prior to chemotherapy, the patient denied cough, hemoptysis, weight loss, lymphadenopathy, fevers or chills.      Patient Active Problem List   Diagnosis    Migratory Lung nodules (\"Soft\" and solid, bilateral)    Non-smoker    History of asthma    Cough    Perennial rhinitis    Syncope and collapse    UTI (urinary tract infection), bacterial    Hypertension    T2DM (type 2 diabetes mellitus)    Neuropathy    Hypokalemia    Lesion of temporal lobe    GERD    Iatrogenic pneumothorax    Carotid stenosis, bilateral    Malignant neoplasm of bronchus of left lower lobe     Past Medical History:   Diagnosis Date    Anxiety and depression     Arthritis     Disease of thyroid gland     GERD (gastroesophageal reflux disease)     Head injury due to trauma 1992    fell down stairs     History of transfusion     NO REACTIONS    Hyperlipidemia     Hypertension     Liver disorder     surgery 1998 approx , BLOOD CLOTS    Lung cancer     Peripheral neuropathic pain     T2DM (type 2 diabetes mellitus)     Thyroid disease     Wears eyeglasses      Past Surgical History:   Procedure Laterality Date    " ABDOMINAL SURGERY      LIVER RESECTION    APPENDECTOMY      BRONCHOSCOPY N/A 02/13/2018    Procedure: BRONCHOSCOPY WITH SANDRITA ENDOBRONCHIAL ULTRASOUND WITH FLUORO;  Surgeon: Dixon Fatima MD;  Location:  [facility];  Service:     BRONCHOSCOPY N/A 03/21/2019    Procedure: NAVIGATIONAL BRONCHOSCOPY;  Surgeon: Dixon Fatima MD;  Location:  [facility];  Service: Pulmonary    BRONCHOSCOPY WITH ION ROBOTIC ASSIST N/A 6/29/2023    Procedure: NAVIGATIONAL BRONCHOSCOPY WITH ION ROBOT;  Surgeon: Dixon Fatima MD;  Location:  [facility];  Service: Robotics - Pulmonary;  Laterality: N/A;  Ion cath 3 - 0010,  3 - 0013.    CHOLECYSTECTOMY      COLONOSCOPY  2019    HYSTERECTOMY      2001    LIVER RESECTION      OOPHORECTOMY Bilateral     2001    ORIF WRIST FRACTURE Right     THYROID SURGERY      TUBAL ABDOMINAL LIGATION         Current Outpatient Medications:     amLODIPine (NORVASC) 5 MG tablet, Take 1 tablet by mouth Daily., Disp: , Rfl:     aspirin 81 MG EC tablet, Take 1 tablet by mouth Every Night., Disp: , Rfl:     bisoprolol (ZEBeta) 10 MG tablet, Take 1 tablet by mouth Daily., Disp: , Rfl:     buPROPion XL (WELLBUTRIN XL) 150 MG 24 hr tablet, Take 1 tablet by mouth Daily., Disp: , Rfl:     cetirizine (zyrTEC) 10 MG tablet, Take 1 tablet by mouth Every Night., Disp: , Rfl:     citalopram (CeleXA) 20 MG tablet, Take 1 tablet by mouth Every Night., Disp: , Rfl:     diazePAM (VALIUM) 5 MG tablet, 1 tablet every 12 hours as needed for anxiety. Make take 2 tablets 30 min before procedure., Disp: 10 tablet, Rfl: 0    gabapentin (NEURONTIN) 800 MG tablet, Take 1 tablet by mouth 3 (Three) Times a Day., Disp: , Rfl:     hydroCHLOROthiazide (MICROZIDE) 12.5 MG capsule, Take 1 capsule by mouth Daily., Disp: , Rfl:     HYDROcodone-acetaminophen (NORCO) 7.5-325 MG per tablet, Take 1 tablet by mouth 2 (Two) Times a Day., Disp: , Rfl:     hydrOXYzine (ATARAX) 25 MG tablet, Take 1-2 tablets by  mouth Every Night., Disp: , Rfl:     hyoscyamine (LEVSIN) 0.125 MG SL tablet, Take 1 tablet by mouth Every 6 (Six) Hours As Needed for Cramping., Disp: , Rfl:     levothyroxine (SYNTHROID, LEVOTHROID) 50 MCG tablet, Take 1 tablet by mouth Daily., Disp: , Rfl:     losartan (COZAAR) 50 MG tablet, , Disp: , Rfl:     lovastatin (MEVACOR) 20 MG tablet, Take 1 tablet by mouth Every Night., Disp: , Rfl:     omeprazole (priLOSEC) 20 MG capsule, Take 1 capsule by mouth 2 (Two) Times a Day., Disp: , Rfl:     ondansetron (ZOFRAN) 4 MG tablet, Take 1 tablet by mouth Every 6 (Six) Hours As Needed for Nausea or Vomiting., Disp: 8 tablet, Rfl: 0    Ergocalciferol (VITAMIN D2 PO), Take  by mouth. (Patient not taking: Reported on 10/17/2023), Disp: , Rfl:   Allergies   Allergen Reactions    Augmentin [Amoxicillin-Pot Clavulanate] Diarrhea     Has tolerated Ceftriaxone, Cefdinir, Cefuroxime.    Benadryl [Diphenhydramine] Other (See Comments)     High Dose Caused uncontrollable shaking in legs    Codeine Nausea Only    Metformin Diarrhea    Rosuvastatin GI Intolerance     Social History     Socioeconomic History    Marital status:     Number of children: 3   Tobacco Use    Smoking status: Never    Smokeless tobacco: Never   Vaping Use    Vaping Use: Never used   Substance and Sexual Activity    Alcohol use: Yes     Alcohol/week: 0.0 - 2.0 standard drinks of alcohol     Comment: seldom     Drug use: No    Sexual activity: Defer     Family History   Problem Relation Age of Onset    Cancer Mother     Ovarian cancer Mother 19    Emphysema Father     COPD Father     No Known Problems Sister     Tuberculosis Maternal Grandmother     Tuberculosis Maternal Grandfather     No Known Problems Paternal Grandmother     No Known Problems Paternal Grandfather     Breast cancer Neg Hx      Review of Systems   Constitutional: Positive for malaise/fatigue and weight loss (8 lbs since chemo). Negative for chills, fever and night sweats.   HENT:   "Negative for hearing loss, odynophagia and sore throat.    Cardiovascular:  Positive for dyspnea on exertion and leg swelling. Negative for chest pain, orthopnea and palpitations.   Respiratory:  Positive for shortness of breath and wheezing. Negative for cough and hemoptysis.    Endocrine: Negative for cold intolerance, heat intolerance, polydipsia, polyphagia and polyuria.   Hematologic/Lymphatic: Does not bruise/bleed easily.   Skin:  Negative for itching and rash.   Musculoskeletal:  Positive for arthritis, back pain and joint pain. Negative for joint swelling and myalgias.   Gastrointestinal:  Positive for abdominal pain and diarrhea. Negative for constipation, hematemesis, hematochezia, melena, nausea and vomiting.   Genitourinary:  Negative for dysuria, frequency and hematuria.   Neurological:  Positive for dizziness and light-headedness. Negative for focal weakness, headaches, numbness and seizures.   Psychiatric/Behavioral:  Positive for depression. Negative for suicidal ideas. The patient is nervous/anxious.    All other systems reviewed and are negative.  Vitals:    10/17/23 1409   BP: 160/90   BP Location: Left arm   Patient Position: Sitting   Pulse: 58   Temp: 98.4 °F (36.9 °C)   SpO2: 95%   Weight: 73.9 kg (163 lb)   Height: 160 cm (63\")      Physical Exam  Vitals reviewed.   Constitutional:       General: She is not in acute distress.     Appearance: Normal appearance.      Comments:  female who appears stated age and is present with her sister   HENT:      Head: Normocephalic and atraumatic.      Nose: Nose normal.   Eyes:      General: No scleral icterus.  Cardiovascular:      Rate and Rhythm: Normal rate and regular rhythm.      Heart sounds: No murmur heard.     No friction rub. No gallop.   Pulmonary:      Effort: Pulmonary effort is normal. No respiratory distress.      Breath sounds: Normal breath sounds. No rhonchi.   Abdominal:      General: There is no distension.      Palpations: " Abdomen is soft. There is no mass.      Tenderness: There is no abdominal tenderness. There is no guarding or rebound.   Musculoskeletal:         General: No deformity.      Cervical back: Neck supple.      Right lower leg: No edema.      Left lower leg: No edema.   Lymphadenopathy:      Cervical: No cervical adenopathy.      Upper Body:      Right upper body: No supraclavicular or axillary adenopathy.      Left upper body: No supraclavicular or axillary adenopathy.   Skin:     General: Skin is warm and dry.      Coloration: Skin is not jaundiced.   Neurological:      Mental Status: She is alert and oriented to person, place, and time.      Gait: Gait normal.   Psychiatric:         Mood and Affect: Mood normal.         Behavior: Behavior normal.         The ROS, past medical history, surgical history, family history, social history, and vitals were reviewed by myself and corrected as needed.      Labs/Imaging:  -PET/CT performed 9/26/2023, personally reviewed, demonstrates decreased size in the left lower lobe mass now measuring 3.8 x 1.8 cm.  This previously measured 4.8 x 3.8 cm.  The SUV has dropped from 14.4-3.4.  Interval resolution of hypermetabolic subcarinal lymphadenopathy.  The right upper lobe has airspace disease measuring 2.7 x 1.7 cm and SUV of 2.7 that is unchanged.  Left hilar lymph node measures 12 mm with no hypermetabolic activity..  Hypermetabolic activity is present at the left C3/C4 facet joint.  -PET/CT performed 7/19/2023, personally reviewed, demonstrates left lower lobe mass measuring 3.6 x 2.9 cm with an SUV of 14.4.  Consolidation is present in the right middle lobe with no hypermetabolic activity.  An opacification is present in the right upper lobe measuring 3.0 x 1.6 cm with an SUV of 1.5.  A subcarinal lymph node is present measuring 1.6 cm with an SUV of 7.  A left hilar node measures 0.8 cm with an SUV of 5.2.  Stable hypodense lesions are present in the liver consistent with  "hemangiomas.  A left adrenal nodule is present measuring 2.1 x 2.1 cm with an SUV of 4.4.  -MRI of the brain with and without contrast performed 7/24/2023, demonstrates no evidence of metastatic disease.  -EBUS performed 6/29/2023, left lower lobe transbronchial biopsy consistent with moderately differentiated adenocarcinoma.  Station 11 L and 7 negative for malignancy.  Brushings and lavage of the left lower lobe malignant with pulmonary adenocarcinoma.  -Pulmonary function test performed 4/24/2023, FEV1 1.54 (76% predicted), DLCO 49% predicted    Assessment/Plan:  78-year-old  female with a history of hypertension who presents with a newly diagnosed left lower lobe lung cancer.  I discussed options with the patient for management of her left lower lobe lung cancer after neoadjuvant chemotherapy.  We discussed definitive chemoradiation versus surgical resection.  The patient had a discussion with her sister and ultimately decided to proceed with surgical resection.  I discussed with the patient performing a bronchoscopy, left VATS, left lower lobectomy, mediastinal lymph node dissection and intercostal nerve blocks.  The risks and benefits surgery were discussed with the patient including pain, bleeding, infection, air leak, myocardial infarction and death.  The patient understood these risks and wished to proceed with surgery.    Patient Active Problem List   Diagnosis    Migratory Lung nodules (\"Soft\" and solid, bilateral)    Non-smoker    History of asthma    Cough    Perennial rhinitis    Syncope and collapse    UTI (urinary tract infection), bacterial    Hypertension    T2DM (type 2 diabetes mellitus)    Neuropathy    Hypokalemia    Lesion of temporal lobe    GERD    Iatrogenic pneumothorax    Carotid stenosis, bilateral    Malignant neoplasm of bronchus of left lower lobe                      "

## 2023-10-17 NOTE — PROGRESS NOTES
Emailed patient educational materials on minimally invasive thoracic surgery from [email or web address]. Will also print care instructions from [employer] and mail to patient.

## 2023-10-18 DIAGNOSIS — C34.32 MALIGNANT NEOPLASM OF LOWER LOBE OF LEFT LUNG: Primary | ICD-10-CM

## 2023-10-19 ENCOUNTER — PREP FOR SURGERY (OUTPATIENT)
Dept: OTHER | Facility: HOSPITAL | Age: 78
End: 2023-10-19
Payer: MEDICARE

## 2023-10-19 DIAGNOSIS — R79.9 ABNORMAL FINDING OF BLOOD CHEMISTRY, UNSPECIFIED: ICD-10-CM

## 2023-10-19 DIAGNOSIS — R94.5 ABNORMAL RESULTS OF LIVER FUNCTION STUDIES: ICD-10-CM

## 2023-10-19 DIAGNOSIS — C34.32 MALIGNANT NEOPLASM OF LOWER LOBE OF LEFT LUNG: Primary | ICD-10-CM

## 2023-10-20 ENCOUNTER — TELEPHONE (OUTPATIENT)
Dept: CARDIAC SURGERY | Facility: CLINIC | Age: 78
End: 2023-10-20
Payer: MEDICARE

## 2023-10-20 RX ORDER — KETOROLAC TROMETHAMINE 15 MG/ML
15 INJECTION, SOLUTION INTRAMUSCULAR; INTRAVENOUS ONCE
OUTPATIENT
Start: 2023-10-20 | End: 2023-10-20

## 2023-10-20 RX ORDER — GABAPENTIN 100 MG/1
100 CAPSULE ORAL ONCE
OUTPATIENT
Start: 2023-11-02 | End: 2023-11-02

## 2023-10-20 RX ORDER — BUPIVACAINE HCL/0.9 % NACL/PF 0.1 %
2 PLASTIC BAG, INJECTION (ML) EPIDURAL ONCE
OUTPATIENT
Start: 2023-11-02 | End: 2023-11-02

## 2023-10-20 RX ORDER — GABAPENTIN 100 MG/1
100 CAPSULE ORAL ONCE
OUTPATIENT
Start: 2023-10-20 | End: 2023-10-20

## 2023-10-20 RX ORDER — CHLORHEXIDINE GLUCONATE 500 MG/1
CLOTH TOPICAL EVERY 12 HOURS PRN
OUTPATIENT
Start: 2023-11-01

## 2023-10-20 NOTE — TELEPHONE ENCOUNTER
Georges precert Nurse from [facility] called to say that the Prior Auth for this pt's upcoming Sx with Dr. Reynolds has been denied b/c the Pt's HMO states that Dr. Reynolds is out of network for this pt. Georges said that she  would have to withdrawal the case and I told her that I would make the pt aware. I could not reach  today no VM available. I will call again on Monday.     I called  again this morning and did get to speak with her. I explained to her that  is no longer in network per [facility] nurse  Georges and that if she proceeded with her surgery she would be responsible for the financial part. Pt was just stunned at the idea of having to pay for her surgery. I told her that per my  that she needed to contact her insurance company and the financial counselors to get their opinion on where to go from here. I apologize to the pt in great lengths I told her how sorry I was for all this trouble and ask that she please call this office back one she has another plan that we can follow. Pt v/u.

## 2023-10-23 ENCOUNTER — TELEPHONE (OUTPATIENT)
Dept: ONCOLOGY | Facility: CLINIC | Age: 78
End: 2023-10-23
Payer: MEDICARE

## 2023-10-23 DIAGNOSIS — C34.32 MALIGNANT NEOPLASM OF LOWER LOBE OF LEFT LUNG: Primary | ICD-10-CM

## 2023-10-23 NOTE — TELEPHONE ENCOUNTER
Caller: MAGAN    Relationship: Other    Best call back number: [phone]    What is the best time to reach you: ANYTIME    Who are you requesting to speak with (clinical staff, provider, specific staff member): CLINICAL    What was the call regarding: MAGAN STATED SHE WOULD LIKE TO SPEAK TO THE NURSE REGARDING AN UPDATE ON PATIENT'S SURGERY/INSURANCE.     PLEASE CALL TO DISCUSS FURTHER.

## 2023-10-23 NOTE — TELEPHONE ENCOUNTER
----- Message from SPENSER Crawford sent at 10/23/2023  1:42 PM EDT -----  Faxed, thanks.   ----- Message -----  From: Pat Mart RN  Sent: 10/23/2023  12:04 PM EDT  [facility]        Aman Powell,    Patient is needing referred to [facility]. For surgery insurance has refused to pay for surgery at [facility].     Thanks,  Tom

## 2023-10-23 NOTE — TELEPHONE ENCOUNTER
Returned call to Dr. Reynolds office. At this time informing office nurse read note and discussed with Dr. Resendez. Informing Christie at Dr. Reynolds office that Dr. Resendez stating that we will send patient on to  to have lung surgery.

## 2023-10-23 NOTE — TELEPHONE ENCOUNTER
Called patient and discussing with her [employer] had refused her surgery. So we will be changing and referring her to [facility] Dr. Kate. Informing patient that she will receive a call from his office in next few days. Patient stating she understood plan.

## 2023-10-24 ENCOUNTER — TELEPHONE (OUTPATIENT)
Dept: ONCOLOGY | Facility: CLINIC | Age: 78
End: 2023-10-24
Payer: MEDICARE

## 2023-10-24 NOTE — TELEPHONE ENCOUNTER
Returned call to patient. At this time leaving  that I had her yesterday informing her of the referral to [facility] that will consist of her seeing Dr. Kate for her lung surgery. Informing patient to call with questions.

## 2023-10-24 NOTE — TELEPHONE ENCOUNTER
Caller: Ruby Pettit    Relationship: Self    Best call back number: [phone]    What is the best time to reach you: ANY    Who are you requesting to speak with (clinical staff, provider,  specific staff member): CLINICAL/SURGERY REFERRAL        What was the call regarding: PATIENT RUBY CALLED TO REQUEST A REFFERAL FOR HER SURGERY THAT'S SCHEDULED ON 11/2/23 WITH DR ESPAÑA. SINCE SHE IS WITH [facility], DR ESPAÑA IS OUT OF NETWORK AND RUBY WOULD LIKE DR ARCEO TO SEND A REFERRAL TO ANOTHER PROVIDER TO DO THE SURGERY.     Is it okay if the provider responds through MyChart: NO

## 2023-10-24 NOTE — TELEPHONE ENCOUNTER
Returned call to patient going over message with patient that was left. Patient stating she understood.

## 2023-10-30 NOTE — TELEPHONE ENCOUNTER
Caller: Ruby Pettit    Relationship to patient: Self    Best call back number:[phone]    Patient is needing: TO KNOW WHEN THIS APPT IS AGAIN. SHE DOES NOT REMEMBER WHEN THE APPT IS AND WHO THE APPT IS WITH.

## 2023-12-18 ENCOUNTER — OFFICE VISIT (OUTPATIENT)
Dept: ONCOLOGY | Facility: CLINIC | Age: 78
End: 2023-12-18
Payer: MEDICARE

## 2023-12-18 VITALS
HEART RATE: 76 BPM | RESPIRATION RATE: 20 BRPM | DIASTOLIC BLOOD PRESSURE: 82 MMHG | TEMPERATURE: 98.1 F | WEIGHT: 160 LBS | OXYGEN SATURATION: 91 % | HEIGHT: 61 IN | SYSTOLIC BLOOD PRESSURE: 162 MMHG | BODY MASS INDEX: 30.21 KG/M2

## 2023-12-18 DIAGNOSIS — C34.32 MALIGNANT NEOPLASM OF LOWER LOBE OF LEFT LUNG: Primary | ICD-10-CM

## 2023-12-18 RX ORDER — GABAPENTIN 300 MG/1
900 CAPSULE ORAL 3 TIMES DAILY
COMMUNITY
Start: 2023-12-15 | End: 2024-02-13

## 2023-12-18 RX ORDER — IBUPROFEN 600 MG/1
600 TABLET ORAL EVERY 6 HOURS PRN
COMMUNITY
Start: 2023-12-01

## 2023-12-18 NOTE — PROGRESS NOTES
PROBLEM LIST:  Oncology/Hematology History   Malignant neoplasm of lower lobe of left lung   6/26/2023 Initial Diagnosis    Malignant neoplasm of bronchus of left lower lobe     6/29/2023 Cancer Staged    Staging form: Lung, AJCC 8th Edition  - Clinical stage from 6/29/2023: Stage IIIA (cT1c, cN2, cM0) - Signed by Hollie Mike MD on 7/20/2023 7/20/2023 Molecular Testing    Molecular testing - [facility]     Negative for EGFR, BRAF and ALK     7/20/2023 Imaging    Pet Scan    IMPRESSION:  Impression:  Hypermetabolic left lower lobe lung mass consistent with known primary malignancy. There are hypermetabolic left hilar and mediastinal lymph nodes consistent with regional metastasis.     Additional groundglass and subsolid lesions within the lungs most prominently along the anterior segment of the right upper lobe demonstrate little to no FDG uptake but remain suspicious for synchronous neoplasm. Recommend attention on follow-up.     Multiple hypodense lesions within the liver without substantial FDG uptake. These appear to be consistent with hemangiomas on prior examinations.     Similar size of a left adrenal nodule with increased metabolic activity. Given the stability of size this is still most likely a benign finding but recommend continued attention on follow-up.     Hypermetabolic focus near the left C4-5 facet joint with a questionable lytic lesion in this area. Recommend follow-up with MRI of the cervical spine and/or bone scan to further evaluate.        Electronically Signed: Tolu Zavaleta    7/19/2023 6:05 PM EDT    Workstation ID: [id number]     7/28/2023 - 9/8/2023 Chemotherapy    OP LUNG  Nivolumab 360mg /  PACLitaxel / CARBOplatin AUC=5     Completed 3 cycles neoadjuvantly     9/26/2023 Imaging    NM PET/CT Skull Base to Mid Thigh    Result Date: 9/27/2023  Impression: 1.The left lower lobe mass is significantly decreased in size and metabolic activity, indicating a positive response to  therapy. There is also resolution of previously seen hypermetabolic subcarinal lymph node and stable size of a mildly enlarged left hilar lymph node without metabolic activity. 2.Hypermetabolic focus associated with a lucency at the left C3-C4 facet joint. This could represent a metastatic lesion or degenerative erosion. No additional hypermetabolic bone lesions. 3.Stable triangular focus of airspace disease in the anterior right upper lobe with low level metabolic activity. This is favored to represent an area of pneumonia or inflammation. Metastatic disease is considered less likely. Continued follow-up is recommended. Electronically Signed: Sergio Spivey MD  9/27/2023 9:03 AM EDT  Workstation ID: [id number]       11/17/2023 Surgery    Surgery       Procedure:  Left lower lobectomy and mediastinoscopy with Dr. Kate at [facility] s/p neoadjuvant chemoimmunotherapy per [id number]     11/17/2023 Cancer Staged    Staging form: Lung, AJCC 8th Edition  - Pathologic stage from 11/17/2023: Stage IIIA (ypT2, pN2, cM0) - Signed by Hollie Mike MD on 12/18/2023         REASON FOR VISIT: Lung cancer    HISTORY OF PRESENT ILLNESS:   78 y.o.  female presents today for follow-up after undergoing a left lower lobectomy with Dr. Kate at .  She is almost 4 weeks out from her surgery.  Recovering nicely from the surgery doing well.  Denies any issues with shortness of breath.  Denies any headaches.  No nausea vomiting no diarrhea.    Past medical history, social history and family history was reviewed 12/18/23 and unchanged from prior visit.    Review of Systems:    Review of Systems   Constitutional:  Positive for fatigue.   HENT:  Negative.     Eyes: Negative.    Respiratory: Negative.     Cardiovascular: Negative.    Gastrointestinal: Negative.    Endocrine: Negative.    Genitourinary: Negative.     Musculoskeletal: Negative.    Skin: Negative.    Neurological: Negative.    Hematological: Negative.     Psychiatric/Behavioral: Negative.              Medications:        Current Outpatient Medications:     amLODIPine (NORVASC) 5 MG tablet, Take 1 tablet by mouth Daily., Disp: , Rfl:     aspirin 81 MG EC tablet, Take 1 tablet by mouth Every Night., Disp: , Rfl:     bisoprolol (ZEBeta) 10 MG tablet, Take 1 tablet by mouth Daily., Disp: , Rfl:     buPROPion XL (WELLBUTRIN XL) 150 MG 24 hr tablet, Take 1 tablet by mouth Daily., Disp: , Rfl:     cetirizine (zyrTEC) 10 MG tablet, Take 1 tablet by mouth Every Night., Disp: , Rfl:     citalopram (CeleXA) 20 MG tablet, Take 1 tablet by mouth Every Night., Disp: , Rfl:     diazePAM (VALIUM) 5 MG tablet, 1 tablet every 12 hours as needed for anxiety. Make take 2 tablets 30 min before procedure., Disp: 10 tablet, Rfl: 0    Ergocalciferol (VITAMIN D2 PO), Take  by mouth., Disp: , Rfl:     gabapentin (NEURONTIN) 300 MG capsule, Take 3 capsules by mouth 3 (Three) Times a Day., Disp: , Rfl:     hydroCHLOROthiazide (MICROZIDE) 12.5 MG capsule, Take 1 capsule by mouth Daily., Disp: , Rfl:     HYDROcodone-acetaminophen (NORCO) 7.5-325 MG per tablet, Take 1 tablet by mouth 2 (Two) Times a Day., Disp: , Rfl:     hydrOXYzine (ATARAX) 25 MG tablet, Take 1-2 tablets by mouth Every Night., Disp: , Rfl:     hyoscyamine (LEVSIN) 0.125 MG SL tablet, Take 1 tablet by mouth Every 6 (Six) Hours As Needed for Cramping., Disp: , Rfl:     ibuprofen (ADVIL,MOTRIN) 600 MG tablet, Take 1 tablet by mouth Every 6 (Six) Hours As Needed., Disp: , Rfl:     levothyroxine (SYNTHROID, LEVOTHROID) 50 MCG tablet, Take 1 tablet by mouth Daily., Disp: , Rfl:     losartan (COZAAR) 50 MG tablet, , Disp: , Rfl:     lovastatin (MEVACOR) 20 MG tablet, Take 1 tablet by mouth Every Night., Disp: , Rfl:     omeprazole (priLOSEC) 20 MG capsule, Take 1 capsule by mouth 2 (Two) Times a Day., Disp: , Rfl:     ondansetron (ZOFRAN) 4 MG tablet, Take 1 tablet by mouth Every 6 (Six) Hours As Needed for Nausea or Vomiting., Disp:  "8 tablet, Rfl: 0    Pain Medications               aspirin 81 MG EC tablet Take 1 tablet by mouth Every Night.    buPROPion XL (WELLBUTRIN XL) 150 MG 24 hr tablet Take 1 tablet by mouth Daily.    citalopram (CeleXA) 20 MG tablet Take 1 tablet by mouth Every Night.    gabapentin (NEURONTIN) 300 MG capsule Take 3 capsules by mouth 3 (Three) Times a Day.    HYDROcodone-acetaminophen (NORCO) 7.5-325 MG per tablet Take 1 tablet by mouth 2 (Two) Times a Day.    ibuprofen (ADVIL,MOTRIN) 600 MG tablet Take 1 tablet by mouth Every 6 (Six) Hours As Needed.               ALLERGIES:    Allergies   Allergen Reactions    Augmentin [Amoxicillin-Pot Clavulanate] Diarrhea     Has tolerated Ceftriaxone, Cefdinir, Cefuroxime.    Benadryl [Diphenhydramine] Other (See Comments)     High Dose Caused uncontrollable shaking in legs    Codeine Nausea Only    Metformin Diarrhea    Rosuvastatin GI Intolerance         Physical Exam    VITAL SIGNS:  /82 Comment: RUE  Pulse 76   Temp 98.1 °F (36.7 °C) (Infrared)   Resp 20   Ht 154.9 cm (61\")   Wt 72.6 kg (160 lb)   LMP  (LMP Unknown)   SpO2 91% Comment: RA  BMI 30.23 kg/m²     ECOG score: 1           Wt Readings from Last 3 Encounters:   12/18/23 72.6 kg (160 lb)   10/17/23 73.9 kg (163 lb)   09/28/23 75.8 kg (167 lb)       Body mass index is 30.23 kg/m². Body surface area is 1.72 meters squared.       Performance Status: 0    General: well appearing, in no acute distress  HEENT: sclera anicteric, neck is supple  Lymphatics: no cervical, supraclavicular, or axillary adenopathy  Cardiovascular: regular rate and rhythm, no murmurs, rubs or gallops  Lungs: clear to auscultation bilaterally  Abdomen: soft, nontender, nondistended.  No palpable organomegaly  Extremities: no lower extremity edema  Skin: no rashes, lesions, bruising, or petechiae  Msk:  Shows no weakness of the large muscle groups  Psych: Mood is stable        RECENT LABS:    Lab Results   Component Value Date    HGB 10.5 " (L) 11/19/2023    HCT 34.1 11/19/2023     (H) 11/19/2023     11/19/2023    WBC 6.67 11/19/2023    NEUTROABS 1.09 (L) 09/15/2023    LYMPHSABS 2.19 09/07/2023    MONOSABS 0.65 09/07/2023    EOSABS 0.03 09/15/2023    BASOSABS 0.03 09/15/2023       Lab Results   Component Value Date    GLUCOSE 167 (H) 11/17/2023    BUN 18 09/15/2023    CREATININE 1.00 09/15/2023     09/15/2023    K 3.4 (L) 11/17/2023     11/17/2023    CO2 23.0 09/15/2023    CALCIUM 9.0 09/15/2023    PROTEINTOT 6.2 09/15/2023    ALBUMIN 3.8 09/15/2023    BILITOT 0.4 09/15/2023    ALKPHOS 112 09/15/2023    AST 16 09/15/2023    ALT 24 09/15/2023     Lab Results   Component Value Date    FINALDX  06/29/2023     Amendment:    Immunohistochemical stains were performed on the corresponding cytology case, [id number], which revealed the tumor to be TTF-1 positive while p40 negative.  The findings are compatible with invasive moderately differentiated adenocarcinoma, rather than squamous cell carcinoma.  The previous diagnosis of this case was made on the morphologic findings, however given the TTF-1 positivity the diagnosis should read as follows:    2) LUNG, LEFT LOWER LOBE, TRANSBRONCHIAL BIOPSY:  Invasive moderately differentiated adenocarcinoma    2) LUNG, LEFT LOWER LOBE, TRANSBRONCHIAL BIOPSY:      3) LUNG, RIGHT LOWER LOBE, BIOPSIES:  Bronchial mucosa with reactive changes and nonspecific chronic inflammation  No evidence of malignancy identified    4) LUNG, LEFT LOWER LOBE, BIOPSIES:  Bronchial mucosa with reactive changes and nonspecific chronic inflammation  No evidence of malignancy identified    5) LYMPH NODE, STATION 11 L, TBNA:  Lymph node with no evidence of metastatic carcinoma identified    6) LYMPH NODE, STATION 7, TBNA:  Scant, inflammatory debris present; no definite lymphocytes identified     ]      NM PET/CT Skull Base to Mid Thigh    Result Date: 9/27/2023  Impression: 1.The left lower lobe mass is significantly  decreased in size and metabolic activity, indicating a positive response to therapy. There is also resolution of previously seen hypermetabolic subcarinal lymph node and stable size of a mildly enlarged left hilar lymph node without metabolic activity. 2.Hypermetabolic focus associated with a lucency at the left C3-C4 facet joint. This could represent a metastatic lesion or degenerative erosion. No additional hypermetabolic bone lesions. 3.Stable triangular focus of airspace disease in the anterior right upper lobe with low level metabolic activity. This is favored to represent an area of pneumonia or inflammation. Metastatic disease is considered less likely. Continued follow-up is recommended. Electronically Signed: Sergio Spivey MD  9/27/2023 9:03 AM EDT  Workstation ID: [id number]    CT Abdomen Pelvis With Contrast    Result Date: 9/15/2023  Impression: 1.Proximal descending colonic epiploic appendagitis. 2.Decrease in size of left lower lobe pulmonary mass. 3.Other relatively stable findings in the abdomen and pelvis. Electronically Signed: Long Casanova MD  9/15/2023 5:19 PM CDT  Workstation ID: [id number]          Assessment/Plan    1.  Stage IIIa adenocarcinoma of the lung s/p 3 cycles of CarboTaxol with Keytruda.  She had a minimal response to treatment.  She had level 7 node positive at the time of surgery.I have recommended consolidative radiotherapy.  After which if her scans look reasonable then plan for consolidative durvalumab for 1 year.  I discussed her pathology and discussed the rationale behind my treatment approach.  I do not think she is fit enough to be able to undergo chemotherapy with radiation concurrently.  So I plan to treat her with radiation upfront and then place her on immunotherapy after that.        Total time of patient care on day of service including time prior  to, face to face with patient, and following visit spent in reviewing records, lab results, imaging studies, discussion  with patient, and documentation/charting was > 46 minutes.     Hollie Mike MD  [facility]    Return on: 01/22/24  Return in (Approximately): 3 weeks    Orders Placed This Encounter   Procedures    Ambulatory Referral to Radiation Oncology       12/18/2023     Future Appointments         Provider Department Center    1/15/2024 2:00 PM (Arrive by 1:45 PM) Hollie Mike MD [facility]

## 2023-12-20 ENCOUNTER — OFFICE VISIT (OUTPATIENT)
Dept: RADIATION ONCOLOGY | Facility: HOSPITAL | Age: 78
End: 2023-12-20
Payer: MEDICARE

## 2023-12-20 ENCOUNTER — HOSPITAL ENCOUNTER (OUTPATIENT)
Dept: RADIATION ONCOLOGY | Facility: HOSPITAL | Age: 78
Setting detail: RADIATION/ONCOLOGY SERIES
Discharge: HOME | End: 2023-12-20
Payer: MEDICARE

## 2023-12-20 VITALS
DIASTOLIC BLOOD PRESSURE: 81 MMHG | RESPIRATION RATE: 20 BRPM | OXYGEN SATURATION: 93 % | WEIGHT: 161.8 LBS | HEIGHT: 63 IN | HEART RATE: 72 BPM | SYSTOLIC BLOOD PRESSURE: 169 MMHG | TEMPERATURE: 98.2 F | BODY MASS INDEX: 28.67 KG/M2

## 2023-12-20 DIAGNOSIS — C34.32 MALIGNANT NEOPLASM OF LOWER LOBE OF LEFT LUNG: Primary | ICD-10-CM

## 2023-12-20 PROCEDURE — G0463 HOSPITAL OUTPT CLINIC VISIT: HCPCS

## 2023-12-20 RX ORDER — LOSARTAN POTASSIUM 50 MG/1
50 TABLET ORAL
COMMUNITY
Start: 2023-09-25

## 2023-12-20 RX ORDER — CITALOPRAM 20 MG/1
20 TABLET ORAL DAILY
COMMUNITY
Start: 2023-10-24 | End: 2023-12-20

## 2023-12-20 RX ORDER — HYOSCYAMINE SULFATE 0.12 MG/1
125 TABLET SUBLINGUAL
COMMUNITY

## 2023-12-20 RX ORDER — BIOTIN 1000 MCG
TABLET,CHEWABLE ORAL
COMMUNITY

## 2023-12-20 RX ORDER — BISOPROLOL FUMARATE 10 MG/1
10 TABLET, FILM COATED ORAL
COMMUNITY
Start: 2023-07-11 | End: 2023-12-20

## 2023-12-20 RX ORDER — CETIRIZINE HYDROCHLORIDE 10 MG/1
10 TABLET ORAL
COMMUNITY

## 2023-12-20 RX ORDER — AMLODIPINE BESYLATE 10 MG/1
TABLET ORAL
COMMUNITY
Start: 2023-12-01

## 2023-12-20 RX ORDER — OMEPRAZOLE 20 MG/1
20 CAPSULE, DELAYED RELEASE ORAL 3 TIMES DAILY
COMMUNITY
Start: 2023-07-20

## 2023-12-20 RX ORDER — HYDROXYZINE HYDROCHLORIDE 25 MG/1
12.5-25 TABLET, FILM COATED ORAL
COMMUNITY
Start: 2023-07-31

## 2023-12-20 RX ORDER — ONDANSETRON HYDROCHLORIDE 8 MG/1
8 TABLET, FILM COATED ORAL
COMMUNITY

## 2023-12-20 RX ORDER — HYDROCODONE BITARTRATE AND ACETAMINOPHEN 5; 325 MG/1; MG/1
TABLET ORAL EVERY 12 HOURS
COMMUNITY
Start: 2023-09-09

## 2023-12-20 NOTE — PROGRESS NOTES
CONSULTATION NOTE      :                                                          1945  DATE OF CONSULTATION:                       2023   REQUESTING PHYSICIAN:                   Hollie Mike MD  REASON FOR CONSULTATION:           Malignant neoplasm of lower lobe of left lung  Clinical- Stage IIIA (cT1c, cN2, cM0)  Pathologic- Stage IIIA (ypT2, pN2, cM0)         BRIEF HISTORY:  The patient is a very pleasant 78 y.o. female  with resected non-small cell lung cancer.  She was referred for adjuvant treatment.  She presented with radiographic diagnosis of left lower lobe lung mass.  On serial CT and pet imaging over the past few years she was noted to have enlarging left lung tumor increasing to 3.6 cm diameter with hypermetabolism.  There was also noted left hilar and subcarinal adenopathy up to 1.6 cm diameter.  Other findings elsewhere in the chest and upper abdomen were less suspicious for neoplasm.  Bronchoscopy with biopsy of left lower lobe showed moderately differentiated adenocarcinoma.  Biopsy of hilar and subcarinal lymph nodes were negative.  She received 3 cycles of neoadjuvant CarboTaxol and Keytruda.  She had excellent radiographic response based on restaging PET scan.  She underwent robotic assisted left lower lob[facility] at [facility]23.  Surgical margins were negative.  There was residual tumor in two station 10 L lymph nodes and also again station 7 lymph node sample.  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  She is healing well after surgery.  She is not experiencing any worsening of dyspnea.  She is supplemental oxygen only at night.  She has minimal residual chest discomfort.  No persistent cough, hemoptysis, fevers or weight loss.      Allergy:   Allergies   Allergen Reactions    Augmentin [Amoxicillin-Pot Clavulanate] Diarrhea     Has tolerated Ceftriaxone, Cefdinir, Cefuroxime.    Benadryl  [Diphenhydramine] Other (See Comments)     High Dose Caused uncontrollable shaking in legs    Codeine Nausea Only    Metformin Diarrhea    Rosuvastatin GI Intolerance       Social History:   Social History     Socioeconomic History    Marital status:     Number of children: 3   Tobacco Use    Smoking status: Never    Smokeless tobacco: Never   Vaping Use    Vaping Use: Never used   Substance and Sexual Activity    Alcohol use: Yes     Alcohol/week: 0.0 - 2.0 standard drinks of alcohol     Comment: seldom     Drug use: No    Sexual activity: Defer       Past Medical History:   Past Medical History:   Diagnosis Date    Anxiety and depression     Arthritis     Disease of thyroid gland     GERD (gastroesophageal reflux disease)     Head injury due to trauma 1992    fell down stairs     History of transfusion     NO REACTIONS    Hyperlipidemia     Hypertension     Liver disorder     surgery 1998 approx , BLOOD CLOTS    Lung cancer     Metastatic cancer     Peripheral neuropathic pain     Thyroid disease     Wears eyeglasses        Family History: family history includes COPD in her father; Cancer in her mother; Emphysema in her father; No Known Problems in her paternal grandfather, paternal grandmother, and sister; Ovarian cancer (age of onset: 19) in her mother; Tuberculosis in her maternal grandfather and maternal grandmother.     Surgical History:   Past Surgical History:   Procedure Laterality Date    ABDOMINAL SURGERY      LIVER RESECTION    APPENDECTOMY      BRONCHOSCOPY N/A 02/13/2018    Procedure: BRONCHOSCOPY WITH SANDRITA ENDOBRONCHIAL ULTRASOUND WITH FLUORO;  Surgeon: Dixon Fatima MD;  Location:  [facility];  Service:     BRONCHOSCOPY N/A 03/21/2019    Procedure: NAVIGATIONAL BRONCHOSCOPY;  Surgeon: Dixon Fatima MD;  Location:  [facility];  Service: Pulmonary    BRONCHOSCOPY WITH ION ROBOTIC ASSIST N/A 06/29/2023    Procedure: NAVIGATIONAL BRONCHOSCOPY WITH ION ROBOT;  Surgeon:  "Dixon Fatima MD;  Location[facility];  Service: Robotics - Pulmonary;  Laterality: N/A;  Ion cath 3 - 0010,  3 - 0013.    CHOLECYSTECTOMY      COLONOSCOPY  2019    HYSTERECTOMY      2001    LIVER RESECTION      LUNG LOBECTOMY  11/17/2023    OOPHORECTOMY Bilateral     2001    ORIF WRIST FRACTURE Right     THYROID SURGERY      TUBAL ABDOMINAL LIGATION          Review of Systems:   Review of Systems   Constitutional:  Positive for fatigue.   Respiratory:  Positive for shortness of breath.    Cardiovascular:  Positive for leg swelling.   Gastrointestinal:  Positive for nausea.   Musculoskeletal:  Positive for arthralgias.   Neurological:  Positive for dizziness, light-headedness and numbness.        Pt states she has numbness and tingling in hands and feet.           Objective   VITAL SIGNS:   Vitals:    12/20/23 0826   BP: 169/81   Pulse: 72   Resp: 20   Temp: 98.2 °F (36.8 °C)   TempSrc: Skin   SpO2: 93%   Weight: 73.4 kg (161 lb 12.8 oz)   Height: 160 cm (63\")   PainSc:   8        Karnofsky score: 80       Physical Exam:   Physical Exam  Vitals and nursing note reviewed.   Constitutional:       Appearance: She is well-developed.   HENT:      Head: Normocephalic and atraumatic.   Cardiovascular:      Rate and Rhythm: Normal rate and regular rhythm.      Heart sounds: Normal heart sounds. No murmur heard.  Pulmonary:      Effort: Pulmonary effort is normal.      Breath sounds: Normal breath sounds. No wheezing or rales.   Abdominal:      General: Bowel sounds are normal. There is no distension.      Palpations: Abdomen is soft.      Tenderness: There is no abdominal tenderness.   Musculoskeletal:         General: No tenderness. Normal range of motion.      Cervical back: Normal range of motion and neck supple.   Lymphadenopathy:      Cervical: No cervical adenopathy.      Upper Body:      Right upper body: No supraclavicular adenopathy.      Left upper body: No supraclavicular adenopathy.   Skin:   "   General: Skin is warm and dry.   Neurological:      Mental Status: She is alert and oriented to person, place, and time.      Sensory: No sensory deficit.   Psychiatric:         Behavior: Behavior normal.         Thought Content: Thought content normal.         Judgment: Judgment normal.              The following portions of the patient's history were reviewed and updated as appropriate: allergies, current medications, past family history, past medical history, past social history, past surgical history, and problem list.    Assessment:   Assessment      Pleomorphic adenocarcinoma with spindle cell differentiation, status post neoadjuvant treatment with partial response followed by left lower lobectomy 11/17/2023.  Clinical stage IIIA (T1c, N2, M0).  Pathologic stage yp IIIA (T2a, N2, M0).  She had partial response to neoadjuvant chemotherapy but with final pathology showing potentially more aggressive neoplasm and some persistence of mediastinal lymphadenopathy.  She would benefit from adjuvant radiotherapy for local tumor control.  She is not a good candidate for concurrent chemoradiotherapy.  She is being considered for sequential adjuvant durvalumab after completing radiotherapy.  We reviewed the use of radiotherapy in this situation.  All questions were answered.  Informed consent was obtained.    RECOMMENDATIONS: She will return within the next 2 weeks for CT simulation.  The mediastinum encompassing regions at risk will receive a dose of approximately 50 Gray delivered over 5 weeks.  She will be treated with IMRT technique and image guidance in order to deliver radiotherapy in the safest manner, reducing the risk to heart, lungs, esophagus and spinal cord.    I spent a total of 45 minutes on todays visit, with more than 30 minutes in direct face to face communication, and the remainder of the time spent in reviewing the relevant history, records, available imaging, and for documentation.    Follow Up:    Return in about 13 days (around 1/2/2024) for Simulation.  Diagnoses and all orders for this visit:    1. Malignant neoplasm of lower lobe of left lung (Primary)  -     Ambulatory Referral to ONC Social Work         Jean Marie Qureshi MD

## 2023-12-21 ENCOUNTER — DOCUMENTATION (OUTPATIENT)
Dept: ONCOLOGY | Facility: CLINIC | Age: 78
End: 2023-12-21
Payer: MEDICARE

## 2023-12-21 NOTE — PROGRESS NOTES
Distress Screening Follow-up    Name: Ruby Pettit    : 1945    Diagnosis: Malignant neoplasm of lower lobe of left lung     Location of Distress Screening: Radiation Oncology    Distress Level: 8 (2023  8:00 AM)      Physical Concerns:  Sleep: Y  Substance abuse: N  Fatigue: Y  Tobacco use: N  Memory or concentration: Y  Sexual health: N  Changes in eating: N  Loss or change of physical abilities: N  Pain: Y        Emotional Concerns:  Worry or anxiety: Y  Sadness or depression: Y  Loss of interest or enjoyment: N  Grief or loss: N  Fear: Y  Loneliness: N  Anger: N  Changes in appearance: Y  Feelings of worthlessness or being a burden: N      Social Concerns:  Relationship with spouse or partner: N  Relationship with children: N  Relationship with family members: N  Relationship with friends or coworkers: N  Communication with health care team: N  Ability to have children: N      Practical Concerns:  Taking care of myself: N  Taking care of others: N  Work: N  School: N  Housing: N  Finances: Y  Insurance: Y  Transportation: N  : N  Having enough food: N  Access to medicine: Y  Treatment decisions: N      Spiritual Concerns:  Sense of meaning or purpose: N  Changes in sharif or beliefs: N  Death, dying or afterlife: N  Conflict between beliefs and cancer treatments: N  Relationship with the sacred: Y  Ritual or dietary needs: N       Interventions: n/a    Comments:  SW contacted pt to follow up on Distress Screen from recent visit. SW provided introductions and explained nature of the call. PT communicated her appointment went well, but she had a bad night sleep due to pain in her legs. Pt reported she did take her Gabapentin, and knows she can call MD if it does not work as needed. Pt asked for clarification about her CT Sim as she was under the impression she had apt today. SW clarified with radiation and reported today was cancelled to let her continue to heal, and she will  have her CT Sim on 1/2. PT thanked SW for the assistance. PT had no other needs, and thanked SW for the call. SW provided contact information and will be available for ongoing support and assistance.

## 2024-01-01 ENCOUNTER — APPOINTMENT (OUTPATIENT)
Dept: CT IMAGING | Facility: HOSPITAL | Age: 79
DRG: 871 | End: 2024-01-01
Payer: MEDICARE

## 2024-01-01 ENCOUNTER — HOSPITAL ENCOUNTER (INPATIENT)
Facility: HOSPITAL | Age: 79
LOS: 3 days | DRG: 871 | End: 2024-05-12
Attending: INTERNAL MEDICINE | Admitting: INTERNAL MEDICINE
Payer: MEDICARE

## 2024-01-01 ENCOUNTER — HOSPITAL ENCOUNTER (OUTPATIENT)
Dept: RADIATION ONCOLOGY | Facility: HOSPITAL | Age: 79
Setting detail: RADIATION/ONCOLOGY SERIES
End: 2024-01-01
Payer: MEDICARE

## 2024-01-01 ENCOUNTER — APPOINTMENT (OUTPATIENT)
Dept: CARDIOLOGY | Facility: HOSPITAL | Age: 79
DRG: 871 | End: 2024-01-01
Payer: MEDICARE

## 2024-01-01 ENCOUNTER — APPOINTMENT (OUTPATIENT)
Dept: GENERAL RADIOLOGY | Facility: HOSPITAL | Age: 79
DRG: 871 | End: 2024-01-01
Payer: MEDICARE

## 2024-01-01 ENCOUNTER — HOSPITAL ENCOUNTER (INPATIENT)
Facility: HOSPITAL | Age: 79
LOS: 2 days | End: 2024-05-14
Attending: INTERNAL MEDICINE | Admitting: FAMILY MEDICINE
Payer: COMMERCIAL

## 2024-01-01 VITALS
BODY MASS INDEX: 31.91 KG/M2 | TEMPERATURE: 97.5 F | HEIGHT: 59 IN | OXYGEN SATURATION: 89 % | DIASTOLIC BLOOD PRESSURE: 52 MMHG | RESPIRATION RATE: 28 BRPM | HEART RATE: 80 BPM | SYSTOLIC BLOOD PRESSURE: 91 MMHG | WEIGHT: 158.29 LBS

## 2024-01-01 VITALS
RESPIRATION RATE: 22 BRPM | TEMPERATURE: 99.4 F | HEART RATE: 71 BPM | SYSTOLIC BLOOD PRESSURE: 124 MMHG | DIASTOLIC BLOOD PRESSURE: 55 MMHG | OXYGEN SATURATION: 72 %

## 2024-01-01 LAB
ABO GROUP BLD: NORMAL
ABO GROUP BLD: NORMAL
ADV 40+41 DNA STL QL NAA+NON-PROBE: NOT DETECTED
ADV 40+41 DNA STL QL NAA+NON-PROBE: NOT DETECTED
ALBUMIN SERPL-MCNC: 1.9 G/DL (ref 3.5–5.2)
ALBUMIN SERPL-MCNC: 2.5 G/DL (ref 3.5–5.2)
ALBUMIN SERPL-MCNC: 2.5 G/DL (ref 3.5–5.2)
ALBUMIN SERPL-MCNC: 2.6 G/DL (ref 3.5–5.2)
ALBUMIN SERPL-MCNC: 2.7 G/DL (ref 3.5–5.2)
ALBUMIN/GLOB SERPL: 1 G/DL
ALBUMIN/GLOB SERPL: 1.1 G/DL
ALBUMIN/GLOB SERPL: 1.3 G/DL
ALBUMIN/GLOB SERPL: 1.4 G/DL
ALBUMIN/GLOB SERPL: 1.8 G/DL
ALP SERPL-CCNC: 58 U/L (ref 39–117)
ALP SERPL-CCNC: 63 U/L (ref 39–117)
ALP SERPL-CCNC: 68 U/L (ref 39–117)
ALP SERPL-CCNC: 83 U/L (ref 39–117)
ALP SERPL-CCNC: 85 U/L (ref 39–117)
ALT SERPL W P-5'-P-CCNC: 43 U/L (ref 1–33)
ALT SERPL W P-5'-P-CCNC: 48 U/L (ref 1–33)
ALT SERPL W P-5'-P-CCNC: 55 U/L (ref 1–33)
ALT SERPL W P-5'-P-CCNC: 73 U/L (ref 1–33)
ALT SERPL W P-5'-P-CCNC: 97 U/L (ref 1–33)
ANION GAP SERPL CALCULATED.3IONS-SCNC: 13 MMOL/L (ref 5–15)
ANION GAP SERPL CALCULATED.3IONS-SCNC: 15 MMOL/L (ref 5–15)
ANION GAP SERPL CALCULATED.3IONS-SCNC: 15 MMOL/L (ref 5–15)
ANION GAP SERPL CALCULATED.3IONS-SCNC: 16 MMOL/L (ref 5–15)
ANION GAP SERPL CALCULATED.3IONS-SCNC: 17 MMOL/L (ref 5–15)
APTT PPP: 34.6 SECONDS (ref 22–39)
ARTERIAL PATENCY WRIST A: ABNORMAL
AST SERPL-CCNC: 12 U/L (ref 1–32)
AST SERPL-CCNC: 14 U/L (ref 1–32)
AST SERPL-CCNC: 15 U/L (ref 1–32)
AST SERPL-CCNC: 22 U/L (ref 1–32)
AST SERPL-CCNC: 23 U/L (ref 1–32)
ASTRO TYP 1-8 RNA STL QL NAA+NON-PROBE: NOT DETECTED
ASTRO TYP 1-8 RNA STL QL NAA+NON-PROBE: NOT DETECTED
ATMOSPHERIC PRESS: ABNORMAL MM[HG]
BACTERIA SPEC AEROBE CULT: ABNORMAL
BACTERIA SPEC AEROBE CULT: NORMAL
BACTERIA SPEC AEROBE CULT: NORMAL
BACTERIA UR QL AUTO: ABNORMAL /HPF
BASE EXCESS BLDA CALC-SCNC: -7.7 MMOL/L (ref 0–2)
BASOPHILS # BLD AUTO: 0 10*3/MM3 (ref 0–0.2)
BASOPHILS # BLD AUTO: 0.01 10*3/MM3 (ref 0–0.2)
BASOPHILS # BLD MANUAL: 0 10*3/MM3 (ref 0–0.2)
BASOPHILS # BLD MANUAL: 0 10*3/MM3 (ref 0–0.2)
BASOPHILS NFR BLD AUTO: 0 % (ref 0–1.5)
BASOPHILS NFR BLD AUTO: 1.7 % (ref 0–1.5)
BASOPHILS NFR BLD MANUAL: 0 % (ref 0–1.5)
BASOPHILS NFR BLD MANUAL: 0 % (ref 0–1.5)
BDY SITE: ABNORMAL
BH BB BLOOD EXPIRATION DATE: NORMAL
BH BB BLOOD TYPE BARCODE: 6200
BH BB BLOOD TYPE BARCODE: 6200
BH BB BLOOD TYPE BARCODE: 8400
BH BB BLOOD TYPE BARCODE: 8400
BH BB DISPENSE STATUS: NORMAL
BH BB PRODUCT CODE: NORMAL
BH BB UNIT NUMBER: NORMAL
BH CV ECHO MEAS - AO MAX PG: 4.6 MMHG
BH CV ECHO MEAS - AO MEAN PG: 3 MMHG
BH CV ECHO MEAS - AO ROOT DIAM: 3.3 CM
BH CV ECHO MEAS - AO V2 MAX: 107 CM/SEC
BH CV ECHO MEAS - AO V2 VTI: 21 CM
BH CV ECHO MEAS - AVA(I,D): 1.58 CM2
BH CV ECHO MEAS - EDV(CUBED): 68.9 ML
BH CV ECHO MEAS - EDV(MOD-SP2): 53.2 ML
BH CV ECHO MEAS - EDV(MOD-SP4): 56.5 ML
BH CV ECHO MEAS - EF(MOD-BP): 57.5 %
BH CV ECHO MEAS - EF(MOD-SP2): 57.3 %
BH CV ECHO MEAS - EF(MOD-SP4): 58.8 %
BH CV ECHO MEAS - ESV(CUBED): 19.7 ML
BH CV ECHO MEAS - ESV(MOD-SP2): 22.7 ML
BH CV ECHO MEAS - ESV(MOD-SP4): 23.3 ML
BH CV ECHO MEAS - FS: 34.1 %
BH CV ECHO MEAS - IVS/LVPW: 1 CM
BH CV ECHO MEAS - IVSD: 1.2 CM
BH CV ECHO MEAS - LA DIMENSION: 4.1 CM
BH CV ECHO MEAS - LAT PEAK E' VEL: 8.6 CM/SEC
BH CV ECHO MEAS - LV MASS(C)D: 171.7 GRAMS
BH CV ECHO MEAS - LV MAX PG: 1.67 MMHG
BH CV ECHO MEAS - LV MEAN PG: 1 MMHG
BH CV ECHO MEAS - LV V1 MAX: 64.7 CM/SEC
BH CV ECHO MEAS - LV V1 VTI: 11.7 CM
BH CV ECHO MEAS - LVIDD: 4.1 CM
BH CV ECHO MEAS - LVIDS: 2.7 CM
BH CV ECHO MEAS - LVOT AREA: 2.8 CM2
BH CV ECHO MEAS - LVOT DIAM: 1.9 CM
BH CV ECHO MEAS - LVPWD: 1.2 CM
BH CV ECHO MEAS - MED PEAK E' VEL: 7.5 CM/SEC
BH CV ECHO MEAS - MR MAX PG: 59.9 MMHG
BH CV ECHO MEAS - MR MAX VEL: 387 CM/SEC
BH CV ECHO MEAS - MR MEAN PG: 38 MMHG
BH CV ECHO MEAS - MR MEAN VEL: 292 CM/SEC
BH CV ECHO MEAS - MR VTI: 117 CM
BH CV ECHO MEAS - MV A MAX VEL: 78.6 CM/SEC
BH CV ECHO MEAS - MV DEC SLOPE: 408 CM/SEC2
BH CV ECHO MEAS - MV DEC TIME: 0.11 SEC
BH CV ECHO MEAS - MV E MAX VEL: 47.2 CM/SEC
BH CV ECHO MEAS - MV E/A: 0.6
BH CV ECHO MEAS - MV MAX PG: 4.2 MMHG
BH CV ECHO MEAS - MV MEAN PG: 2 MMHG
BH CV ECHO MEAS - MV P1/2T: 56.4 MSEC
BH CV ECHO MEAS - MV V2 VTI: 18.1 CM
BH CV ECHO MEAS - MVA(P1/2T): 3.9 CM2
BH CV ECHO MEAS - MVA(VTI): 1.83 CM2
BH CV ECHO MEAS - PA ACC TIME: 0.07 SEC
BH CV ECHO MEAS - PI END-D VEL: 96.2 CM/SEC
BH CV ECHO MEAS - RAP SYSTOLE: 8 MMHG
BH CV ECHO MEAS - RVSP: 53.4 MMHG
BH CV ECHO MEAS - SV(LVOT): 33.2 ML
BH CV ECHO MEAS - SV(MOD-SP2): 30.5 ML
BH CV ECHO MEAS - SV(MOD-SP4): 33.2 ML
BH CV ECHO MEAS - TAPSE (>1.6): 1.73 CM
BH CV ECHO MEAS - TR MAX PG: 45.4 MMHG
BH CV ECHO MEAS - TR MAX VEL: 337 CM/SEC
BH CV ECHO MEASUREMENTS AVERAGE E/E' RATIO: 5.86
BH CV VAS BP RIGHT ARM: NORMAL MMHG
BH CV XLRA - RV BASE: 3.3 CM
BH CV XLRA - RV LENGTH: 6.4 CM
BH CV XLRA - RV MID: 2.5 CM
BH CV XLRA - TDI S': 9.7 CM/SEC
BILIRUB SERPL-MCNC: 0.2 MG/DL (ref 0–1.2)
BILIRUB SERPL-MCNC: 0.3 MG/DL (ref 0–1.2)
BILIRUB SERPL-MCNC: 0.3 MG/DL (ref 0–1.2)
BILIRUB SERPL-MCNC: 0.4 MG/DL (ref 0–1.2)
BILIRUB SERPL-MCNC: 0.5 MG/DL (ref 0–1.2)
BILIRUB UR QL STRIP: NEGATIVE
BLD GP AB SCN SERPL QL: NEGATIVE
BODY TEMPERATURE: 37
BUN SERPL-MCNC: 47 MG/DL (ref 8–23)
BUN SERPL-MCNC: 49 MG/DL (ref 8–23)
BUN SERPL-MCNC: 51 MG/DL (ref 8–23)
BUN SERPL-MCNC: 51 MG/DL (ref 8–23)
BUN SERPL-MCNC: 53 MG/DL (ref 8–23)
BUN/CREAT SERPL: 24 (ref 7–25)
BUN/CREAT SERPL: 24.9 (ref 7–25)
BUN/CREAT SERPL: 26 (ref 7–25)
BUN/CREAT SERPL: 26.2 (ref 7–25)
BUN/CREAT SERPL: 27 (ref 7–25)
BURR CELLS BLD QL SMEAR: NORMAL
C CAYETANENSIS DNA STL QL NAA+NON-PROBE: NOT DETECTED
C CAYETANENSIS DNA STL QL NAA+NON-PROBE: NOT DETECTED
C COLI+JEJ+UPSA DNA STL QL NAA+NON-PROBE: NOT DETECTED
C COLI+JEJ+UPSA DNA STL QL NAA+NON-PROBE: NOT DETECTED
C DIFF TOX GENS STL QL NAA+PROBE: NOT DETECTED
CALCIUM SPEC-SCNC: 6.5 MG/DL (ref 8.6–10.5)
CALCIUM SPEC-SCNC: 6.8 MG/DL (ref 8.6–10.5)
CALCIUM SPEC-SCNC: 7.1 MG/DL (ref 8.6–10.5)
CALCIUM SPEC-SCNC: 7.4 MG/DL (ref 8.6–10.5)
CALCIUM SPEC-SCNC: 8.2 MG/DL (ref 8.6–10.5)
CHLORIDE SERPL-SCNC: 104 MMOL/L (ref 98–107)
CHLORIDE SERPL-SCNC: 111 MMOL/L (ref 98–107)
CHLORIDE SERPL-SCNC: 112 MMOL/L (ref 98–107)
CHLORIDE SERPL-SCNC: 114 MMOL/L (ref 98–107)
CHLORIDE SERPL-SCNC: 115 MMOL/L (ref 98–107)
CLARITY UR: ABNORMAL
CO2 BLDA-SCNC: 17.5 MMOL/L (ref 22–33)
CO2 SERPL-SCNC: 11 MMOL/L (ref 22–29)
CO2 SERPL-SCNC: 13 MMOL/L (ref 22–29)
CO2 SERPL-SCNC: 15 MMOL/L (ref 22–29)
CO2 SERPL-SCNC: 17 MMOL/L (ref 22–29)
CO2 SERPL-SCNC: 18 MMOL/L (ref 22–29)
COHGB MFR BLD: 0.3 % (ref 0–2)
COLOR UR: YELLOW
CORTIS SERPL-MCNC: 22.45 MCG/DL
CREAT SERPL-MCNC: 1.95 MG/DL (ref 0.57–1)
CREAT SERPL-MCNC: 1.96 MG/DL (ref 0.57–1)
CREAT SERPL-MCNC: 1.97 MG/DL (ref 0.57–1)
CROSSMATCH INTERPRETATION: NORMAL
CRP SERPL-MCNC: 19.87 MG/DL (ref 0–0.5)
CRYPTOSP DNA STL QL NAA+NON-PROBE: NOT DETECTED
CRYPTOSP DNA STL QL NAA+NON-PROBE: NOT DETECTED
D DIMER PPP FEU-MCNC: 1.5 MCGFEU/ML (ref 0–0.79)
D-LACTATE SERPL-SCNC: 1 MMOL/L (ref 0.5–2)
D-LACTATE SERPL-SCNC: 2.5 MMOL/L (ref 0.5–2)
D-LACTATE SERPL-SCNC: 3.8 MMOL/L (ref 0.5–2)
D-LACTATE SERPL-SCNC: 4.2 MMOL/L (ref 0.5–2)
D-LACTATE SERPL-SCNC: 5.7 MMOL/L (ref 0.5–2)
D-LACTATE SERPL-SCNC: 5.8 MMOL/L (ref 0.5–2)
DACRYOCYTES BLD QL SMEAR: NORMAL
DEPRECATED RDW RBC AUTO: 50.5 FL (ref 37–54)
DEPRECATED RDW RBC AUTO: 51.8 FL (ref 37–54)
DEPRECATED RDW RBC AUTO: 52.7 FL (ref 37–54)
DEPRECATED RDW RBC AUTO: 52.8 FL (ref 37–54)
DEPRECATED RDW RBC AUTO: 53.2 FL (ref 37–54)
E HISTOLYT DNA STL QL NAA+NON-PROBE: NOT DETECTED
E HISTOLYT DNA STL QL NAA+NON-PROBE: NOT DETECTED
EAEC PAA PLAS AGGR+AATA ST NAA+NON-PRB: NOT DETECTED
EAEC PAA PLAS AGGR+AATA ST NAA+NON-PRB: NOT DETECTED
EC STX1+STX2 GENES STL QL NAA+NON-PROBE: NOT DETECTED
EC STX1+STX2 GENES STL QL NAA+NON-PROBE: NOT DETECTED
EGFRCR SERPLBLD CKD-EPI 2021: 25.5 ML/MIN/1.73
EGFRCR SERPLBLD CKD-EPI 2021: 25.6 ML/MIN/1.73
EGFRCR SERPLBLD CKD-EPI 2021: 25.8 ML/MIN/1.73
EOSINOPHIL # BLD AUTO: 0 10*3/MM3 (ref 0–0.4)
EOSINOPHIL # BLD AUTO: 0 10*3/MM3 (ref 0–0.4)
EOSINOPHIL # BLD MANUAL: 0 10*3/MM3 (ref 0–0.4)
EOSINOPHIL # BLD MANUAL: 0.02 10*3/MM3 (ref 0–0.4)
EOSINOPHIL NFR BLD AUTO: 0 % (ref 0.3–6.2)
EOSINOPHIL NFR BLD AUTO: 0 % (ref 0.3–6.2)
EOSINOPHIL NFR BLD MANUAL: 0 % (ref 0.3–6.2)
EOSINOPHIL NFR BLD MANUAL: 5 % (ref 0.3–6.2)
EPAP: 0
EPEC EAE GENE STL QL NAA+NON-PROBE: NOT DETECTED
EPEC EAE GENE STL QL NAA+NON-PROBE: NOT DETECTED
ERYTHROCYTE [DISTWIDTH] IN BLOOD BY AUTOMATED COUNT: 14.9 % (ref 12.3–15.4)
ERYTHROCYTE [DISTWIDTH] IN BLOOD BY AUTOMATED COUNT: 15 % (ref 12.3–15.4)
ERYTHROCYTE [DISTWIDTH] IN BLOOD BY AUTOMATED COUNT: 15.1 % (ref 12.3–15.4)
ERYTHROCYTE [DISTWIDTH] IN BLOOD BY AUTOMATED COUNT: 15.3 % (ref 12.3–15.4)
ERYTHROCYTE [DISTWIDTH] IN BLOOD BY AUTOMATED COUNT: 15.5 % (ref 12.3–15.4)
ERYTHROCYTE [SEDIMENTATION RATE] IN BLOOD: 4 MM/HR (ref 0–30)
ETEC LTA+ST1A+ST1B TOX ST NAA+NON-PROBE: NOT DETECTED
ETEC LTA+ST1A+ST1B TOX ST NAA+NON-PROBE: NOT DETECTED
FIBRINOGEN PPP-MCNC: 414 MG/DL (ref 203–470)
G LAMBLIA DNA STL QL NAA+NON-PROBE: NOT DETECTED
G LAMBLIA DNA STL QL NAA+NON-PROBE: NOT DETECTED
GLOBULIN UR ELPH-MCNC: 1.5 GM/DL
GLOBULIN UR ELPH-MCNC: 1.7 GM/DL
GLOBULIN UR ELPH-MCNC: 1.8 GM/DL
GLOBULIN UR ELPH-MCNC: 2 GM/DL
GLOBULIN UR ELPH-MCNC: 2.4 GM/DL
GLUCOSE BLDC GLUCOMTR-MCNC: 107 MG/DL (ref 70–130)
GLUCOSE BLDC GLUCOMTR-MCNC: 113 MG/DL (ref 70–130)
GLUCOSE BLDC GLUCOMTR-MCNC: 115 MG/DL (ref 70–130)
GLUCOSE BLDC GLUCOMTR-MCNC: 116 MG/DL (ref 70–130)
GLUCOSE BLDC GLUCOMTR-MCNC: 120 MG/DL (ref 70–130)
GLUCOSE BLDC GLUCOMTR-MCNC: 122 MG/DL (ref 70–130)
GLUCOSE BLDC GLUCOMTR-MCNC: 124 MG/DL (ref 70–130)
GLUCOSE BLDC GLUCOMTR-MCNC: 130 MG/DL (ref 70–130)
GLUCOSE BLDC GLUCOMTR-MCNC: 131 MG/DL (ref 70–130)
GLUCOSE BLDC GLUCOMTR-MCNC: 135 MG/DL (ref 70–130)
GLUCOSE BLDC GLUCOMTR-MCNC: 149 MG/DL (ref 70–130)
GLUCOSE BLDC GLUCOMTR-MCNC: 179 MG/DL (ref 70–130)
GLUCOSE BLDC GLUCOMTR-MCNC: 181 MG/DL (ref 70–130)
GLUCOSE BLDC GLUCOMTR-MCNC: 218 MG/DL (ref 70–130)
GLUCOSE BLDC GLUCOMTR-MCNC: 77 MG/DL (ref 70–130)
GLUCOSE BLDC GLUCOMTR-MCNC: 78 MG/DL (ref 70–130)
GLUCOSE SERPL-MCNC: 129 MG/DL (ref 65–99)
GLUCOSE SERPL-MCNC: 131 MG/DL (ref 65–99)
GLUCOSE SERPL-MCNC: 183 MG/DL (ref 65–99)
GLUCOSE SERPL-MCNC: 318 MG/DL (ref 65–99)
GLUCOSE SERPL-MCNC: 53 MG/DL (ref 65–99)
GLUCOSE UR STRIP-MCNC: ABNORMAL MG/DL
GRAN CASTS URNS QL MICRO: ABNORMAL /LPF
HCO3 BLDA-SCNC: 16.6 MMOL/L (ref 20–26)
HCT VFR BLD AUTO: 22.4 % (ref 34–46.6)
HCT VFR BLD AUTO: 22.9 % (ref 34–46.6)
HCT VFR BLD AUTO: 23.3 % (ref 34–46.6)
HCT VFR BLD AUTO: 23.4 % (ref 34–46.6)
HCT VFR BLD AUTO: 23.7 % (ref 34–46.6)
HCT VFR BLD AUTO: 25.7 % (ref 34–46.6)
HCT VFR BLD AUTO: 27.7 % (ref 34–46.6)
HCT VFR BLD AUTO: 28.7 % (ref 34–46.6)
HCT VFR BLD AUTO: 29.7 % (ref 34–46.6)
HCT VFR BLD AUTO: 33.2 % (ref 34–46.6)
HCT VFR BLD CALC: 27.5 % (ref 38–51)
HEMOCCULT STL QL: POSITIVE
HGB BLD-MCNC: 10.8 G/DL (ref 12–15.9)
HGB BLD-MCNC: 7.2 G/DL (ref 12–15.9)
HGB BLD-MCNC: 7.4 G/DL (ref 12–15.9)
HGB BLD-MCNC: 7.6 G/DL (ref 12–15.9)
HGB BLD-MCNC: 8.3 G/DL (ref 12–15.9)
HGB BLD-MCNC: 8.8 G/DL (ref 12–15.9)
HGB BLD-MCNC: 9.3 G/DL (ref 12–15.9)
HGB BLD-MCNC: 9.4 G/DL (ref 12–15.9)
HGB BLDA-MCNC: 9 G/DL (ref 14–18)
HGB UR QL STRIP.AUTO: ABNORMAL
HYALINE CASTS UR QL AUTO: ABNORMAL /LPF
IMM GRANULOCYTES # BLD AUTO: 0.02 10*3/MM3 (ref 0–0.05)
IMM GRANULOCYTES # BLD AUTO: 0.03 10*3/MM3 (ref 0–0.05)
IMM GRANULOCYTES NFR BLD AUTO: 3.4 % (ref 0–0.5)
IMM GRANULOCYTES NFR BLD AUTO: 9.1 % (ref 0–0.5)
INHALED O2 CONCENTRATION: 32 %
IPAP: 0
IVRT: 109 MS
KETONES UR QL STRIP: NEGATIVE
LEFT ATRIUM VOLUME INDEX: 30.2 ML/M2
LEFT ATRIUM VOLUME: 51.6 ML
LEUKOCYTE ESTERASE UR QL STRIP.AUTO: NEGATIVE
LIPASE SERPL-CCNC: 26 U/L (ref 13–60)
LV EF 2D ECHO EST: 60 %
LYMPHOCYTES # BLD AUTO: 0.1 10*3/MM3 (ref 0.7–3.1)
LYMPHOCYTES # BLD AUTO: 0.12 10*3/MM3 (ref 0.7–3.1)
LYMPHOCYTES # BLD MANUAL: 0.09 10*3/MM3 (ref 0.7–3.1)
LYMPHOCYTES # BLD MANUAL: 0.19 10*3/MM3 (ref 0.7–3.1)
LYMPHOCYTES NFR BLD AUTO: 20.3 % (ref 19.6–45.3)
LYMPHOCYTES NFR BLD AUTO: 30.3 % (ref 19.6–45.3)
LYMPHOCYTES NFR BLD MANUAL: 10 % (ref 5–12)
LYMPHOCYTES NFR BLD MANUAL: 7 % (ref 5–12)
MAGNESIUM SERPL-MCNC: 1.5 MG/DL (ref 1.6–2.4)
MAGNESIUM SERPL-MCNC: 2.8 MG/DL (ref 1.6–2.4)
MAGNESIUM SERPL-MCNC: 3.1 MG/DL (ref 1.6–2.4)
MCH RBC QN AUTO: 29.9 PG (ref 26.6–33)
MCH RBC QN AUTO: 30 PG (ref 26.6–33)
MCH RBC QN AUTO: 30 PG (ref 26.6–33)
MCH RBC QN AUTO: 30.1 PG (ref 26.6–33)
MCH RBC QN AUTO: 30.8 PG (ref 26.6–33)
MCHC RBC AUTO-ENTMCNC: 31.8 G/DL (ref 31.5–35.7)
MCHC RBC AUTO-ENTMCNC: 32.4 G/DL (ref 31.5–35.7)
MCHC RBC AUTO-ENTMCNC: 32.5 G/DL (ref 31.5–35.7)
MCHC RBC AUTO-ENTMCNC: 32.5 G/DL (ref 31.5–35.7)
MCHC RBC AUTO-ENTMCNC: 32.6 G/DL (ref 31.5–35.7)
MCV RBC AUTO: 92 FL (ref 79–97)
MCV RBC AUTO: 92.1 FL (ref 79–97)
MCV RBC AUTO: 92.6 FL (ref 79–97)
MCV RBC AUTO: 94.7 FL (ref 79–97)
MCV RBC AUTO: 94.9 FL (ref 79–97)
METHGB BLD QL: 1.1 % (ref 0–1.5)
MODALITY: ABNORMAL
MONOCYTES # BLD AUTO: 0.04 10*3/MM3 (ref 0.1–0.9)
MONOCYTES # BLD AUTO: 0.05 10*3/MM3 (ref 0.1–0.9)
MONOCYTES # BLD: 0.04 10*3/MM3 (ref 0.1–0.9)
MONOCYTES # BLD: 0.08 10*3/MM3 (ref 0.1–0.9)
MONOCYTES NFR BLD AUTO: 15.2 % (ref 5–12)
MONOCYTES NFR BLD AUTO: 6.8 % (ref 5–12)
MRSA DNA SPEC QL NAA+PROBE: NEGATIVE
MYELOCYTES NFR BLD MANUAL: 1 % (ref 0–0)
NEUTROPHILS # BLD AUTO: 0.22 10*3/MM3 (ref 1.7–7)
NEUTROPHILS # BLD AUTO: 0.85 10*3/MM3 (ref 1.7–7)
NEUTROPHILS NFR BLD AUTO: 0.15 10*3/MM3 (ref 1.7–7)
NEUTROPHILS NFR BLD AUTO: 0.4 10*3/MM3 (ref 1.7–7)
NEUTROPHILS NFR BLD AUTO: 45.4 % (ref 42.7–76)
NEUTROPHILS NFR BLD AUTO: 67.8 % (ref 42.7–76)
NEUTROPHILS NFR BLD MANUAL: 45 % (ref 42.7–76)
NEUTROPHILS NFR BLD MANUAL: 47 % (ref 42.7–76)
NEUTS BAND NFR BLD MANUAL: 15 % (ref 0–5)
NEUTS BAND NFR BLD MANUAL: 28 % (ref 0–5)
NITRITE UR QL STRIP: NEGATIVE
NOROVIRUS GI+II RNA STL QL NAA+NON-PROBE: NOT DETECTED
NOROVIRUS GI+II RNA STL QL NAA+NON-PROBE: NOT DETECTED
NRBC BLD AUTO-RTO: 0 /100 WBC (ref 0–0.2)
NRBC BLD AUTO-RTO: 0 /100 WBC (ref 0–0.2)
NRBC SPEC MANUAL: 0 /100 WBC (ref 0–0.2)
NT-PROBNP SERPL-MCNC: 4680 PG/ML (ref 0–1800)
OVALOCYTES BLD QL SMEAR: NORMAL
OXYHGB MFR BLDV: 87.7 % (ref 94–99)
P SHIGELLOIDES DNA STL QL NAA+NON-PROBE: NOT DETECTED
P SHIGELLOIDES DNA STL QL NAA+NON-PROBE: NOT DETECTED
PAW @ PEAK INSP FLOW SETTING VENT: 0 CMH2O
PCO2 BLDA: 28.7 MM HG (ref 35–45)
PCO2 TEMP ADJ BLD: 28.7 MM HG (ref 35–45)
PH BLDA: 7.37 PH UNITS (ref 7.35–7.45)
PH UR STRIP.AUTO: <=5 [PH] (ref 5–8)
PH, TEMP CORRECTED: 7.37 PH UNITS
PHOSPHATE SERPL-MCNC: 3.5 MG/DL (ref 2.5–4.5)
PHOSPHATE SERPL-MCNC: 4.1 MG/DL (ref 2.5–4.5)
PHOSPHATE SERPL-MCNC: 4.8 MG/DL (ref 2.5–4.5)
PLAT MORPH BLD: NORMAL
PLATELET # BLD AUTO: 12 10*3/MM3 (ref 140–450)
PLATELET # BLD AUTO: 18 10*3/MM3 (ref 140–450)
PLATELET # BLD AUTO: 25 10*3/MM3 (ref 140–450)
PLATELET # BLD AUTO: 34 10*3/MM3 (ref 140–450)
PLATELET # BLD AUTO: 4 10*3/MM3 (ref 140–450)
PLATELET # BLD AUTO: 47 10*3/MM3 (ref 140–450)
PMV BLD AUTO: 11.1 FL (ref 6–12)
PMV BLD AUTO: 11.1 FL (ref 6–12)
PMV BLD AUTO: 11.7 FL (ref 6–12)
PMV BLD AUTO: 12.7 FL (ref 6–12)
PO2 BLDA: 58.7 MM HG (ref 83–108)
PO2 TEMP ADJ BLD: 58.7 MM HG (ref 83–108)
POTASSIUM SERPL-SCNC: 3.2 MMOL/L (ref 3.5–5.2)
POTASSIUM SERPL-SCNC: 3.2 MMOL/L (ref 3.5–5.2)
POTASSIUM SERPL-SCNC: 3.3 MMOL/L (ref 3.5–5.2)
POTASSIUM SERPL-SCNC: 3.5 MMOL/L (ref 3.5–5.2)
POTASSIUM SERPL-SCNC: 3.9 MMOL/L (ref 3.5–5.2)
POTASSIUM SERPL-SCNC: 4.1 MMOL/L (ref 3.5–5.2)
POTASSIUM SERPL-SCNC: 4.9 MMOL/L (ref 3.5–5.2)
PROCALCITONIN SERPL-MCNC: 74 NG/ML (ref 0–0.25)
PROCALCITONIN SERPL-MCNC: 80.55 NG/ML (ref 0–0.25)
PROCALCITONIN SERPL-MCNC: >100 NG/ML (ref 0–0.25)
PROCALCITONIN SERPL-MCNC: >100 NG/ML (ref 0–0.25)
PROT SERPL-MCNC: 3.6 G/DL (ref 6–8.5)
PROT SERPL-MCNC: 4.2 G/DL (ref 6–8.5)
PROT SERPL-MCNC: 4.3 G/DL (ref 6–8.5)
PROT SERPL-MCNC: 4.6 G/DL (ref 6–8.5)
PROT SERPL-MCNC: 4.9 G/DL (ref 6–8.5)
PROT UR QL STRIP: ABNORMAL
QT INTERVAL: 396 MS
QTC INTERVAL: 467 MS
RBC # BLD AUTO: 2.47 10*6/MM3 (ref 3.77–5.28)
RBC # BLD AUTO: 2.53 10*6/MM3 (ref 3.77–5.28)
RBC # BLD AUTO: 2.92 10*6/MM3 (ref 3.77–5.28)
RBC # BLD AUTO: 3.1 10*6/MM3 (ref 3.77–5.28)
RBC # BLD AUTO: 3.61 10*6/MM3 (ref 3.77–5.28)
RBC # UR STRIP: ABNORMAL /HPF
RBC MORPH BLD: NORMAL
RBC MORPH BLD: NORMAL
REF LAB TEST METHOD: ABNORMAL
RH BLD: POSITIVE
RH BLD: POSITIVE
RVA RNA STL QL NAA+NON-PROBE: NOT DETECTED
RVA RNA STL QL NAA+NON-PROBE: NOT DETECTED
S ENT+BONG DNA STL QL NAA+NON-PROBE: NOT DETECTED
S ENT+BONG DNA STL QL NAA+NON-PROBE: NOT DETECTED
SAPO I+II+IV+V RNA STL QL NAA+NON-PROBE: NOT DETECTED
SAPO I+II+IV+V RNA STL QL NAA+NON-PROBE: NOT DETECTED
SHIGELLA SP+EIEC IPAH ST NAA+NON-PROBE: NOT DETECTED
SHIGELLA SP+EIEC IPAH ST NAA+NON-PROBE: NOT DETECTED
SMALL PLATELETS BLD QL SMEAR: NORMAL
SODIUM SERPL-SCNC: 137 MMOL/L (ref 136–145)
SODIUM SERPL-SCNC: 141 MMOL/L (ref 136–145)
SODIUM SERPL-SCNC: 141 MMOL/L (ref 136–145)
SODIUM SERPL-SCNC: 143 MMOL/L (ref 136–145)
SODIUM SERPL-SCNC: 144 MMOL/L (ref 136–145)
SP GR UR STRIP: 1.02 (ref 1–1.03)
SQUAMOUS #/AREA URNS HPF: ABNORMAL /HPF
T&S EXPIRATION DATE: NORMAL
TOTAL RATE: 0 BREATHS/MINUTE
UNIT  ABO: NORMAL
UNIT  RH: NORMAL
UROBILINOGEN UR QL STRIP: ABNORMAL
V CHOL+PARA+VUL DNA STL QL NAA+NON-PROBE: NOT DETECTED
V CHOL+PARA+VUL DNA STL QL NAA+NON-PROBE: NOT DETECTED
V CHOLERAE DNA STL QL NAA+NON-PROBE: NOT DETECTED
V CHOLERAE DNA STL QL NAA+NON-PROBE: NOT DETECTED
VANCOMYCIN SERPL-MCNC: 14.5 MCG/ML (ref 5–40)
VANCOMYCIN SERPL-MCNC: 16 MCG/ML (ref 5–40)
VANCOMYCIN SERPL-MCNC: 24.3 MCG/ML (ref 5–40)
VARIANT LYMPHS NFR BLD MANUAL: 17 % (ref 19.6–45.3)
VARIANT LYMPHS NFR BLD MANUAL: 25 % (ref 19.6–45.3)
VENTILATOR MODE: ABNORMAL
WBC # UR STRIP: ABNORMAL /HPF
WBC MORPH BLD: NORMAL
WBC NRBC COR # BLD AUTO: 0.31 10*3/MM3 (ref 3.4–10.8)
WBC NRBC COR # BLD AUTO: 0.33 10*3/MM3 (ref 3.4–10.8)
WBC NRBC COR # BLD AUTO: 0.36 10*3/MM3 (ref 3.4–10.8)
WBC NRBC COR # BLD AUTO: 0.59 10*3/MM3 (ref 3.4–10.8)
WBC NRBC COR # BLD AUTO: 1.13 10*3/MM3 (ref 3.4–10.8)
Y ENTEROCOL DNA STL QL NAA+NON-PROBE: NOT DETECTED
Y ENTEROCOL DNA STL QL NAA+NON-PROBE: NOT DETECTED

## 2024-01-01 PROCEDURE — 82948 REAGENT STRIP/BLOOD GLUCOSE: CPT

## 2024-01-01 PROCEDURE — 25010000002 MORPHINE PER 10 MG: Performed by: NURSE PRACTITIONER

## 2024-01-01 PROCEDURE — 25010000002 MIDAZOLAM PER 1 MG: Performed by: INTERNAL MEDICINE

## 2024-01-01 PROCEDURE — 83605 ASSAY OF LACTIC ACID: CPT | Performed by: INTERNAL MEDICINE

## 2024-01-01 PROCEDURE — 85025 COMPLETE CBC W/AUTO DIFF WBC: CPT | Performed by: INTERNAL MEDICINE

## 2024-01-01 PROCEDURE — 93306 TTE W/DOPPLER COMPLETE: CPT

## 2024-01-01 PROCEDURE — 25010000002 HYDROCORTISONE SOD SUC (PF) 100 MG RECONSTITUTED SOLUTION: Performed by: INTERNAL MEDICINE

## 2024-01-01 PROCEDURE — 80053 COMPREHEN METABOLIC PANEL: CPT | Performed by: INTERNAL MEDICINE

## 2024-01-01 PROCEDURE — 80202 ASSAY OF VANCOMYCIN: CPT

## 2024-01-01 PROCEDURE — 85025 COMPLETE CBC W/AUTO DIFF WBC: CPT | Performed by: NURSE PRACTITIONER

## 2024-01-01 PROCEDURE — 25010000002 LEVETRIRACETAM PER 10 MG: Performed by: INTERNAL MEDICINE

## 2024-01-01 PROCEDURE — 25010000002 MIDAZOLAM PER 1 MG: Performed by: NURSE PRACTITIONER

## 2024-01-01 PROCEDURE — 02HV33Z INSERTION OF INFUSION DEVICE INTO SUPERIOR VENA CAVA, PERCUTANEOUS APPROACH: ICD-10-PCS | Performed by: INTERNAL MEDICINE

## 2024-01-01 PROCEDURE — 25810000003 SODIUM CHLORIDE 0.9 % SOLUTION 250 ML FLEX CONT

## 2024-01-01 PROCEDURE — 25010000002 FILGRASTIM PER 300 MCG: Performed by: INTERNAL MEDICINE

## 2024-01-01 PROCEDURE — 99232 SBSQ HOSP IP/OBS MODERATE 35: CPT | Performed by: INTERNAL MEDICINE

## 2024-01-01 PROCEDURE — 97166 OT EVAL MOD COMPLEX 45 MIN: CPT

## 2024-01-01 PROCEDURE — 25810000003 SODIUM CHLORIDE 0.9 % SOLUTION: Performed by: INTERNAL MEDICINE

## 2024-01-01 PROCEDURE — 71250 CT THORAX DX C-: CPT

## 2024-01-01 PROCEDURE — 86900 BLOOD TYPING SEROLOGIC ABO: CPT

## 2024-01-01 PROCEDURE — 84132 ASSAY OF SERUM POTASSIUM: CPT | Performed by: INTERNAL MEDICINE

## 2024-01-01 PROCEDURE — 63710000001 INSULIN LISPRO (HUMAN) PER 5 UNITS: Performed by: NURSE PRACTITIONER

## 2024-01-01 PROCEDURE — 25010000002 ONDANSETRON PER 1 MG: Performed by: NURSE PRACTITIONER

## 2024-01-01 PROCEDURE — P9047 ALBUMIN (HUMAN), 25%, 50ML: HCPCS | Performed by: NURSE PRACTITIONER

## 2024-01-01 PROCEDURE — 84100 ASSAY OF PHOSPHORUS: CPT | Performed by: INTERNAL MEDICINE

## 2024-01-01 PROCEDURE — 99223 1ST HOSP IP/OBS HIGH 75: CPT | Performed by: INTERNAL MEDICINE

## 2024-01-01 PROCEDURE — 99291 CRITICAL CARE FIRST HOUR: CPT | Performed by: INTERNAL MEDICINE

## 2024-01-01 PROCEDURE — 71045 X-RAY EXAM CHEST 1 VIEW: CPT

## 2024-01-01 PROCEDURE — 82375 ASSAY CARBOXYHB QUANT: CPT

## 2024-01-01 PROCEDURE — 85014 HEMATOCRIT: CPT | Performed by: INTERNAL MEDICINE

## 2024-01-01 PROCEDURE — 87641 MR-STAPH DNA AMP PROBE: CPT | Performed by: NURSE PRACTITIONER

## 2024-01-01 PROCEDURE — 85379 FIBRIN DEGRADATION QUANT: CPT | Performed by: NURSE PRACTITIONER

## 2024-01-01 PROCEDURE — 74176 CT ABD & PELVIS W/O CONTRAST: CPT

## 2024-01-01 PROCEDURE — 82805 BLOOD GASES W/O2 SATURATION: CPT

## 2024-01-01 PROCEDURE — 84145 PROCALCITONIN (PCT): CPT | Performed by: INTERNAL MEDICINE

## 2024-01-01 PROCEDURE — 87507 IADNA-DNA/RNA PROBE TQ 12-25: CPT | Performed by: INTERNAL MEDICINE

## 2024-01-01 PROCEDURE — 86923 COMPATIBILITY TEST ELECTRIC: CPT

## 2024-01-01 PROCEDURE — 87086 URINE CULTURE/COLONY COUNT: CPT | Performed by: NURSE PRACTITIONER

## 2024-01-01 PROCEDURE — 83605 ASSAY OF LACTIC ACID: CPT | Performed by: NURSE PRACTITIONER

## 2024-01-01 PROCEDURE — 85007 BL SMEAR W/DIFF WBC COUNT: CPT | Performed by: NURSE PRACTITIONER

## 2024-01-01 PROCEDURE — 25010000002 PIPERACILLIN SOD-TAZOBACTAM PER 1 G: Performed by: NURSE PRACTITIONER

## 2024-01-01 PROCEDURE — 25010000002 FILGRASTIM PER 480 MCG: Performed by: INTERNAL MEDICINE

## 2024-01-01 PROCEDURE — 97530 THERAPEUTIC ACTIVITIES: CPT

## 2024-01-01 PROCEDURE — 77373 STRTCTC BDY RAD THER TX DLVR: CPT | Performed by: RADIOLOGY

## 2024-01-01 PROCEDURE — 25010000002 GLYCOPYRROLATE 0.2 MG/ML SOLUTION

## 2024-01-01 PROCEDURE — 84145 PROCALCITONIN (PCT): CPT | Performed by: NURSE PRACTITIONER

## 2024-01-01 PROCEDURE — 25010000002 VANCOMYCIN PER 500 MG

## 2024-01-01 PROCEDURE — 87186 SC STD MICRODIL/AGAR DIL: CPT | Performed by: NURSE PRACTITIONER

## 2024-01-01 PROCEDURE — 25010000002 VANCOMYCIN HCL IN NACL 1.5-0.9 GM/500ML-% SOLUTION

## 2024-01-01 PROCEDURE — P9035 PLATELET PHERES LEUKOREDUCED: HCPCS

## 2024-01-01 PROCEDURE — P9100 PATHOGEN TEST FOR PLATELETS: HCPCS

## 2024-01-01 PROCEDURE — 85730 THROMBOPLASTIN TIME PARTIAL: CPT | Performed by: NURSE PRACTITIONER

## 2024-01-01 PROCEDURE — 25010000002 HYDRALAZINE PER 20 MG

## 2024-01-01 PROCEDURE — 86901 BLOOD TYPING SEROLOGIC RH(D): CPT | Performed by: INTERNAL MEDICINE

## 2024-01-01 PROCEDURE — 85025 COMPLETE CBC W/AUTO DIFF WBC: CPT

## 2024-01-01 PROCEDURE — 77336 RADIATION PHYSICS CONSULT: CPT | Performed by: RADIOLOGY

## 2024-01-01 PROCEDURE — 97162 PT EVAL MOD COMPLEX 30 MIN: CPT

## 2024-01-01 PROCEDURE — 87493 C DIFF AMPLIFIED PROBE: CPT | Performed by: INTERNAL MEDICINE

## 2024-01-01 PROCEDURE — 86140 C-REACTIVE PROTEIN: CPT | Performed by: INTERNAL MEDICINE

## 2024-01-01 PROCEDURE — 85384 FIBRINOGEN ACTIVITY: CPT | Performed by: NURSE PRACTITIONER

## 2024-01-01 PROCEDURE — 81001 URINALYSIS AUTO W/SCOPE: CPT | Performed by: NURSE PRACTITIONER

## 2024-01-01 PROCEDURE — 25810000003 SODIUM CHLORIDE 0.9 % SOLUTION: Performed by: NURSE PRACTITIONER

## 2024-01-01 PROCEDURE — 87040 BLOOD CULTURE FOR BACTERIA: CPT | Performed by: NURSE PRACTITIONER

## 2024-01-01 PROCEDURE — 85007 BL SMEAR W/DIFF WBC COUNT: CPT

## 2024-01-01 PROCEDURE — G0378 HOSPITAL OBSERVATION PER HR: HCPCS

## 2024-01-01 PROCEDURE — 25010000002 MAGNESIUM SULFATE 2 GM/50ML SOLUTION

## 2024-01-01 PROCEDURE — 25010000002 GLYCOPYRROLATE 0.2 MG/ML SOLUTION: Performed by: NURSE PRACTITIONER

## 2024-01-01 PROCEDURE — 25810000003 LACTATED RINGERS SOLUTION: Performed by: INTERNAL MEDICINE

## 2024-01-01 PROCEDURE — 85007 BL SMEAR W/DIFF WBC COUNT: CPT | Performed by: INTERNAL MEDICINE

## 2024-01-01 PROCEDURE — 87077 CULTURE AEROBIC IDENTIFY: CPT | Performed by: NURSE PRACTITIONER

## 2024-01-01 PROCEDURE — 93005 ELECTROCARDIOGRAM TRACING: CPT | Performed by: NURSE PRACTITIONER

## 2024-01-01 PROCEDURE — 25010000002 HEPARIN (PORCINE) PER 1000 UNITS: Performed by: INTERNAL MEDICINE

## 2024-01-01 PROCEDURE — 36430 TRANSFUSION BLD/BLD COMPNT: CPT

## 2024-01-01 PROCEDURE — 25010000002 MORPHINE PER 10 MG: Performed by: INTERNAL MEDICINE

## 2024-01-01 PROCEDURE — 25010000002 VANCOMYCIN 1 G RECONSTITUTED SOLUTION 1 EACH VIAL

## 2024-01-01 PROCEDURE — 36600 WITHDRAWAL OF ARTERIAL BLOOD: CPT

## 2024-01-01 PROCEDURE — 83735 ASSAY OF MAGNESIUM: CPT | Performed by: INTERNAL MEDICINE

## 2024-01-01 PROCEDURE — 85014 HEMATOCRIT: CPT

## 2024-01-01 PROCEDURE — 86901 BLOOD TYPING SEROLOGIC RH(D): CPT

## 2024-01-01 PROCEDURE — 82533 TOTAL CORTISOL: CPT | Performed by: INTERNAL MEDICINE

## 2024-01-01 PROCEDURE — 25010000002 CEFEPIME PER 500 MG: Performed by: INTERNAL MEDICINE

## 2024-01-01 PROCEDURE — 93306 TTE W/DOPPLER COMPLETE: CPT | Performed by: INTERNAL MEDICINE

## 2024-01-01 PROCEDURE — C1894 INTRO/SHEATH, NON-LASER: HCPCS

## 2024-01-01 PROCEDURE — 87507 IADNA-DNA/RNA PROBE TQ 12-25: CPT | Performed by: NURSE PRACTITIONER

## 2024-01-01 PROCEDURE — 86850 RBC ANTIBODY SCREEN: CPT | Performed by: INTERNAL MEDICINE

## 2024-01-01 PROCEDURE — C1751 CATH, INF, PER/CENT/MIDLINE: HCPCS

## 2024-01-01 PROCEDURE — 25010000002 CEFEPIME PER 500 MG

## 2024-01-01 PROCEDURE — 83690 ASSAY OF LIPASE: CPT | Performed by: INTERNAL MEDICINE

## 2024-01-01 PROCEDURE — 82272 OCCULT BLD FECES 1-3 TESTS: CPT | Performed by: NURSE PRACTITIONER

## 2024-01-01 PROCEDURE — 25010000002 VANCOMYCIN 750 MG RECONSTITUTED SOLUTION 1 EACH VIAL

## 2024-01-01 PROCEDURE — 99238 HOSP IP/OBS DSCHRG MGMT 30/<: CPT | Performed by: INTERNAL MEDICINE

## 2024-01-01 PROCEDURE — 85027 COMPLETE CBC AUTOMATED: CPT

## 2024-01-01 PROCEDURE — 85018 HEMOGLOBIN: CPT

## 2024-01-01 PROCEDURE — 86900 BLOOD TYPING SEROLOGIC ABO: CPT | Performed by: INTERNAL MEDICINE

## 2024-01-01 PROCEDURE — 85018 HEMOGLOBIN: CPT | Performed by: INTERNAL MEDICINE

## 2024-01-01 PROCEDURE — 85652 RBC SED RATE AUTOMATED: CPT | Performed by: INTERNAL MEDICINE

## 2024-01-01 PROCEDURE — 85049 AUTOMATED PLATELET COUNT: CPT | Performed by: INTERNAL MEDICINE

## 2024-01-01 PROCEDURE — 25810000003 DEXTROSE-NACL PER 500 ML: Performed by: INTERNAL MEDICINE

## 2024-01-01 PROCEDURE — 80053 COMPREHEN METABOLIC PANEL: CPT | Performed by: NURSE PRACTITIONER

## 2024-01-01 PROCEDURE — 25010000002 ALBUMIN HUMAN 25% PER 50 ML: Performed by: NURSE PRACTITIONER

## 2024-01-01 PROCEDURE — 83880 ASSAY OF NATRIURETIC PEPTIDE: CPT | Performed by: INTERNAL MEDICINE

## 2024-01-01 PROCEDURE — 83050 HGB METHEMOGLOBIN QUAN: CPT

## 2024-01-01 PROCEDURE — 25010000002 FUROSEMIDE PER 20 MG: Performed by: INTERNAL MEDICINE

## 2024-01-01 RX ORDER — SODIUM CHLORIDE 9 MG/ML
40 INJECTION, SOLUTION INTRAVENOUS AS NEEDED
Status: DISCONTINUED | OUTPATIENT
Start: 2024-01-01 | End: 2024-01-01 | Stop reason: HOSPADM

## 2024-01-01 RX ORDER — ACETAMINOPHEN 160 MG/5ML
650 SOLUTION ORAL EVERY 4 HOURS PRN
Status: DISCONTINUED | OUTPATIENT
Start: 2024-01-01 | End: 2024-01-01

## 2024-01-01 RX ORDER — MAGNESIUM SULFATE HEPTAHYDRATE 40 MG/ML
2 INJECTION, SOLUTION INTRAVENOUS
Status: COMPLETED | OUTPATIENT
Start: 2024-01-01 | End: 2024-01-01

## 2024-01-01 RX ORDER — MIDAZOLAM HYDROCHLORIDE 1 MG/ML
2 INJECTION INTRAMUSCULAR; INTRAVENOUS EVERY 4 HOURS PRN
Status: DISCONTINUED | OUTPATIENT
Start: 2024-01-01 | End: 2024-01-01 | Stop reason: HOSPADM

## 2024-01-01 RX ORDER — DIPHENHYDRAMINE HYDROCHLORIDE AND LIDOCAINE HYDROCHLORIDE AND ALUMINUM HYDROXIDE AND MAGNESIUM HYDRO
5 KIT EVERY 6 HOURS PRN
Status: DISCONTINUED | OUTPATIENT
Start: 2024-01-01 | End: 2024-01-01 | Stop reason: HOSPADM

## 2024-01-01 RX ORDER — PANTOPRAZOLE SODIUM 40 MG/10ML
40 INJECTION, POWDER, LYOPHILIZED, FOR SOLUTION INTRAVENOUS
Status: DISCONTINUED | OUTPATIENT
Start: 2024-01-01 | End: 2024-01-01 | Stop reason: HOSPADM

## 2024-01-01 RX ORDER — VANCOMYCIN/0.9 % SOD CHLORIDE 1.5G/250ML
20 PLASTIC BAG, INJECTION (ML) INTRAVENOUS ONCE
Status: COMPLETED | OUTPATIENT
Start: 2024-01-01 | End: 2024-01-01

## 2024-01-01 RX ORDER — SODIUM CHLORIDE 0.9 % (FLUSH) 0.9 %
10 SYRINGE (ML) INJECTION EVERY 12 HOURS SCHEDULED
Status: DISCONTINUED | OUTPATIENT
Start: 2024-01-01 | End: 2024-01-01 | Stop reason: HOSPADM

## 2024-01-01 RX ORDER — PANTOPRAZOLE SODIUM 40 MG/10ML
40 INJECTION, POWDER, LYOPHILIZED, FOR SOLUTION INTRAVENOUS
Status: CANCELLED | OUTPATIENT
Start: 2024-01-01

## 2024-01-01 RX ORDER — HYDRALAZINE HYDROCHLORIDE 20 MG/ML
10 INJECTION INTRAMUSCULAR; INTRAVENOUS EVERY 4 HOURS PRN
Status: DISCONTINUED | OUTPATIENT
Start: 2024-01-01 | End: 2024-01-01

## 2024-01-01 RX ORDER — GLYCOPYRROLATE 0.2 MG/ML
0.4 INJECTION INTRAMUSCULAR; INTRAVENOUS EVERY 4 HOURS PRN
Status: DISCONTINUED | OUTPATIENT
Start: 2024-01-01 | End: 2024-01-01 | Stop reason: HOSPADM

## 2024-01-01 RX ORDER — NITROGLYCERIN 0.4 MG/1
0.4 TABLET SUBLINGUAL
Status: DISCONTINUED | OUTPATIENT
Start: 2024-01-01 | End: 2024-01-01

## 2024-01-01 RX ORDER — ACETAMINOPHEN 650 MG/1
650 SUPPOSITORY RECTAL EVERY 4 HOURS PRN
Status: DISCONTINUED | OUTPATIENT
Start: 2024-01-01 | End: 2024-01-01 | Stop reason: HOSPADM

## 2024-01-01 RX ORDER — GLYCOPYRROLATE 0.2 MG/ML
0.4 INJECTION INTRAMUSCULAR; INTRAVENOUS EVERY 4 HOURS PRN
Status: CANCELLED | OUTPATIENT
Start: 2024-01-01

## 2024-01-01 RX ORDER — ALBUMIN (HUMAN) 12.5 G/50ML
25 SOLUTION INTRAVENOUS ONCE
Status: COMPLETED | OUTPATIENT
Start: 2024-01-01 | End: 2024-01-01

## 2024-01-01 RX ORDER — MORPHINE SULFATE 2 MG/ML
2 INJECTION, SOLUTION INTRAMUSCULAR; INTRAVENOUS EVERY 6 HOURS
Status: DISCONTINUED | OUTPATIENT
Start: 2024-01-01 | End: 2024-01-01 | Stop reason: HOSPADM

## 2024-01-01 RX ORDER — ASPIRIN 81 MG/1
81 TABLET ORAL DAILY
Status: DISCONTINUED | OUTPATIENT
Start: 2024-01-01 | End: 2024-01-01

## 2024-01-01 RX ORDER — SODIUM CHLORIDE 9 MG/ML
75 INJECTION, SOLUTION INTRAVENOUS CONTINUOUS
Status: DISCONTINUED | OUTPATIENT
Start: 2024-01-01 | End: 2024-01-01

## 2024-01-01 RX ORDER — DEXTROSE AND SODIUM CHLORIDE 5; .9 G/100ML; G/100ML
75 INJECTION, SOLUTION INTRAVENOUS CONTINUOUS
Status: DISCONTINUED | OUTPATIENT
Start: 2024-01-01 | End: 2024-01-01

## 2024-01-01 RX ORDER — ATORVASTATIN CALCIUM 10 MG/1
10 TABLET, FILM COATED ORAL DAILY
Status: DISCONTINUED | OUTPATIENT
Start: 2024-01-01 | End: 2024-01-01

## 2024-01-01 RX ORDER — ACETAMINOPHEN 325 MG/1
650 TABLET ORAL EVERY 4 HOURS PRN
Status: DISCONTINUED | OUTPATIENT
Start: 2024-01-01 | End: 2024-01-01

## 2024-01-01 RX ORDER — LEVOTHYROXINE SODIUM 0.05 MG/1
50 TABLET ORAL DAILY
Status: DISCONTINUED | OUTPATIENT
Start: 2024-01-01 | End: 2024-01-01

## 2024-01-01 RX ORDER — LEVOTHYROXINE SODIUM 40 UG/ML
40 INJECTION, SOLUTION INTRAVENOUS
Status: DISCONTINUED | OUTPATIENT
Start: 2024-01-01 | End: 2024-01-01

## 2024-01-01 RX ORDER — LEVETIRACETAM 500 MG/5ML
500 INJECTION, SOLUTION, CONCENTRATE INTRAVENOUS EVERY 12 HOURS SCHEDULED
Status: DISCONTINUED | OUTPATIENT
Start: 2024-01-01 | End: 2024-01-01

## 2024-01-01 RX ORDER — ONDANSETRON 2 MG/ML
4 INJECTION INTRAMUSCULAR; INTRAVENOUS EVERY 6 HOURS PRN
Status: DISCONTINUED | OUTPATIENT
Start: 2024-01-01 | End: 2024-01-01 | Stop reason: HOSPADM

## 2024-01-01 RX ORDER — MIDAZOLAM HYDROCHLORIDE 1 MG/ML
2 INJECTION INTRAMUSCULAR; INTRAVENOUS EVERY 4 HOURS PRN
Status: CANCELLED | OUTPATIENT
Start: 2024-01-01

## 2024-01-01 RX ORDER — HEPARIN SODIUM 5000 [USP'U]/ML
5000 INJECTION, SOLUTION INTRAVENOUS; SUBCUTANEOUS EVERY 12 HOURS SCHEDULED
Status: DISCONTINUED | OUTPATIENT
Start: 2024-01-01 | End: 2024-01-01

## 2024-01-01 RX ORDER — ACETAMINOPHEN 325 MG/1
650 TABLET ORAL EVERY 4 HOURS PRN
Status: CANCELLED | OUTPATIENT
Start: 2024-01-01

## 2024-01-01 RX ORDER — ONDANSETRON 2 MG/ML
4 INJECTION INTRAMUSCULAR; INTRAVENOUS EVERY 6 HOURS PRN
Status: CANCELLED | OUTPATIENT
Start: 2024-01-01

## 2024-01-01 RX ORDER — ACETAMINOPHEN 160 MG/5ML
650 SOLUTION ORAL EVERY 4 HOURS PRN
Status: CANCELLED | OUTPATIENT
Start: 2024-01-01

## 2024-01-01 RX ORDER — CITALOPRAM 20 MG/1
20 TABLET ORAL DAILY
Status: DISCONTINUED | OUTPATIENT
Start: 2024-01-01 | End: 2024-01-01

## 2024-01-01 RX ORDER — FUROSEMIDE 10 MG/ML
40 INJECTION INTRAMUSCULAR; INTRAVENOUS ONCE
Status: COMPLETED | OUTPATIENT
Start: 2024-01-01 | End: 2024-01-01

## 2024-01-01 RX ORDER — LEVETIRACETAM 500 MG/5ML
500 INJECTION, SOLUTION, CONCENTRATE INTRAVENOUS EVERY 12 HOURS SCHEDULED
Status: CANCELLED | OUTPATIENT
Start: 2024-01-01

## 2024-01-01 RX ORDER — LEVETIRACETAM 500 MG/1
500 TABLET ORAL EVERY 12 HOURS SCHEDULED
Status: DISCONTINUED | OUTPATIENT
Start: 2024-01-01 | End: 2024-01-01

## 2024-01-01 RX ORDER — INSULIN LISPRO 100 [IU]/ML
2-7 INJECTION, SOLUTION INTRAVENOUS; SUBCUTANEOUS
Status: DISCONTINUED | OUTPATIENT
Start: 2024-01-01 | End: 2024-01-01

## 2024-01-01 RX ORDER — ACETAMINOPHEN 650 MG/1
650 SUPPOSITORY RECTAL EVERY 4 HOURS PRN
Status: CANCELLED | OUTPATIENT
Start: 2024-01-01

## 2024-01-01 RX ORDER — IBUPROFEN 600 MG/1
1 TABLET ORAL
Status: DISCONTINUED | OUTPATIENT
Start: 2024-01-01 | End: 2024-01-01

## 2024-01-01 RX ORDER — ACETAMINOPHEN 160 MG/5ML
650 SOLUTION ORAL EVERY 4 HOURS PRN
Status: DISCONTINUED | OUTPATIENT
Start: 2024-01-01 | End: 2024-01-01 | Stop reason: HOSPADM

## 2024-01-01 RX ORDER — MIDAZOLAM HYDROCHLORIDE 1 MG/ML
1 INJECTION INTRAMUSCULAR; INTRAVENOUS EVERY 6 HOURS
Status: DISCONTINUED | OUTPATIENT
Start: 2024-01-01 | End: 2024-01-01 | Stop reason: HOSPADM

## 2024-01-01 RX ORDER — VANCOMYCIN HYDROCHLORIDE 125 MG/1
125 CAPSULE ORAL EVERY 6 HOURS SCHEDULED
Status: DISCONTINUED | OUTPATIENT
Start: 2024-01-01 | End: 2024-01-01

## 2024-01-01 RX ORDER — SODIUM CHLORIDE 0.9 % (FLUSH) 0.9 %
10 SYRINGE (ML) INJECTION EVERY 12 HOURS SCHEDULED
Status: DISCONTINUED | OUTPATIENT
Start: 2024-01-01 | End: 2024-01-01

## 2024-01-01 RX ORDER — GABAPENTIN 100 MG/1
100 CAPSULE ORAL EVERY 12 HOURS
Status: DISCONTINUED | OUTPATIENT
Start: 2024-01-01 | End: 2024-01-01

## 2024-01-01 RX ORDER — NOREPINEPHRINE BITARTRATE 0.03 MG/ML
.02-.3 INJECTION, SOLUTION INTRAVENOUS
Status: DISCONTINUED | OUTPATIENT
Start: 2024-01-01 | End: 2024-01-01

## 2024-01-01 RX ORDER — HEPARIN SODIUM 5000 [USP'U]/ML
5000 INJECTION, SOLUTION INTRAVENOUS; SUBCUTANEOUS EVERY 8 HOURS SCHEDULED
Status: DISCONTINUED | OUTPATIENT
Start: 2024-01-01 | End: 2024-01-01

## 2024-01-01 RX ORDER — ACETAMINOPHEN 650 MG/1
650 SUPPOSITORY RECTAL EVERY 6 HOURS PRN
Status: DISCONTINUED | OUTPATIENT
Start: 2024-01-01 | End: 2024-01-01 | Stop reason: HOSPADM

## 2024-01-01 RX ORDER — PANTOPRAZOLE SODIUM 40 MG/1
40 TABLET, DELAYED RELEASE ORAL
Status: DISCONTINUED | OUTPATIENT
Start: 2024-01-01 | End: 2024-01-01

## 2024-01-01 RX ORDER — MORPHINE SULFATE 2 MG/ML
2 INJECTION, SOLUTION INTRAMUSCULAR; INTRAVENOUS
Status: DISCONTINUED | OUTPATIENT
Start: 2024-01-01 | End: 2024-01-01

## 2024-01-01 RX ORDER — BISOPROLOL FUMARATE 5 MG/1
5 TABLET, FILM COATED ORAL DAILY
Status: DISCONTINUED | OUTPATIENT
Start: 2024-01-01 | End: 2024-01-01

## 2024-01-01 RX ORDER — DIPHENHYDRAMINE HYDROCHLORIDE AND LIDOCAINE HYDROCHLORIDE AND ALUMINUM HYDROXIDE AND MAGNESIUM HYDRO
5 KIT EVERY 6 HOURS PRN
Status: CANCELLED | OUTPATIENT
Start: 2024-01-01

## 2024-01-01 RX ORDER — MIDAZOLAM HYDROCHLORIDE 1 MG/ML
2 INJECTION INTRAMUSCULAR; INTRAVENOUS EVERY 4 HOURS PRN
Status: DISCONTINUED | OUTPATIENT
Start: 2024-01-01 | End: 2024-01-01

## 2024-01-01 RX ORDER — SODIUM CHLORIDE 0.9 % (FLUSH) 0.9 %
10 SYRINGE (ML) INJECTION EVERY 12 HOURS SCHEDULED
Status: CANCELLED | OUTPATIENT
Start: 2024-01-01

## 2024-01-01 RX ORDER — POTASSIUM CHLORIDE 750 MG/1
40 CAPSULE, EXTENDED RELEASE ORAL ONCE
Status: COMPLETED | OUTPATIENT
Start: 2024-01-01 | End: 2024-01-01

## 2024-01-01 RX ORDER — MIDAZOLAM HYDROCHLORIDE 1 MG/ML
2 INJECTION INTRAMUSCULAR; INTRAVENOUS EVERY 4 HOURS PRN
Status: DISCONTINUED | OUTPATIENT
Start: 2024-01-01 | End: 2024-01-01 | Stop reason: SDUPTHER

## 2024-01-01 RX ORDER — DEXTROSE MONOHYDRATE 25 G/50ML
25 INJECTION, SOLUTION INTRAVENOUS
Status: DISCONTINUED | OUTPATIENT
Start: 2024-01-01 | End: 2024-01-01

## 2024-01-01 RX ORDER — SODIUM CHLORIDE 9 MG/ML
40 INJECTION, SOLUTION INTRAVENOUS AS NEEDED
Status: CANCELLED | OUTPATIENT
Start: 2024-01-01

## 2024-01-01 RX ORDER — ACETAMINOPHEN 650 MG/1
650 SUPPOSITORY RECTAL EVERY 4 HOURS PRN
Status: DISCONTINUED | OUTPATIENT
Start: 2024-01-01 | End: 2024-01-01

## 2024-01-01 RX ORDER — NICOTINE POLACRILEX 4 MG
15 LOZENGE BUCCAL
Status: DISCONTINUED | OUTPATIENT
Start: 2024-01-01 | End: 2024-01-01

## 2024-01-01 RX ORDER — AMLODIPINE BESYLATE 5 MG/1
5 TABLET ORAL
Status: DISCONTINUED | OUTPATIENT
Start: 2024-01-01 | End: 2024-01-01

## 2024-01-01 RX ORDER — GABAPENTIN 300 MG/1
300 CAPSULE ORAL EVERY 12 HOURS
Status: DISCONTINUED | OUTPATIENT
Start: 2024-01-01 | End: 2024-01-01

## 2024-01-01 RX ORDER — ACETAMINOPHEN 325 MG/1
650 TABLET ORAL EVERY 4 HOURS PRN
Status: DISCONTINUED | OUTPATIENT
Start: 2024-01-01 | End: 2024-01-01 | Stop reason: HOSPADM

## 2024-01-01 RX ORDER — LEVETIRACETAM 500 MG/5ML
500 INJECTION, SOLUTION, CONCENTRATE INTRAVENOUS EVERY 12 HOURS SCHEDULED
Status: DISCONTINUED | OUTPATIENT
Start: 2024-01-01 | End: 2024-01-01 | Stop reason: HOSPADM

## 2024-01-01 RX ORDER — SODIUM CHLORIDE 0.9 % (FLUSH) 0.9 %
10 SYRINGE (ML) INJECTION AS NEEDED
Status: DISCONTINUED | OUTPATIENT
Start: 2024-01-01 | End: 2024-01-01

## 2024-01-01 RX ORDER — BISACODYL 10 MG
10 SUPPOSITORY, RECTAL RECTAL DAILY PRN
Status: DISCONTINUED | OUTPATIENT
Start: 2024-01-01 | End: 2024-01-01 | Stop reason: HOSPADM

## 2024-01-01 RX ADMIN — DEXTROSE AND SODIUM CHLORIDE 75 ML/HR: 5; 900 INJECTION, SOLUTION INTRAVENOUS at 14:38

## 2024-01-01 RX ADMIN — LEVOTHYROXINE SODIUM 50 MCG: 0.05 TABLET ORAL at 06:23

## 2024-01-01 RX ADMIN — HYDROCORTISONE SODIUM SUCCINATE 100 MG: 100 INJECTION, POWDER, FOR SOLUTION INTRAMUSCULAR; INTRAVENOUS at 12:12

## 2024-01-01 RX ADMIN — GABAPENTIN 300 MG: 300 CAPSULE ORAL at 21:46

## 2024-01-01 RX ADMIN — SODIUM CHLORIDE, POTASSIUM CHLORIDE, SODIUM LACTATE AND CALCIUM CHLORIDE 1000 ML: 600; 310; 30; 20 INJECTION, SOLUTION INTRAVENOUS at 14:35

## 2024-01-01 RX ADMIN — Medication 10 ML: at 12:42

## 2024-01-01 RX ADMIN — MORPHINE SULFATE 4 MG: 4 INJECTION, SOLUTION INTRAMUSCULAR; INTRAVENOUS at 20:08

## 2024-01-01 RX ADMIN — HYDRALAZINE HYDROCHLORIDE 10 MG: 20 INJECTION INTRAMUSCULAR; INTRAVENOUS at 12:42

## 2024-01-01 RX ADMIN — GABAPENTIN 100 MG: 100 CAPSULE ORAL at 20:17

## 2024-01-01 RX ADMIN — MORPHINE SULFATE 2 MG: 2 INJECTION, SOLUTION INTRAMUSCULAR; INTRAVENOUS at 21:19

## 2024-01-01 RX ADMIN — Medication 10 ML: at 08:16

## 2024-01-01 RX ADMIN — LEVETIRACETAM 500 MG: 500 TABLET, FILM COATED ORAL at 20:17

## 2024-01-01 RX ADMIN — AMLODIPINE BESYLATE 5 MG: 5 TABLET ORAL at 14:36

## 2024-01-01 RX ADMIN — LEVETIRACETAM 500 MG: 500 TABLET, FILM COATED ORAL at 21:46

## 2024-01-01 RX ADMIN — ATORVASTATIN CALCIUM 10 MG: 10 TABLET, FILM COATED ORAL at 20:33

## 2024-01-01 RX ADMIN — SODIUM CHLORIDE 100 ML/HR: 9 INJECTION, SOLUTION INTRAVENOUS at 19:09

## 2024-01-01 RX ADMIN — MIDAZOLAM HYDROCHLORIDE 2 MG: 1 INJECTION, SOLUTION INTRAMUSCULAR; INTRAVENOUS at 13:34

## 2024-01-01 RX ADMIN — Medication 10 ML: at 08:37

## 2024-01-01 RX ADMIN — BISOPROLOL FUMARATE 5 MG: 5 TABLET ORAL at 09:01

## 2024-01-01 RX ADMIN — AMLODIPINE BESYLATE 5 MG: 5 TABLET ORAL at 09:01

## 2024-01-01 RX ADMIN — VANCOMYCIN HYDROCHLORIDE 500 MG: 500 INJECTION, POWDER, LYOPHILIZED, FOR SOLUTION INTRAVENOUS at 20:10

## 2024-01-01 RX ADMIN — NOREPINEPHRINE BITARTRATE 0.02 MCG/KG/MIN: 0.03 INJECTION, SOLUTION INTRAVENOUS at 06:55

## 2024-01-01 RX ADMIN — INSULIN LISPRO 2 UNITS: 100 INJECTION, SOLUTION INTRAVENOUS; SUBCUTANEOUS at 08:18

## 2024-01-01 RX ADMIN — PANTOPRAZOLE SODIUM 40 MG: 40 INJECTION, POWDER, FOR SOLUTION INTRAVENOUS at 06:23

## 2024-01-01 RX ADMIN — GLYCOPYRROLATE 0.4 MG: 0.2 INJECTION INTRAMUSCULAR; INTRAVENOUS at 21:34

## 2024-01-01 RX ADMIN — PANTOPRAZOLE SODIUM 40 MG: 40 INJECTION, POWDER, FOR SOLUTION INTRAVENOUS at 05:53

## 2024-01-01 RX ADMIN — ASPIRIN 81 MG: 81 TABLET, COATED ORAL at 08:09

## 2024-01-01 RX ADMIN — ACETAMINOPHEN 650 MG: 325 TABLET ORAL at 17:07

## 2024-01-01 RX ADMIN — ATORVASTATIN CALCIUM 10 MG: 10 TABLET, FILM COATED ORAL at 20:10

## 2024-01-01 RX ADMIN — VANCOMYCIN HYDROCHLORIDE 125 MG: 125 CAPSULE ORAL at 17:58

## 2024-01-01 RX ADMIN — POTASSIUM CHLORIDE 40 MEQ: 750 CAPSULE, EXTENDED RELEASE ORAL at 12:07

## 2024-01-01 RX ADMIN — SODIUM CHLORIDE 1000 ML: 9 INJECTION, SOLUTION INTRAVENOUS at 04:50

## 2024-01-01 RX ADMIN — ALBUMIN (HUMAN) 25 G: 0.25 INJECTION, SOLUTION INTRAVENOUS at 05:48

## 2024-01-01 RX ADMIN — VANCOMYCIN HYDROCHLORIDE 125 MG: 125 CAPSULE ORAL at 06:15

## 2024-01-01 RX ADMIN — LEVETIRACETAM 500 MG: 500 TABLET, FILM COATED ORAL at 09:04

## 2024-01-01 RX ADMIN — MIDAZOLAM HYDROCHLORIDE 2 MG: 1 INJECTION, SOLUTION INTRAMUSCULAR; INTRAVENOUS at 17:04

## 2024-01-01 RX ADMIN — LEVOTHYROXINE SODIUM 50 MCG: 0.05 TABLET ORAL at 06:15

## 2024-01-01 RX ADMIN — ATORVASTATIN CALCIUM 10 MG: 10 TABLET, FILM COATED ORAL at 21:46

## 2024-01-01 RX ADMIN — FILGRASTIM 300 MCG: 300 INJECTION, SOLUTION INTRAVENOUS; SUBCUTANEOUS at 17:07

## 2024-01-01 RX ADMIN — GABAPENTIN 100 MG: 100 CAPSULE ORAL at 08:14

## 2024-01-01 RX ADMIN — VANCOMYCIN HYDROCHLORIDE 125 MG: 125 CAPSULE ORAL at 00:39

## 2024-01-01 RX ADMIN — BISOPROLOL FUMARATE 5 MG: 5 TABLET ORAL at 08:14

## 2024-01-01 RX ADMIN — SODIUM CHLORIDE 1000 ML: 9 INJECTION, SOLUTION INTRAVENOUS at 18:39

## 2024-01-01 RX ADMIN — ONDANSETRON 4 MG: 2 INJECTION INTRAMUSCULAR; INTRAVENOUS at 15:50

## 2024-01-01 RX ADMIN — SODIUM CHLORIDE 500 ML: 9 INJECTION, SOLUTION INTRAVENOUS at 01:11

## 2024-01-01 RX ADMIN — HYDRALAZINE HYDROCHLORIDE 10 MG: 20 INJECTION INTRAMUSCULAR; INTRAVENOUS at 23:20

## 2024-01-01 RX ADMIN — Medication 10 ML: at 21:27

## 2024-01-01 RX ADMIN — MORPHINE SULFATE 2 MG: 2 INJECTION, SOLUTION INTRAMUSCULAR; INTRAVENOUS at 13:16

## 2024-01-01 RX ADMIN — MIDAZOLAM HYDROCHLORIDE 1 MG: 1 INJECTION, SOLUTION INTRAMUSCULAR; INTRAVENOUS at 18:46

## 2024-01-01 RX ADMIN — FILGRASTIM 300 MCG: 480 INJECTION, SOLUTION INTRAVENOUS; SUBCUTANEOUS at 01:11

## 2024-01-01 RX ADMIN — MAGNESIUM SULFATE HEPTAHYDRATE 2 G: 2 INJECTION, SOLUTION INTRAVENOUS at 15:54

## 2024-01-01 RX ADMIN — FUROSEMIDE 40 MG: 10 INJECTION, SOLUTION INTRAMUSCULAR; INTRAVENOUS at 04:38

## 2024-01-01 RX ADMIN — VANCOMYCIN HYDROCHLORIDE 125 MG: 125 CAPSULE ORAL at 12:12

## 2024-01-01 RX ADMIN — CEFEPIME 2000 MG: 2 INJECTION, POWDER, FOR SOLUTION INTRAVENOUS at 05:38

## 2024-01-01 RX ADMIN — GABAPENTIN 100 MG: 100 CAPSULE ORAL at 20:10

## 2024-01-01 RX ADMIN — INSULIN LISPRO 3 UNITS: 100 INJECTION, SOLUTION INTRAVENOUS; SUBCUTANEOUS at 21:59

## 2024-01-01 RX ADMIN — MORPHINE SULFATE 4 MG: 4 INJECTION, SOLUTION INTRAMUSCULAR; INTRAVENOUS at 15:53

## 2024-01-01 RX ADMIN — LEVETIRACETAM 500 MG: 500 TABLET, FILM COATED ORAL at 08:09

## 2024-01-01 RX ADMIN — VANCOMYCIN HYDROCHLORIDE 125 MG: 125 CAPSULE ORAL at 17:06

## 2024-01-01 RX ADMIN — Medication 10 ML: at 20:21

## 2024-01-01 RX ADMIN — LEVETIRACETAM 500 MG: 500 TABLET, FILM COATED ORAL at 20:10

## 2024-01-01 RX ADMIN — LEVOTHYROXINE SODIUM 50 MCG: 0.05 TABLET ORAL at 05:38

## 2024-01-01 RX ADMIN — GLYCOPYRROLATE 0.4 MG: 0.2 INJECTION INTRAMUSCULAR; INTRAVENOUS at 13:38

## 2024-01-01 RX ADMIN — VANCOMYCIN HYDROCHLORIDE 125 MG: 125 CAPSULE ORAL at 05:49

## 2024-01-01 RX ADMIN — LEVETIRACETAM 500 MG: 500 TABLET, FILM COATED ORAL at 08:14

## 2024-01-01 RX ADMIN — CITALOPRAM HYDROBROMIDE 20 MG: 20 TABLET ORAL at 09:01

## 2024-01-01 RX ADMIN — VANCOMYCIN HYDROCHLORIDE 125 MG: 125 CAPSULE ORAL at 01:10

## 2024-01-01 RX ADMIN — LEVOTHYROXINE SODIUM 50 MCG: 0.05 TABLET ORAL at 05:53

## 2024-01-01 RX ADMIN — VANCOMYCIN HYDROCHLORIDE 125 MG: 125 CAPSULE ORAL at 18:39

## 2024-01-01 RX ADMIN — SODIUM CHLORIDE 500 ML: 9 INJECTION, SOLUTION INTRAVENOUS at 01:12

## 2024-01-01 RX ADMIN — Medication 1500 MG: at 21:48

## 2024-01-01 RX ADMIN — Medication 10 ML: at 20:34

## 2024-01-01 RX ADMIN — VANCOMYCIN HYDROCHLORIDE 125 MG: 125 CAPSULE ORAL at 12:09

## 2024-01-01 RX ADMIN — Medication 10 ML: at 21:47

## 2024-01-01 RX ADMIN — PANTOPRAZOLE SODIUM 40 MG: 40 INJECTION, POWDER, FOR SOLUTION INTRAVENOUS at 05:22

## 2024-01-01 RX ADMIN — INSULIN LISPRO 4 UNITS: 100 INJECTION, SOLUTION INTRAVENOUS; SUBCUTANEOUS at 18:38

## 2024-01-01 RX ADMIN — MIDAZOLAM HYDROCHLORIDE 1 MG: 1 INJECTION, SOLUTION INTRAMUSCULAR; INTRAVENOUS at 04:26

## 2024-01-01 RX ADMIN — MORPHINE SULFATE 4 MG: 4 INJECTION, SOLUTION INTRAMUSCULAR; INTRAVENOUS at 06:23

## 2024-01-01 RX ADMIN — PANTOPRAZOLE SODIUM 40 MG: 40 INJECTION, POWDER, FOR SOLUTION INTRAVENOUS at 01:10

## 2024-01-01 RX ADMIN — CEFEPIME 2000 MG: 2 INJECTION, POWDER, FOR SOLUTION INTRAVENOUS at 05:52

## 2024-01-01 RX ADMIN — MIDAZOLAM HYDROCHLORIDE 1 MG: 1 INJECTION, SOLUTION INTRAMUSCULAR; INTRAVENOUS at 21:19

## 2024-01-01 RX ADMIN — ONDANSETRON 4 MG: 2 INJECTION INTRAMUSCULAR; INTRAVENOUS at 12:10

## 2024-01-01 RX ADMIN — CITALOPRAM HYDROBROMIDE 20 MG: 20 TABLET ORAL at 08:09

## 2024-01-01 RX ADMIN — SODIUM CHLORIDE 100 ML/HR: 9 INJECTION, SOLUTION INTRAVENOUS at 22:00

## 2024-01-01 RX ADMIN — ATORVASTATIN CALCIUM 10 MG: 10 TABLET, FILM COATED ORAL at 20:17

## 2024-01-01 RX ADMIN — ASPIRIN 81 MG: 81 TABLET, COATED ORAL at 08:14

## 2024-01-01 RX ADMIN — MORPHINE SULFATE 2 MG: 2 INJECTION, SOLUTION INTRAMUSCULAR; INTRAVENOUS at 04:28

## 2024-01-01 RX ADMIN — HEPARIN SODIUM 5000 UNITS: 5000 INJECTION INTRAVENOUS; SUBCUTANEOUS at 20:10

## 2024-01-01 RX ADMIN — GLYCOPYRROLATE 0.4 MG: 0.2 INJECTION INTRAMUSCULAR; INTRAVENOUS at 01:55

## 2024-01-01 RX ADMIN — BISOPROLOL FUMARATE 5 MG: 5 TABLET ORAL at 15:53

## 2024-01-01 RX ADMIN — VANCOMYCIN HYDROCHLORIDE 125 MG: 125 CAPSULE ORAL at 05:53

## 2024-01-01 RX ADMIN — PANTOPRAZOLE SODIUM 40 MG: 40 INJECTION, POWDER, FOR SOLUTION INTRAVENOUS at 06:59

## 2024-01-01 RX ADMIN — PIPERACILLIN AND TAZOBACTAM 3.38 G: 3; .375 INJECTION, POWDER, LYOPHILIZED, FOR SOLUTION INTRAVENOUS at 20:00

## 2024-01-01 RX ADMIN — GABAPENTIN 100 MG: 100 CAPSULE ORAL at 09:04

## 2024-01-01 RX ADMIN — HYDROCORTISONE SODIUM SUCCINATE 100 MG: 100 INJECTION, POWDER, FOR SOLUTION INTRAMUSCULAR; INTRAVENOUS at 01:10

## 2024-01-01 RX ADMIN — HYDROCORTISONE SODIUM SUCCINATE 100 MG: 100 INJECTION, POWDER, FOR SOLUTION INTRAMUSCULAR; INTRAVENOUS at 00:12

## 2024-01-01 RX ADMIN — AMLODIPINE BESYLATE 5 MG: 5 TABLET ORAL at 08:14

## 2024-01-01 RX ADMIN — Medication 10 ML: at 20:48

## 2024-01-01 RX ADMIN — FILGRASTIM 300 MCG: 300 INJECTION, SOLUTION INTRAVENOUS; SUBCUTANEOUS at 17:02

## 2024-01-01 RX ADMIN — MORPHINE SULFATE 4 MG: 4 INJECTION, SOLUTION INTRAMUSCULAR; INTRAVENOUS at 23:38

## 2024-01-01 RX ADMIN — MAGNESIUM SULFATE HEPTAHYDRATE 2 G: 2 INJECTION, SOLUTION INTRAVENOUS at 12:09

## 2024-01-01 RX ADMIN — VANCOMYCIN HYDROCHLORIDE 750 MG: 750 INJECTION, POWDER, LYOPHILIZED, FOR SOLUTION INTRAVENOUS at 08:07

## 2024-01-01 RX ADMIN — MAGNESIUM SULFATE HEPTAHYDRATE 2 G: 2 INJECTION, SOLUTION INTRAVENOUS at 13:44

## 2024-01-01 RX ADMIN — GABAPENTIN 100 MG: 100 CAPSULE ORAL at 08:09

## 2024-01-01 RX ADMIN — Medication 10 ML: at 08:09

## 2024-01-01 RX ADMIN — PANTOPRAZOLE SODIUM 40 MG: 40 INJECTION, POWDER, FOR SOLUTION INTRAVENOUS at 05:00

## 2024-01-01 RX ADMIN — CITALOPRAM HYDROBROMIDE 20 MG: 20 TABLET ORAL at 08:14

## 2024-01-01 RX ADMIN — POTASSIUM CHLORIDE 40 MEQ: 750 CAPSULE, EXTENDED RELEASE ORAL at 05:53

## 2024-01-01 RX ADMIN — VANCOMYCIN HYDROCHLORIDE 125 MG: 125 CAPSULE ORAL at 12:15

## 2024-01-01 RX ADMIN — GABAPENTIN 100 MG: 100 CAPSULE ORAL at 20:33

## 2024-01-01 RX ADMIN — ASPIRIN 81 MG: 81 TABLET, COATED ORAL at 09:04

## 2024-01-01 RX ADMIN — MORPHINE SULFATE 2 MG: 2 INJECTION, SOLUTION INTRAMUSCULAR; INTRAVENOUS at 17:12

## 2024-01-01 RX ADMIN — PANTOPRAZOLE SODIUM 40 MG: 40 INJECTION, POWDER, FOR SOLUTION INTRAVENOUS at 05:48

## 2024-01-01 RX ADMIN — LEVETIRACETAM 500 MG: 100 INJECTION, SOLUTION INTRAVENOUS at 12:41

## 2024-01-01 RX ADMIN — CITALOPRAM HYDROBROMIDE 20 MG: 20 TABLET ORAL at 09:04

## 2024-01-01 RX ADMIN — LEVETIRACETAM 500 MG: 500 TABLET, FILM COATED ORAL at 20:33

## 2024-01-01 RX ADMIN — CEFEPIME HYDROCHLORIDE 1000 MG: 1 INJECTION, POWDER, FOR SOLUTION INTRAMUSCULAR; INTRAVENOUS at 04:50

## 2024-01-01 RX ADMIN — CEFEPIME 2000 MG: 2 INJECTION, POWDER, FOR SOLUTION INTRAVENOUS at 06:23

## 2024-01-01 RX ADMIN — ALBUMIN (HUMAN) 25 G: 0.25 INJECTION, SOLUTION INTRAVENOUS at 03:58

## 2024-01-01 RX ADMIN — SODIUM CHLORIDE 500 ML: 9 INJECTION, SOLUTION INTRAVENOUS at 21:47

## 2024-01-01 RX ADMIN — INSULIN LISPRO 2 UNITS: 100 INJECTION, SOLUTION INTRAVENOUS; SUBCUTANEOUS at 21:21

## 2024-01-01 RX ADMIN — Medication 10 ML: at 08:58

## 2024-01-01 RX ADMIN — VANCOMYCIN HYDROCHLORIDE 125 MG: 125 CAPSULE ORAL at 00:12

## 2024-01-02 ENCOUNTER — HOSPITAL ENCOUNTER (OUTPATIENT)
Dept: RADIATION ONCOLOGY | Facility: HOSPITAL | Age: 79
Setting detail: RADIATION/ONCOLOGY SERIES
Discharge: HOME | End: 2024-01-02

## 2024-01-02 ENCOUNTER — HOSPITAL ENCOUNTER (OUTPATIENT)
Dept: RADIATION ONCOLOGY | Facility: HOSPITAL | Age: 79
Setting detail: RADIATION/ONCOLOGY SERIES
Discharge: HOME | End: 2024-01-02
Payer: MEDICARE

## 2024-01-02 ENCOUNTER — HOSPITAL ENCOUNTER (OUTPATIENT)
Dept: RADIATION ONCOLOGY | Facility: HOSPITAL | Age: 79
Setting detail: RADIATION/ONCOLOGY SERIES
End: 2024-01-02
Payer: MEDICARE

## 2024-01-02 PROCEDURE — 77332 RADIATION TREATMENT AID(S): CPT | Performed by: RADIOLOGY

## 2024-01-10 PROCEDURE — 77301 RADIOTHERAPY DOSE PLAN IMRT: CPT | Performed by: RADIOLOGY

## 2024-01-10 PROCEDURE — 77338 DESIGN MLC DEVICE FOR IMRT: CPT | Performed by: RADIOLOGY

## 2024-01-10 PROCEDURE — 77300 RADIATION THERAPY DOSE PLAN: CPT | Performed by: RADIOLOGY

## 2024-01-15 ENCOUNTER — HOSPITAL ENCOUNTER (OUTPATIENT)
Dept: RADIATION ONCOLOGY | Facility: HOSPITAL | Age: 79
Discharge: HOME | End: 2024-01-15
Payer: MEDICARE

## 2024-01-15 PROCEDURE — 77386: CPT | Performed by: RADIOLOGY

## 2024-01-16 ENCOUNTER — HOSPITAL ENCOUNTER (OUTPATIENT)
Dept: RADIATION ONCOLOGY | Facility: HOSPITAL | Age: 79
Discharge: HOME | End: 2024-01-16

## 2024-01-16 ENCOUNTER — DOCUMENTATION (OUTPATIENT)
Dept: NUTRITION | Facility: HOSPITAL | Age: 79
End: 2024-01-16
Payer: MEDICARE

## 2024-01-16 ENCOUNTER — TELEPHONE (OUTPATIENT)
Dept: ONCOLOGY | Facility: CLINIC | Age: 79
End: 2024-01-16
Payer: MEDICARE

## 2024-01-16 VITALS
OXYGEN SATURATION: 97 % | DIASTOLIC BLOOD PRESSURE: 77 MMHG | WEIGHT: 159.8 LBS | TEMPERATURE: 97.8 F | BODY MASS INDEX: 28.31 KG/M2 | SYSTOLIC BLOOD PRESSURE: 171 MMHG | RESPIRATION RATE: 20 BRPM | HEART RATE: 79 BPM

## 2024-01-16 PROCEDURE — 77386: CPT | Performed by: RADIOLOGY

## 2024-01-16 NOTE — TELEPHONE ENCOUNTER
Caller: Ruby Pettit    Relationship to patient: Self    Best call back number: [phone]    Chief complaint: RESCHEDULE     Type of visit: FOLLOW UP    Requested date: AFTERNOON AFTER RADIATION      If rescheduling, when is the original appointment: 1-15     Additional notes:PLEASE ADVISE

## 2024-01-16 NOTE — PROGRESS NOTES
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 There are hypermetabolic left hilar and mediastinal lymph nodes consistent with regional metastasis      Additional groundglass and subsolid lesions within the lungs most prominently along the anterior segment of the right upper lobe demonstrate little to no FDG uptake but remain suspicious for synchronous neoplasm.      Hypermetabolic focus near the left C4-5 facet joint with a questionable lytic lesion in this area.     Surgery: She underwent robotic assisted left lower lobectomy at the [facility] 11/17/2023.  Surgical margins were negative. There was residual tumor in two station 10 L lymph nodes and also again station 7 lymph node sample.    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     She is being considered for sequential adjuvant durvalumab after completing radiotherapy     Weight 159.8 lbs    Patient demonstrating an approximate 10 lbs weight loss since completion of chemotherapy in September.  States that surgery took a lot more out of her than expected.    Appetite fair; finding it hard to find things that she wants to eat and can tolerate.  States that since surgery, she has gone more toward a bland diet, which she tolerates better. Hydration is an issue; patient stating that she struggles to get adequate fluid intake.  Hydration goal approximately 80 ounces.    Encouraged frequent  grazing throughout the day with focus on adequate calories to prevent additional weight loss, higher protein to aid with combating fatigue & recovery from surgery.  Provided numerous suggestions for easy to keep on hand foods for meals and snacking when she does not feel like eating.     Provided samples of ONS and Pedialyte to encourage increased oral intake and improve hydration.  Patient states that her son is buying her items to make smoothies at home; encouraged her to add modular protein powder when she makes them.    Will follow.

## 2024-01-16 NOTE — TELEPHONE ENCOUNTER
Caller: Ruby Pettit    Relationship to patient: Self    Best call back number: [phone]    Patient is needing: TO CONFIRM APPT SCHEDULED FOR TOMORROW.

## 2024-01-17 ENCOUNTER — APPOINTMENT (OUTPATIENT)
Dept: GENERAL RADIOLOGY | Facility: HOSPITAL | Age: 79
End: 2024-01-17
Payer: MEDICARE

## 2024-01-17 ENCOUNTER — HOSPITAL ENCOUNTER (INPATIENT)
Facility: HOSPITAL | Age: 79
LOS: 1 days | Discharge: HOME | End: 2024-01-19
Attending: EMERGENCY MEDICINE | Admitting: STUDENT IN AN ORGANIZED HEALTH CARE EDUCATION/TRAINING PROGRAM
Payer: MEDICARE

## 2024-01-17 ENCOUNTER — APPOINTMENT (OUTPATIENT)
Dept: MRI IMAGING | Facility: HOSPITAL | Age: 79
End: 2024-01-17
Payer: MEDICARE

## 2024-01-17 ENCOUNTER — APPOINTMENT (OUTPATIENT)
Dept: CT IMAGING | Facility: HOSPITAL | Age: 79
End: 2024-01-17
Payer: MEDICARE

## 2024-01-17 ENCOUNTER — HOSPITAL ENCOUNTER (OUTPATIENT)
Dept: RADIATION ONCOLOGY | Facility: HOSPITAL | Age: 79
Discharge: HOME | End: 2024-01-17

## 2024-01-17 ENCOUNTER — DOCUMENTATION (OUTPATIENT)
Dept: RADIATION ONCOLOGY | Facility: HOSPITAL | Age: 79
End: 2024-01-17
Payer: MEDICARE

## 2024-01-17 VITALS
SYSTOLIC BLOOD PRESSURE: 88 MMHG | HEART RATE: 100 BPM | TEMPERATURE: 97.6 F | RESPIRATION RATE: 20 BRPM | OXYGEN SATURATION: 100 % | DIASTOLIC BLOOD PRESSURE: 51 MMHG

## 2024-01-17 DIAGNOSIS — C34.90 MALIGNANT NEOPLASM OF LUNG, UNSPECIFIED LATERALITY, UNSPECIFIED PART OF LUNG: ICD-10-CM

## 2024-01-17 DIAGNOSIS — R41.82 ALTERED MENTAL STATUS, UNSPECIFIED ALTERED MENTAL STATUS TYPE: Primary | ICD-10-CM

## 2024-01-17 DIAGNOSIS — Z86.79 HISTORY OF HYPERTENSION: ICD-10-CM

## 2024-01-17 PROBLEM — N17.9 ACUTE KIDNEY INJURY: Status: ACTIVE | Noted: 2024-01-17

## 2024-01-17 PROBLEM — J95.811 IATROGENIC PNEUMOTHORAX: Status: RESOLVED | Noted: 2019-03-21 | Resolved: 2024-01-17

## 2024-01-17 PROBLEM — J30.2 SEASONAL AND PERENNIAL ALLERGIC RHINITIS: Status: RESOLVED | Noted: 2018-07-26 | Resolved: 2024-01-17

## 2024-01-17 PROBLEM — R55 SYNCOPE AND COLLAPSE: Status: RESOLVED | Noted: 2019-01-15 | Resolved: 2024-01-17

## 2024-01-17 PROBLEM — I65.23 CAROTID STENOSIS, BILATERAL: Status: RESOLVED | Noted: 2020-03-02 | Resolved: 2024-01-17

## 2024-01-17 PROBLEM — E87.6 HYPOKALEMIA: Status: RESOLVED | Noted: 2019-01-15 | Resolved: 2024-01-17

## 2024-01-17 PROBLEM — N39.0 UTI (URINARY TRACT INFECTION), BACTERIAL: Status: RESOLVED | Noted: 2019-01-15 | Resolved: 2024-01-17

## 2024-01-17 PROBLEM — A49.9 UTI (URINARY TRACT INFECTION), BACTERIAL: Status: RESOLVED | Noted: 2019-01-15 | Resolved: 2024-01-17

## 2024-01-17 PROBLEM — J30.89 SEASONAL AND PERENNIAL ALLERGIC RHINITIS: Status: RESOLVED | Noted: 2018-07-26 | Resolved: 2024-01-17

## 2024-01-17 PROBLEM — R79.89 ELEVATED TROPONIN: Status: ACTIVE | Noted: 2024-01-17

## 2024-01-17 LAB
ALBUMIN SERPL-MCNC: 4.5 G/DL (ref 3.5–5.2)
ALBUMIN/GLOB SERPL: 2.3 G/DL
ALP SERPL-CCNC: 170 U/L (ref 39–117)
ALT SERPL W P-5'-P-CCNC: 38 U/L (ref 1–33)
AMPHET+METHAMPHET UR QL: NEGATIVE
AMPHETAMINES UR QL: NEGATIVE
ANION GAP SERPL CALCULATED.3IONS-SCNC: 13 MMOL/L (ref 5–15)
AST SERPL-CCNC: 37 U/L (ref 1–32)
BACTERIA UR QL AUTO: ABNORMAL /HPF
BARBITURATES UR QL SCN: NEGATIVE
BASOPHILS # BLD AUTO: 0.01 10*3/MM3 (ref 0–0.2)
BASOPHILS NFR BLD AUTO: 0.2 % (ref 0–1.5)
BENZODIAZ UR QL SCN: NEGATIVE
BILIRUB SERPL-MCNC: 0.5 MG/DL (ref 0–1.2)
BILIRUB UR QL STRIP: NEGATIVE
BUN SERPL-MCNC: 28 MG/DL (ref 8–23)
BUN/CREAT SERPL: 19.6 (ref 7–25)
BUPRENORPHINE SERPL-MCNC: NEGATIVE NG/ML
CALCIUM SPEC-SCNC: 9.4 MG/DL (ref 8.6–10.5)
CANNABINOIDS SERPL QL: NEGATIVE
CHLORIDE SERPL-SCNC: 99 MMOL/L (ref 98–107)
CLARITY UR: ABNORMAL
CO2 SERPL-SCNC: 24 MMOL/L (ref 22–29)
COCAINE UR QL: NEGATIVE
COLOR UR: YELLOW
CREAT SERPL-MCNC: 1.43 MG/DL (ref 0.57–1)
D-LACTATE SERPL-SCNC: 1 MMOL/L (ref 0.5–2)
DEPRECATED RDW RBC AUTO: 50.9 FL (ref 37–54)
EGFRCR SERPLBLD CKD-EPI 2021: 37.6 ML/MIN/1.73
EOSINOPHIL # BLD AUTO: 0.01 10*3/MM3 (ref 0–0.4)
EOSINOPHIL NFR BLD AUTO: 0.2 % (ref 0.3–6.2)
ERYTHROCYTE [DISTWIDTH] IN BLOOD BY AUTOMATED COUNT: 14.6 % (ref 12.3–15.4)
FENTANYL UR-MCNC: NEGATIVE NG/ML
GEN 5 2HR TROPONIN T REFLEX: 43 NG/L
GLOBULIN UR ELPH-MCNC: 2 GM/DL
GLUCOSE BLDC GLUCOMTR-MCNC: 111 MG/DL (ref 70–130)
GLUCOSE SERPL-MCNC: 178 MG/DL (ref 65–99)
GLUCOSE UR STRIP-MCNC: NEGATIVE MG/DL
GRAN CASTS URNS QL MICRO: ABNORMAL /LPF
HCT VFR BLD AUTO: 37.9 % (ref 34–46.6)
HGB BLD-MCNC: 12.3 G/DL (ref 12–15.9)
HGB UR QL STRIP.AUTO: ABNORMAL
HOLD SPECIMEN: NORMAL
HYALINE CASTS UR QL AUTO: ABNORMAL /LPF
IMM GRANULOCYTES # BLD AUTO: 0.03 10*3/MM3 (ref 0–0.05)
IMM GRANULOCYTES NFR BLD AUTO: 0.6 % (ref 0–0.5)
KETONES UR QL STRIP: ABNORMAL
LEUKOCYTE ESTERASE UR QL STRIP.AUTO: NEGATIVE
LYMPHOCYTES # BLD AUTO: 1.38 10*3/MM3 (ref 0.7–3.1)
LYMPHOCYTES NFR BLD AUTO: 25.4 % (ref 19.6–45.3)
MAGNESIUM SERPL-MCNC: 2 MG/DL (ref 1.6–2.4)
MCH RBC QN AUTO: 31.1 PG (ref 26.6–33)
MCHC RBC AUTO-ENTMCNC: 32.5 G/DL (ref 31.5–35.7)
MCV RBC AUTO: 95.7 FL (ref 79–97)
METHADONE UR QL SCN: NEGATIVE
MONOCYTES # BLD AUTO: 0.91 10*3/MM3 (ref 0.1–0.9)
MONOCYTES NFR BLD AUTO: 16.8 % (ref 5–12)
NEUTROPHILS NFR BLD AUTO: 3.09 10*3/MM3 (ref 1.7–7)
NEUTROPHILS NFR BLD AUTO: 56.8 % (ref 42.7–76)
NITRITE UR QL STRIP: NEGATIVE
NRBC BLD AUTO-RTO: 0 /100 WBC (ref 0–0.2)
OPIATES UR QL: NEGATIVE
OXYCODONE UR QL SCN: NEGATIVE
PCP UR QL SCN: NEGATIVE
PH UR STRIP.AUTO: 6 [PH] (ref 5–8)
PLATELET # BLD AUTO: 191 10*3/MM3 (ref 140–450)
PMV BLD AUTO: 10.7 FL (ref 6–12)
POTASSIUM SERPL-SCNC: 3.8 MMOL/L (ref 3.5–5.2)
PROCALCITONIN SERPL-MCNC: 0.19 NG/ML (ref 0–0.25)
PROT SERPL-MCNC: 6.5 G/DL (ref 6–8.5)
PROT UR QL STRIP: ABNORMAL
QT INTERVAL: 434 MS
QTC INTERVAL: 522 MS
RBC # BLD AUTO: 3.96 10*6/MM3 (ref 3.77–5.28)
RBC # UR STRIP: ABNORMAL /HPF
REF LAB TEST METHOD: ABNORMAL
SODIUM SERPL-SCNC: 136 MMOL/L (ref 136–145)
SP GR UR STRIP: 1.02 (ref 1–1.03)
SQUAMOUS #/AREA URNS HPF: ABNORMAL /HPF
TRICYCLICS UR QL SCN: NEGATIVE
TROPONIN T DELTA: -13 NG/L
TROPONIN T SERPL HS-MCNC: 56 NG/L
UROBILINOGEN UR QL STRIP: ABNORMAL
WBC # UR STRIP: ABNORMAL /HPF
WBC CASTS #/AREA URNS LPF: ABNORMAL /LPF
WBC NRBC COR # BLD AUTO: 5.43 10*3/MM3 (ref 3.4–10.8)
WHOLE BLOOD HOLD COAG: NORMAL
WHOLE BLOOD HOLD SPECIMEN: NORMAL

## 2024-01-17 PROCEDURE — 85025 COMPLETE CBC W/AUTO DIFF WBC: CPT | Performed by: EMERGENCY MEDICINE

## 2024-01-17 PROCEDURE — 36415 COLL VENOUS BLD VENIPUNCTURE: CPT

## 2024-01-17 PROCEDURE — 84145 PROCALCITONIN (PCT): CPT | Performed by: NURSE PRACTITIONER

## 2024-01-17 PROCEDURE — G0378 HOSPITAL OBSERVATION PER HR: HCPCS

## 2024-01-17 PROCEDURE — 99285 EMERGENCY DEPT VISIT HI MDM: CPT

## 2024-01-17 PROCEDURE — 25810000003 SODIUM CHLORIDE 0.9 % SOLUTION: Performed by: NURSE PRACTITIONER

## 2024-01-17 PROCEDURE — 83605 ASSAY OF LACTIC ACID: CPT | Performed by: NURSE PRACTITIONER

## 2024-01-17 PROCEDURE — 77386: CPT | Performed by: RADIOLOGY

## 2024-01-17 PROCEDURE — 81001 URINALYSIS AUTO W/SCOPE: CPT | Performed by: EMERGENCY MEDICINE

## 2024-01-17 PROCEDURE — 0202U NFCT DS 22 TRGT SARS-COV-2: CPT | Performed by: NURSE PRACTITIONER

## 2024-01-17 PROCEDURE — 99221 1ST HOSP IP/OBS SF/LOW 40: CPT | Performed by: FAMILY MEDICINE

## 2024-01-17 PROCEDURE — 70551 MRI BRAIN STEM W/O DYE: CPT

## 2024-01-17 PROCEDURE — 93005 ELECTROCARDIOGRAM TRACING: CPT | Performed by: EMERGENCY MEDICINE

## 2024-01-17 PROCEDURE — 70450 CT HEAD/BRAIN W/O DYE: CPT

## 2024-01-17 PROCEDURE — 71045 X-RAY EXAM CHEST 1 VIEW: CPT

## 2024-01-17 PROCEDURE — 80053 COMPREHEN METABOLIC PANEL: CPT | Performed by: EMERGENCY MEDICINE

## 2024-01-17 PROCEDURE — 84484 ASSAY OF TROPONIN QUANT: CPT | Performed by: EMERGENCY MEDICINE

## 2024-01-17 PROCEDURE — 83735 ASSAY OF MAGNESIUM: CPT | Performed by: EMERGENCY MEDICINE

## 2024-01-17 PROCEDURE — 80307 DRUG TEST PRSMV CHEM ANLYZR: CPT | Performed by: FAMILY MEDICINE

## 2024-01-17 PROCEDURE — 82948 REAGENT STRIP/BLOOD GLUCOSE: CPT

## 2024-01-17 PROCEDURE — 74176 CT ABD & PELVIS W/O CONTRAST: CPT

## 2024-01-17 PROCEDURE — 25810000003 SODIUM CHLORIDE 0.9 % SOLUTION: Performed by: EMERGENCY MEDICINE

## 2024-01-17 RX ORDER — IBUPROFEN 600 MG/1
1 TABLET ORAL
Status: DISCONTINUED | OUTPATIENT
Start: 2024-01-17 | End: 2024-01-19 | Stop reason: HOSPADM

## 2024-01-17 RX ORDER — BISACODYL 5 MG/1
5 TABLET, DELAYED RELEASE ORAL DAILY PRN
Status: DISCONTINUED | OUTPATIENT
Start: 2024-01-17 | End: 2024-01-19 | Stop reason: HOSPADM

## 2024-01-17 RX ORDER — SODIUM CHLORIDE 9 MG/ML
75 INJECTION, SOLUTION INTRAVENOUS CONTINUOUS
Status: ACTIVE | OUTPATIENT
Start: 2024-01-17 | End: 2024-01-18

## 2024-01-17 RX ORDER — BISACODYL 10 MG
10 SUPPOSITORY, RECTAL RECTAL DAILY PRN
Status: DISCONTINUED | OUTPATIENT
Start: 2024-01-17 | End: 2024-01-19 | Stop reason: HOSPADM

## 2024-01-17 RX ORDER — POLYETHYLENE GLYCOL 3350 17 G/17G
17 POWDER, FOR SOLUTION ORAL DAILY PRN
Status: DISCONTINUED | OUTPATIENT
Start: 2024-01-17 | End: 2024-01-19 | Stop reason: HOSPADM

## 2024-01-17 RX ORDER — SODIUM CHLORIDE 0.9 % (FLUSH) 0.9 %
10 SYRINGE (ML) INJECTION AS NEEDED
Status: DISCONTINUED | OUTPATIENT
Start: 2024-01-17 | End: 2024-01-19 | Stop reason: HOSPADM

## 2024-01-17 RX ORDER — SODIUM CHLORIDE 0.9 % (FLUSH) 0.9 %
10 SYRINGE (ML) INJECTION EVERY 12 HOURS SCHEDULED
Status: DISCONTINUED | OUTPATIENT
Start: 2024-01-17 | End: 2024-01-19 | Stop reason: HOSPADM

## 2024-01-17 RX ORDER — DEXTROSE MONOHYDRATE 25 G/50ML
25 INJECTION, SOLUTION INTRAVENOUS
Status: DISCONTINUED | OUTPATIENT
Start: 2024-01-17 | End: 2024-01-19 | Stop reason: HOSPADM

## 2024-01-17 RX ORDER — LEVOTHYROXINE SODIUM 0.05 MG/1
50 TABLET ORAL
Status: DISCONTINUED | OUTPATIENT
Start: 2024-01-18 | End: 2024-01-19 | Stop reason: HOSPADM

## 2024-01-17 RX ORDER — AMOXICILLIN 250 MG
2 CAPSULE ORAL 2 TIMES DAILY
Status: DISCONTINUED | OUTPATIENT
Start: 2024-01-17 | End: 2024-01-19 | Stop reason: HOSPADM

## 2024-01-17 RX ORDER — PANTOPRAZOLE SODIUM 40 MG/1
40 TABLET, DELAYED RELEASE ORAL DAILY
Status: DISCONTINUED | OUTPATIENT
Start: 2024-01-18 | End: 2024-01-19 | Stop reason: HOSPADM

## 2024-01-17 RX ORDER — SODIUM CHLORIDE 9 MG/ML
40 INJECTION, SOLUTION INTRAVENOUS AS NEEDED
Status: DISCONTINUED | OUTPATIENT
Start: 2024-01-17 | End: 2024-01-19 | Stop reason: HOSPADM

## 2024-01-17 RX ORDER — GABAPENTIN 300 MG/1
300 CAPSULE ORAL ONCE
Status: DISCONTINUED | OUTPATIENT
Start: 2024-01-17 | End: 2024-01-17

## 2024-01-17 RX ORDER — NICOTINE POLACRILEX 4 MG
15 LOZENGE BUCCAL
Status: DISCONTINUED | OUTPATIENT
Start: 2024-01-17 | End: 2024-01-19 | Stop reason: HOSPADM

## 2024-01-17 RX ORDER — GABAPENTIN 300 MG/1
300 CAPSULE ORAL 3 TIMES DAILY
Status: DISCONTINUED | OUTPATIENT
Start: 2024-01-17 | End: 2024-01-17

## 2024-01-17 RX ORDER — GABAPENTIN 300 MG/1
900 CAPSULE ORAL ONCE
Status: COMPLETED | OUTPATIENT
Start: 2024-01-17 | End: 2024-01-17

## 2024-01-17 RX ORDER — INSULIN LISPRO 100 [IU]/ML
2-7 INJECTION, SOLUTION INTRAVENOUS; SUBCUTANEOUS
Status: DISCONTINUED | OUTPATIENT
Start: 2024-01-17 | End: 2024-01-19 | Stop reason: HOSPADM

## 2024-01-17 RX ORDER — CETIRIZINE HYDROCHLORIDE 10 MG/1
10 TABLET ORAL DAILY
Status: DISCONTINUED | OUTPATIENT
Start: 2024-01-18 | End: 2024-01-19 | Stop reason: HOSPADM

## 2024-01-17 RX ADMIN — SODIUM CHLORIDE 75 ML/HR: 9 INJECTION, SOLUTION INTRAVENOUS at 22:51

## 2024-01-17 RX ADMIN — GABAPENTIN 900 MG: 300 CAPSULE ORAL at 19:54

## 2024-01-17 RX ADMIN — SODIUM CHLORIDE 1000 ML: 9 INJECTION, SOLUTION INTRAVENOUS at 14:49

## 2024-01-17 NOTE — Clinical Note
Level of Care: Telemetry [5]   Diagnosis: AMS (altered mental status) [5429998]   Admitting Physician: GARO CARRERA [[id number]   Attending Physician: GARO CARRERA [[id number]]

## 2024-01-17 NOTE — ED PROVIDER NOTES
[facility]    EMERGENCY DEPARTMENT ENCOUNTER      Pt Name: Ruby Pettit  MRN: [id number]  YOB: 1945  Date of evaluation: 1/17/2024  Provider: Gaurav Groves MD    CHIEF COMPLAINT       Chief Complaint   Patient presents with    Altered Mental Status         HISTORY OF PRESENT ILLNESS   Ruby Pettit is a 78 y.o. female who presents to the emergency department with report of encephalopathy.  Patient is reportedly normally without any cognitive deficit at baseline.  She has active lung cancer and is currently being treated with radiation therapy but is not currently on chemotherapy.  She was reportedly last seen yesterday afternoon and was fine at that point and at her baseline.  She was seen again today and was noted to be significantly confused.  Patient self has no complaints.      Nursing notes were reviewed.    REVIEW OF SYSTEMS     ROS:  A chief complaint appropriate review of systems was completed and is negative except as noted in the HPI.      PAST MEDICAL HISTORY     Past Medical History:   Diagnosis Date    Anxiety and depression     Arthritis     Carotid stenosis, bilateral     Carotid duplex, 01/16/2019: Bilateral ICA stenosis 0-49%. Tortuous vessels. Vertebral flow antegrade.        Disease of thyroid gland     GERD (gastroesophageal reflux disease)     Head injury due to trauma 1992    fell down stairs     History of transfusion     NO REACTIONS    Hyperlipidemia     Hypertension     Iatrogenic pneumothorax     Liver disorder     surgery 1998 approx , BLOOD CLOTS    Lung cancer     Metastatic cancer     Perennial rhinitis     Peripheral neuropathic pain     Syncope and collapse     Thyroid disease     UTI (urinary tract infection), bacterial     Wears eyeglasses          SURGICAL HISTORY       Past Surgical History:   Procedure Laterality Date    ABDOMINAL SURGERY      LIVER RESECTION    APPENDECTOMY      BRONCHOSCOPY N/A 02/13/2018    Procedure: BRONCHOSCOPY WITH SANDRITA ENDOBRONCHIAL  ULTRASOUND WITH FLUORO;  Surgeon: Dixon Fatima MD;  Location:  [facility];  Service:     BRONCHOSCOPY N/A 03/21/2019    Procedure: NAVIGATIONAL BRONCHOSCOPY;  Surgeon: Dixon Fatima MD;  Location:  [facility];  Service: Pulmonary    BRONCHOSCOPY WITH ION ROBOTIC ASSIST N/A 06/29/2023    Procedure: NAVIGATIONAL BRONCHOSCOPY WITH ION ROBOT;  Surgeon: Dixon Fatima MD;  Location:  [facility];  Service: Robotics - Pulmonary;  Laterality: N/A;  Ion cath 3 - 0010,  3 - 0013.    CHOLECYSTECTOMY      COLONOSCOPY  2019    HYSTERECTOMY      2001    LIVER RESECTION      LUNG LOBECTOMY  11/17/2023    OOPHORECTOMY Bilateral     2001    ORIF WRIST FRACTURE Right     THYROID SURGERY      TUBAL ABDOMINAL LIGATION           CURRENT MEDICATIONS       Current Facility-Administered Medications:     sennosides-docusate (PERICOLACE) 8.6-50 MG per tablet 2 tablet, 2 tablet, Oral, BID **AND** polyethylene glycol (MIRALAX) packet 17 g, 17 g, Oral, Daily PRN **AND** bisacodyl (DULCOLAX) EC tablet 5 mg, 5 mg, Oral, Daily PRN **AND** bisacodyl (DULCOLAX) suppository 10 mg, 10 mg, Rectal, Daily PRN, Pricila Gutierrez APRN    [START ON 1/18/2024] cetirizine (zyrTEC) tablet 10 mg, 10 mg, Oral, Daily, Pricila Gutierrez APRN    dextrose (D50W) (25 g/50 mL) IV injection 25 g, 25 g, Intravenous, Q15 Min PRN, Pricila Gutierrez APRN    dextrose (GLUTOSE) oral gel 15 g, 15 g, Oral, Q15 Min PRN, Pricila Gutierrez APRN    glucagon (GLUCAGEN) injection 1 mg, 1 mg, Intramuscular, Q15 Min PRN, Pricila Gutierrez APRN    Insulin Lispro (humaLOG) injection 2-7 Units, 2-7 Units, Subcutaneous, 4x Daily AC & at Bedtime, Pricila Gutierrez APRN    [START ON 1/18/2024] levothyroxine (SYNTHROID, LEVOTHROID) tablet 50 mcg, 50 mcg, Oral, Q AM, Pricila Gutierrez APRN    [START ON 1/18/2024] pantoprazole (PROTONIX) EC tablet 40 mg, 40 mg, Oral, Daily, Pricila Gutierrez APRN    sodium chloride 0.9 % flush 10 mL, 10 mL, Intravenous, PRN,  Gaurav Groves MD    sodium chloride 0.9 % flush 10 mL, 10 mL, Intravenous, Q12H, Pricila Gutierrez, APRLUZ MARINA    sodium chloride 0.9 % flush 10 mL, 10 mL, Intravenous, PRN, Alejandro Gutierreza, APRN    sodium chloride 0.9 % infusion 40 mL, 40 mL, Intravenous, PRN, Matt, Pricila, APRN    sodium chloride 0.9 % infusion, 75 mL/hr, Intravenous, Continuous, Alejandro Gutierreza, APRN    ALLERGIES     Augmentin [amoxicillin-pot clavulanate], Benadryl [diphenhydramine], Codeine, Metformin, and Rosuvastatin    FAMILY HISTORY       Family History   Problem Relation Age of Onset    Cancer Mother     Ovarian cancer Mother 19    Emphysema Father     COPD Father     No Known Problems Sister     Tuberculosis Maternal Grandmother     Tuberculosis Maternal Grandfather     No Known Problems Paternal Grandmother     No Known Problems Paternal Grandfather     Breast cancer Neg Hx           SOCIAL HISTORY       Social History     Socioeconomic History    Marital status:     Number of children: 3   Tobacco Use    Smoking status: Never    Smokeless tobacco: Never   Vaping Use    Vaping Use: Never used   Substance and Sexual Activity    Alcohol use: Yes     Alcohol/week: 0.0 - 2.0 standard drinks of alcohol     Comment: seldom     Drug use: No    Sexual activity: Defer         PHYSICAL EXAM    (up to 7 for level 4, 8 or more for level 5)     Vitals:    01/17/24 1741 01/17/24 1800 01/17/24 1830 01/17/24 2031   BP: 144/74 175/90 145/65 154/87   BP Location:    Right arm   Patient Position:    Lying   Pulse: 85 79 82 80   Resp:    16   Temp:    98.1 °F (36.7 °C)   TempSrc:    Oral   SpO2: 92% 90% 97% 94%   Weight:       Height:           General: Awake, alert, no acute distress.  HEENT: Conjunctivae normal.  Neck: Trachea midline.  Cardiac: Heart regular rate, rhythm, no murmurs, rubs, or gallops  Lungs: Lungs are clear to auscultation, there is no wheezing, rhonchi, or rales. There is no use of accessory muscles.  Chest wall: There is no  tenderness to palpation over the chest wall or over ribs  Abdomen: Abdomen is soft, nontender, nondistended. There are no firm or pulsatile masses, no rebound rigidity or guarding.   Musculoskeletal: No deformity.  Neuro: Oriented to person only.  Follows commands and moves all extremities.  Dermatology: Skin is warm and dry  Psych: Anxious appearing.        DIAGNOSTIC RESULTS     EKG: All EKGs are interpreted by the Emergency Department Physician who either signs or Co-signs this chart in the absence of a cardiologist.    ECG 12 Lead ED Triage Standing Order; Weak / Dizzy / AMS   Preliminary Result   Test Reason : ED Triage Standing Order~   Blood Pressure :   */*   mmHG   Vent. Rate :  87 BPM     Atrial Rate :  87 BPM      P-R Int : 146 ms          QRS Dur :  76 ms       QT Int : 434 ms       P-R-T Axes :  55  15  66 degrees      QTc Int : 522 ms      Normal sinus rhythm   Nonspecific ST and T wave abnormality   Abnormal ECG   When compared with ECG of 22-JUN-2023 09:31,   Vent. rate has increased BY  34 BPM   Nonspecific T wave abnormality now evident in Anterolateral leads   QT has lengthened      Referred By: EDMD           Confirmed By:             RADIOLOGY:   [x] Radiologist's Report Reviewed:  CT Head Without Contrast   Final Result   Impression:   No acute intracranial findings.            Electronically Signed: Omar Sanchez MD     1/17/2024 2:41 PM EST     Workstation ID: [id number]      CT Abdomen Pelvis Without Contrast   Final Result   Impression:   1.No acute process is identified.   2.Resolution of previous epiploic appendagitis.   3.Presumed resection of previous left lower lobe pulmonary lesion. Correlate with history.   4.Other stable chronic findings.                  Electronically Signed: Long Casanova MD     1/17/2024 1:58 PM CST     Workstation ID: [id number]      XR Chest 1 View   Final Result   Impression:   No acute process identified.         Electronically Signed: Long Casanova MD      1/17/2024 12:54 PM [facility]     Workstation ID: [id number]      MRI Brain Without Contrast    (Results Pending)       I ordered and independently reviewed the above noted radiographic studies.        LABS:    I have reviewed and interpreted all of the currently available lab results from this visit (if applicable):  Results for orders placed or performed during the hospital encounter of 01/17/24   Comprehensive Metabolic Panel    Specimen: Blood   Result Value Ref Range    Glucose 178 (H) 65 - 99 mg/dL    BUN 28 (H) 8 - 23 mg/dL    Creatinine 1.43 (H) 0.57 - 1.00 mg/dL    Sodium 136 136 - 145 mmol/L    Potassium 3.8 3.5 - 5.2 mmol/L    Chloride 99 98 - 107 mmol/L    CO2 24.0 22.0 - 29.0 mmol/L    Calcium 9.4 8.6 - 10.5 mg/dL    Total Protein 6.5 6.0 - 8.5 g/dL    Albumin 4.5 3.5 - 5.2 g/dL    ALT (SGPT) 38 (H) 1 - 33 U/L    AST (SGOT) 37 (H) 1 - 32 U/L    Alkaline Phosphatase 170 (H) 39 - 117 U/L    Total Bilirubin 0.5 0.0 - 1.2 mg/dL    Globulin 2.0 gm/dL    A/G Ratio 2.3 g/dL    BUN/Creatinine Ratio 19.6 7.0 - 25.0    Anion Gap 13.0 5.0 - 15.0 mmol/L    eGFR 37.6 (L) >60.0 mL/min/1.73   Single High Sensitivity Troponin T    Specimen: Blood   Result Value Ref Range    HS Troponin T 56 (C) <14 ng/L   Magnesium    Specimen: Blood   Result Value Ref Range    Magnesium 2.0 1.6 - 2.4 mg/dL   Urinalysis With Microscopic If Indicated (No Culture) - Urine, Clean Catch    Specimen: Urine, Clean Catch   Result Value Ref Range    Color, UA Yellow Yellow, Straw    Appearance, UA Cloudy (A) Clear    pH, UA 6.0 5.0 - 8.0    Specific Gravity, UA 1.023 1.001 - 1.030    Glucose, UA Negative Negative    Ketones, UA Trace (A) Negative    Bilirubin, UA Negative Negative    Blood, UA Trace (A) Negative    Protein,  mg/dL (2+) (A) Negative    Leuk Esterase, UA Negative Negative    Nitrite, UA Negative Negative    Urobilinogen, UA 0.2 E.U./dL 0.2 - 1.0 E.U./dL   CBC Auto Differential    Specimen: Blood   Result Value Ref Range    WBC 5.43  3.40 - 10.80 10*3/mm3    RBC 3.96 3.77 - 5.28 10*6/mm3    Hemoglobin 12.3 12.0 - 15.9 g/dL    Hematocrit 37.9 34.0 - 46.6 %    MCV 95.7 79.0 - 97.0 fL    MCH 31.1 26.6 - 33.0 pg    MCHC 32.5 31.5 - 35.7 g/dL    RDW 14.6 12.3 - 15.4 %    RDW-SD 50.9 37.0 - 54.0 fl    MPV 10.7 6.0 - 12.0 fL    Platelets 191 140 - 450 10*3/mm3    Neutrophil % 56.8 42.7 - 76.0 %    Lymphocyte % 25.4 19.6 - 45.3 %    Monocyte % 16.8 (H) 5.0 - 12.0 %    Eosinophil % 0.2 (L) 0.3 - 6.2 %    Basophil % 0.2 0.0 - 1.5 %    Immature Grans % 0.6 (H) 0.0 - 0.5 %    Neutrophils, Absolute 3.09 1.70 - 7.00 10*3/mm3    Lymphocytes, Absolute 1.38 0.70 - 3.10 10*3/mm3    Monocytes, Absolute 0.91 (H) 0.10 - 0.90 10*3/mm3    Eosinophils, Absolute 0.01 0.00 - 0.40 10*3/mm3    Basophils, Absolute 0.01 0.00 - 0.20 10*3/mm3    Immature Grans, Absolute 0.03 0.00 - 0.05 10*3/mm3    nRBC 0.0 0.0 - 0.2 /100 WBC   High Sensitivity Troponin T 2Hr    Specimen: Arm, Left; Blood   Result Value Ref Range    HS Troponin T 43 (H) <14 ng/L    Troponin T Delta -13 (L) >=-4 - <+4 ng/L   Urinalysis, Microscopic Only - Urine, Clean Catch    Specimen: Urine, Clean Catch   Result Value Ref Range    RBC, UA 0-2 None Seen, 0-2 /HPF    WBC, UA 6-10 (A) None Seen, 0-2 /HPF    Bacteria, UA None Seen None Seen, Trace /HPF    Squamous Epithelial Cells, UA 0-2 None Seen, 0-2 /HPF    Hyaline Casts, UA 7-12 0 - 6 /LPF    WBC Casts 0-2 None Seen /LPF    Granular Casts, UA 0-2 None Seen /LPF    Methodology Manual Light Microscopy    Urine Drug Screen - Urine, Clean Catch    Specimen: Urine, Clean Catch   Result Value Ref Range    THC, Screen, Urine Negative Negative    Phencyclidine (PCP), Urine Negative Negative    Cocaine Screen, Urine Negative Negative    Methamphetamine, Ur Negative Negative    Opiate Screen Negative Negative    Amphetamine Screen, Urine Negative Negative    Benzodiazepine Screen, Urine Negative Negative    Tricyclic Antidepressants Screen Negative Negative     Methadone Screen, Urine Negative Negative    Barbiturates Screen, Urine Negative Negative    Oxycodone Screen, Urine Negative Negative    Buprenorphine, Screen, Urine Negative Negative   Fentanyl, Urine - Urine, Clean Catch    Specimen: Urine, Clean Catch   Result Value Ref Range    Fentanyl, Urine Negative Negative   Lactic Acid, Plasma    Specimen: Blood   Result Value Ref Range    Lactate 1.0 0.5 - 2.0 mmol/L   Procalcitonin    Specimen: Arm, Left; Blood   Result Value Ref Range    Procalcitonin 0.19 0.00 - 0.25 ng/mL   POC Glucose Once    Specimen: Blood   Result Value Ref Range    Glucose 111 70 - 130 mg/dL   ECG 12 Lead ED Triage Standing Order; Weak / Dizzy / AMS   Result Value Ref Range    QT Interval 434 ms    QTC Interval 522 ms   Green Top (Gel)   Result Value Ref Range    Extra Tube Hold for add-ons.    Lavender Top   Result Value Ref Range    Extra Tube hold for add-on    Gold Top - SST   Result Value Ref Range    Extra Tube Hold for add-ons.    Gray Top   Result Value Ref Range    Extra Tube Hold for add-ons.    Light Blue Top   Result Value Ref Range    Extra Tube Hold for add-ons.         If labs were ordered, I independently reviewed the results and considered them in treating the patient.      EMERGENCY DEPARTMENT COURSE and DIFFERENTIAL DIAGNOSIS/MDM:   Vitals:  AS OF 22:28 EST    BP - 154/87  HR - 80  TEMP - 98.1 °F (36.7 °C) (Oral)  O2 SATS - 94%        Discussion below represents my analysis of pertinent findings related to patient's condition, differential diagnosis, treatment plan and final disposition.      Differential diagnosis:  The differential diagnosis associated with the patient's presentation includes: Metastatic disease, intracranial hemorrhage, intracranial mass, CVA, pneumonia, UTI, electrolyte derangement, dehydration, medication effect      Independent interpretations (ECG/rhythm strip/X-ray/US/CT scan): I independently interpreted the patient's head CT and cardiac monitor.   There is no evidence of intracranial hemorrhage and the patient is in sinus rhythm.      Additional sources:  Discussed/obtained information from independent historians:   [] Spouse:   [] Parent:   [] Friend:   [] EMS:   [x] Other: History taken from the patient's friend who accompanies her who notes that patient is significantly altered from baseline currently.  External (non-ED) record review:   [] Inpatient record:   [] Office record:   [] Outpatient record:   [] Prior Outpatient labs:   [] Prior Outpatient radiology:   [] Primary Care record:   [] Outside ED record:   [] Other:       Patient's care impacted by:   [] Diabetes   [x] Hypertension   [] Coronary Artery Disease   [x] Cancer: Active lung cancer   [] Other:     Care significantly affected by Social Determinants of Health (housing and economic circumstances, unemployment)    [] Yes     [x] No   If yes, Patient's care significantly limited by  Social Determinants of Health including:    [] Inadequate housing    [] Low income    [] Alcoholism and drug addiction in family    [] Problems related to primary support group    [] Unemployment    [] Problems related to employment    [] Other Social Determinants of Health:       Consideration of admission/observation vs discharge: This patient presents with acute deviation from baseline mental status and requires admission for further workup.      ED Course:    ED Course as of 01/17/24 2228 Wed Jan 17, 2024 1914 Patient with acute encephalopathy. Patient has lung cancer for which she is currently undergoing radiation therapy. No known history of metastasis. Family members at bedside state that she was last seen normal yesterday afternoon and was at baseline. Reportedly normally has no cognitive impairment. Today, they found the patient to be very confused. She had soiled herself and did not realize she had done it. She is altered here and only selectively answers questions. She is oriented to person only. She  does not have any focal deficits on exam and head CT is normal. [NS]   1914 I spoke with hospitalist Dr. Siknner.  I discussed patient history, presentation, workup.  Excess patient for admission. [NS]      ED Course User Index  [NS] Gaurav Groves MD             PROCEDURES:  Procedures    CRITICAL CARE TIME        FINAL IMPRESSION      1. Altered mental status, unspecified altered mental status type    2. Malignant neoplasm of lung, unspecified laterality, unspecified part of lung    3. History of hypertension          DISPOSITION/PLAN     ED Disposition       ED Disposition   Decision to Admit    Condition   --    Comment   Level of Care: Telemetry [5]   Diagnosis: AMS (altered mental status) [[id number]]   Admitting Physician: GARO CARRERA [[id number]]   Attending Physician: GARO CARRERA [[id number]]                   Comment: Please note this report has been produced using speech recognition software.      Gaurav Groves MD  Attending Emergency Physician             Gaurav Groves MD  01/17/24 8071

## 2024-01-17 NOTE — PROGRESS NOTES
RADIATION ONCOLOGY PROGRESS NOTE  01/17/24    Vitals:    01/17/24 1300   BP: (!) 88/51   Pulse: 100   Resp: 20   Temp: 97.6 °F (36.4 °C)   SpO2: 100%     Pt brought to clinic after treatment.  Pt in wheelchair, writhing in pain, clutching her left side, stating she is hurting all over.  I asked pt if she had eaten or drank anything today.  She states no.  .  Repeat bp 128/54.  Pt unable to tell me where she is. Pt oriented to person only. Follows commands. I asked if she drove herself to the appointment. She states she did not but could not remember the name of the person that brought her. Dr. Qureshi examined pt and rapid response called @ 1307. Spoke with pt's friend that brought her she states pt is much different than she was yesterday when she brought her to treatment.  She states pt did have half of an ensure today.  /54. Rapid response team transported pt via wheelchair to ER for evaluation.

## 2024-01-18 ENCOUNTER — APPOINTMENT (OUTPATIENT)
Dept: NEUROLOGY | Facility: HOSPITAL | Age: 79
End: 2024-01-18
Payer: MEDICARE

## 2024-01-18 PROBLEM — U07.1 COVID-19: Status: ACTIVE | Noted: 2024-01-18

## 2024-01-18 LAB
ALBUMIN SERPL-MCNC: 3.7 G/DL (ref 3.5–5.2)
ALBUMIN/GLOB SERPL: 2.5 G/DL
ALP SERPL-CCNC: 129 U/L (ref 39–117)
ALT SERPL W P-5'-P-CCNC: 28 U/L (ref 1–33)
ANION GAP SERPL CALCULATED.3IONS-SCNC: 9 MMOL/L (ref 5–15)
AST SERPL-CCNC: 22 U/L (ref 1–32)
B PARAPERT DNA SPEC QL NAA+PROBE: NOT DETECTED
B PERT DNA SPEC QL NAA+PROBE: NOT DETECTED
BASOPHILS # BLD AUTO: 0.01 10*3/MM3 (ref 0–0.2)
BASOPHILS NFR BLD AUTO: 0.3 % (ref 0–1.5)
BILIRUB SERPL-MCNC: 0.3 MG/DL (ref 0–1.2)
BUN SERPL-MCNC: 24 MG/DL (ref 8–23)
BUN/CREAT SERPL: 27.6 (ref 7–25)
C PNEUM DNA NPH QL NAA+NON-PROBE: NOT DETECTED
CALCIUM SPEC-SCNC: 8.4 MG/DL (ref 8.6–10.5)
CHLORIDE SERPL-SCNC: 105 MMOL/L (ref 98–107)
CO2 SERPL-SCNC: 23 MMOL/L (ref 22–29)
CREAT SERPL-MCNC: 0.87 MG/DL (ref 0.57–1)
CREAT UR-MCNC: 151.3 MG/DL
CRP SERPL-MCNC: 7.96 MG/DL (ref 0–0.5)
DEPRECATED RDW RBC AUTO: 49.4 FL (ref 37–54)
EGFRCR SERPLBLD CKD-EPI 2021: 68.3 ML/MIN/1.73
EOSINOPHIL # BLD AUTO: 0.03 10*3/MM3 (ref 0–0.4)
EOSINOPHIL NFR BLD AUTO: 0.9 % (ref 0.3–6.2)
ERYTHROCYTE [DISTWIDTH] IN BLOOD BY AUTOMATED COUNT: 14.6 % (ref 12.3–15.4)
FLUAV SUBTYP SPEC NAA+PROBE: NOT DETECTED
FLUBV RNA ISLT QL NAA+PROBE: NOT DETECTED
FOLATE SERPL-MCNC: >20 NG/ML (ref 4.78–24.2)
GLOBULIN UR ELPH-MCNC: 1.5 GM/DL
GLUCOSE BLDC GLUCOMTR-MCNC: 124 MG/DL (ref 70–130)
GLUCOSE BLDC GLUCOMTR-MCNC: 139 MG/DL (ref 70–130)
GLUCOSE BLDC GLUCOMTR-MCNC: 88 MG/DL (ref 70–130)
GLUCOSE BLDC GLUCOMTR-MCNC: 89 MG/DL (ref 70–130)
GLUCOSE SERPL-MCNC: 93 MG/DL (ref 65–99)
HADV DNA SPEC NAA+PROBE: NOT DETECTED
HBA1C MFR BLD: 6.8 % (ref 4.8–5.6)
HCOV 229E RNA SPEC QL NAA+PROBE: NOT DETECTED
HCOV HKU1 RNA SPEC QL NAA+PROBE: NOT DETECTED
HCOV NL63 RNA SPEC QL NAA+PROBE: NOT DETECTED
HCOV OC43 RNA SPEC QL NAA+PROBE: NOT DETECTED
HCT VFR BLD AUTO: 31.8 % (ref 34–46.6)
HGB BLD-MCNC: 10.5 G/DL (ref 12–15.9)
HMPV RNA NPH QL NAA+NON-PROBE: NOT DETECTED
HPIV1 RNA ISLT QL NAA+PROBE: NOT DETECTED
HPIV2 RNA SPEC QL NAA+PROBE: NOT DETECTED
HPIV3 RNA NPH QL NAA+PROBE: NOT DETECTED
HPIV4 P GENE NPH QL NAA+PROBE: NOT DETECTED
IMM GRANULOCYTES # BLD AUTO: 0.01 10*3/MM3 (ref 0–0.05)
IMM GRANULOCYTES NFR BLD AUTO: 0.3 % (ref 0–0.5)
LYMPHOCYTES # BLD AUTO: 0.99 10*3/MM3 (ref 0.7–3.1)
LYMPHOCYTES NFR BLD AUTO: 30.7 % (ref 19.6–45.3)
M PNEUMO IGG SER IA-ACNC: NOT DETECTED
MCH RBC QN AUTO: 30.5 PG (ref 26.6–33)
MCHC RBC AUTO-ENTMCNC: 33 G/DL (ref 31.5–35.7)
MCV RBC AUTO: 92.4 FL (ref 79–97)
MONOCYTES # BLD AUTO: 0.49 10*3/MM3 (ref 0.1–0.9)
MONOCYTES NFR BLD AUTO: 15.2 % (ref 5–12)
NEUTROPHILS NFR BLD AUTO: 1.69 10*3/MM3 (ref 1.7–7)
NEUTROPHILS NFR BLD AUTO: 52.6 % (ref 42.7–76)
NRBC BLD AUTO-RTO: 0 /100 WBC (ref 0–0.2)
OSMOLALITY UR: 777 MOSM/KG (ref 300–1100)
PLATELET # BLD AUTO: 172 10*3/MM3 (ref 140–450)
PMV BLD AUTO: 10.5 FL (ref 6–12)
POTASSIUM SERPL-SCNC: 3.7 MMOL/L (ref 3.5–5.2)
PROT SERPL-MCNC: 5.2 G/DL (ref 6–8.5)
RBC # BLD AUTO: 3.44 10*6/MM3 (ref 3.77–5.28)
RHINOVIRUS RNA SPEC NAA+PROBE: NOT DETECTED
RSV RNA NPH QL NAA+NON-PROBE: NOT DETECTED
SARS-COV-2 RNA NPH QL NAA+NON-PROBE: DETECTED
SODIUM SERPL-SCNC: 137 MMOL/L (ref 136–145)
SODIUM UR-SCNC: 111 MMOL/L
VIT B12 BLD-MCNC: 217 PG/ML (ref 211–946)
WBC NRBC COR # BLD AUTO: 3.22 10*3/MM3 (ref 3.4–10.8)

## 2024-01-18 PROCEDURE — 99223 1ST HOSP IP/OBS HIGH 75: CPT

## 2024-01-18 PROCEDURE — 97161 PT EVAL LOW COMPLEX 20 MIN: CPT

## 2024-01-18 PROCEDURE — 77336 RADIATION PHYSICS CONSULT: CPT | Performed by: RADIOLOGY

## 2024-01-18 PROCEDURE — 84300 ASSAY OF URINE SODIUM: CPT | Performed by: NURSE PRACTITIONER

## 2024-01-18 PROCEDURE — 63710000001 DEXAMETHASONE PER 0.25 MG: Performed by: INTERNAL MEDICINE

## 2024-01-18 PROCEDURE — 80053 COMPREHEN METABOLIC PANEL: CPT | Performed by: NURSE PRACTITIONER

## 2024-01-18 PROCEDURE — 86140 C-REACTIVE PROTEIN: CPT | Performed by: INTERNAL MEDICINE

## 2024-01-18 PROCEDURE — 82607 VITAMIN B-12: CPT

## 2024-01-18 PROCEDURE — 99233 SBSQ HOSP IP/OBS HIGH 50: CPT | Performed by: INTERNAL MEDICINE

## 2024-01-18 PROCEDURE — 82570 ASSAY OF URINE CREATININE: CPT | Performed by: NURSE PRACTITIONER

## 2024-01-18 PROCEDURE — 83036 HEMOGLOBIN GLYCOSYLATED A1C: CPT | Performed by: NURSE PRACTITIONER

## 2024-01-18 PROCEDURE — 82948 REAGENT STRIP/BLOOD GLUCOSE: CPT

## 2024-01-18 PROCEDURE — 95819 EEG AWAKE AND ASLEEP: CPT

## 2024-01-18 PROCEDURE — 97166 OT EVAL MOD COMPLEX 45 MIN: CPT

## 2024-01-18 PROCEDURE — 95819 EEG AWAKE AND ASLEEP: CPT | Performed by: PSYCHIATRY & NEUROLOGY

## 2024-01-18 PROCEDURE — 82746 ASSAY OF FOLIC ACID SERUM: CPT

## 2024-01-18 PROCEDURE — 85025 COMPLETE CBC W/AUTO DIFF WBC: CPT | Performed by: NURSE PRACTITIONER

## 2024-01-18 PROCEDURE — 83935 ASSAY OF URINE OSMOLALITY: CPT | Performed by: NURSE PRACTITIONER

## 2024-01-18 RX ORDER — LOSARTAN POTASSIUM 50 MG/1
50 TABLET ORAL
Status: DISCONTINUED | OUTPATIENT
Start: 2024-01-18 | End: 2024-01-19 | Stop reason: HOSPADM

## 2024-01-18 RX ORDER — DEXAMETHASONE 4 MG/1
6 TABLET ORAL
Status: DISCONTINUED | OUTPATIENT
Start: 2024-01-18 | End: 2024-01-19

## 2024-01-18 RX ORDER — GABAPENTIN 300 MG/1
300 CAPSULE ORAL 3 TIMES DAILY
Status: DISCONTINUED | OUTPATIENT
Start: 2024-01-18 | End: 2024-01-19 | Stop reason: HOSPADM

## 2024-01-18 RX ADMIN — LEVOTHYROXINE SODIUM 50 MCG: 0.05 TABLET ORAL at 05:35

## 2024-01-18 RX ADMIN — DEXAMETHASONE 6 MG: 4 TABLET ORAL at 13:50

## 2024-01-18 RX ADMIN — SENNOSIDES AND DOCUSATE SODIUM 2 TABLET: 8.6; 5 TABLET ORAL at 21:13

## 2024-01-18 RX ADMIN — LOSARTAN POTASSIUM 50 MG: 50 TABLET, FILM COATED ORAL at 13:50

## 2024-01-18 RX ADMIN — CETIRIZINE HYDROCHLORIDE 10 MG: 10 TABLET, FILM COATED ORAL at 10:04

## 2024-01-18 RX ADMIN — Medication 10 ML: at 10:05

## 2024-01-18 RX ADMIN — GABAPENTIN 300 MG: 300 CAPSULE ORAL at 15:30

## 2024-01-18 RX ADMIN — GABAPENTIN 300 MG: 300 CAPSULE ORAL at 21:13

## 2024-01-18 RX ADMIN — PANTOPRAZOLE SODIUM 40 MG: 40 TABLET, DELAYED RELEASE ORAL at 10:03

## 2024-01-18 NOTE — THERAPY EVALUATION
"Patient Name: Ruby Pettit  : 1945    MRN: [id number]                              Today's Date: 2024       Admit Date: 2024    Visit Dx:     ICD-10-CM ICD-9-CM   1. Altered mental status, unspecified altered mental status type  R41.82 780.97   2. Malignant neoplasm of lung, unspecified laterality, unspecified part of lung  C34.90 162.9   3. History of hypertension  Z86.79 V12.59     Patient Active Problem List   Diagnosis    Migratory Lung nodules (\"Soft\" and solid, bilateral)    Non-smoker    History of asthma    Cough    Hypertension    T2DM (type 2 diabetes mellitus)    Neuropathy    Lesion of temporal lobe    GERD    Malignant neoplasm of lower lobe of left lung    AMS (altered mental status)    Elevated troponin    Acute kidney injury    COVID-19     Past Medical History:   Diagnosis Date    Anxiety and depression     Arthritis     Carotid stenosis, bilateral     Carotid duplex, 2019: Bilateral ICA stenosis 0-49%. Tortuous vessels. Vertebral flow antegrade.        Disease of thyroid gland     GERD (gastroesophageal reflux disease)     Head injury due to trauma     fell down stairs     History of transfusion     NO REACTIONS    Hyperlipidemia     Hypertension     Iatrogenic pneumothorax     Liver disorder     surgery  approx , BLOOD CLOTS    Lung cancer     Metastatic cancer     Perennial rhinitis     Peripheral neuropathic pain     Syncope and collapse     Thyroid disease     UTI (urinary tract infection), bacterial     Wears eyeglasses      Past Surgical History:   Procedure Laterality Date    ABDOMINAL SURGERY      LIVER RESECTION    APPENDECTOMY      BRONCHOSCOPY N/A 2018    Procedure: BRONCHOSCOPY WITH SANDRITA ENDOBRONCHIAL ULTRASOUND WITH FLUORO;  Surgeon: Dixon Fatima MD;  Location:  [facility];  Service:     BRONCHOSCOPY N/A 2019    Procedure: NAVIGATIONAL BRONCHOSCOPY;  Surgeon: Dixon Fatima MD;  Location:  [facility];  Service: " "Pulmonary    BRONCHOSCOPY WITH ION ROBOTIC ASSIST N/A 06/29/2023    Procedure: NAVIGATIONAL BRONCHOSCOPY WITH ION ROBOT;  Surgeon: Dixon Fatima MD;  Location: [facility];  Service: Robotics - Pulmonary;  Laterality: N/A;  Ion cath 3 - 0010,  3 - 0013.    CHOLECYSTECTOMY      COLONOSCOPY  2019    HYSTERECTOMY      2001    LIVER RESECTION      LUNG LOBECTOMY  11/17/2023    OOPHORECTOMY Bilateral     2001    ORIF WRIST FRACTURE Right     THYROID SURGERY      TUBAL ABDOMINAL LIGATION        General Information       Row Name 01/18/24 1413          OT Time and Intention    Document Type evaluation  -TB     Mode of Treatment occupational therapy  -TB       Row Name 01/18/24 1413          General Information    Patient Profile Reviewed yes  -TB     Prior Level of Function independent:;all household mobility;transfer;ADL's;home management;dependent:;driving;shopping  -TB     Existing Precautions/Restrictions fall;other (see comments)  COVID  -TB     Barriers to Rehab medically complex  -TB       Row Name 01/18/24 1413          Occupational Profile    Reason for Services/Referral (Occupational Profile) Occupational decline  -TB     Environmental Supports and Barriers (Occupational Profile) Pt lives alone in a single story home with no steps to enter. Tub/shower with grab bar and seat. RW and SPC available, but pt denies using AD at baseline. No recent falls. Independent with self-care. Pt does not drive.  -TB       Row Name 01/18/24 1413          Living Environment    People in Home alone;other (see comments)  Son stays with her \"sometimes\"  -TB       Row Name 01/18/24 1413          Home Main Entrance    Number of Stairs, Main Entrance none  -TB       Row Name 01/18/24 1413          Stairs Within Home, Primary    Number of Stairs, Within Home, Primary none  -TB       Row Name 01/18/24 1413          Cognition    Orientation Status (Cognition) oriented x 4;verbal cues/prompts needed for orientation  Cues for " month only  -TB       Row Name 01/18/24 1413          Safety Issues, Functional Mobility    Safety Issues Affecting Function (Mobility) insight into deficits/self-awareness;awareness of need for assistance;safety precaution awareness  -TB     Impairments Affecting Function (Mobility) endurance/activity tolerance;strength  -TB     Comment, Safety Issues/Impairments (Mobility) Pt up in room/bathroom with CGAx1. Fatigues easily.  -TB               User Key  (r) = Recorded By, (t) = Taken By, (c) = Cosigned By      Initials Name Provider Type    TB Marium Tejeda, OT Occupational Therapist                     Mobility/ADL's       Row Name 01/18/24 1416          Bed Mobility    Bed Mobility supine-sit;scooting/bridging  -TB     Scooting/Bridging [place] (Bed Mobility) standby assist;verbal cues  -TB     Supine-Sit [place] (Bed Mobility) standby assist;verbal cues  -TB     Assistive Device (Bed Mobility) bed rails  -TB     Comment, (Bed Mobility) Pt transitions to EOB sitting with minimal effort  -TB       Row Name 01/18/24 1416          Transfers    Transfers sit-stand transfer;stand-sit transfer;toilet transfer;bed-chair transfer  -TB     Comment, (Transfers) Education and cues for hand placement  -TB       Row Name 01/18/24 1416          Bed-Chair Transfer    Bed-Chair [place] (Transfers) contact guard;1 person assist;verbal cues  -TB       Row Name 01/18/24 1416          Sit-Stand Transfer    Sit-Stand [place] (Transfers) contact guard;1 person assist;verbal cues  -TB       Row Name 01/18/24 1416          Stand-Sit Transfer    Stand-Sit [place] (Transfers) contact guard;1 person assist;verbal cues  -TB       Row Name 01/18/24 1416          Toilet Transfer    Type (Toilet Transfer) sit-stand;stand-sit  -TB     [place] Level (Toilet Transfer) contact guard;1 person assist;verbal cues  -TB       Row Name 01/18/24 1416          Functional Mobility    Functional Mobility- Ind. Level  contact guard assist;1 person;verbal cues required  -TB     Functional Mobility-Distance (Feet) 30  -TB     Functional Mobility- Safety Issues balance decreased during turns  -TB     Functional Mobility- Comment Pt up in room/bathroom with CGAx1. No dizziness or LOB. Fatigues easily.  -TB     Patient was able to Ambulate yes  -TB       Row Name 01/18/24 1416          Activities of Daily Living    BADL Assessment/Intervention lower body dressing;toileting;grooming  -TB       Row Name 01/18/24 1416          Lower Body Dressing Assessment/Training    [place] Level (Lower Body Dressing) doff;don;socks;independent  -TB     Position (Lower Body Dressing) unsupported sitting  -TB       Row Name 01/18/24 1416          Toileting Assessment/Training    [place] Level (Toileting) contact guard assist;adjust/manage clothing;dependent (less than 25% patient effort);perform perineal hygiene  -TB     Position (Toileting) unsupported sitting;supported standing  -TB     Comment, (Toileting) Pt required assist for hygiene only due to placement of IV in her dominant hand.  -TB       Row Name 01/18/24 1416          Grooming Assessment/Training    [place] Level (Grooming) set up  -TB     Position (Grooming) supported sitting  -TB     Comment, (Grooming) to wash hands following toileting  -TB               User Key  (r) = Recorded By, (t) = Taken By, (c) = Cosigned By      Initials Name Provider Type    TB Marium Tejeda, OT Occupational Therapist                   Obj/Interventions       Row Name 01/18/24 1423          Sensory Assessment (Somatosensory)    Sensory Assessment (Somatosensory) bilateral UE  -TB     Sensory Subjective Reports numbness;tingling  -TB     Sensory Assessment Pt reports impaired sensation in B hands and B feet.  -TB       Row Name 01/18/24 1423          Vision Assessment/Intervention    Visual Impairment/Limitations corrective lenses for reading  -TB       Row Name 01/18/24 1423           Range of Motion Comprehensive    General Range of Motion bilateral upper extremity ROM WFL  -TB     Comment, General Range of Motion BUE AROM WFL for self-care performance  -TB       Row Name 01/18/24 1423          Strength Comprehensive (MMT)    Comment, General Manual Muscle Testing (MMT) Assessment Generalized weakness. No focal deficits.  -TB       Row Name 01/18/24 1423          Balance    Balance Assessment sitting dynamic balance;sit to stand dynamic balance;standing dynamic balance  -TB     Dynamic Sitting Balance supervision  -TB     Position, Sitting Balance unsupported;sitting in chair;sitting edge of bed  Commode  -TB     Sit to Stand Dynamic Balance contact guard;1-person assist;verbal cues  -TB     Dynamic Standing Balance contact guard;1-person assist;verbal cues  -TB     Balance Interventions sitting;standing;sit to stand;dynamic;dynamic reaching;occupation based/functional task;UE activity with balance activity  -TB     Comment, Balance No LOB  -TB               User Key  (r) = Recorded By, (t) = Taken By, (c) = Cosigned By      Initials Name Provider Type    TB Marium Tejeda OT Occupational Therapist                   Goals/Plan       Row Name 01/18/24 1432          Transfer Goal 1 (OT)    Activity/Assistive Device (Transfer Goal 1, OT) toilet  -TB     [place] Level/Cues Needed (Transfer Goal 1, OT) modified independence  -TB     Time Frame (Transfer Goal 1, OT) by discharge  -TB     Progress/Outcome (Transfer Goal 1, OT) goal ongoing  -TB       [facility] Name 01/18/24 1432          Dressing Goal 1 (OT)    Activity/Device (Dressing Goal 1, OT) dressing skills, all  -TB     [place]/Cues Needed (Dressing Goal 1, OT) independent  -TB     Time Frame (Dressing Goal 1, OT) by discharge  -TB     Progress/Outcome (Dressing Goal 1, OT) goal ongoing  -TB       Row Name 01/18/24 1432          Toileting Goal 1 (OT)    Activity/Device (Toileting Goal 1, OT) toileting skills, all  -TB      [place] Level/Cues Needed (Toileting Goal 1, OT) independent  -TB     Time Frame (Toileting Goal 1, OT) by discharge  -TB     Progress/Outcome (Toileting Goal 1, OT) goal ongoing  -TB       Row Name 01/18/24 1432          Strength Goal 1 (OT)    Strength Goal 1 (OT) Pt demonstrates independence with HEP to support self-care: 10 reps alternating UB/LB ther ex and 5x5 reps STS with SBA  -TB     Time Frame (Strength Goal 1, OT) by discharge  -TB     Progress/Outcome (Strength Goal 1, OT) goal ongoing  -TB               User Key  (r) = Recorded By, (t) = Taken By, (c) = Cosigned By      Initials Name Provider Type    TB Marium Tejeda, OT Occupational Therapist                   Clinical Impression       Row Name 01/18/24 1422          Pain Assessment    Pain Intervention(s) Ambulation/increased activity;Repositioned  -TB     Additional Documentation Pain Scale: FACES Pre/Post-Treatment (Group)  -TB       Row Name 01/18/24 7168          Pain Scale: FACES Pre/Post-Treatment    Pain: FACES Scale, Pretreatment 0-->no hurt  -TB     Posttreatment Pain Rating 0-->no hurt  -TB     Pre/Posttreatment Pain Comment No indication of pain with dynamic OOB activity  -TB       Row Name 01/18/24 1421          Plan of Care Review    Plan of Care Reviewed With patient;friend  -TB     Progress --  IE  -TB     Outcome Evaluation OT IE completed. Pt is A/Ox4 and participates in therapy with good effort. SBA to transition to EOB sitting. Able to don socks indepedently. Up in room/bathroom and transfering with CGAx1. No dizziness or LOB. Pt is limited by generalized weakness and fatigue. OT will follow IP to support return to home with family assist when pt is medically ready.  -TB       Row Name 01/18/24 1426          Therapy Assessment/Plan (OT)    Rehab Potential (OT) good, to achieve stated therapy goals  -TB     Criteria for Skilled Therapeutic Interventions Met (OT) yes;meets criteria;skilled treatment is necessary  -TB      Therapy Frequency (OT) daily  -TB       Row Name 01/18/24 1425          Therapy Plan Review/Discharge Plan (OT)    Anticipated Discharge Disposition (OT) home with assist  -TB       Row Name 01/18/24 1425          Vital Signs    Pre Systolic BP Rehab --  RN cleared OT  -TB     Pre SpO2 (%) 93  -TB     O2 Delivery Pre Treatment room air  -TB     Post SpO2 (%) 92  -TB     O2 Delivery Post Treatment room air  -TB     Pre Patient Position Supine  -TB     Intra Patient Position Standing  -TB     Post Patient Position Sitting  -TB       Row Name 01/18/24 1425          Positioning and Restraints    Pre-Treatment Position in bed  -TB     Post Treatment Position chair  -TB     In Chair notified nsg;reclined;call light within reach;encouraged to call for assist;exit alarm on;with family/caregiver;legs elevated  -TB               User Key  (r) = Recorded By, (t) = Taken By, (c) = Cosigned By      Initials Name Provider Type    Marium Tinajero OT Occupational Therapist                   Outcome Measures       Row Name 01/18/24 1434          How much help from another is currently needed...    Putting on and taking off regular lower body clothing? 3  -TB     Bathing (including washing, rinsing, and drying) 3  -TB     Toileting (which includes using toilet bed pan or urinal) 3  -TB     Putting on and taking off regular upper body clothing 3  -TB     Taking care of personal grooming (such as brushing teeth) 3  -TB     Eating meals 4  -TB     AM-PAC 6 Clicks Score (OT) 19  -TB       Row Name 01/18/24 1434          Functional Assessment    Outcome Measure Options AM-PAC 6 Clicks Daily Activity (OT)  -TB               User Key  (r) = Recorded By, (t) = Taken By, (c) = Cosigned By      Initials Name Provider Type    Marium Tinajero OT Occupational Therapist                    Occupational Therapy Education       Title: PT OT SLP Therapies (In Progress)       Topic: Occupational Therapy (In Progress)       Point:  ADL training (Done)       Description:   Instruct learner(s) on proper safety adaptation and remediation techniques during self care or transfers.   Instruct in proper use of assistive devices.                  Learning Progress Summary             Patient Acceptance, E,D, VU,DU,NR by  at 1/18/2024 0444                         Point: Home exercise program (Not Started)       Description:   Instruct learner(s) on appropriate technique for monitoring, assisting and/or progressing therapeutic exercises/activities.                  Learner Progress:  Not documented in this visit.              Point: Precautions (Not Started)       Description:   Instruct learner(s) on prescribed precautions during self-care and functional transfers.                  Learner Progress:  Not documented in this visit.              Point: Body mechanics (Not Started)       Description:   Instruct learner(s) on proper positioning and spine alignment during self-care, functional mobility activities and/or exercises.                  Learner Progress:  Not documented in this visit.                              User Key       Initials Effective Dates Name Provider Type Discipline     07/11/23 -  Marium Tejeda OT Occupational Therapist OT                  OT Recommendation and Plan  Therapy Frequency (OT): daily  Plan of Care Review  Plan of Care Reviewed With: patient, friend  Progress:  (IE)  Outcome Evaluation: OT IE completed. Pt is A/Ox4 and participates in therapy with good effort. SBA to transition to EOB sitting. Able to don socks indepedently. Up in room/bathroom and transfering with CGAx1. No dizziness or LOB. Pt is limited by generalized weakness and fatigue. OT will follow IP to support return to home with family assist when pt is medically ready.     Time Calculation:   Evaluation Complexity (OT)  Review Occupational Profile/Medical/Therapy History Complexity: expanded/moderate complexity  Assessment, Occupational  Performance/Identification of Deficit Complexity: 3-5 performance deficits  Clinical Decision Making Complexity (OT): detailed assessment/moderate complexity  Overall Complexity of Evaluation (OT): moderate complexity     Time Calculation- OT       Row Name 01/18/24 1303             Time Calculation- OT    OT Start Time 1303  -TB      OT Received On 01/18/24  -TB      OT Goal Re-Cert Due Date 01/28/24  -TB         Untimed Charges    OT Eval/Re-eval Minutes 55  -TB         Total Minutes    Untimed Charges Total Minutes 55  -TB       Total Minutes 55  -TB                User Key  (r) = Recorded By, (t) = Taken By, (c) = Cosigned By      Initials Name Provider Type    TB Marium Tejeda, OT Occupational Therapist                  Therapy Charges for Today       Code Description Service Date Service Provider Modifiers Qty    [id number] HC OT EVAL MOD COMPLEXITY 4 1/18/2024 Marium Tejeda OT GO 1                 Marium Tejeda OT  1/18/2024

## 2024-01-18 NOTE — PROGRESS NOTES
[facility] Medicine Services  PROGRESS NOTE    Patient Name: Ruby Pettit  : 1945  MRN: [id number]    Date of Admission: 2024  Primary Care Physician: Ly Funez MD    Subjective   Subjective     CC:  confused and weak    HPI:  walking back from BR with standby assist she looks great, is on room air, is bright and conversant.  States she is not more dyspneic than baseline, is not noting fevers, is not confused.  She has had loss of appetite lately.  No diarrhea.        Objective   Objective     Vital Signs:   Temp:  [98 °F (36.7 °C)-98.3 °F (36.8 °C)] 98.3 °F (36.8 °C)  Heart Rate:  [75-91] 75  Resp:  [16-18] 16  BP: (127-192)/(62-93) 162/82     Physical Exam  Gen:  WD/WN woman walking in room on room air, moving briskly, mild tachypnea only   Neuro: alert and oriented, clear speech, follows commands, grossly nonfocal  HEENT:  NC/AT   Neck:  Supple, no LAD  Heart RRR no murmur,   Lungs decreased throughout but no focal findings.    Abd:  Soft, nontender, no rebound or guarding, pos BS  Extrem:  No c/c/e          Results Reviewed:  LAB RESULTS:      Lab 24  0612 24  2140 24  1544 24  1335   WBC 3.22*  --   --  5.43   HEMOGLOBIN 10.5*  --   --  12.3   HEMATOCRIT 31.8*  --   --  37.9   PLATELETS 172  --   --  191   NEUTROS ABS 1.69*  --   --  3.09   IMMATURE GRANS (ABS) 0.01  --   --  0.03   LYMPHS ABS 0.99  --   --  1.38   MONOS ABS 0.49  --   --  0.91*   EOS ABS 0.03  --   --  0.01   MCV 92.4  --   --  95.7   PROCALCITONIN  --   --  0.19  --    LACTATE  --  1.0  --   --          Lab 24  0612 24  1335   SODIUM 137 136   POTASSIUM 3.7 3.8   CHLORIDE 105 99   CO2 23.0 24.0   ANION GAP 9.0 13.0   BUN 24* 28*   CREATININE 0.87 1.43*   EGFR 68.3 37.6*   GLUCOSE 93 178*   CALCIUM 8.4* 9.4   MAGNESIUM  --  2.0   HEMOGLOBIN A1C 6.80*  --          Lab 24  0612 24  1335   TOTAL PROTEIN 5.2* 6.5   ALBUMIN 3.7 4.5   GLOBULIN 1.5 2.0    ALT (SGPT) 28 38*   AST (SGOT) 22 37*   BILIRUBIN 0.3 0.5   ALK PHOS 129* 170*         Lab 01/17/24  1544 01/17/24  1335   HSTROP T 43* 56*                 Brief Urine Lab Results  (Last result in the past 365 days)        Color   Clarity   Blood   Leuk Est   Nitrite   Protein   CREAT   Urine HCG        01/18/24 0139             151.3                 Microbiology Results Abnormal       None            EEG    Result Date: 1/18/2024  Reason for referral: 78 y.o.female with altered mental status Technical Summary:  A 19 channel digital EEG was performed using the international 10-20 placement system, including eye leads and EKG leads. Duration: 20 minutes Findings: The patient is drowsy.  The background shows diffuse low to medium amplitude 5 to 6 Hz theta which is present symmetrically over both hemispheres.  Frequent but brief bursts of medium amplitude 3 Hz generalized delta are noted.  Low amplitude EMG artifact is variably prominent anteriorly.  Light sleep is seen with further slowing of the background.  Photic stimulation does not change the tracing.  No focal features or epileptiform activity are seen.  Hyperventilation is not performed. Video: Available Technical quality: Superior EKG: Regular, 70 bpm SUMMARY: Mild generalized slow Excessive drowsiness No focal features or epileptiform activity are seen     Impression: Diffuse cerebral dysfunction of mild degree, nonspecific No evidence for epilepsy is seen This report is transcribed using the Dragon dictation system.      MRI Brain Without Contrast    Result Date: 1/18/2024  MRI BRAIN WO CONTRAST Date of Exam: 1/18/2024 12:05 AM EST Indication: altered mental status; lung cancer.  Comparison: CT head from January 17, 2024 at 1422 hours; MRI of the brain from 7/24/2023 Technique:  Routine multiplanar/multisequence sequence images of the brain were obtained without contrast administration. Findings: There is mild diffuse generalized atrophy. There is a cystic  area in the left temporal lobe favored to be a small arachnoid cyst measuring about 1.4 cm in maximum diameter. There are areas of increased T2 and FLAIR signal in the periventricular white matter consistent with chronic microvascular ischemia. There is no mass or mass effect. There are no abnormal extra-axial fluid collections. There are no areas of acute hemorrhage. The diffusion weighted sequences are normal.     Impression: Impression: Atrophy and chronic microvascular ischemic change. No acute intracranial process. Electronically Signed: Piter Rain MD  1/18/2024 12:27 AM EST  Workstation ID: [id number]    CT Abdomen Pelvis Without Contrast    Result Date: 1/17/2024  CT ABDOMEN PELVIS WO CONTRAST Date of Exam: 1/17/2024 1:19 PM CST Indication: l flank pain. Comparison: 9/15/2023 Technique: Axial CT images were obtained of the abdomen and pelvis without the administration of contrast. Reconstructed coronal and sagittal images were also obtained. Automated exposure control and iterative construction methods were used. Findings: LUNG BASES: Resolution of previous left lower lobe lesion with what appear to be postoperative changes. Correlate with history. LIVER: No significant change in appearance of the liver on unenhanced CT imaging. BILIARY/GALLBLADDER: Cholecystectomy SPLEEN:  Unremarkable PANCREAS:  Unremarkable ADRENAL: Stable 2.1 cm nonspecific left adrenal nodule. KIDNEYS:  Unremarkable parenchyma with no solid mass identified. No obstruction.  There is a nonobstructive 2 mm calculus within the mid right kidney. No left renal calculus. There are no ureteral calculi. GASTROINTESTINAL/MESENTERY: Resolution of previous descending colonic epiploic appendagitis. No evidence of obstruction nor inflammation currently.  AORTA/IVC:  Normal caliber. RETROPERITONEUM/LYMPH NODES:  Unremarkable REPRODUCTIVE: Hysterectomy BLADDER:  Unremarkable OSSEUS STRUCTURES: No acute process nor significant herbal change.      Impression: Impression: 1.No acute process is identified. 2.Resolution of previous epiploic appendagitis. 3.Presumed resection of previous left lower lobe pulmonary lesion. Correlate with history. 4.Other stable chronic findings. Electronically Signed: Long Casanova MD  1/17/2024 1:58 PM CST  Workstation ID: [id number]    CT Head Without Contrast    Result Date: 1/17/2024  CT HEAD WO CONTRAST Date of Exam: 1/17/2024 2:19 PM EST Indication: ams. Comparison: Head CT 11/2/2020, brain MRI 7/24/2023 Technique: Axial CT images were obtained of the head without contrast administration.  Automated exposure control and iterative construction methods were used. Findings: No acute intracranial hemorrhage. No acute large territory infarct. Mild scattered subcortical and periventricular white matter hypodensities are nonspecific and can be seen in the setting of chronic small vessel ischemic change. Redemonstration of a round circumscribed 1.4 cm hypodensity in the anterior left temporal lobe, unchanged, previously characterized as likely arachnoid cyst on prior brain MRI. No extra-axial collections. No midline shift or herniation. Normal size and configuration of the ventricles. Unremarkable appearance of the orbits. There is some mucosal thickening in the posterior right ethmoid air cells and along the floor of the maxillary sinuses. Hypopneumatized mastoid air cells appear clear. No acute or suspicious bony findings. Redemonstration of multiple flat and rounded cutaneous lesions of the scalp.     Impression: Impression: No acute intracranial findings. Electronically Signed: Omar Sanchez MD  1/17/2024 2:41 PM EST  Workstation ID: [id number]    XR Chest 1 View    Result Date: 1/17/2024  XR CHEST 1 VW Date of Exam: 1/17/2024 12:36 PM CST Indication: Weak/Dizzy/AMS triage protocol Comparison: 8/17/2023 Findings: Cardiomediastinal silhouette is unremarkable. Mild diffuse interstitial prominence is similar to prior exam. No  airspace disease, pneumothorax, nor pleural effusion. No acute osseous abnormality identified.     Impression: Impression: No acute process identified. Electronically Signed: Long Casanova MD  1/17/2024 12:54 PM CST  Workstation ID: [id number]     Results for orders placed during the hospital encounter of 07/08/19    Adult Transthoracic Echo Complete W/ Cont if Necessary Per Protocol    Interpretation Summary  · Estimated EF = 65%. Near cavitary obliteration at rest with contractility  · Left ventricular systolic function is normal.  · Left ventricular diastolic dysfunction (grade I) consistent with impaired relaxation.  · Trace mitral regurgitation  · Trace to mild tricuspid regurgitation      Current medications:  Scheduled Meds:cetirizine, 10 mg, Oral, Daily  dexAMETHasone, 6 mg, Oral, Daily With Breakfast  gabapentin, 300 mg, Oral, TID  insulin lispro, 2-7 Units, Subcutaneous, 4x Daily AC & at Bedtime  levothyroxine, 50 mcg, Oral, Q AM  losartan, 50 mg, Oral, Q24H  Nirmatrelvir & Ritonavir (300mg/100mg), 3 tablet, Oral, BID  pantoprazole, 40 mg, Oral, Daily  senna-docusate sodium, 2 tablet, Oral, BID  sodium chloride, 10 mL, Intravenous, Q12H      Continuous Infusions:   PRN Meds:.  senna-docusate sodium **AND** polyethylene glycol **AND** bisacodyl **AND** bisacodyl    dextrose    dextrose    glucagon (human recombinant)    sodium chloride    sodium chloride    sodium chloride    Assessment & Plan   Assessment & Plan     Active Hospital Problems    Diagnosis  POA    **AMS (altered mental status) [R41.82]  Yes    Elevated troponin [R79.89]  Yes    Acute kidney injury [N17.9]  Yes    Malignant neoplasm of lower lobe of left lung [C34.32]  Yes    GERD [K21.9]  Yes    Hypertension [I10]  Yes    T2DM (type 2 diabetes mellitus) [E11.9]  Yes    Neuropathy [G62.9]  Yes      Resolved Hospital Problems   No resolved problems to display.        Brief Hospital Course to date:  Ruby Pettit is a 78 y.o. female  w/ a hx of  HTN, T2DM, GERD, neuropathy, asthma, lung cancer (s/p left lower lobectomy 11/17/2023, chemotherapy completion in 11/2023, currently undergoing radiation) who presented to the ED w/ c/o altered mental status.   Showed up at XRT appointment confused beyond baseline      COVID+  Altered mental status   Dehydration, KANDY, hypotension on arrival   - hold losartan and HCTZ  - for COVID:  She remains on RA.  Start Paxlovid and Decadron.    - EEG and brain MRI without acute findings.  Neuro consultant appreciated.  Supportive care.    -   **Acute kidney injury   -baseline CR ~ 1.0, was 1.4 on admission, now 0.9 after fluids  -  losartan and HCTZ held - pt with low bp on arrival , now hypertensive however      **Lung cancer  -s/p left lower lobectomy 11/17/2023, chemotherapy completion in 11/2023  -started radiation on Monday (plans for 5 days/week x 5 weeks)   -follows w/ Dr. Resendez; courtesy consult ordered   -symptom mgt      **HTN - holding htn meds due to hypotensive on arrival - restart losartan.      **T2DM  -diet controlled      Neuropathy   -received 900mg Gabapentin in ED - decrease dose     GERD -continue PPI        Expected Discharge Location and Transportation: home by car  Expected Discharge   Expected Discharge Date: 1/19/2024; Expected Discharge Time:      DVT prophylaxis:  Mechanical DVT prophylaxis orders are present.     AM-PAC 6 Clicks Score (PT): 19 (01/17/24 2029)    CODE STATUS:   Code Status and Medical Interventions:   Ordered at: 01/17/24 2104     Code Status (Patient has no pulse and is not breathing):    CPR (Attempt to Resuscitate)     Medical Interventions (Patient has pulse or is breathing):    Full Support       Mari Velasquez MD  01/18/24

## 2024-01-18 NOTE — ED NOTES
Ruyb Pettit    Nursing Report ED to Floor:  Mental status: GCS 14  Ambulatory status: STANDBY   Oxygen Therapy:  RA  Cardiac Rhythm: NS   Admitted from: HOME- ED  Safety Concerns:  AMS  Social Issues: N/A  ED Room #:  18    ED Nurse Phone Extension - [phone] or may call [phone].      HPI:   Chief Complaint   Patient presents with    Altered Mental Status       Past Medical History:  Past Medical History:   Diagnosis Date    Anxiety and depression     Arthritis     Disease of thyroid gland     GERD (gastroesophageal reflux disease)     Head injury due to trauma 1992    fell down stairs     History of transfusion     NO REACTIONS    Hyperlipidemia     Hypertension     Liver disorder     surgery 1998 approx , BLOOD CLOTS    Lung cancer     Metastatic cancer     Peripheral neuropathic pain     Thyroid disease     Wears eyeglasses         Past Surgical History:  Past Surgical History:   Procedure Laterality Date    ABDOMINAL SURGERY      LIVER RESECTION    APPENDECTOMY      BRONCHOSCOPY N/A 02/13/2018    Procedure: BRONCHOSCOPY WITH SANDRITA ENDOBRONCHIAL ULTRASOUND WITH FLUORO;  Surgeon: Dixon Fatima MD;  Location:  [facility];  Service:     BRONCHOSCOPY N/A 03/21/2019    Procedure: NAVIGATIONAL BRONCHOSCOPY;  Surgeon: Dixon Fatima MD;  Location:  [facility];  Service: Pulmonary    BRONCHOSCOPY WITH ION ROBOTIC ASSIST N/A 06/29/2023    Procedure: NAVIGATIONAL BRONCHOSCOPY WITH ION ROBOT;  Surgeon: Dixon Fatima MD;  Location:  [facility];  Service: Robotics - Pulmonary;  Laterality: N/A;  Ion cath 3 - 0010,  3 - 0013.    CHOLECYSTECTOMY      COLONOSCOPY  2019    HYSTERECTOMY      2001    LIVER RESECTION      LUNG LOBECTOMY  11/17/2023    OOPHORECTOMY Bilateral     2001    ORIF WRIST FRACTURE Right     THYROID SURGERY      TUBAL ABDOMINAL LIGATION          Admitting Doctor:   Rasheeda Dyson DO    Consulting Provider(s):  Consults       No orders found from 12/19/2023 to  1/18/2024.             Admitting Diagnosis:   The primary encounter diagnosis was Altered mental status, unspecified altered mental status type. Diagnoses of Malignant neoplasm of lung, unspecified laterality, unspecified part of lung and History of hypertension were also pertinent to this visit.    Most Recent Vitals:   Vitals:    01/17/24 1700 01/17/24 1741 01/17/24 1800 01/17/24 1830   BP: (!) 192/80 144/74 175/90 145/65   Pulse: 76 85 79 82   Resp:       Temp:       SpO2: 90% 92% 90% 97%   Weight:       Height:           Active LDAs/IV Access:   Lines, Drains & Airways       Active LDAs       Name Placement date Placement time Site Days    Peripheral IV 01/17/24 1341 Right Antecubital 01/17/24  1341  Antecubital  less than 1                    Labs (abnormal labs have a star):   Labs Reviewed   COMPREHENSIVE METABOLIC PANEL - Abnormal; Notable for the following components:       Result Value    Glucose 178 (*)     BUN 28 (*)     Creatinine 1.43 (*)     ALT (SGPT) 38 (*)     AST (SGOT) 37 (*)     Alkaline Phosphatase 170 (*)     eGFR 37.6 (*)     All other components within normal limits    Narrative:     GFR Normal >60  Chronic Kidney Disease <60  Kidney Failure <15    The GFR formula is only valid for adults with stable renal function between ages 18 and 70.   SINGLE HSTROPONIN T - Abnormal; Notable for the following components:    HS Troponin T 56 (*)     All other components within normal limits    Narrative:     High Sensitive Troponin T Reference Range:  <14.0 ng/L- Negative Female for AMI  <22.0 ng/L- Negative Male for AMI  >=14 - Abnormal Female indicating possible myocardial injury.  >=22 - Abnormal Male indicating possible myocardial injury.   Clinicians would have to utilize clinical acumen, EKG, Troponin, and serial changes to determine if it is an Acute Myocardial Infarction or myocardial injury due to an underlying chronic condition.        URINALYSIS W/ MICROSCOPIC IF INDICATED (NO CULTURE) -  Abnormal; Notable for the following components:    Appearance, UA Cloudy (*)     Ketones, UA Trace (*)     Blood, UA Trace (*)     Protein,  mg/dL (2+) (*)     All other components within normal limits   CBC WITH AUTO DIFFERENTIAL - Abnormal; Notable for the following components:    Monocyte % 16.8 (*)     Eosinophil % 0.2 (*)     Immature Grans % 0.6 (*)     Monocytes, Absolute 0.91 (*)     All other components within normal limits   HIGH SENSITIVITIY TROPONIN T 2HR - Abnormal; Notable for the following components:    HS Troponin T 43 (*)     Troponin T Delta -13 (*)     All other components within normal limits    Narrative:     High Sensitive Troponin T Reference Range:  <14.0 ng/L- Negative Female for AMI  <22.0 ng/L- Negative Male for AMI  >=14 - Abnormal Female indicating possible myocardial injury.  >=22 - Abnormal Male indicating possible myocardial injury.   Clinicians would have to utilize clinical acumen, EKG, Troponin, and serial changes to determine if it is an Acute Myocardial Infarction or myocardial injury due to an underlying chronic condition.        URINALYSIS, MICROSCOPIC ONLY - Abnormal; Notable for the following components:    WBC, UA 6-10 (*)     All other components within normal limits   MAGNESIUM - Normal   RAINBOW DRAW    Narrative:     The following orders were created for panel order Council Draw.  Procedure                               Abnormality         Status                     ---------                               -----------         ------                     Green Top (Gel)[[id number]]                                  Final result               Lavender Top[[id number]]                                     Final result               Gold Top - [facility][[id number]]                                   Final result               Gray Top[[id number]]                                         Final result               Light Blue Top[[id number]]                                   Final result                  Please view results for these tests on the individual orders.   URINE DRUG SCREEN   CBC AND DIFFERENTIAL    Narrative:     The following orders were created for panel order CBC & Differential.  Procedure                               Abnormality         Status                     ---------                               -----------         ------                     CBC Auto Differential[[id number]]        Abnormal            Final result                 Please view results for these tests on the individual orders.   GREEN TOP   LAVENDER TOP   GOLD TOP - SST   GRAY TOP   LIGHT BLUE TOP       Meds Given in ED:   Medications   sodium chloride 0.9 % flush 10 mL (has no administration in time range)   gabapentin (NEURONTIN) capsule 900 mg (has no administration in time range)   sodium chloride 0.9 % bolus 1,000 mL (0 mL Intravenous Stopped 1/17/24 1749)

## 2024-01-18 NOTE — CONSULTS
78-year-old lady with stage III adenocarcinoma of the lung underwent neoadjuvant chemotherapy and subsequently underwent surgery.  Scheduled to start radiation.  However presents with mental status changes and hypotension.  Found to have COVID-19.  For now she is undergoing treatment for that and best supportive care.  She seems to be doing much better today per nursing.  Defer further care to primary team at this point.  She will follow-up with me on discharge.

## 2024-01-18 NOTE — THERAPY EVALUATION
"Patient Name: Ruby Pettit  : 1945    MRN: [id number]                              Today's Date: 2024       Admit Date: 2024    Visit Dx:     ICD-10-CM ICD-9-CM   1. Altered mental status, unspecified altered mental status type  R41.82 780.97   2. Malignant neoplasm of lung, unspecified laterality, unspecified part of lung  C34.90 162.9   3. History of hypertension  Z86.79 V12.59     Patient Active Problem List   Diagnosis    Migratory Lung nodules (\"Soft\" and solid, bilateral)    Non-smoker    History of asthma    Cough    Hypertension    T2DM (type 2 diabetes mellitus)    Neuropathy    Lesion of temporal lobe    GERD    Malignant neoplasm of lower lobe of left lung    AMS (altered mental status)    Elevated troponin    Acute kidney injury    COVID-19     Past Medical History:   Diagnosis Date    Anxiety and depression     Arthritis     Carotid stenosis, bilateral     Carotid duplex, 2019: Bilateral ICA stenosis 0-49%. Tortuous vessels. Vertebral flow antegrade.        Disease of thyroid gland     GERD (gastroesophageal reflux disease)     Head injury due to trauma     fell down stairs     History of transfusion     NO REACTIONS    Hyperlipidemia     Hypertension     Iatrogenic pneumothorax     Liver disorder     surgery  approx , BLOOD CLOTS    Lung cancer     Metastatic cancer     Perennial rhinitis     Peripheral neuropathic pain     Syncope and collapse     Thyroid disease     UTI (urinary tract infection), bacterial     Wears eyeglasses      Past Surgical History:   Procedure Laterality Date    ABDOMINAL SURGERY      LIVER RESECTION    APPENDECTOMY      BRONCHOSCOPY N/A 2018    Procedure: BRONCHOSCOPY WITH SANDRITA ENDOBRONCHIAL ULTRASOUND WITH FLUORO;  Surgeon: Dixon Fatima MD;  Location:  [facility];  Service:     BRONCHOSCOPY N/A 2019    Procedure: NAVIGATIONAL BRONCHOSCOPY;  Surgeon: Dixon Fatima MD;  Location:  [facility];  Service: " "Pulmonary    BRONCHOSCOPY WITH ION ROBOTIC ASSIST N/A 06/29/2023    Procedure: NAVIGATIONAL BRONCHOSCOPY WITH ION ROBOT;  Surgeon: Dixon Fatima MD;  Location: [facility];  Service: Robotics - Pulmonary;  Laterality: N/A;  Ion cath 3 - 0010,  3 - 0013.    CHOLECYSTECTOMY      COLONOSCOPY  2019    HYSTERECTOMY      2001    LIVER RESECTION      LUNG LOBECTOMY  11/17/2023    OOPHORECTOMY Bilateral     2001    ORIF WRIST FRACTURE Right     THYROID SURGERY      TUBAL ABDOMINAL LIGATION        General Information       Row Name 01/18/24 1517          Physical Therapy Time and Intention    Document Type evaluation  -AB     Mode of Treatment physical therapy  -AB       Row Name 01/18/24 1517          General Information    Patient Profile Reviewed yes  -AB     Prior Level of Function independent:;all household mobility;community mobility;gait;transfer;bed mobility;ADL's  Endorses one fal. Denies DME use but owns cane and RW  -AB     Existing Precautions/Restrictions fall;other (see comments)  COVID  -AB     Barriers to Rehab medically complex  -AB       Row Name 01/18/24 1517          Living Environment    People in Home alone;other (see comments)  Son stays with her \"sometimes\"  -AB       Row Name 01/18/24 1517          Home Main Entrance    Number of Stairs, Main Entrance none  -AB       Row Name 01/18/24 1517          Stairs Within Home, Primary    Number of Stairs, Within Home, Primary none  -AB       Row Name 01/18/24 1517          Cognition    Orientation Status (Cognition) oriented x 4;verbal cues/prompts needed for orientation  -AB       Row Name 01/18/24 1517          Safety Issues, Functional Mobility    Safety Issues Affecting Function (Mobility) awareness of need for assistance;insight into deficits/self-awareness;safety precaution awareness;safety precautions follow-through/compliance  -AB     Impairments Affecting Function (Mobility) endurance/activity tolerance;strength  -AB               " User Key  (r) = Recorded By, (t) = Taken By, (c) = Cosigned By      Initials Name Provider Type    AB Christie Dumont, PT Physical Therapist                   Mobility       Row Name 01/18/24 1518          Bed Mobility    Comment, (Bed Mobility) Received from OT  -AB       Row Name 01/18/24 1518          Transfers    Comment, (Transfers) Cues for hand placement and sequencing.  -AB       Row Name 01/18/24 1518          Sit-Stand Transfer    Sit-Stand Clar[place] (Transfers) contact guard;1 person assist;verbal cues  -AB       Row Name 01/18/24 1518          Gait/Stairs (Locomotion)    Cl[place] Level (Gait) contact guard;1 person assist;verbal cues  -AB     Distance in Feet (Gait) 100  -AB     Deviations/Abnormal Patterns (Gait) bilateral deviations;alda decreased;gait speed decreased;stride length decreased  -AB     [place] Level (Stairs) not tested  -AB     Comment, (Gait/Stairs) Pt ambulated in room with step through gait pattern and slowed alda. Cues for upright posture. Pt with near LOB but able to self correct. Improved with repitition. Further activity limited by fatigue.  -AB               User Key  (r) = Recorded By, (t) = Taken By, (c) = Cosigned By      Initials Name Provider Type    AB Christie Dumont, PT Physical Therapist                   Obj/Interventions       Row Name 01/18/24 1522          Range of Motion Comprehensive    General Range of Motion bilateral lower extremity ROM WNL  -AB       Row Name 01/18/24 1522          Strength Comprehensive (MMT)    General Manual Muscle Testing (MMT) Assessment lower extremity strength deficits identified  -AB     Comment, General Manual Muscle Testing (MMT) Assessment BLE grossly 4+/5  -AB       Row Name 01/18/24 1522          Balance    Balance Assessment sitting static balance;sitting dynamic balance;standing static balance;standing dynamic balance  -AB     Static Sitting Balance standby assist  -AB     Dynamic Sitting Balance standby assist   -AB     Position, Sitting Balance unsupported;sitting in chair  -AB     Static Standing Balance contact guard  -AB     Dynamic Standing Balance contact guard;1-person assist;verbal cues  -AB     Position/Device Used, Standing Balance unsupported  -AB     Balance Interventions sitting;standing;sit to stand;supported;static;dynamic;occupation based/functional task  -AB     Comment, Balance One near LOB able to self correct.  -AB       Row Name 01/18/24 1522          Sensory Assessment (Somatosensory)    Sensory Assessment (Somatosensory) bilateral LE  -AB     Bilateral LE Sensory Assessment general sensation;impaired  reports numbness and tingling  -AB               User Key  (r) = Recorded By, (t) = Taken By, (c) = Cosigned By      Initials Name Provider Type    AB Christie Dumont, PT Physical Therapist                   Goals/Plan       Row Name 01/18/24 1527          Bed Mobility Goal 1 (PT)    Activity/Assistive Device (Bed Mobility Goal 1, PT) sit to supine;supine to sit  -AB     Clark Level/Cues Needed (Bed Mobility Goal 1, PT) independent  -AB     Time Frame (Bed Mobility Goal 1, PT) long term goal (LTG);10 days  -AB       Row Name 01/18/24 1527          Transfer Goal 1 (PT)    Activity/Assistive Device (Transfer Goal 1, PT) sit-to-stand/stand-to-sit;bed-to-chair/chair-to-bed  -AB     Clark Level/Cues Needed (Transfer Goal 1, PT) independent  -AB     Time Frame (Transfer Goal 1, PT) long term goal (LTG);10 days  -AB       Row Name 01/18/24 1527          Gait Training Goal 1 (PT)    Activity/Assistive Device (Gait Training Goal 1, PT) gait (walking locomotion)  -AB     Clark Level (Gait Training Goal 1, PT) contact guard required  -AB     Distance (Gait Training Goal 1, PT) 150  -AB     Time Frame (Gait Training Goal 1, PT) long term goal (LTG);10 days  -AB       Row Name 01/18/24 1527          Therapy Assessment/Plan (PT)    Planned Therapy Interventions (PT) balance training;bed mobility  training;home exercise program;gait training;patient/family education;postural re-education;transfer training;stretching;strengthening;ROM (range of motion)  -AB               User Key  (r) = Recorded By, (t) = Taken By, (c) = Cosigned By      Initials Name Provider Type    AB Christie Dumont, PT Physical Therapist                   Clinical Impression       Row Name 01/18/24 1523          Pain Scale: FACES Pre/Post-Treatment    Pain: FACES Scale, Pretreatment 0-->no hurt  -AB     Posttreatment Pain Rating 0-->no hurt  -AB       Row Name 01/18/24 1523          Plan of Care Review    Plan of Care Reviewed With patient;friend  -AB     Progress no change  -AB     Outcome Evaluation PT initial eval completed. Pt presents below her functional baseline with weakness, impaired activity tolerance, and mild balance deficits. Pt ambulated in room with CGA and no UE support. Further IPPT is warrented. PT rec d/c home with assist when medically appropriate.  -AB       Row Name 01/18/24 1523          Therapy Assessment/Plan (PT)    Rehab Potential (PT) good, to achieve stated therapy goals  -AB     Criteria for Skilled Interventions Met (PT) yes;skilled treatment is necessary;meets criteria  -AB     Therapy Frequency (PT) daily  -AB       Row Name 01/18/24 1523          Vital Signs    Pre Systolic BP Rehab 162  -AB     Pre Treatment Diastolic BP 82  -AB     Post Systolic BP Rehab 177  -AB     Post Treatment Diastolic BP 83  -AB     Posttreatment Heart Rate (beats/min) 92  -AB     O2 Delivery Pre Treatment room air  -AB     O2 Delivery Intra Treatment room air  -AB     Post SpO2 (%) 96  -AB     O2 Delivery Post Treatment room air  -AB     Pre Patient Position Sitting  -AB     Intra Patient Position Standing  -AB     Post Patient Position Sitting  -AB       Row Name 01/18/24 1523          Positioning and Restraints    Pre-Treatment Position sitting in chair/recliner  -AB     Post Treatment Position chair  -AB     In Chair  reclined;notified nsg;sitting;call light within reach;encouraged to call for assist;exit alarm on;with family/caregiver;legs elevated;waffle cushion  -AB               User Key  (r) = Recorded By, (t) = Taken By, (c) = Cosigned By      Initials Name Provider Type    AB Christie Dumont, PT Physical Therapist                   Outcome Measures       Row Name 01/18/24 1527          How much help from another person do you currently need...    Turning from your back to your side while in flat bed without using bedrails? 4  -AB     Moving from lying on back to sitting on the side of a flat bed without bedrails? 3  -AB     Moving to and from a bed to a chair (including a wheelchair)? 3  -AB     Standing up from a chair using your arms (e.g., wheelchair, bedside chair)? 3  -AB     Climbing 3-5 steps with a railing? 3  -AB     To walk in hospital room? 3  -AB     AM-PAC 6 Clicks Score (PT) 19  -AB     Highest Level of Mobility Goal 6 --> Walk 10 steps or more  -AB       Row Name 01/18/24 1527 01/18/24 1434       Functional Assessment    Outcome Measure Options AM-PAC 6 Clicks Basic Mobility (PT)  -AB AM-PAC 6 Clicks Daily Activity (OT)  -TB              User Key  (r) = Recorded By, (t) = Taken By, (c) = Cosigned By      Initials Name Provider Type    TB Marium Tejeda, OT Occupational Therapist    Christie Cruz, PT Physical Therapist                                 Physical Therapy Education       Title: PT OT SLP Therapies (In Progress)       Topic: Physical Therapy (In Progress)       Point: Mobility training (Done)       Learning Progress Summary             Patient Acceptance, E,D, VU,NR by AB at 1/18/2024 1527                         Point: Home exercise program (Not Started)       Learner Progress:  Not documented in this visit.              Point: Body mechanics (Done)       Learning Progress Summary             Patient Acceptance, E,D, VU,NR by AB at 1/18/2024 1527                         Point:  Precautions (Done)       Learning Progress Summary             Patient Acceptance, E,D, VU,NR by AB at 1/18/2024 1527                                         User Key       Initials Effective Dates Name Provider Type Discipline    AB 09/22/22 -  Christie Dumont, PT Physical Therapist PT                  PT Recommendation and Plan  Planned Therapy Interventions (PT): balance training, bed mobility training, home exercise program, gait training, patient/family education, postural re-education, transfer training, stretching, strengthening, ROM (range of motion)  Plan of Care Reviewed With: patient, friend  Progress: no change  Outcome Evaluation: PT initial eval completed. Pt presents below her functional baseline with weakness, impaired activity tolerance, and mild balance deficits. Pt ambulated in room with CGA and no UE support. Further IPPT is warrented. PT rec d/c home with assist when medically appropriate.     Time Calculation:   PT Evaluation Complexity  History, PT Evaluation Complexity: 1-2 personal factors and/or comorbidities  Examination of Body Systems (PT Eval Complexity): total of 3 or more elements  Clinical Presentation (PT Evaluation Complexity): stable  Clinical Decision Making (PT Evaluation Complexity): low complexity  Overall Complexity (PT Evaluation Complexity): low complexity     PT Charges       Row Name 01/18/24 1528             Time Calculation    Start Time 1310  -AB      PT Received On 01/18/24  -AB      PT Goal Re-Cert Due Date 01/28/24  -AB         Untimed Charges    PT Eval/Re-eval Minutes 48  -AB         Total Minutes    Untimed Charges Total Minutes 48  -AB       Total Minutes 48  -AB                User Key  (r) = Recorded By, (t) = Taken By, (c) = Cosigned By      Initials Name Provider Type    AB Christie Dumont, PT Physical Therapist                  Therapy Charges for Today       Code Description Service Date Service Provider Modifiers Qty    92155569708 HC PT EVAL LOW  COMPLEXITY 4 1/18/2024 Christie Dumont, PT GP 1            PT G-Codes  Outcome Measure Options: AM-PAC 6 Clicks Basic Mobility (PT)  AM-PAC 6 Clicks Score (PT): 19  AM-PAC 6 Clicks Score (OT): 19  PT Discharge Summary  Anticipated Discharge Disposition (PT): home with assist    Christie Dumont, PT  1/18/2024

## 2024-01-18 NOTE — PROGRESS NOTES
"                  Clinical Nutrition   Nutrition Support Assessment  Reason for Visit: Identified at risk by screening criteria      Patient Name: Ruby Pettit  YOB: 1945  MRN: [id number]  Date of Encounter: 01/18/24 12:24 EST  Admission date: 1/17/2024    Nutrition Assessment   Admission Diagnosis:  AMS (altered mental status) [R41.82]      Problem List:    AMS (altered mental status)    Hypertension    T2DM (type 2 diabetes mellitus)    Neuropathy    GERD    Malignant neoplasm of lower lobe of left lung    Elevated troponin    Acute kidney injury        PMH:   She  has a past medical history of Anxiety and depression, Arthritis, Carotid stenosis, bilateral, Disease of thyroid gland, GERD (gastroesophageal reflux disease), Head injury due to trauma (1992), History of transfusion, Hyperlipidemia, Hypertension, Iatrogenic pneumothorax, Liver disorder, Lung cancer, Metastatic cancer, Perennial rhinitis, Peripheral neuropathic pain, Syncope and collapse, Thyroid disease, UTI (urinary tract infection), bacterial, and Wears eyeglasses.    PSH:  She  has a past surgical history that includes Hysterectomy; Oophorectomy (Bilateral); Abdominal surgery; Liver resection; Appendectomy; Cholecystectomy; Tubal ligation; Bronchoscopy (N/A, 02/13/2018); Thyroid surgery; Colonoscopy (2019); Bronchoscopy (N/A, 03/21/2019); ORIF wrist fracture (Right); BRONCHOSCOPY WITH ION ROBOTIC ASSIST (N/A, 06/29/2023); and Lung lobectomy (11/17/2023).    Applicable Nutrition Concerns:   Skin:bruised  GI:last bm 1-17    Reported/Observed/Food/Nutrition Related History:     Pt resting in bed, on room air, reports not much appetite, nothing has any taste, has lost weight over past several months, states she weighed ~177lb's at beginning of last year    Per RN: pt fully oriented now, no issues swallowing    Anthropometrics     Flowsheet Rows      Flowsheet Row First Filed Value   Admission Height 162.6 cm (64\") Documented at 01/17/2024 " "1330   Admission Weight 72.6 kg (160 lb) Documented at 01/17/2024 1330            Height: Height: 162.6 cm (64\")  Last Filed Weight: Weight: 72.6 kg (160 lb) (01/17/24 1330)  Method: Weight Method: Bed scale  BMI: BMI (Calculated): 27.5  BMI classification: Overweight: 25.0-29.9kg/m2   IBW:  120lb    UBW:  177lb  Weight change:   17lb wt loss over 1 year per report, 10% wt loss     Weight       Weight (kg) Weight (lbs) Weight Method Visit Report   8/9/2019 86.637 kg  191 lb   --    8/12/2019 85.911 kg  189 lb 6.4 oz   --    9/10/2019 84.369 kg  186 lb   --    10/4/2019 84.188 kg  185 lb 9.6 oz   --    3/2/2020 82.192 kg  181 lb 3.2 oz   --    6/4/2020 82.827 kg  182 lb 9.6 oz   --    11/2/2020 83.008 kg  183 lb  Stated     11/17/2020 82.129 kg  181 lb 1 oz   --    11/23/2020 81.92 kg  180 lb 9.6 oz   --    3/10/2021 79.833 kg  176 lb   --    4/5/2021 80.287 kg  177 lb   --    5/6/2021 80.74 kg  178 lb   --    6/7/2021 81.194 kg  179 lb   --    6/15/2021 80.74 kg  178 lb   --    4/24/2023 76.658 kg  169 lb   --    6/22/2023 76.1 kg  167 lb 12.3 oz  Standing scale     7/13/2023 76.204 kg  168 lb      7/27/2023 77.565 kg  171 lb   --    7/28/2023 78.019 kg  172 lb   --    8/17/2023 77.111 kg  170 lb   --    8/18/2023 77.111 kg  170 lb      9/7/2023 75.751 kg  167 lb      9/8/2023 76.204 kg  168 lb   --    9/15/2023 75.751 kg  167 lb  Stated     9/28/2023 75.751 kg  167 lb   --    10/17/2023 73.936 kg  163 lb   --    12/18/2023 72.576 kg  160 lb   --    12/20/2023 73.392 kg  161 lb 12.8 oz   --    1/16/2024 72.485 kg  159 lb 12.8 oz      1/17/2024 72.576 kg  160 lb  Bed scale           Nutrition Focused Physical Exam     Date:         Unable to perform exam due to: COVID Isolation       Current Nutrition Prescription   PO: Diet: Regular/House Diet, Cardiac Diets, Diabetic Diets; Healthy Heart (2-3 Na+); Consistent Carbohydrate; Texture: Regular Texture (IDDSI 7); Fluid Consistency: Thin (IDDSI 0)  Oral Nutrition " Supplement:   Intake: Insufficient data      Nutrition Diagnosis   Date:  1-18-24            Updated:    Problem Unintended weight loss   Etiology Lung CA   Signs/Symptoms Reported wt loss over past year ~ 17lb's       Goal:   General: Nutrition support treatment  PO: Establish PO  EN/PN: N/A    Nutrition Intervention      Follow treatment progress, Care plan reviewed, Advise alternate selection, Interview for preferences, Menu adjusted, Supplement provided    Add Boost GC 2x/day    Monitoring/Evaluation:   Per protocol      Merry Wooten RD  Time Spent: 30min

## 2024-01-18 NOTE — CASE MANAGEMENT/SOCIAL WORK
Discharge Planning Assessment  [facility]     Patient Name: Ruby Pettit  MRN: [id number]  Today's Date: 1/18/2024    Admit Date: 1/17/2024    Plan: home   Discharge Needs Assessment       Row Name 01/18/24 0948       Living Environment    People in Home alone    Current Living Arrangements home    Potentially Unsafe Housing Conditions none    In the past 12 months has the electric, gas, oil, or water company threatened to shut off services in your home? No    Primary Care Provided by self    Provides Primary Care For no one    Family Caregiver if Needed child(amalia), adult;friend(s)    Quality of Family Relationships involved;helpful    Able to Return to Prior Arrangements yes       Resource/Environmental Concerns    Resource/Environmental Concerns none    Transportation Concerns none       Transportation Needs    In the past 12 months, has lack of transportation kept you from medical appointments or from getting medications? no    In the past 12 months, has lack of transportation kept you from meetings, work, or from getting things needed for daily living? No       Food Insecurity    Within the past 12 months, you worried that your food would run out before you got the money to buy more. Never true    Within the past 12 months, the food you bought just didn't last and you didn't have money to get more. Never true       Transition Planning    Patient/Family Anticipates Transition to home with family    Patient/Family Anticipated Services at Transition none    Transportation Anticipated family or friend will provide       Discharge Needs Assessment    Readmission Within the Last 30 Days no previous admission in last 30 days    Equipment Currently Used at Home walker, standard;cane, straight    Concerns to be Addressed discharge planning    Anticipated Changes Related to Illness none    Equipment Needed After Discharge none                   Discharge Plan       Row Name 01/18/24 0948       Plan    Plan home     Patient/Family in Agreement with Plan yes    Plan Comments Pt lives alone in [facility]. She was independent with mobility and ADLs prior to admit and owns a walker and a cane. Her son is her POA and he has requested her friend Sudheer be contacted with questions due to his work schedule. Pt is followed by her PCP and has drug coverage. At this time her plan for discharge is to return home. Cm to follow for any discharge needs    Final Discharge Disposition Code 01 - home or self-care                  Continued Care and Services - Admitted Since 1/17/2024    Coordination has not been started for this encounter.          Demographic Summary       Row Name 01/18/24 0955       General Information    Admission Type inpatient    Referral Source physician    Reason for Consult discharge planning    Preferred Language English    General Information Comments PCP Ly Funez       Contact Information    Permission Granted to Share Info With family/designee    Contact Information Comments Sudheer Valle [phone]                   Functional Status       Row Name 01/18/24 0948       Functional Status, IADL    Medications independent    Meal Preparation independent    Housekeeping independent    Laundry independent    Shopping independent       Mental Status    General Appearance WDL WDL       Mental Status Summary    Recent Changes in Mental Status/Cognitive Functioning no changes                   Psychosocial    No documentation.                  Abuse/Neglect    No documentation.                  Legal    No documentation.                  Substance Abuse    No documentation.                  Patient Forms    No documentation.                     Lea Gallegos RN

## 2024-01-18 NOTE — PLAN OF CARE
Goal Outcome Evaluation:  Plan of Care Reviewed With: patient, friend        Progress: no change  Outcome Evaluation: PT initial eval completed. Pt presents below her functional baseline with weakness, impaired activity tolerance, and mild balance deficits. Pt ambulated in room with CGA and no UE support. Further IPPT is warrented. PT rec d/c home with assist when medically appropriate.      Anticipated Discharge Disposition (PT): home with assist

## 2024-01-18 NOTE — PLAN OF CARE
Problem: Adult Inpatient Plan of Care  Goal: Plan of Care Review  Recent Flowsheet Documentation  Taken 1/18/2024 1425 by Marium Tejeda OT  Progress: (IE) --  Plan of Care Reviewed With:   patient   friend  Outcome Evaluation: OT IE completed. Pt is A/Ox4 and participates in therapy with good effort. SBA to transition to EOB sitting. Able to don socks indepedently. Up in room/bathroom and transfering with CGAx1. No dizziness or LOB. Pt is limited by generalized weakness and fatigue. OT will follow IP to support return to home with family assist when pt is medically ready.

## 2024-01-18 NOTE — H&P
"    [facility] Medicine Services  HISTORY AND PHYSICAL    Patient Name: Ruby Pettit  : 1945  MRN: [id number]  Primary Care Physician: Ly Funez MD  Date of admission: 2024    Subjective   Subjective     Chief Complaint:  Altered mental status     HPI:  Ruby Pettit is a 78 y.o. female w/ a hx of HTN, T2DM, GERD, neuropathy, asthma, lung cancer (s/p left lower lobectomy 2023, chemotherapy completion in 2023, currently undergoing radiation) who presented to the ED w/ c/o altered mental status.   Pt started radiation on Monday (plans for radiation 5 days/week x 5 weeks). Pt went to her radiation appointment this afternoon and was noted to be very confused, c/o left sided abdominal pain and \"hurting all over\". A rapid response was called and pt was brought to the ED for further evaluation. BP noted to be 128/54, 106/54, 88/51, glucose 209.   Pt's son reports that he spoke to the pt last night around 8pm and pt was \"her normal self\". It was planned that one of the pt's friends was picking her up to take her to her appointment today. When the friend arrived at the pt's house, the pt was still in bed and appeared confused w/ generalized weakness. At time of exam tonight, pt oriented to name/, year, place and her son at bedside. Pt was unable to recall the president or the name of the friend at her bedside. Pt reported that she does not remember waking today or riding to her appointment. She does remember going to bed last night. Family noted pt has had some congestion today and a non-productive cough. Pt c/o generalized abdominal tenderness. Family also reports that pt has been on an antibiotic recently but unsure for what or name of antibiotic.   Pt evaluated in the ED. CT Head unremarkable. CT abd/pel without acute findings. CXR unremarkable. UA and UDS negative. Pt admitted to the hospital medicine service for further evaluation.     Review of Systems "   Constitutional:  Positive for fatigue. Negative for chills, diaphoresis, fever and unexpected weight change.        Generalized weakness.    HENT:  Positive for congestion, postnasal drip and rhinorrhea. Negative for sinus pressure, sinus pain, sore throat and trouble swallowing.    Eyes: Negative.  Negative for visual disturbance.   Respiratory:  Positive for cough. Negative for chest tightness, shortness of breath and wheezing.    Cardiovascular: Negative.  Negative for chest pain, palpitations and leg swelling.   Gastrointestinal:  Positive for abdominal pain. Negative for abdominal distention, blood in stool, constipation, diarrhea, nausea and vomiting.   Endocrine: Negative.    Genitourinary: Negative.  Negative for decreased urine volume, difficulty urinating, dysuria, flank pain, frequency, hematuria and urgency.   Musculoskeletal: Negative.  Negative for arthralgias, back pain, myalgias, neck pain and neck stiffness.   Skin: Negative.  Negative for wound.   Allergic/Immunologic: Positive for immunocompromised state.   Neurological: Negative.  Negative for dizziness, tremors, seizures, syncope, facial asymmetry, speech difficulty, weakness, light-headedness, numbness and headaches.   Hematological: Negative.  Does not bruise/bleed easily.   Psychiatric/Behavioral:  Positive for confusion.    All other systems reviewed and are negative.     Personal History     Past Medical History:   Diagnosis Date   • Anxiety and depression    • Arthritis    • Carotid stenosis, bilateral     Carotid duplex, 01/16/2019: Bilateral ICA stenosis 0-49%. Tortuous vessels. Vertebral flow antegrade.       • Disease of thyroid gland    • GERD (gastroesophageal reflux disease)    • Head injury due to trauma 1992    fell down stairs    • History of transfusion     NO REACTIONS   • Hyperlipidemia    • Hypertension    • Iatrogenic pneumothorax    • Liver disorder     surgery 1998 approx , BLOOD CLOTS   • Lung cancer    • Metastatic  cancer    • Perennial rhinitis    • Peripheral neuropathic pain    • Syncope and collapse    • Thyroid disease    • UTI (urinary tract infection), bacterial    • Wears eyeglasses        Oncology Problem List:  Malignant neoplasm of lower lobe of left lung (07/13/2023; Status:   Active)    Oncology/Hematology History   Malignant neoplasm of lower lobe of left lung   6/26/2023 Initial Diagnosis    Malignant neoplasm of bronchus of left lower lobe     6/29/2023 Cancer Staged    Staging form: Lung, AJCC 8th Edition  - Clinical stage from 6/29/2023: Stage IIIA (cT1c, cN2, cM0) - Signed by Hollie Mike MD on 7/20/2023 7/28/2023 - 9/8/2023 Chemotherapy    OP LUNG  Nivolumab 360mg /  PACLitaxel / CARBOplatin AUC=5     Completed 3 cycles neoadjuvantly     11/17/2023 Surgery    Surgery       Procedure:  Left lower lobectomy and mediastinoscopy with Dr. Kate at [facility] s/p neoadjuvant chemoimmunotherapy per Checkmate [id number]     11/17/2023 Cancer Staged    Staging form: Lung, AJCC 8th Edition  - Pathologic stage from 11/17/2023: Stage IIIA (ypT2, pN2, cM0) - Signed by Hollie Mike MD on 12/18/2023       Past Surgical History:   Procedure Laterality Date   • ABDOMINAL SURGERY      LIVER RESECTION   • APPENDECTOMY     • BRONCHOSCOPY N/A 02/13/2018    Procedure: BRONCHOSCOPY WITH SANDRITA ENDOBRONCHIAL ULTRASOUND WITH FLUORO;  Surgeon: Dixon Fatima MD;  Location:  [facility];  Service:    • BRONCHOSCOPY N/A 03/21/2019    Procedure: NAVIGATIONAL BRONCHOSCOPY;  Surgeon: Dixon Fatima MD;  Location:  [facility];  Service: Pulmonary   • BRONCHOSCOPY WITH ION ROBOTIC ASSIST N/A 06/29/2023    Procedure: NAVIGATIONAL BRONCHOSCOPY WITH ION ROBOT;  Surgeon: Dixon Fatima MD;  Location:  [facility];  Service: Robotics - Pulmonary;  Laterality: N/A;  Ion cath 3 - 0010,  3 - 0013.   • CHOLECYSTECTOMY     • COLONOSCOPY  2019   • HYSTERECTOMY      2001   • LIVER RESECTION     • LUNG  LOBECTOMY  11/17/2023   • OOPHORECTOMY Bilateral     2001   • ORIF WRIST FRACTURE Right    • THYROID SURGERY     • TUBAL ABDOMINAL LIGATION       Family History: family history includes COPD in her father; Cancer in her mother; Emphysema in her father; No Known Problems in her paternal grandfather, paternal grandmother, and sister; Ovarian cancer (age of onset: 19) in her mother; Tuberculosis in her maternal grandfather and maternal grandmother.     Social History:  reports that she has never smoked. She has never used smokeless tobacco. She reports current alcohol use. She reports that she does not use drugs.  Social History     Social History Narrative    Nonsmoker    Has been exposed to secondhand smoke        Has 3 children    No regular alcohol use    Caffeine: 1 cup coffee daily     Medications:  Biotin, HYDROcodone-acetaminophen, Hyoscyamine Sulfate SL, amLODIPine, cetirizine, diazePAM, gabapentin, hydrOXYzine, hydroCHLOROthiazide, ibuprofen, levothyroxine, losartan, omeprazole, and ondansetron    Allergies   Allergen Reactions   • Augmentin [Amoxicillin-Pot Clavulanate] Diarrhea     Has tolerated Ceftriaxone, Cefdinir, Cefuroxime.   • Benadryl [Diphenhydramine] Other (See Comments)     High Dose Caused uncontrollable shaking in legs   • Codeine Nausea Only   • Metformin Diarrhea   • Rosuvastatin GI Intolerance     Objective   Objective     Vital Signs:   Temp:  [97.6 °F (36.4 °C)-98.4 °F (36.9 °C)] 98.1 °F (36.7 °C)  Heart Rate:  [] 80  Resp:  [16-20] 16  BP: ()/(51-93) 154/87    Physical Exam     Constitutional: Awake, alert; non-toxic appearing   Eyes: PERRLA, sclerae anicteric, no conjunctival injection  HENT: NCAT, mucous membranes moist  Neck: Supple, no thyromegaly, no lymphadenopathy, trachea midline  Respiratory: Clear to auscultation bilaterally, nonlabored respirations   Cardiovascular: RRR, no murmurs, rubs, or gallops, no peripheral edema   Gastrointestinal: Positive bowel  sounds, soft, non-distended; generalized TTP   Musculoskeletal: Normal ROM bilaterally   Psychiatric: Appropriate affect, cooperative  Neurologic: Oriented to name/, year, place and son at bedside (unable to recall president or name of friend at bedside), strength symmetric in all extremities, Cranial Nerves grossly intact to confrontation, speech clear  Skin: No rashes, lesions or wounds     Result Review:  I have personally reviewed the results from the time of this admission to 2024 21:28 EST and agree with these findings:  [x]  Laboratory list / accordion  []  Microbiology  [x]  Radiology  [x]  EKG/Telemetry   []  Cardiology/Vascular   []  Pathology  [x]  Old records    LAB RESULTS:      Lab 24  1544 24  1335   WBC  --  5.43   HEMOGLOBIN  --  12.3   HEMATOCRIT  --  37.9   PLATELETS  --  191   NEUTROS ABS  --  3.09   IMMATURE GRANS (ABS)  --  0.03   LYMPHS ABS  --  1.38   MONOS ABS  --  0.91*   EOS ABS  --  0.01   MCV  --  95.7   PROCALCITONIN 0.19  --          Lab 24  1335   SODIUM 136   POTASSIUM 3.8   CHLORIDE 99   CO2 24.0   ANION GAP 13.0   BUN 28*   CREATININE 1.43*   EGFR 37.6*   GLUCOSE 178*   CALCIUM 9.4   MAGNESIUM 2.0         Lab 24  1335   TOTAL PROTEIN 6.5   ALBUMIN 4.5   GLOBULIN 2.0   ALT (SGPT) 38*   AST (SGOT) 37*   BILIRUBIN 0.5   ALK PHOS 170*         Lab 24  1544 24  1335   HSTROP T 43* 56*                 Brief Urine Lab Results  (Last result in the past 365 days)        Color   Clarity   Blood   Leuk Est   Nitrite   Protein   CREAT   Urine HCG        24 1530 Yellow   Cloudy   Trace   Negative   Negative   100 mg/dL (2+)                 Microbiology Results (last 10 days)       ** No results found for the last 240 hours. **          CT Abdomen Pelvis Without Contrast    Result Date: 2024  CT ABDOMEN PELVIS WO CONTRAST Date of Exam: 2024 1:19 PM CST Indication: l flank pain. Comparison: 9/15/2023 Technique: Axial CT images were  obtained of the abdomen and pelvis without the administration of contrast. Reconstructed coronal and sagittal images were also obtained. Automated exposure control and iterative construction methods were used. Findings: LUNG BASES: Resolution of previous left lower lobe lesion with what appear to be postoperative changes. Correlate with history. LIVER: No significant change in appearance of the liver on unenhanced CT imaging. BILIARY/GALLBLADDER: Cholecystectomy SPLEEN:  Unremarkable PANCREAS:  Unremarkable ADRENAL: Stable 2.1 cm nonspecific left adrenal nodule. KIDNEYS:  Unremarkable parenchyma with no solid mass identified. No obstruction.  There is a nonobstructive 2 mm calculus within the mid right kidney. No left renal calculus. There are no ureteral calculi. GASTROINTESTINAL/MESENTERY: Resolution of previous descending colonic epiploic appendagitis. No evidence of obstruction nor inflammation currently.  AORTA/IVC:  Normal caliber. RETROPERITONEUM/LYMPH NODES:  Unremarkable REPRODUCTIVE: Hysterectomy BLADDER:  Unremarkable OSSEUS STRUCTURES: No acute process nor significant herbal change.     Impression: Impression: 1.No acute process is identified. 2.Resolution of previous epiploic appendagitis. 3.Presumed resection of previous left lower lobe pulmonary lesion. Correlate with history. 4.Other stable chronic findings. Electronically Signed: Long Casanova MD  1/17/2024 1:58 PM CST  Workstation ID: [id number]    CT Head Without Contrast    Result Date: 1/17/2024  CT HEAD WO CONTRAST Date of Exam: 1/17/2024 2:19 PM EST Indication: ams. Comparison: Head CT 11/2/2020, brain MRI 7/24/2023 Technique: Axial CT images were obtained of the head without contrast administration.  Automated exposure control and iterative construction methods were used. Findings: No acute intracranial hemorrhage. No acute large territory infarct. Mild scattered subcortical and periventricular white matter hypodensities are nonspecific and can  be seen in the setting of chronic small vessel ischemic change. Redemonstration of a round circumscribed 1.4 cm hypodensity in the anterior left temporal lobe, unchanged, previously characterized as likely arachnoid cyst on prior brain MRI. No extra-axial collections. No midline shift or herniation. Normal size and configuration of the ventricles. Unremarkable appearance of the orbits. There is some mucosal thickening in the posterior right ethmoid air cells and along the floor of the maxillary sinuses. Hypopneumatized mastoid air cells appear clear. No acute or suspicious bony findings. Redemonstration of multiple flat and rounded cutaneous lesions of the scalp.     Impression: Impression: No acute intracranial findings. Electronically Signed: Omar Sanchez MD  1/17/2024 2:41 PM EST  Workstation ID: [id number]    XR Chest 1 View    Result Date: 1/17/2024  XR CHEST 1 VW Date of Exam: 1/17/2024 12:36 PM CST Indication: Weak/Dizzy/AMS triage protocol Comparison: 8/17/2023 Findings: Cardiomediastinal silhouette is unremarkable. Mild diffuse interstitial prominence is similar to prior exam. No airspace disease, pneumothorax, nor pleural effusion. No acute osseous abnormality identified.     Impression: Impression: No acute process identified. Electronically Signed: Long Casanova MD  1/17/2024 12:54 PM CST  Workstation ID: [id number]     Results for orders placed during the hospital encounter of 07/08/19    Adult Transthoracic Echo Complete W/ Cont if Necessary Per Protocol    Interpretation Summary  · Estimated EF = 65%. Near cavitary obliteration at rest with contractility  · Left ventricular systolic function is normal.  · Left ventricular diastolic dysfunction (grade I) consistent with impaired relaxation.  · Trace mitral regurgitation  · Trace to mild tricuspid regurgitation    Assessment & Plan   Assessment & Plan       AMS (altered mental status)    Hypertension    T2DM (type 2 diabetes mellitus)    Neuropathy     GERD    Malignant neoplasm of lower lobe of left lung    Elevated troponin    Acute kidney injury    Ruby Pettit is a 78 y.o. female w/ a hx of HTN, T2DM, GERD, neuropathy, asthma, lung cancer (s/p left lower lobectomy 11/17/2023, chemotherapy completion in 11/2023, currently undergoing radiation) who presented to the ED w/ c/o altered mental status.     **Altered mental status   -unclear etiology   -BP noted to be low at office prior to ED arrival (88/51)  -CT Head unremarkable   -MRI Brain without contrast pending  -EEG pending   -CT abd/pel and CXR without acute findings  -UA, UDS both unremarkable   -IVF overnight  -procal, lactic acid pending (afebrile, WBC WNL; pt c/o abdominal tenderness)  -avoid sedating meds when possible   -orthostatic Bps   -fall precautions   -pt,ot and case mgt consult   -Neurology consult in am     **Acute kidney injury   -baseline CR ~ 1.0  -current CR 1.43 w/ eGFR 37.6 and CRCL 31.7  -UA w/ trace ketones, blood, protein; 6-10 WBCs- otherwise unremarkable   -urine studies pending   -holding losartan and HCTZ  -s/p 1 liter NS bolus given in ED  -continue NS @ 75ml/hour x 12 hours   -repeat labs in am     **Lung cancer  -s/p left lower lobectomy 11/17/2023, chemotherapy completion in 11/2023  -started radiation on Monday (plans for 5 days/week x 5 weeks)   -follows w/ Dr. Resendez; courtesy consult ordered   -symptom mgt     **HTN   **Elevated troponin   -BP as low as 88/51 prior to ED arrival   -troponin 56 and 43  -EKG stable   -holding routine Norvasc, HCTZ and losartan for now   -light IVF   -monitor I/Os     **T2DM  -diet controlled   -hem A1c pending   -FSBG q ac/hs w/ LDSS     **Neuropathy   -received 900mg Gabapentin in ED  -holding further sedating meds for now; add back as needed pending mental status     **GERD  -continue PPI     DVT prophylaxis:  Mechanical     CODE STATUS:    Code Status (Patient has no pulse and is not breathing): CPR (Attempt to Resuscitate)  Medical  Interventions (Patient has pulse or is breathing): Full Support    Expected Discharge  Expected discharge date/ time has not been documented.    This note has been completed as part of a split-shared workflow.     Signature: Electronically signed by EMMIE You, 01/17/24, 9:28 PM EST.    Time spent: 55 minutes       Attending   Admission Attestation       I have performed an independent face-to-face diagnostic evaluation including performing an independent physical examination.  I approve of the documented plan of care above that was reviewed and developed with the advanced practice clinician (APC) and take responsibility for that plan along with its associated risks.  I have updated the HPI as appropriate.    Brief HPI    Pt was found this AM by friend to be sleepy and confused. She was seen at radiation oncology appointment where are rapid response was called due to AMS and hypotension. She has started radiation treatment this week. In the ED, CT of head was negative. UA and UDS were non remarkable. Per report her mental status has improved some since coming to the floor.  Currently the patient is resting well and no longer hypotensive.     Attending Physical Exam:  Temp:  [97.6 °F (36.4 °C)-98.4 °F (36.9 °C)] 98.1 °F (36.7 °C)  Heart Rate:  [] 80  Resp:  [16-20] 16  BP: ()/(51-93) 154/87    Constitutional: No acute distress  HENT: NCAT,   Respiratory: respiratory effort normal   Cardiovascular: RRR,   Gastrointestinal: nondistended  Musculoskeletal: No bilateral ankle edema  Skin: No rashes      Result Review:  I have personally reviewed the results from the time of this admission to 1/17/2024 22:03 EST and agree with these findings:  []  Laboratory list / accordion  []  Microbiology  []  Radiology  []  EKG/Telemetry   []  Cardiology/Vascular   []  Pathology  []  Old records  []  Other:  Most notable findings include:   Troponin elevated, cr 1.43     Assessment and Plan:    See assessment  and plan documented by APC above and updated/edited by me as appropriate.    Rasheeda Dyson,   01/17/24

## 2024-01-18 NOTE — CONSULTS
[facility] Neurology  Consult Note    Patient Name: Ruby Pettit  : 1945  MRN: [id number]  Primary Care Physician:  Ly Funez MD  Referring Physician: No ref. provider found  Date of admission: 2024    Subjective     Reason for Consult/ Chief Complaint: Altered mental status    Ruby Pettit is a 78 y.o. female  with past medical history of HTN, T2DM, GERD, neuropathy, asthma, lung cancer s/p left lower back to me 2023, chemotherapy  and currently undergoing radiation who presented to [facility] ED with complaint of altered mental status.  Patient initiated her radiation therapy on 1/15/2024, with a plan for 5 days of radiation for 5 weeks.  Patient after radiation appointment this afternoon she was noted to be confused with left-sided abdominal pain.  While there rapid response was called and she was sent to the ED for further evaluation.  She became hypotensive in the ED with a blood pressure of 88/51.    Patient has been seen by Dr. Aguila in the past for syncopal episodes.  At that time her syncopal episodes are most likely due to autonomic dysfunction.    Upon assessment patient is alert and oriented x 4.  She is COVID-positive.  She is able to follow commands and move all extremities.  She notes an extreme feeling of malaise and change in her appetite in the past 24 hours.  She denies any loss of consciousness or head injury.  Does endorse an overall feeling of malaise and cough.  She denies any feeling of dizziness, lightheadedness, headache or numbness.    Review Of Systems   Constitutional: Well-developed female  General: Positive for change in diet, feeling of malaise, fatigue  Cardiovascular: Negative for chest pain o it was likely rolled to autonomic dysfunctionr palpitations.  Respiratory: Positive cough gastrointestinal: Negative for nausea and vomiting.  Genitourinary: Negative for bladder incontinence.  Musculoskeletal: Negative for aches and pains in the muscles or  joints.  Dermatological: Negative for skin breakdown.   Neurological: Negative for headache, pain, weakness or vision changes.     Personal History     Past Medical History:   Diagnosis Date    Anxiety and depression     Arthritis     Carotid stenosis, bilateral     Carotid duplex, 01/16/2019: Bilateral ICA stenosis 0-49%. Tortuous vessels. Vertebral flow antegrade.        Disease of thyroid gland     GERD (gastroesophageal reflux disease)     Head injury due to trauma 1992    fell down stairs     History of transfusion     NO REACTIONS    Hyperlipidemia     Hypertension     Iatrogenic pneumothorax     Liver disorder     surgery 1998 approx , BLOOD CLOTS    Lung cancer     Metastatic cancer     Perennial rhinitis     Peripheral neuropathic pain     Syncope and collapse     Thyroid disease     UTI (urinary tract infection), bacterial     Wears eyeglasses        Past Surgical History:   Procedure Laterality Date    ABDOMINAL SURGERY      LIVER RESECTION    APPENDECTOMY      BRONCHOSCOPY N/A 02/13/2018    Procedure: BRONCHOSCOPY WITH SANDRITA ENDOBRONCHIAL ULTRASOUND WITH FLUORO;  Surgeon: Dixon Fatima MD;  Location:  [facility];  Service:     BRONCHOSCOPY N/A 03/21/2019    Procedure: NAVIGATIONAL BRONCHOSCOPY;  Surgeon: Dixon Fatima MD;  Location:  [facility];  Service: Pulmonary    BRONCHOSCOPY WITH ION ROBOTIC ASSIST N/A 06/29/2023    Procedure: NAVIGATIONAL BRONCHOSCOPY WITH ION ROBOT;  Surgeon: Dixon Fatima MD;  Location:  [facility];  Service: Robotics - Pulmonary;  Laterality: N/A;  Ion cath 3 - 0010,  3 - 0013.    CHOLECYSTECTOMY      COLONOSCOPY  2019    HYSTERECTOMY      2001    LIVER RESECTION      LUNG LOBECTOMY  11/17/2023    OOPHORECTOMY Bilateral     2001    ORIF WRIST FRACTURE Right     THYROID SURGERY      TUBAL ABDOMINAL LIGATION         Family History: family history includes COPD in her father; Cancer in her mother; Emphysema in her father; No Known  Problems in her paternal grandfather, paternal grandmother, and sister; Ovarian cancer (age of onset: 19) in her mother; Tuberculosis in her maternal grandfather and maternal grandmother. Otherwise pertinent FHx was reviewed and not pertinent to current issue.    Social History:  reports that she has never smoked. She has never used smokeless tobacco. She reports current alcohol use. She reports that she does not use drugs.    Home Medications:   Biotin, HYDROcodone-acetaminophen, Hyoscyamine Sulfate SL, amLODIPine, cetirizine, gabapentin, hydrOXYzine, hydroCHLOROthiazide, ibuprofen, levothyroxine, losartan, omeprazole, and ondansetron    Current Medications:     Current Facility-Administered Medications:     sennosides-docusate (PERICOLACE) 8.6-50 MG per tablet 2 tablet, 2 tablet, Oral, BID **AND** polyethylene glycol (MIRALAX) packet 17 g, 17 g, Oral, Daily PRN **AND** bisacodyl (DULCOLAX) EC tablet 5 mg, 5 mg, Oral, Daily PRN **AND** bisacodyl (DULCOLAX) suppository 10 mg, 10 mg, Rectal, Daily PRN, Pricila Gutierrez APRN    cetirizine (zyrTEC) tablet 10 mg, 10 mg, Oral, Daily, Pricila Gutierrez APRN    dextrose (D50W) (25 g/50 mL) IV injection 25 g, 25 g, Intravenous, Q15 Min PRN, Pricila Gutierrez APRN    dextrose (GLUTOSE) oral gel 15 g, 15 g, Oral, Q15 Min PRN, Pricila Gutierrez APRN    glucagon (GLUCAGEN) injection 1 mg, 1 mg, Intramuscular, Q15 Min PRN, Pricila Gutierrez APRN    Insulin Lispro (humaLOG) injection 2-7 Units, 2-7 Units, Subcutaneous, 4x Daily AC & at Bedtime, Pricila Gutierrez APRN    levothyroxine (SYNTHROID, LEVOTHROID) tablet 50 mcg, 50 mcg, Oral, Q AM, Pricila Gutierrez APRN, 50 mcg at 01/18/24 0535    pantoprazole (PROTONIX) EC tablet 40 mg, 40 mg, Oral, Daily, Pricila Gutierrez APRN    sodium chloride 0.9 % flush 10 mL, 10 mL, Intravenous, PRN, Gaurav Groves MD    sodium chloride 0.9 % flush 10 mL, 10 mL, Intravenous, Q12H, Pricila Gutierrez APRN    sodium chloride 0.9 % flush 10 mL, 10 mL,  Intravenous, PRN, Pricila Gutierrez APRN    sodium chloride 0.9 % infusion 40 mL, 40 mL, Intravenous, PRN, Pricila Gutierrez APRN    sodium chloride 0.9 % infusion, 75 mL/hr, Intravenous, Continuous, Pricila Gutierrez APRN, Last Rate: 75 mL/hr at 01/17/24 2251, 75 mL/hr at 01/17/24 2251     Allergies:  Allergies   Allergen Reactions    Augmentin [Amoxicillin-Pot Clavulanate] Diarrhea     Has tolerated Ceftriaxone, Cefdinir, Cefuroxime.    Benadryl [Diphenhydramine] Other (See Comments)     High Dose Caused uncontrollable shaking in legs    Codeine Nausea Only    Metformin Diarrhea    Rosuvastatin GI Intolerance       Objective     Physical Exam  Vitals and nursing note reviewed.   Constitutional:       General: She is not in acute distress.     Appearance: She is not ill-appearing.   Eyes:      Extraocular Movements: Extraocular movements intact.      Pupils: Pupils are equal, round, and reactive to light.      Comments: No nystagmus or deviated gaze noted   Cardiovascular:      Rate and Rhythm: Normal rate.   Pulmonary:      Effort: Pulmonary effort is normal.   Neurological:      Mental Status: She is alert and oriented to person, place, and time.      Cranial Nerves: Cranial nerves 2-12 are intact.      Sensory: Sensation is intact.      Motor: No weakness, tremor or seizure activity.      Coordination: Finger-Nose-Finger Test normal.      Deep Tendon Reflexes: Babinski sign absent on the right side. Babinski sign absent on the left side.         Vitals:  Temp:  [97.6 °F (36.4 °C)-98.4 °F (36.9 °C)] 98.2 °F (36.8 °C)  Heart Rate:  [] 75  Resp:  [16-20] 16  BP: ()/(51-93) 150/72    Laboratory Results:   Lab Results   Component Value Date    GLUCOSE 93 01/18/2024    CALCIUM 8.4 (L) 01/18/2024     01/18/2024    K 3.7 01/18/2024    CO2 23.0 01/18/2024     01/18/2024    BUN 24 (H) 01/18/2024    CREATININE 0.87 01/18/2024    EGFRIFNONA 58 (L) 11/02/2020    BCR 27.6 (H) 01/18/2024    ANIONGAP 9.0  "01/18/2024     Lab Results   Component Value Date    WBC 3.22 (L) 01/18/2024    HGB 10.5 (L) 01/18/2024    HCT 31.8 (L) 01/18/2024    MCV 92.4 01/18/2024     01/18/2024     No results found for: \"CHOL\"  No results found for: \"HDL\"  No results found for: \"LDL\"  No results found for: \"TRIG\"  Lab Results   Component Value Date    HGBA1C 6.80 (H) 01/18/2024     Lab Results   Component Value Date    INR 0.97 06/22/2023    INR 0.93 03/15/2019    PROTIME 13.0 06/22/2023    PROTIME 12.0 03/15/2019     Lab Results   Component Value Date    UJRUPQIO89 339 06/27/2014     No results found for: \"FOLATE\"    MRI Brain Without Contrast    Result Date: 1/18/2024  MRI BRAIN WO CONTRAST Date of Exam: 1/18/2024 12:05 AM EST Indication: altered mental status; lung cancer.  Comparison: CT head from January 17, 2024 at 1422 hours; MRI of the brain from 7/24/2023 Technique:  Routine multiplanar/multisequence sequence images of the brain were obtained without contrast administration. Findings: There is mild diffuse generalized atrophy. There is a cystic area in the left temporal lobe favored to be a small arachnoid cyst measuring about 1.4 cm in maximum diameter. There are areas of increased T2 and FLAIR signal in the periventricular white matter consistent with chronic microvascular ischemia. There is no mass or mass effect. There are no abnormal extra-axial fluid collections. There are no areas of acute hemorrhage. The diffusion weighted sequences are normal.     Impression: Impression: Atrophy and chronic microvascular ischemic change. No acute intracranial process. Electronically Signed: Piter Rain MD  1/18/2024 12:27 AM EST  Workstation ID: [id number]      Assessment / Plan   Brief Patient Summary:  Ruby Pettit is a 78 y.o. female  with past medical history of HTN, T2DM, GERD, neuropathy, asthma, lung cancer s/p left lower back to me 11/17/2023, chemotherapy 11/23 and currently undergoing [facility] of [facility] mental status.    Plan:   Altered mental status-resolving  Neuropathy  Differential diagnoses include acute illness, dehydration, recent radiation side effect, autonomic dysfunction; less likely seizures, less likely paraneoplastic syndrome.   MRI brain with and without contrast showed a left temporal lobe small arachnoid cyst measuring approximately 1.4 cm.  Unchanged from previous scans in 2023 and 2019.  No acute process noted.  Chronic atrophy and chronic small vessel ischemic changes noted.  EEG showed nonspecific diffuse cerebral dysfunction of mild degree with no focal features or epileptic activity.  Orthostatic blood pressures daily  Patient is oriented x 4. Will hold off on LP at this time for evaluation CNS cytology. Can be reconsidered at a later date if patient's symptoms return and acute illness/dehydration has been resolved.  Vitamin B12 and folate  Restart patient's home gabapentin at a reduced dose of 300 mg 3 times daily.  General neurology will continue to follow    I have discussed the above with the patient, bedside  Time spent with patient: 80 minutes in face-to-face evaluation and management of the patient.       EMMIE Aguiar

## 2024-01-19 ENCOUNTER — READMISSION MANAGEMENT (OUTPATIENT)
Dept: CALL CENTER | Facility: HOSPITAL | Age: 79
End: 2024-01-19
Payer: MEDICARE

## 2024-01-19 VITALS
TEMPERATURE: 98 F | SYSTOLIC BLOOD PRESSURE: 168 MMHG | BODY MASS INDEX: 27.31 KG/M2 | HEART RATE: 92 BPM | DIASTOLIC BLOOD PRESSURE: 94 MMHG | RESPIRATION RATE: 18 BRPM | HEIGHT: 64 IN | OXYGEN SATURATION: 95 % | WEIGHT: 160 LBS

## 2024-01-19 PROBLEM — R41.82 AMS (ALTERED MENTAL STATUS): Status: RESOLVED | Noted: 2024-01-17 | Resolved: 2024-01-19

## 2024-01-19 PROBLEM — N17.9 ACUTE KIDNEY INJURY: Status: RESOLVED | Noted: 2024-01-17 | Resolved: 2024-01-19

## 2024-01-19 LAB
ALBUMIN SERPL-MCNC: 4 G/DL (ref 3.5–5.2)
ALBUMIN/GLOB SERPL: 1.7 G/DL
ALP SERPL-CCNC: 139 U/L (ref 39–117)
ALT SERPL W P-5'-P-CCNC: 25 U/L (ref 1–33)
ANION GAP SERPL CALCULATED.3IONS-SCNC: 12 MMOL/L (ref 5–15)
AST SERPL-CCNC: 18 U/L (ref 1–32)
BILIRUB SERPL-MCNC: 0.3 MG/DL (ref 0–1.2)
BUN SERPL-MCNC: 23 MG/DL (ref 8–23)
BUN/CREAT SERPL: 27.1 (ref 7–25)
CALCIUM SPEC-SCNC: 9.1 MG/DL (ref 8.6–10.5)
CHLORIDE SERPL-SCNC: 101 MMOL/L (ref 98–107)
CO2 SERPL-SCNC: 24 MMOL/L (ref 22–29)
CREAT SERPL-MCNC: 0.85 MG/DL (ref 0.57–1)
DEPRECATED RDW RBC AUTO: 49.4 FL (ref 37–54)
EGFRCR SERPLBLD CKD-EPI 2021: 70.2 ML/MIN/1.73
ERYTHROCYTE [DISTWIDTH] IN BLOOD BY AUTOMATED COUNT: 14.1 % (ref 12.3–15.4)
GLOBULIN UR ELPH-MCNC: 2.4 GM/DL
GLUCOSE BLDC GLUCOMTR-MCNC: 122 MG/DL (ref 70–130)
GLUCOSE BLDC GLUCOMTR-MCNC: 136 MG/DL (ref 70–130)
GLUCOSE SERPL-MCNC: 124 MG/DL (ref 65–99)
HCT VFR BLD AUTO: 33.7 % (ref 34–46.6)
HGB BLD-MCNC: 11 G/DL (ref 12–15.9)
MCH RBC QN AUTO: 30.8 PG (ref 26.6–33)
MCHC RBC AUTO-ENTMCNC: 32.6 G/DL (ref 31.5–35.7)
MCV RBC AUTO: 94.4 FL (ref 79–97)
PLATELET # BLD AUTO: 185 10*3/MM3 (ref 140–450)
PMV BLD AUTO: 11 FL (ref 6–12)
POTASSIUM SERPL-SCNC: 4.6 MMOL/L (ref 3.5–5.2)
PROT SERPL-MCNC: 6.4 G/DL (ref 6–8.5)
RBC # BLD AUTO: 3.57 10*6/MM3 (ref 3.77–5.28)
SODIUM SERPL-SCNC: 137 MMOL/L (ref 136–145)
WBC NRBC COR # BLD AUTO: 3.48 10*3/MM3 (ref 3.4–10.8)

## 2024-01-19 PROCEDURE — 77386: CPT | Performed by: RADIOLOGY

## 2024-01-19 PROCEDURE — 99239 HOSP IP/OBS DSCHRG MGMT >30: CPT | Performed by: STUDENT IN AN ORGANIZED HEALTH CARE EDUCATION/TRAINING PROGRAM

## 2024-01-19 PROCEDURE — 80053 COMPREHEN METABOLIC PANEL: CPT | Performed by: INTERNAL MEDICINE

## 2024-01-19 PROCEDURE — 85027 COMPLETE CBC AUTOMATED: CPT | Performed by: STUDENT IN AN ORGANIZED HEALTH CARE EDUCATION/TRAINING PROGRAM

## 2024-01-19 PROCEDURE — 99231 SBSQ HOSP IP/OBS SF/LOW 25: CPT | Performed by: PSYCHIATRY & NEUROLOGY

## 2024-01-19 PROCEDURE — 25010000002 CYANOCOBALAMIN PER 1000 MCG: Performed by: PSYCHIATRY & NEUROLOGY

## 2024-01-19 PROCEDURE — 63710000001 DEXAMETHASONE PER 0.25 MG: Performed by: INTERNAL MEDICINE

## 2024-01-19 PROCEDURE — 82948 REAGENT STRIP/BLOOD GLUCOSE: CPT

## 2024-01-19 RX ORDER — CYANOCOBALAMIN 1000 UG/ML
1000 INJECTION, SOLUTION INTRAMUSCULAR; SUBCUTANEOUS DAILY
Status: DISCONTINUED | OUTPATIENT
Start: 2024-01-19 | End: 2024-01-19 | Stop reason: HOSPADM

## 2024-01-19 RX ORDER — HYDROCHLOROTHIAZIDE 12.5 MG/1
12.5 CAPSULE, GELATIN COATED ORAL DAILY
Status: DISCONTINUED | OUTPATIENT
Start: 2024-01-19 | End: 2024-01-19 | Stop reason: HOSPADM

## 2024-01-19 RX ORDER — LOVASTATIN 20 MG/1
20 TABLET ORAL NIGHTLY
COMMUNITY
End: 2024-01-19 | Stop reason: HOSPADM

## 2024-01-19 RX ORDER — AMLODIPINE BESYLATE 10 MG/1
10 TABLET ORAL
Status: DISCONTINUED | OUTPATIENT
Start: 2024-01-19 | End: 2024-01-19 | Stop reason: HOSPADM

## 2024-01-19 RX ADMIN — GABAPENTIN 300 MG: 300 CAPSULE ORAL at 09:22

## 2024-01-19 RX ADMIN — DEXAMETHASONE 6 MG: 4 TABLET ORAL at 09:32

## 2024-01-19 RX ADMIN — NIRMATRELVIR AND RITONAVIR 3 TABLET: KIT at 00:23

## 2024-01-19 RX ADMIN — Medication 10 ML: at 09:25

## 2024-01-19 RX ADMIN — LEVOTHYROXINE SODIUM 50 MCG: 0.05 TABLET ORAL at 06:08

## 2024-01-19 RX ADMIN — AMLODIPINE BESYLATE 10 MG: 10 TABLET ORAL at 16:48

## 2024-01-19 RX ADMIN — CYANOCOBALAMIN 1000 MCG: 1000 INJECTION, SOLUTION INTRAMUSCULAR; SUBCUTANEOUS at 12:57

## 2024-01-19 RX ADMIN — LOSARTAN POTASSIUM 50 MG: 50 TABLET, FILM COATED ORAL at 09:33

## 2024-01-19 RX ADMIN — SENNOSIDES AND DOCUSATE SODIUM 2 TABLET: 8.6; 5 TABLET ORAL at 09:22

## 2024-01-19 RX ADMIN — HYDROCHLOROTHIAZIDE 12.5 MG: 12.5 CAPSULE ORAL at 16:48

## 2024-01-19 RX ADMIN — PANTOPRAZOLE SODIUM 40 MG: 40 TABLET, DELAYED RELEASE ORAL at 09:32

## 2024-01-19 RX ADMIN — NIRMATRELVIR AND RITONAVIR 3 TABLET: KIT at 09:23

## 2024-01-19 RX ADMIN — GABAPENTIN 300 MG: 300 CAPSULE ORAL at 16:48

## 2024-01-19 NOTE — CASE MANAGEMENT/SOCIAL WORK
Continued Stay Note  [facility]     Patient Name: Ruby Pettit  MRN: [id number]  Today's Date: 1/19/2024    Admit Date: 1/17/2024    Plan: home   Discharge Plan       Row Name 01/19/24 0925       Plan    Plan Comments Both PT/OT are recommending home at discharge. CM will continue to follow for discharge needs once medically appropriate.                   Discharge Codes    No documentation.                 Expected Discharge Date and Time       Expected Discharge Date Expected Discharge Time    Jan 19, 2024               Lea Gallegos RN

## 2024-01-19 NOTE — PROGRESS NOTES
Neurology note    The patient has been oriented to person place and time during her entire hospitalization.    She has had no COVID symptoms according to the hospitalist.    She was noted to have a B12 level of 212 and I told her to 1000 mcg of B12 daily for the next day or 2.    The patient on request.  No evidence of neurologic issues.

## 2024-01-19 NOTE — OUTREACH NOTE
Prep Survey      Flowsheet Row Responses   [facility] patient discharged from? [facility]   Is LACE score < 7 ? No   Eligibility Readm Mgmt   Discharge diagnosis AMS (altered mental status)   Does the patient have one of the following disease processes/diagnoses(primary or secondary)? Other   Does the patient have Home health ordered? No   Is there a DME ordered? No   Prep survey completed? Yes            Anna CASTANEDA - Registered Nurse

## 2024-01-19 NOTE — DISCHARGE INSTRUCTIONS
Constipation: Patient complains of constipation.  Stool pattern has been {numbers 0-6:32409} {stool descriptions:03245} stool(s) {time unit:42986}. Onset was {0-10:48201} {time units:11} ago Defecation has been {defecation:59310}. Co-Morbid conditions:{constipating co-morbid:47025}. Symptoms have been {symptom progression:64004}. Current Health Habits: Eating fiber? {yes/amt/no:75292} Exercise?{yes***/no:56621} Water intake? {yes/amt/no:08219} Current OTC/RX therapy has been {laxative list:67275} which has been {effective/ineffective:47116}.

## 2024-01-19 NOTE — PLAN OF CARE
Goal Outcome Evaluation:      Patient is fully alert and oriented.Ambulating to toilet with stand by assist.On Room air ,On 2 L while sleeping.Radiation therapy   Not done as pt refused to go down today.To call luisa in [phone] or [phone] to make an appt for her Radiation timing.Endorsed to next shift.

## 2024-01-22 ENCOUNTER — HOSPITAL ENCOUNTER (OUTPATIENT)
Dept: RADIATION ONCOLOGY | Facility: HOSPITAL | Age: 79
Discharge: HOME | End: 2024-01-22
Payer: MEDICARE

## 2024-01-22 PROCEDURE — 77386: CPT | Performed by: RADIOLOGY

## 2024-01-23 ENCOUNTER — HOSPITAL ENCOUNTER (OUTPATIENT)
Dept: RADIATION ONCOLOGY | Facility: HOSPITAL | Age: 79
Discharge: HOME | End: 2024-01-23

## 2024-01-23 ENCOUNTER — READMISSION MANAGEMENT (OUTPATIENT)
Dept: CALL CENTER | Facility: HOSPITAL | Age: 79
End: 2024-01-23
Payer: MEDICARE

## 2024-01-23 PROCEDURE — 77386: CPT | Performed by: RADIOLOGY

## 2024-01-23 NOTE — OUTREACH NOTE
Medical Week 1 Survey      Flowsheet Row Responses   [facility] patient discharged from? [place]   Does the patient have one of the following disease processes/diagnoses(primary or secondary)? Other   Week 1 attempt successful? No   Unsuccessful attempts Attempt 1            Tamiko OCASIO - Licensed Nurse

## 2024-01-24 ENCOUNTER — DOCUMENTATION (OUTPATIENT)
Dept: ONCOLOGY | Facility: CLINIC | Age: 79
End: 2024-01-24
Payer: MEDICARE

## 2024-01-24 NOTE — SIGNIFICANT NOTE
MACIEJ received phone call from pt's friend, Shannon requesting assistance for transportation for pt's daily radiation treatment. MACIEJ educated Shannon on [facility]. MACIEJ has sent referral for start date of 1/29 through end of tx. MACIEJ also attached Shannon's name to referral to assist pt with transportation. Shannon thanked AMCIEJ and had no other needs at this time.       01/24/24 0800   Oncology Interventions   Transportation Needs medical transportation  ([facility] referral)

## 2024-01-25 ENCOUNTER — HOSPITAL ENCOUNTER (OUTPATIENT)
Dept: RADIATION ONCOLOGY | Facility: HOSPITAL | Age: 79
Discharge: HOME | End: 2024-01-25

## 2024-01-25 PROCEDURE — 77336 RADIATION PHYSICS CONSULT: CPT | Performed by: RADIOLOGY

## 2024-01-26 ENCOUNTER — HOSPITAL ENCOUNTER (OUTPATIENT)
Dept: RADIATION ONCOLOGY | Facility: HOSPITAL | Age: 79
Discharge: HOME | End: 2024-01-26

## 2024-01-26 PROCEDURE — 77386: CPT | Performed by: RADIOLOGY

## 2024-01-29 ENCOUNTER — HOSPITAL ENCOUNTER (OUTPATIENT)
Dept: RADIATION ONCOLOGY | Facility: HOSPITAL | Age: 79
Discharge: HOME | End: 2024-01-29
Payer: MEDICARE

## 2024-01-29 ENCOUNTER — READMISSION MANAGEMENT (OUTPATIENT)
Dept: CALL CENTER | Facility: HOSPITAL | Age: 79
End: 2024-01-29
Payer: MEDICARE

## 2024-01-29 PROCEDURE — 77386: CPT | Performed by: RADIOLOGY

## 2024-01-29 NOTE — OUTREACH NOTE
Medical Week 2 Survey      Flowsheet Row Responses   [facility] patient discharged from? [place]   Does the patient have one of the following disease processes/diagnoses(primary or secondary)? Other   Week 2 attempt successful? No   Unsuccessful attempts Attempt 1            Nicola KNAPP - Registered Nurse

## 2024-01-30 ENCOUNTER — DOCUMENTATION (OUTPATIENT)
Dept: RADIATION ONCOLOGY | Facility: HOSPITAL | Age: 79
End: 2024-01-30
Payer: MEDICARE

## 2024-01-30 ENCOUNTER — HOSPITAL ENCOUNTER (OUTPATIENT)
Dept: RADIATION ONCOLOGY | Facility: HOSPITAL | Age: 79
Discharge: HOME | End: 2024-01-30

## 2024-01-30 VITALS — BODY MASS INDEX: 27.38 KG/M2 | WEIGHT: 159.5 LBS

## 2024-01-30 DIAGNOSIS — G62.9 NEUROPATHY: ICD-10-CM

## 2024-01-30 DIAGNOSIS — C34.32 MALIGNANT NEOPLASM OF LOWER LOBE OF LEFT LUNG: ICD-10-CM

## 2024-01-30 DIAGNOSIS — R91.8 LUNG NODULES: Primary | ICD-10-CM

## 2024-01-30 DIAGNOSIS — L98.9 SCALP LESION: Primary | ICD-10-CM

## 2024-01-30 PROCEDURE — 77386: CPT | Performed by: RADIOLOGY

## 2024-01-30 NOTE — PROGRESS NOTES
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 There are hypermetabolic left hilar and mediastinal lymph nodes consistent with regional metastasis      Additional groundglass and subsolid lesions within the lungs most prominently along the anterior segment of the right upper lobe demonstrate little to no FDG uptake but remain suspicious for synchronous neoplasm.      Hypermetabolic focus near the left C4-5 facet joint with a questionable lytic lesion in this area.     Surgery: She underwent robotic assisted left lower lobectomy at the [facility] 11/17/2023.  Surgical margins were negative. There was residual tumor in two station 10 L lymph nodes and also again station 7 lymph node sample.     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     She is being considered for sequential adjuvant durvalumab after completing radiotherapy      Weight 159.5 lbs / weight stable    Patient continues to struggle with taste and appetite changes / aversion to beef presently.  Trying to focus on higher protein foods and supplementing with shakes and ONS on days when oral intake is more limited.     Patient states that she had been experiencing pain on the upper left side of her mouth for a few days and on Monday, her tooth \"just fell out\" and the pain resolved.  Advised patient that she should follow up with her dentist to make sure that there are not any " remaining tooth fragments or roots still in the gums, as she stated there were no visible roots on the tooth when it came out.  Also recommended frequent use of the baking soda, salt and water mouth rinses.    Will continue to follow.

## 2024-01-31 ENCOUNTER — HOSPITAL ENCOUNTER (OUTPATIENT)
Dept: RADIATION ONCOLOGY | Facility: HOSPITAL | Age: 79
Discharge: HOME | End: 2024-01-31

## 2024-01-31 PROCEDURE — 77386: CPT | Performed by: RADIOLOGY

## 2024-02-01 ENCOUNTER — HOSPITAL ENCOUNTER (OUTPATIENT)
Dept: RADIATION ONCOLOGY | Facility: HOSPITAL | Age: 79
Setting detail: RADIATION/ONCOLOGY SERIES
Discharge: HOME | End: 2024-02-01

## 2024-02-01 ENCOUNTER — READMISSION MANAGEMENT (OUTPATIENT)
Dept: CALL CENTER | Facility: HOSPITAL | Age: 79
End: 2024-02-01
Payer: MEDICARE

## 2024-02-01 ENCOUNTER — HOSPITAL ENCOUNTER (OUTPATIENT)
Dept: RADIATION ONCOLOGY | Facility: HOSPITAL | Age: 79
Setting detail: RADIATION/ONCOLOGY SERIES
Discharge: HOME | End: 2024-02-01
Payer: MEDICARE

## 2024-02-01 PROCEDURE — 77386: CPT | Performed by: RADIOLOGY

## 2024-02-01 NOTE — OUTREACH NOTE
Medical Week 2 Survey      Flowsheet Row Responses   [facility] patient discharged from? [place]   Does the patient have one of the following disease processes/diagnoses(primary or secondary)? Other   Week 2 attempt successful? No   Unsuccessful attempts Attempt 2            Tamiko OCASIO - Licensed Nurse

## 2024-02-02 ENCOUNTER — HOSPITAL ENCOUNTER (OUTPATIENT)
Dept: RADIATION ONCOLOGY | Facility: HOSPITAL | Age: 79
Discharge: HOME | End: 2024-02-02

## 2024-02-02 PROCEDURE — 77386: CPT | Performed by: RADIOLOGY

## 2024-02-02 PROCEDURE — 77336 RADIATION PHYSICS CONSULT: CPT | Performed by: RADIOLOGY

## 2024-02-05 ENCOUNTER — HOSPITAL ENCOUNTER (OUTPATIENT)
Dept: RADIATION ONCOLOGY | Facility: HOSPITAL | Age: 79
Discharge: HOME | End: 2024-02-05
Payer: MEDICARE

## 2024-02-05 PROCEDURE — 77386: CPT | Performed by: RADIOLOGY

## 2024-02-06 ENCOUNTER — TELEPHONE (OUTPATIENT)
Dept: RADIATION ONCOLOGY | Facility: HOSPITAL | Age: 79
End: 2024-02-06
Payer: MEDICARE

## 2024-02-06 ENCOUNTER — HOSPITAL ENCOUNTER (OUTPATIENT)
Dept: RADIATION ONCOLOGY | Facility: HOSPITAL | Age: 79
Discharge: HOME | End: 2024-02-06

## 2024-02-06 PROCEDURE — 77386: CPT | Performed by: RADIOLOGY

## 2024-02-06 NOTE — TELEPHONE ENCOUNTER
Called and spoke with Shannon.  I explained pt called on Monday and stated she was retested for covid.  Today when seen on the machine pt states she did not get retested.  Shannon verified that she went to her PCP on Sat and was retested and tested positive for covid.  She was given antibiotics and cough medicine.  She has an appointment today at 4:20 with her PCP to be retested. I explained that she was scheduled for a dermatology appointment with Dr. Best today.  Shannon states she did not know about the appointment.  I explained we had spoken about the appointment twice with the pt.  She states she will call and reschedule.

## 2024-02-07 ENCOUNTER — HOSPITAL ENCOUNTER (OUTPATIENT)
Dept: RADIATION ONCOLOGY | Facility: HOSPITAL | Age: 79
Discharge: HOME | End: 2024-02-07

## 2024-02-07 ENCOUNTER — HOSPITAL ENCOUNTER (OUTPATIENT)
Dept: GENERAL RADIOLOGY | Facility: HOSPITAL | Age: 79
Discharge: HOME | End: 2024-02-07
Admitting: RADIOLOGY
Payer: MEDICARE

## 2024-02-07 DIAGNOSIS — G62.9 NEUROPATHY: ICD-10-CM

## 2024-02-07 DIAGNOSIS — C34.32 MALIGNANT NEOPLASM OF LOWER LOBE OF LEFT LUNG: ICD-10-CM

## 2024-02-07 PROCEDURE — 77386: CPT | Performed by: RADIOLOGY

## 2024-02-07 PROCEDURE — 73110 X-RAY EXAM OF WRIST: CPT

## 2024-02-08 ENCOUNTER — HOSPITAL ENCOUNTER (OUTPATIENT)
Dept: RADIATION ONCOLOGY | Facility: HOSPITAL | Age: 79
Discharge: HOME | End: 2024-02-08

## 2024-02-08 PROCEDURE — 77336 RADIATION PHYSICS CONSULT: CPT | Performed by: RADIOLOGY

## 2024-02-08 PROCEDURE — 77386: CPT | Performed by: RADIOLOGY

## 2024-02-09 ENCOUNTER — HOSPITAL ENCOUNTER (OUTPATIENT)
Dept: RADIATION ONCOLOGY | Facility: HOSPITAL | Age: 79
Discharge: HOME | End: 2024-02-09

## 2024-02-09 ENCOUNTER — READMISSION MANAGEMENT (OUTPATIENT)
Dept: CALL CENTER | Facility: HOSPITAL | Age: 79
End: 2024-02-09
Payer: MEDICARE

## 2024-02-09 PROCEDURE — 77386: CPT | Performed by: RADIOLOGY

## 2024-02-09 NOTE — OUTREACH NOTE
Medical Week 3 Survey      Flowsheet Row Responses   [facility] patient discharged from? [place]   Does the patient have one of the following disease processes/diagnoses(primary or secondary)? Other   Week 3 attempt successful? No   Unsuccessful attempts Attempt 1            NASRIN KELLY - Registered Nurse

## 2024-02-12 ENCOUNTER — HOSPITAL ENCOUNTER (OUTPATIENT)
Dept: RADIATION ONCOLOGY | Facility: HOSPITAL | Age: 79
Discharge: HOME | End: 2024-02-12
Payer: MEDICARE

## 2024-02-12 LAB
QT INTERVAL: 434 MS
QTC INTERVAL: 522 MS

## 2024-02-12 PROCEDURE — 77386: CPT | Performed by: RADIOLOGY

## 2024-02-13 ENCOUNTER — READMISSION MANAGEMENT (OUTPATIENT)
Dept: CALL CENTER | Facility: HOSPITAL | Age: 79
End: 2024-02-13
Payer: MEDICARE

## 2024-02-13 ENCOUNTER — DOCUMENTATION (OUTPATIENT)
Dept: RADIATION ONCOLOGY | Facility: HOSPITAL | Age: 79
End: 2024-02-13
Payer: MEDICARE

## 2024-02-13 ENCOUNTER — HOSPITAL ENCOUNTER (OUTPATIENT)
Dept: RADIATION ONCOLOGY | Facility: HOSPITAL | Age: 79
Discharge: HOME | End: 2024-02-13

## 2024-02-13 VITALS
RESPIRATION RATE: 20 BRPM | OXYGEN SATURATION: 97 % | DIASTOLIC BLOOD PRESSURE: 67 MMHG | SYSTOLIC BLOOD PRESSURE: 138 MMHG | TEMPERATURE: 98.7 F | HEART RATE: 68 BPM

## 2024-02-13 VITALS — BODY MASS INDEX: 26.78 KG/M2 | WEIGHT: 156 LBS

## 2024-02-13 PROCEDURE — 77386: CPT | Performed by: RADIOLOGY

## 2024-02-14 ENCOUNTER — DOCUMENTATION (OUTPATIENT)
Dept: NUTRITION | Facility: HOSPITAL | Age: 79
End: 2024-02-14
Payer: MEDICARE

## 2024-02-14 ENCOUNTER — HOSPITAL ENCOUNTER (OUTPATIENT)
Dept: RADIATION ONCOLOGY | Facility: HOSPITAL | Age: 79
Discharge: HOME | End: 2024-02-14

## 2024-02-14 PROCEDURE — 77386: CPT | Performed by: RADIOLOGY

## 2024-02-15 ENCOUNTER — HOSPITAL ENCOUNTER (OUTPATIENT)
Dept: RADIATION ONCOLOGY | Facility: HOSPITAL | Age: 79
Discharge: HOME | End: 2024-02-15

## 2024-02-15 PROCEDURE — 77386: CPT | Performed by: RADIOLOGY

## 2024-02-16 ENCOUNTER — HOSPITAL ENCOUNTER (OUTPATIENT)
Dept: RADIATION ONCOLOGY | Facility: HOSPITAL | Age: 79
Discharge: HOME | End: 2024-02-16

## 2024-02-16 PROCEDURE — 77336 RADIATION PHYSICS CONSULT: CPT | Performed by: RADIOLOGY

## 2024-02-16 PROCEDURE — 77386: CPT | Performed by: RADIOLOGY

## 2024-02-19 ENCOUNTER — HOSPITAL ENCOUNTER (OUTPATIENT)
Dept: RADIATION ONCOLOGY | Facility: HOSPITAL | Age: 79
Discharge: HOME | End: 2024-02-19
Payer: MEDICARE

## 2024-02-19 PROCEDURE — 77386: CPT | Performed by: RADIOLOGY

## 2024-02-20 ENCOUNTER — HOSPITAL ENCOUNTER (OUTPATIENT)
Dept: RADIATION ONCOLOGY | Facility: HOSPITAL | Age: 79
Discharge: HOME | End: 2024-02-20

## 2024-02-20 VITALS — WEIGHT: 155.7 LBS | BODY MASS INDEX: 26.73 KG/M2

## 2024-02-20 PROCEDURE — 77386: CPT | Performed by: RADIOLOGY

## 2024-03-20 DIAGNOSIS — C34.32 PRIMARY CANCER OF LEFT LOWER LOBE OF LUNG: Primary | ICD-10-CM

## 2024-03-26 ENCOUNTER — OFFICE VISIT (OUTPATIENT)
Dept: RADIATION ONCOLOGY | Facility: HOSPITAL | Age: 79
End: 2024-03-26
Payer: MEDICARE

## 2024-03-26 ENCOUNTER — HOSPITAL ENCOUNTER (OUTPATIENT)
Dept: RADIATION ONCOLOGY | Facility: HOSPITAL | Age: 79
Setting detail: RADIATION/ONCOLOGY SERIES
Discharge: HOME | End: 2024-03-26
Payer: MEDICARE

## 2024-03-26 DIAGNOSIS — C34.32 MALIGNANT NEOPLASM OF LOWER LOBE OF LEFT LUNG: Primary | ICD-10-CM

## 2024-03-26 DIAGNOSIS — G93.9 LESION OF TEMPORAL LOBE: ICD-10-CM

## 2024-03-26 NOTE — LETTER
2024       No Recipients    Patient: Ruby Pettit   YOB: 1945   Date of Visit: 3/26/2024     Dear Jean Marie Quresih MD:       Thank you for referring Ruby Pettit to me for evaluation. Below are the relevant portions of my assessment and plan of care.    If you have questions, please do not hesitate to call me. I look forward to following Ruby along with you.         Sincerely,        EMMIE Rosado        CC:   No Recipients    Paola Reid APRN  [phone]  Sign when Signing Visit  FOLLOW UP NOTE    PATIENT:                                                      Ruby Pettit  MEDICAL RECORD #:                        [id number]  :                                                          1945  COMPLETION DATE:   2024  DIAGNOSIS:     Malignant neoplasm of lower lobe of left lung  - Stage IIIA (cT1c, cN2, cM0)  - Stage IIIA (ypT2, pN2, cM0)    This visit has been converted to a telehealth virtual visit, the patient's preferred method for today's follow-up.  Total time of discussion was 18 minutes.  The patient has given verbal consent.    BRIEF HISTORY:    Ruby Pettit is a 79-year-old female with a history of pleomorphic adenocarcinoma with spindle cell differentiation, status post neoadjuvant treatment with partial response followed by left lower lobectomy 2023.  Clinical stage IIIA (T1c, N2, M0).  Pathologic stage yp IIIA (T2a, N2, M0).  She had partial response to neoadjuvant chemotherapy but with final pathology showing potentially more aggressive neoplasm and some persistence of mediastinal lymphadenopathy.   She received a course of adjuvant radiotherapy for local tumor control. The mediastinal lymphadenopathy received a dose of 50 Gray delivered in 25 daily fractions of 2 Hines, completing on 2024. During her radiation treatment, she experienced symptoms of dyspnea and fatigue but this was found to be due primarily to concurrent COVID  infection.  She tolerated radiotherapy fairly well.   She reports she has been more fatigued than usual but she is unsure if that is from radiation or from her COVID recovery.  She also has required supplemental oxygen use about twice daily when she gets short of breath from performing household chores.  She is unsure how many liters of oxygen she uses.  She is unsure if it is related to COVID or radiation.  No other respiratory changes.  Denies cough, wheezing, hemoptysis, chest tightness.  She also reports a fewest incidence of headaches.  She acknowledges she does not drink enough fluids, only about 2 cups of water a day.  She follows with Dr. Mike, medical oncology.      MEDICATIONS: Medication reconciliation for the patient was reviewed and confirmed in the electronic medical record.    Review of Systems:   Review of Systems   Constitutional:  Positive for fatigue.   Respiratory:          Uses supplemental O2 about twice daily when SOB from activity   Cardiovascular: Negative.    Skin: Negative.    Neurological:  Positive for headaches.        Reports not drinking enough fluids   Hematological: Negative.    Psychiatric/Behavioral:  Positive for sleep disturbance.        Physical Exam:   Pulmonary:      Respirations even, unlabored. No audible wheezing or cough.  Neurological:      A+Ox4, conversant, answers questions appropriately.  Psychiatric:     Judgement, affect, and decision-making WNL.    Limited physical exam as visit was conducted remotely via telephone.    VITAL SIGNS: N/A- Telehealth  No Pain            KPS %:  80    The following portions of the patient's history were reviewed and updated as appropriate: allergies, current medications, past family history, past medical history, past social history, past surgical history and problem list.          IMPRESSION:  Kaleb Vogt is a 79-year-old female with a history of yp IIIA (T2a, N2, M0) pleomorphic adenocarcinoma with spindle cell  differentiation, status post neoadjuvant treatment with partial response followed by left lower lobectomy 11/17/2023.  She underwent adjuvant radiation 50Gy/25 fractions completing on 2/20/2024.  Her radiation course was complicated by COVID infection.  Did however tolerate radiation well.  It is difficult to discern if her symptoms of shortness of breath with activity requiring supplemental oxygen and fatigue are from COVID infection or radiation.  She has otherwise been well.  She is scheduled follow-up with Dr. Mike on 4/12/2024.  She has CT chest abdomen and pelvis prior to her appointment on 4/9/2024.  The plan for adjuvant treatment is durvalumab for 1 year.      RECOMMENDATIONS: Follow-up office visit in 3 months.  Continue close follow-up with Dr. Mike and other specialists.    25 minutes in virtual communication with the patient via telephone, and the remainder of the time spent in reviewing the relevant history, records, available imaging, and for documentation..             EMMIE Rosado    Errors in dictation may reflect use of voice recognition software and not all errors in transcription may have been detected prior to signing.

## 2024-03-26 NOTE — PROGRESS NOTES
FOLLOW UP NOTE    PATIENT:                                                      Ruby Pettit  MEDICAL RECORD #:                        [id number]  :                                                          1945  COMPLETION DATE:   2024  DIAGNOSIS:     Malignant neoplasm of lower lobe of left lung  - Stage IIIA (cT1c, cN2, cM0)  - Stage IIIA (ypT2, pN2, cM0)    This visit has been converted to a telehealth virtual visit, the patient's preferred method for today's follow-up.  Total time of discussion was 18 minutes.  The patient has given verbal consent.    BRIEF HISTORY:    Ruby Pettit is a 79-year-old female with a history of pleomorphic adenocarcinoma with spindle cell differentiation, status post neoadjuvant treatment with partial response followed by left lower lobectomy 2023.  Clinical stage IIIA (T1c, N2, M0).  Pathologic stage yp IIIA (T2a, N2, M0).  She had partial response to neoadjuvant chemotherapy but with final pathology showing potentially more aggressive neoplasm and some persistence of mediastinal lymphadenopathy.   She received a course of adjuvant radiotherapy for local tumor control. The mediastinal lymphadenopathy received a dose of 50 Gray delivered in 25 daily fractions of 2 Hines, completing on 2024. During her radiation treatment, she experienced symptoms of dyspnea and fatigue but this was found to be due primarily to concurrent COVID infection.  She tolerated radiotherapy fairly well.   She reports she has been more fatigued than usual but she is unsure if that is from radiation or from her COVID recovery.  She also has required supplemental oxygen use about twice daily when she gets short of breath from performing household chores.  She is unsure how many liters of oxygen she uses.  She is unsure if it is related to COVID or radiation.  No other respiratory changes.  Denies cough, wheezing, hemoptysis, chest tightness.  She also reports a fewest incidence of  headaches.  She acknowledges she does not drink enough fluids, only about 2 cups of water a day.  She follows with Dr. Mike, medical oncology.      MEDICATIONS: Medication reconciliation for the patient was reviewed and confirmed in the electronic medical record.    Review of Systems:   Review of Systems   Constitutional:  Positive for fatigue.   Respiratory:          Uses supplemental O2 about twice daily when SOB from activity   Cardiovascular: Negative.    Skin: Negative.    Neurological:  Positive for headaches.        Reports not drinking enough fluids   Hematological: Negative.    Psychiatric/Behavioral:  Positive for sleep disturbance.        Physical Exam:   Pulmonary:      Respirations even, unlabored. No audible wheezing or cough.  Neurological:      A+Ox4, conversant, answers questions appropriately.  Psychiatric:     Judgement, affect, and decision-making WNL.    Limited physical exam as visit was conducted remotely via telephone.    VITAL SIGNS: N/A- Telehealth  No Pain            KPS %:  80    The following portions of the patient's history were reviewed and updated as appropriate: allergies, current medications, past family history, past medical history, past social history, past surgical history and problem list.          IMPRESSION:  Kaleb Vogt is a 79-year-old female with a history of yp IIIA (T2a, N2, M0) pleomorphic adenocarcinoma with spindle cell differentiation, status post neoadjuvant treatment with partial response followed by left lower lobectomy 11/17/2023.  She underwent adjuvant radiation 50Gy/25 fractions completing on 2/20/2024.  Her radiation course was complicated by COVID infection.  Did however tolerate radiation well.  It is difficult to discern if her symptoms of shortness of breath with activity requiring supplemental oxygen and fatigue are from COVID infection or radiation.  She has otherwise been well.  She is scheduled follow-up with Dr. Mike on 4/12/2024.   She has CT chest abdomen and pelvis prior to her appointment on 4/9/2024.  The plan for adjuvant treatment is durvalumab for 1 year.      RECOMMENDATIONS: Follow-up office visit in 3 months.  Continue close follow-up with Dr. Mike and other specialists.    25 minutes in virtual communication with the patient via telephone, and the remainder of the time spent in reviewing the relevant history, records, available imaging, and for documentation..             EMMIE Rosado    Errors in dictation may reflect use of voice recognition software and not all errors in transcription may have been detected prior to signing.

## 2024-04-08 ENCOUNTER — APPOINTMENT (OUTPATIENT)
Dept: GENERAL RADIOLOGY | Facility: HOSPITAL | Age: 79
DRG: 054 | End: 2024-04-08
Payer: MEDICARE

## 2024-04-08 ENCOUNTER — APPOINTMENT (OUTPATIENT)
Dept: CT IMAGING | Facility: HOSPITAL | Age: 79
DRG: 054 | End: 2024-04-08
Payer: MEDICARE

## 2024-04-08 ENCOUNTER — HOSPITAL ENCOUNTER (INPATIENT)
Facility: HOSPITAL | Age: 79
LOS: 2 days | Discharge: HOME | DRG: 054 | End: 2024-04-12
Attending: EMERGENCY MEDICINE | Admitting: STUDENT IN AN ORGANIZED HEALTH CARE EDUCATION/TRAINING PROGRAM
Payer: MEDICARE

## 2024-04-08 DIAGNOSIS — I95.1 ORTHOSTATIC HYPOTENSION: ICD-10-CM

## 2024-04-08 DIAGNOSIS — G62.9 NEUROPATHY: ICD-10-CM

## 2024-04-08 DIAGNOSIS — C79.31 METASTATIC CANCER TO BRAIN: ICD-10-CM

## 2024-04-08 DIAGNOSIS — Z87.19 HISTORY OF GASTROESOPHAGEAL REFLUX (GERD): ICD-10-CM

## 2024-04-08 DIAGNOSIS — C34.92 ADENOCARCINOMA OF LEFT LUNG: ICD-10-CM

## 2024-04-08 DIAGNOSIS — Z86.79 HISTORY OF HYPERTENSION: ICD-10-CM

## 2024-04-08 DIAGNOSIS — R55 SYNCOPE AND COLLAPSE: ICD-10-CM

## 2024-04-08 DIAGNOSIS — J18.9 PNEUMONIA OF LEFT LUNG DUE TO INFECTIOUS ORGANISM, UNSPECIFIED PART OF LUNG: ICD-10-CM

## 2024-04-08 DIAGNOSIS — C34.32 MALIGNANT NEOPLASM OF LOWER LOBE OF LEFT LUNG: ICD-10-CM

## 2024-04-08 DIAGNOSIS — Z86.39 HISTORY OF HYPERLIPIDEMIA: ICD-10-CM

## 2024-04-08 DIAGNOSIS — R41.82 ALTERED MENTAL STATUS, UNSPECIFIED ALTERED MENTAL STATUS TYPE: Primary | ICD-10-CM

## 2024-04-08 LAB
ALBUMIN SERPL-MCNC: 3.9 G/DL (ref 3.5–5.2)
ALBUMIN/GLOB SERPL: 1.9 G/DL
ALP SERPL-CCNC: 133 U/L (ref 39–117)
ALT SERPL W P-5'-P-CCNC: 30 U/L (ref 1–33)
ANION GAP SERPL CALCULATED.3IONS-SCNC: 11 MMOL/L (ref 5–15)
AST SERPL-CCNC: 23 U/L (ref 1–32)
B PARAPERT DNA SPEC QL NAA+PROBE: NOT DETECTED
B PERT DNA SPEC QL NAA+PROBE: NOT DETECTED
BASOPHILS # BLD AUTO: 0.02 10*3/MM3 (ref 0–0.2)
BASOPHILS NFR BLD AUTO: 0.3 % (ref 0–1.5)
BILIRUB SERPL-MCNC: 0.5 MG/DL (ref 0–1.2)
BILIRUB UR QL STRIP: NEGATIVE
BUN SERPL-MCNC: 21 MG/DL (ref 8–23)
BUN/CREAT SERPL: 18.9 (ref 7–25)
C PNEUM DNA NPH QL NAA+NON-PROBE: NOT DETECTED
CALCIUM SPEC-SCNC: 9.1 MG/DL (ref 8.6–10.5)
CHLORIDE SERPL-SCNC: 108 MMOL/L (ref 98–107)
CLARITY UR: CLEAR
CO2 SERPL-SCNC: 23 MMOL/L (ref 22–29)
COLOR UR: YELLOW
CREAT SERPL-MCNC: 1.11 MG/DL (ref 0.57–1)
D-LACTATE SERPL-SCNC: 2 MMOL/L (ref 0.5–2)
DEPRECATED RDW RBC AUTO: 58.7 FL (ref 37–54)
EGFRCR SERPLBLD CKD-EPI 2021: 50.7 ML/MIN/1.73
EOSINOPHIL # BLD AUTO: 0.11 10*3/MM3 (ref 0–0.4)
EOSINOPHIL NFR BLD AUTO: 1.9 % (ref 0.3–6.2)
ERYTHROCYTE [DISTWIDTH] IN BLOOD BY AUTOMATED COUNT: 16.6 % (ref 12.3–15.4)
FLUAV SUBTYP SPEC NAA+PROBE: NOT DETECTED
FLUBV RNA ISLT QL NAA+PROBE: NOT DETECTED
GLOBULIN UR ELPH-MCNC: 2.1 GM/DL
GLUCOSE SERPL-MCNC: 156 MG/DL (ref 65–99)
GLUCOSE UR STRIP-MCNC: NEGATIVE MG/DL
HADV DNA SPEC NAA+PROBE: NOT DETECTED
HCOV 229E RNA SPEC QL NAA+PROBE: NOT DETECTED
HCOV HKU1 RNA SPEC QL NAA+PROBE: NOT DETECTED
HCOV NL63 RNA SPEC QL NAA+PROBE: NOT DETECTED
HCOV OC43 RNA SPEC QL NAA+PROBE: NOT DETECTED
HCT VFR BLD AUTO: 35.2 % (ref 34–46.6)
HGB BLD-MCNC: 10.9 G/DL (ref 12–15.9)
HGB UR QL STRIP.AUTO: NEGATIVE
HMPV RNA NPH QL NAA+NON-PROBE: NOT DETECTED
HOLD SPECIMEN: NORMAL
HOLD SPECIMEN: NORMAL
HPIV1 RNA ISLT QL NAA+PROBE: NOT DETECTED
HPIV2 RNA SPEC QL NAA+PROBE: NOT DETECTED
HPIV3 RNA NPH QL NAA+PROBE: NOT DETECTED
HPIV4 P GENE NPH QL NAA+PROBE: NOT DETECTED
IMM GRANULOCYTES # BLD AUTO: 0.02 10*3/MM3 (ref 0–0.05)
IMM GRANULOCYTES NFR BLD AUTO: 0.3 % (ref 0–0.5)
KETONES UR QL STRIP: NEGATIVE
LEUKOCYTE ESTERASE UR QL STRIP.AUTO: NEGATIVE
LYMPHOCYTES # BLD AUTO: 2.03 10*3/MM3 (ref 0.7–3.1)
LYMPHOCYTES NFR BLD AUTO: 35.1 % (ref 19.6–45.3)
M PNEUMO IGG SER IA-ACNC: NOT DETECTED
MAGNESIUM SERPL-MCNC: 2.1 MG/DL (ref 1.6–2.4)
MCH RBC QN AUTO: 29.9 PG (ref 26.6–33)
MCHC RBC AUTO-ENTMCNC: 31 G/DL (ref 31.5–35.7)
MCV RBC AUTO: 96.4 FL (ref 79–97)
MONOCYTES # BLD AUTO: 0.61 10*3/MM3 (ref 0.1–0.9)
MONOCYTES NFR BLD AUTO: 10.5 % (ref 5–12)
NEUTROPHILS NFR BLD AUTO: 3 10*3/MM3 (ref 1.7–7)
NEUTROPHILS NFR BLD AUTO: 51.9 % (ref 42.7–76)
NITRITE UR QL STRIP: NEGATIVE
NRBC BLD AUTO-RTO: 0 /100 WBC (ref 0–0.2)
PH UR STRIP.AUTO: 6 [PH] (ref 5–8)
PLATELET # BLD AUTO: 217 10*3/MM3 (ref 140–450)
PMV BLD AUTO: 10.6 FL (ref 6–12)
POTASSIUM SERPL-SCNC: 4.2 MMOL/L (ref 3.5–5.2)
PROT SERPL-MCNC: 6 G/DL (ref 6–8.5)
PROT UR QL STRIP: ABNORMAL
RBC # BLD AUTO: 3.65 10*6/MM3 (ref 3.77–5.28)
RHINOVIRUS RNA SPEC NAA+PROBE: NOT DETECTED
RSV RNA NPH QL NAA+NON-PROBE: NOT DETECTED
SARS-COV-2 RNA NPH QL NAA+NON-PROBE: NOT DETECTED
SODIUM SERPL-SCNC: 142 MMOL/L (ref 136–145)
SP GR UR STRIP: 1.02 (ref 1–1.03)
TROPONIN T SERPL HS-MCNC: 35 NG/L
UROBILINOGEN UR QL STRIP: ABNORMAL
WBC NRBC COR # BLD AUTO: 5.79 10*3/MM3 (ref 3.4–10.8)
WHOLE BLOOD HOLD COAG: NORMAL
WHOLE BLOOD HOLD SPECIMEN: NORMAL

## 2024-04-08 PROCEDURE — 83605 ASSAY OF LACTIC ACID: CPT | Performed by: PHYSICIAN ASSISTANT

## 2024-04-08 PROCEDURE — 93005 ELECTROCARDIOGRAM TRACING: CPT | Performed by: EMERGENCY MEDICINE

## 2024-04-08 PROCEDURE — 71275 CT ANGIOGRAPHY CHEST: CPT

## 2024-04-08 PROCEDURE — 83735 ASSAY OF MAGNESIUM: CPT | Performed by: EMERGENCY MEDICINE

## 2024-04-08 PROCEDURE — 85014 HEMATOCRIT: CPT

## 2024-04-08 PROCEDURE — 36415 COLL VENOUS BLD VENIPUNCTURE: CPT

## 2024-04-08 PROCEDURE — 70450 CT HEAD/BRAIN W/O DYE: CPT

## 2024-04-08 PROCEDURE — 85025 COMPLETE CBC W/AUTO DIFF WBC: CPT | Performed by: EMERGENCY MEDICINE

## 2024-04-08 PROCEDURE — 99285 EMERGENCY DEPT VISIT HI MDM: CPT

## 2024-04-08 PROCEDURE — 80053 COMPREHEN METABOLIC PANEL: CPT | Performed by: EMERGENCY MEDICINE

## 2024-04-08 PROCEDURE — 71045 X-RAY EXAM CHEST 1 VIEW: CPT

## 2024-04-08 PROCEDURE — 81003 URINALYSIS AUTO W/O SCOPE: CPT | Performed by: PHYSICIAN ASSISTANT

## 2024-04-08 PROCEDURE — 25510000001 IOPAMIDOL PER 1 ML: Performed by: EMERGENCY MEDICINE

## 2024-04-08 PROCEDURE — 93005 ELECTROCARDIOGRAM TRACING: CPT | Performed by: PHYSICIAN ASSISTANT

## 2024-04-08 PROCEDURE — 0202U NFCT DS 22 TRGT SARS-COV-2: CPT | Performed by: PHYSICIAN ASSISTANT

## 2024-04-08 PROCEDURE — P9612 CATHETERIZE FOR URINE SPEC: HCPCS

## 2024-04-08 PROCEDURE — 84484 ASSAY OF TROPONIN QUANT: CPT | Performed by: EMERGENCY MEDICINE

## 2024-04-08 PROCEDURE — 74176 CT ABD & PELVIS W/O CONTRAST: CPT

## 2024-04-08 PROCEDURE — 25810000003 SODIUM CHLORIDE 0.9 % SOLUTION: Performed by: PHYSICIAN ASSISTANT

## 2024-04-08 PROCEDURE — 80047 BASIC METABLC PNL IONIZED CA: CPT

## 2024-04-08 RX ORDER — VANCOMYCIN 1.75 GRAM/500 ML IN 0.9 % SODIUM CHLORIDE INTRAVENOUS
20 ONCE
Status: COMPLETED | OUTPATIENT
Start: 2024-04-08 | End: 2024-04-09

## 2024-04-08 RX ORDER — SODIUM CHLORIDE 0.9 % (FLUSH) 0.9 %
10 SYRINGE (ML) INJECTION AS NEEDED
Status: DISCONTINUED | OUTPATIENT
Start: 2024-04-08 | End: 2024-04-12 | Stop reason: SDUPTHER

## 2024-04-08 RX ADMIN — SODIUM CHLORIDE 1000 ML: 9 INJECTION, SOLUTION INTRAVENOUS at 20:26

## 2024-04-08 RX ADMIN — IOPAMIDOL 80 ML: 755 INJECTION, SOLUTION INTRAVENOUS at 21:52

## 2024-04-08 NOTE — Clinical Note
Level of Care: Telemetry [5]   Diagnosis: Encephalopathy [480928]   Admitting Physician: GRACIE HERNANDEZ [[id number]]   Attending Physician: GRACIE HERNANDEZ [[id number]]

## 2024-04-09 ENCOUNTER — APPOINTMENT (OUTPATIENT)
Dept: MRI IMAGING | Facility: HOSPITAL | Age: 79
DRG: 054 | End: 2024-04-09
Payer: MEDICARE

## 2024-04-09 ENCOUNTER — HOSPITAL ENCOUNTER (OUTPATIENT)
Dept: CT IMAGING | Facility: HOSPITAL | Age: 79
Discharge: HOME | End: 2024-04-09
Payer: MEDICARE

## 2024-04-09 PROBLEM — G93.40 ENCEPHALOPATHY: Status: ACTIVE | Noted: 2024-04-09

## 2024-04-09 LAB
ANION GAP SERPL CALCULATED.3IONS-SCNC: 12 MMOL/L (ref 5–15)
BUN BLDA-MCNC: 22 MG/DL (ref 8–26)
BUN SERPL-MCNC: 18 MG/DL (ref 8–23)
BUN/CREAT SERPL: 20.7 (ref 7–25)
CA-I BLDA-SCNC: 1.23 MMOL/L (ref 1.2–1.32)
CALCIUM SPEC-SCNC: 8.6 MG/DL (ref 8.6–10.5)
CHLORIDE BLDA-SCNC: 108 MMOL/L (ref 98–109)
CHLORIDE SERPL-SCNC: 107 MMOL/L (ref 98–107)
CO2 BLDA-SCNC: 25 MMOL/L (ref 24–29)
CO2 SERPL-SCNC: 21 MMOL/L (ref 22–29)
CREAT BLDA-MCNC: 1.2 MG/DL (ref 0.6–1.3)
CREAT SERPL-MCNC: 0.87 MG/DL (ref 0.57–1)
DEPRECATED RDW RBC AUTO: 56.4 FL (ref 37–54)
EGFRCR SERPLBLD CKD-EPI 2021: 46.1 ML/MIN/1.73
EGFRCR SERPLBLD CKD-EPI 2021: 67.9 ML/MIN/1.73
ERYTHROCYTE [DISTWIDTH] IN BLOOD BY AUTOMATED COUNT: 16.4 % (ref 12.3–15.4)
GLUCOSE BLDC GLUCOMTR-MCNC: 136 MG/DL (ref 70–130)
GLUCOSE BLDC GLUCOMTR-MCNC: 151 MG/DL (ref 70–130)
GLUCOSE BLDC GLUCOMTR-MCNC: 168 MG/DL (ref 70–130)
GLUCOSE BLDC GLUCOMTR-MCNC: 200 MG/DL (ref 70–130)
GLUCOSE SERPL-MCNC: 147 MG/DL (ref 65–99)
HCT VFR BLD AUTO: 36.2 % (ref 34–46.6)
HCT VFR BLDA CALC: 33 % (ref 38–51)
HGB BLD-MCNC: 11.5 G/DL (ref 12–15.9)
HGB BLDA-MCNC: 11.2 G/DL (ref 12–17)
HOLD SPECIMEN: NORMAL
MAGNESIUM SERPL-MCNC: 1.9 MG/DL (ref 1.6–2.4)
MAGNESIUM SERPL-MCNC: 1.9 MG/DL (ref 1.6–2.4)
MCH RBC QN AUTO: 29.9 PG (ref 26.6–33)
MCHC RBC AUTO-ENTMCNC: 31.8 G/DL (ref 31.5–35.7)
MCV RBC AUTO: 94 FL (ref 79–97)
PHOSPHATE SERPL-MCNC: 3.8 MG/DL (ref 2.5–4.5)
PLATELET # BLD AUTO: 178 10*3/MM3 (ref 140–450)
PMV BLD AUTO: 10.4 FL (ref 6–12)
POTASSIUM BLDA-SCNC: 3.9 MMOL/L (ref 3.5–4.9)
POTASSIUM SERPL-SCNC: 3.7 MMOL/L (ref 3.5–5.2)
PROCALCITONIN SERPL-MCNC: 0.08 NG/ML (ref 0–0.25)
QT INTERVAL: 452 MS
QT INTERVAL: 456 MS
QTC INTERVAL: 451 MS
QTC INTERVAL: 470 MS
RBC # BLD AUTO: 3.85 10*6/MM3 (ref 3.77–5.28)
SODIUM BLD-SCNC: 141 MMOL/L (ref 138–146)
SODIUM SERPL-SCNC: 140 MMOL/L (ref 136–145)
TROPONIN T SERPL HS-MCNC: 32 NG/L
TSH SERPL DL<=0.05 MIU/L-ACNC: 3.07 UIU/ML (ref 0.27–4.2)
WBC NRBC COR # BLD AUTO: 4.29 10*3/MM3 (ref 3.4–10.8)

## 2024-04-09 PROCEDURE — 99222 1ST HOSP IP/OBS MODERATE 55: CPT | Performed by: STUDENT IN AN ORGANIZED HEALTH CARE EDUCATION/TRAINING PROGRAM

## 2024-04-09 PROCEDURE — 25810000003 LACTATED RINGERS PER 1000 ML: Performed by: STUDENT IN AN ORGANIZED HEALTH CARE EDUCATION/TRAINING PROGRAM

## 2024-04-09 PROCEDURE — 84443 ASSAY THYROID STIM HORMONE: CPT | Performed by: STUDENT IN AN ORGANIZED HEALTH CARE EDUCATION/TRAINING PROGRAM

## 2024-04-09 PROCEDURE — 0 GADOBENATE DIMEGLUMINE 529 MG/ML SOLUTION: Performed by: FAMILY MEDICINE

## 2024-04-09 PROCEDURE — 84484 ASSAY OF TROPONIN QUANT: CPT | Performed by: PHYSICIAN ASSISTANT

## 2024-04-09 PROCEDURE — 70553 MRI BRAIN STEM W/O & W/DYE: CPT

## 2024-04-09 PROCEDURE — 82948 REAGENT STRIP/BLOOD GLUCOSE: CPT

## 2024-04-09 PROCEDURE — 25010000002 PIPERACILLIN SOD-TAZOBACTAM PER 1 G: Performed by: FAMILY MEDICINE

## 2024-04-09 PROCEDURE — G0378 HOSPITAL OBSERVATION PER HR: HCPCS

## 2024-04-09 PROCEDURE — 97162 PT EVAL MOD COMPLEX 30 MIN: CPT

## 2024-04-09 PROCEDURE — 25810000003 SODIUM CHLORIDE 0.9 % SOLUTION: Performed by: PHYSICIAN ASSISTANT

## 2024-04-09 PROCEDURE — 25010000002 PIPERACILLIN SOD-TAZOBACTAM PER 1 G: Performed by: PHYSICIAN ASSISTANT

## 2024-04-09 PROCEDURE — 99204 OFFICE O/P NEW MOD 45 MIN: CPT

## 2024-04-09 PROCEDURE — 80048 BASIC METABOLIC PNL TOTAL CA: CPT | Performed by: STUDENT IN AN ORGANIZED HEALTH CARE EDUCATION/TRAINING PROGRAM

## 2024-04-09 PROCEDURE — 87040 BLOOD CULTURE FOR BACTERIA: CPT | Performed by: PHYSICIAN ASSISTANT

## 2024-04-09 PROCEDURE — A9577 INJ MULTIHANCE: HCPCS | Performed by: FAMILY MEDICINE

## 2024-04-09 PROCEDURE — 25010000002 LEVETRIRACETAM PER 10 MG: Performed by: STUDENT IN AN ORGANIZED HEALTH CARE EDUCATION/TRAINING PROGRAM

## 2024-04-09 PROCEDURE — 25010000002 HEPARIN (PORCINE) PER 1000 UNITS: Performed by: STUDENT IN AN ORGANIZED HEALTH CARE EDUCATION/TRAINING PROGRAM

## 2024-04-09 PROCEDURE — 84100 ASSAY OF PHOSPHORUS: CPT | Performed by: STUDENT IN AN ORGANIZED HEALTH CARE EDUCATION/TRAINING PROGRAM

## 2024-04-09 PROCEDURE — 36415 COLL VENOUS BLD VENIPUNCTURE: CPT

## 2024-04-09 PROCEDURE — 84145 PROCALCITONIN (PCT): CPT | Performed by: STUDENT IN AN ORGANIZED HEALTH CARE EDUCATION/TRAINING PROGRAM

## 2024-04-09 PROCEDURE — 85027 COMPLETE CBC AUTOMATED: CPT | Performed by: STUDENT IN AN ORGANIZED HEALTH CARE EDUCATION/TRAINING PROGRAM

## 2024-04-09 PROCEDURE — 25010000002 DEXAMETHASONE PER 1 MG: Performed by: STUDENT IN AN ORGANIZED HEALTH CARE EDUCATION/TRAINING PROGRAM

## 2024-04-09 PROCEDURE — 83735 ASSAY OF MAGNESIUM: CPT | Performed by: STUDENT IN AN ORGANIZED HEALTH CARE EDUCATION/TRAINING PROGRAM

## 2024-04-09 PROCEDURE — 25010000002 VANCOMYCIN 10 G RECONSTITUTED SOLUTION: Performed by: PHYSICIAN ASSISTANT

## 2024-04-09 RX ORDER — AMOXICILLIN 250 MG
2 CAPSULE ORAL 2 TIMES DAILY PRN
Status: DISCONTINUED | OUTPATIENT
Start: 2024-04-09 | End: 2024-04-12 | Stop reason: HOSPADM

## 2024-04-09 RX ORDER — DEXAMETHASONE SODIUM PHOSPHATE 4 MG/ML
4 INJECTION, SOLUTION INTRA-ARTICULAR; INTRALESIONAL; INTRAMUSCULAR; INTRAVENOUS; SOFT TISSUE EVERY 6 HOURS SCHEDULED
Status: DISCONTINUED | OUTPATIENT
Start: 2024-04-09 | End: 2024-04-10

## 2024-04-09 RX ORDER — LOSARTAN POTASSIUM 50 MG/1
50 TABLET ORAL
Status: DISCONTINUED | OUTPATIENT
Start: 2024-04-09 | End: 2024-04-09

## 2024-04-09 RX ORDER — HYDROCHLOROTHIAZIDE 12.5 MG/1
12.5 CAPSULE, GELATIN COATED ORAL DAILY
Status: DISCONTINUED | OUTPATIENT
Start: 2024-04-09 | End: 2024-04-12 | Stop reason: HOSPADM

## 2024-04-09 RX ORDER — LEVETIRACETAM 500 MG/5ML
500 INJECTION, SOLUTION, CONCENTRATE INTRAVENOUS EVERY 12 HOURS SCHEDULED
Status: DISCONTINUED | OUTPATIENT
Start: 2024-04-09 | End: 2024-04-09

## 2024-04-09 RX ORDER — GABAPENTIN 100 MG/1
100 CAPSULE ORAL ONCE
Qty: 1 CAPSULE | Refills: 0 | Status: COMPLETED | OUTPATIENT
Start: 2024-04-09 | End: 2024-04-09

## 2024-04-09 RX ORDER — BISACODYL 10 MG
10 SUPPOSITORY, RECTAL RECTAL DAILY PRN
Status: DISCONTINUED | OUTPATIENT
Start: 2024-04-09 | End: 2024-04-12 | Stop reason: HOSPADM

## 2024-04-09 RX ORDER — POLYETHYLENE GLYCOL 3350 17 G/17G
17 POWDER, FOR SOLUTION ORAL DAILY PRN
Status: DISCONTINUED | OUTPATIENT
Start: 2024-04-09 | End: 2024-04-12 | Stop reason: HOSPADM

## 2024-04-09 RX ORDER — SODIUM CHLORIDE 0.9 % (FLUSH) 0.9 %
10 SYRINGE (ML) INJECTION AS NEEDED
Status: DISCONTINUED | OUTPATIENT
Start: 2024-04-09 | End: 2024-04-12 | Stop reason: HOSPADM

## 2024-04-09 RX ORDER — HEPARIN SODIUM 5000 [USP'U]/ML
5000 INJECTION, SOLUTION INTRAVENOUS; SUBCUTANEOUS EVERY 8 HOURS SCHEDULED
Status: DISCONTINUED | OUTPATIENT
Start: 2024-04-09 | End: 2024-04-09

## 2024-04-09 RX ORDER — KETOROLAC TROMETHAMINE 15 MG/ML
15 INJECTION, SOLUTION INTRAMUSCULAR; INTRAVENOUS ONCE
Qty: 1 ML | Refills: 0 | Status: DISCONTINUED | OUTPATIENT
Start: 2024-04-09 | End: 2024-04-09

## 2024-04-09 RX ORDER — SODIUM CHLORIDE 9 MG/ML
40 INJECTION, SOLUTION INTRAVENOUS AS NEEDED
Status: DISCONTINUED | OUTPATIENT
Start: 2024-04-09 | End: 2024-04-12 | Stop reason: HOSPADM

## 2024-04-09 RX ORDER — AMLODIPINE BESYLATE 5 MG/1
5 TABLET ORAL
Status: DISCONTINUED | OUTPATIENT
Start: 2024-04-09 | End: 2024-04-12 | Stop reason: HOSPADM

## 2024-04-09 RX ORDER — HYDROCODONE BITARTRATE AND ACETAMINOPHEN 5; 325 MG/1; MG/1
1 TABLET ORAL EVERY 12 HOURS PRN
Status: DISCONTINUED | OUTPATIENT
Start: 2024-04-09 | End: 2024-04-12 | Stop reason: HOSPADM

## 2024-04-09 RX ORDER — HEPARIN SODIUM 5000 [USP'U]/ML
5000 INJECTION, SOLUTION INTRAVENOUS; SUBCUTANEOUS EVERY 8 HOURS SCHEDULED
Status: DISCONTINUED | OUTPATIENT
Start: 2024-04-09 | End: 2024-04-12 | Stop reason: HOSPADM

## 2024-04-09 RX ORDER — SODIUM CHLORIDE 0.9 % (FLUSH) 0.9 %
10 SYRINGE (ML) INJECTION EVERY 12 HOURS SCHEDULED
Status: DISCONTINUED | OUTPATIENT
Start: 2024-04-09 | End: 2024-04-12 | Stop reason: HOSPADM

## 2024-04-09 RX ORDER — AMLODIPINE BESYLATE 5 MG/1
5 TABLET ORAL
Status: DISCONTINUED | OUTPATIENT
Start: 2024-04-09 | End: 2024-04-09

## 2024-04-09 RX ORDER — ACETAMINOPHEN 325 MG/1
650 TABLET ORAL EVERY 4 HOURS PRN
Status: DISCONTINUED | OUTPATIENT
Start: 2024-04-09 | End: 2024-04-12 | Stop reason: HOSPADM

## 2024-04-09 RX ORDER — VANCOMYCIN/0.9 % SOD CHLORIDE 1.5G/250ML
1500 PLASTIC BAG, INJECTION (ML) INTRAVENOUS EVERY 24 HOURS
Status: DISCONTINUED | OUTPATIENT
Start: 2024-04-10 | End: 2024-04-11

## 2024-04-09 RX ORDER — SODIUM CHLORIDE, SODIUM LACTATE, POTASSIUM CHLORIDE, CALCIUM CHLORIDE 600; 310; 30; 20 MG/100ML; MG/100ML; MG/100ML; MG/100ML
100 INJECTION, SOLUTION INTRAVENOUS CONTINUOUS
Status: DISCONTINUED | OUTPATIENT
Start: 2024-04-09 | End: 2024-04-10

## 2024-04-09 RX ORDER — BISACODYL 5 MG/1
5 TABLET, DELAYED RELEASE ORAL DAILY PRN
Status: DISCONTINUED | OUTPATIENT
Start: 2024-04-09 | End: 2024-04-12 | Stop reason: HOSPADM

## 2024-04-09 RX ORDER — LEVOTHYROXINE SODIUM 0.05 MG/1
50 TABLET ORAL DAILY
Status: DISCONTINUED | OUTPATIENT
Start: 2024-04-09 | End: 2024-04-12 | Stop reason: HOSPADM

## 2024-04-09 RX ORDER — LOSARTAN POTASSIUM 50 MG/1
50 TABLET ORAL
Status: DISCONTINUED | OUTPATIENT
Start: 2024-04-09 | End: 2024-04-12 | Stop reason: HOSPADM

## 2024-04-09 RX ORDER — PANTOPRAZOLE SODIUM 40 MG/1
40 TABLET, DELAYED RELEASE ORAL
Status: DISCONTINUED | OUTPATIENT
Start: 2024-04-09 | End: 2024-04-12 | Stop reason: HOSPADM

## 2024-04-09 RX ORDER — LEVETIRACETAM 500 MG/5ML
500 INJECTION, SOLUTION, CONCENTRATE INTRAVENOUS EVERY 12 HOURS SCHEDULED
Status: DISCONTINUED | OUTPATIENT
Start: 2024-04-09 | End: 2024-04-11

## 2024-04-09 RX ADMIN — DEXAMETHASONE SODIUM PHOSPHATE 4 MG: 4 INJECTION, SOLUTION INTRA-ARTICULAR; INTRALESIONAL; INTRAMUSCULAR; INTRAVENOUS; SOFT TISSUE at 14:14

## 2024-04-09 RX ADMIN — PIPERACILLIN AND TAZOBACTAM 3.38 G: 3; .375 INJECTION, POWDER, LYOPHILIZED, FOR SOLUTION INTRAVENOUS at 20:04

## 2024-04-09 RX ADMIN — PANTOPRAZOLE SODIUM 40 MG: 40 TABLET, DELAYED RELEASE ORAL at 09:48

## 2024-04-09 RX ADMIN — GADOBENATE DIMEGLUMINE 15 ML: 529 INJECTION, SOLUTION INTRAVENOUS at 23:12

## 2024-04-09 RX ADMIN — LEVOTHYROXINE SODIUM 50 MCG: 0.05 TABLET ORAL at 09:48

## 2024-04-09 RX ADMIN — AMLODIPINE BESYLATE 5 MG: 5 TABLET ORAL at 14:14

## 2024-04-09 RX ADMIN — HYDROCODONE BITARTRATE AND ACETAMINOPHEN 1 TABLET: 5; 325 TABLET ORAL at 20:04

## 2024-04-09 RX ADMIN — LEVETIRACETAM 500 MG: 100 INJECTION, SOLUTION INTRAVENOUS at 03:04

## 2024-04-09 RX ADMIN — Medication 10 ML: at 20:05

## 2024-04-09 RX ADMIN — LOSARTAN POTASSIUM 50 MG: 50 TABLET, FILM COATED ORAL at 14:14

## 2024-04-09 RX ADMIN — VANCOMYCIN HYDROCHLORIDE 1750 MG: 10 INJECTION, POWDER, LYOPHILIZED, FOR SOLUTION INTRAVENOUS at 02:11

## 2024-04-09 RX ADMIN — LEVETIRACETAM 500 MG: 100 INJECTION, SOLUTION INTRAVENOUS at 20:04

## 2024-04-09 RX ADMIN — LEVETIRACETAM 500 MG: 100 INJECTION, SOLUTION INTRAVENOUS at 14:15

## 2024-04-09 RX ADMIN — SODIUM CHLORIDE, POTASSIUM CHLORIDE, SODIUM LACTATE AND CALCIUM CHLORIDE 100 ML/HR: 600; 310; 30; 20 INJECTION, SOLUTION INTRAVENOUS at 20:04

## 2024-04-09 RX ADMIN — DEXAMETHASONE SODIUM PHOSPHATE 4 MG: 4 INJECTION, SOLUTION INTRA-ARTICULAR; INTRALESIONAL; INTRAMUSCULAR; INTRAVENOUS; SOFT TISSUE at 02:06

## 2024-04-09 RX ADMIN — DEXAMETHASONE SODIUM PHOSPHATE 4 MG: 4 INJECTION, SOLUTION INTRA-ARTICULAR; INTRALESIONAL; INTRAMUSCULAR; INTRAVENOUS; SOFT TISSUE at 21:55

## 2024-04-09 RX ADMIN — PIPERACILLIN AND TAZOBACTAM 3.38 G: 3; .375 INJECTION, POWDER, LYOPHILIZED, FOR SOLUTION INTRAVENOUS at 00:44

## 2024-04-09 RX ADMIN — SODIUM CHLORIDE, POTASSIUM CHLORIDE, SODIUM LACTATE AND CALCIUM CHLORIDE 100 ML/HR: 600; 310; 30; 20 INJECTION, SOLUTION INTRAVENOUS at 03:08

## 2024-04-09 RX ADMIN — PIPERACILLIN AND TAZOBACTAM 3.38 G: 3; .375 INJECTION, POWDER, LYOPHILIZED, FOR SOLUTION INTRAVENOUS at 14:14

## 2024-04-09 RX ADMIN — GABAPENTIN 100 MG: 100 CAPSULE ORAL at 09:48

## 2024-04-09 RX ADMIN — HEPARIN SODIUM 5000 UNITS: 5000 INJECTION INTRAVENOUS; SUBCUTANEOUS at 18:04

## 2024-04-09 RX ADMIN — HEPARIN SODIUM 5000 UNITS: 5000 INJECTION INTRAVENOUS; SUBCUTANEOUS at 09:49

## 2024-04-09 NOTE — CONSULTS
NAME: DESEAN FLAHERTY  : 1945  PCP: Ly Funez MD  Attending MD: Veronica Jimenez DO    Date of Admission:  2024  Date of Service: 2024    CC:   Chief Complaint   Patient presents with    Altered Mental Status       History of Present Illness:  79 y.o.  female presented to [facility] emergency department yesterday due to altered mental status.  Patient has a history of metastatic lung cancer and has recently completed chemotherapy and radiation treatment for a left lower lobe lung mass.  CT imaging of head shows a right frontal lobe brain lesion that is suspicious for metastasis.  Patient denies headache, weakness, vision changes, or other focal neurologic deficit.  Neurosurgery has been consulted in regards to this brain lesion.    Past Medical History:  Past Medical History:   Diagnosis Date    Anxiety and depression     Arthritis     Carotid stenosis, bilateral     Carotid duplex, 2019: Bilateral ICA stenosis 0-49%. Tortuous vessels. Vertebral flow antegrade.        Disease of thyroid gland     GERD (gastroesophageal reflux disease)     Head injury due to trauma     fell down stairs     History of transfusion     NO REACTIONS    Hyperlipidemia     Hypertension     Iatrogenic pneumothorax     Liver disorder     surgery  approx , BLOOD CLOTS    Lung cancer     Metastatic cancer     Perennial rhinitis     Peripheral neuropathic pain     Syncope and collapse     Thyroid disease     UTI (urinary tract infection), bacterial     Wears eyeglasses        Past Surgical History:  Past Surgical History:   Procedure Laterality Date    ABDOMINAL SURGERY      LIVER RESECTION    APPENDECTOMY      BRONCHOSCOPY N/A 2018    Procedure: BRONCHOSCOPY WITH SANDRITA ENDOBRONCHIAL ULTRASOUND WITH FLUORO;  Surgeon: Dixon Fatima MD;  Location: [facility] ENDOSCOPY;  Service:     BRONCHOSCOPY N/A 2019    Procedure: NAVIGATIONAL BRONCHOSCOPY;  Surgeon: Dixon Fatima,  MD;  Location:  [facility] ENDOSCOPY;  Service: Pulmonary    BRONCHOSCOPY WITH ION ROBOTIC ASSIST N/A 06/29/2023    Procedure: NAVIGATIONAL BRONCHOSCOPY WITH ION ROBOT;  Surgeon: Dixon Fatima MD;  Location:  [facility] ENDOSCOPY;  Service: Robotics - Pulmonary;  Laterality: N/A;  Ion cath 3 - 0010,  3 - 0013.    CHOLECYSTECTOMY      COLONOSCOPY  2019    HYSTERECTOMY      2001    LIVER RESECTION      LUNG LOBECTOMY  11/17/2023    OOPHORECTOMY Bilateral     2001    ORIF WRIST FRACTURE Right     THYROID SURGERY      TUBAL ABDOMINAL LIGATION           Medications  Medications Prior to Admission   Medication Sig Dispense Refill Last Dose    amLODIPine (NORVASC) 10 MG tablet        Biotin 1000 MCG chewable tablet Chew.       cetirizine (zyrTEC) 10 MG tablet Take 1 tablet by mouth.       cyanocobalamin ([employer] Vitamin B-12) 1000 MCG tablet Take 1 tablet by mouth Daily. 2 tablet 0     hydroCHLOROthiazide (MICROZIDE) 12.5 MG capsule Take 1 capsule by mouth Daily.       HYDROcodone-acetaminophen (NORCO) 5-325 MG per tablet Take  by mouth Every 12 (Twelve) Hours.       hydrOXYzine (ATARAX) 25 MG tablet Take 0.5-1 tablets by mouth.       Hyoscyamine Sulfate SL 0.125 MG sublingual tablet Place 125 mcg under the tongue.       levothyroxine (SYNTHROID, LEVOTHROID) 50 MCG tablet Take 1 tablet by mouth Daily.       losartan (COZAAR) 50 MG tablet Take 1 tablet by mouth.       omeprazole (priLOSEC) 20 MG capsule Take 1 capsule by mouth 3 (Three) Times a Day.       ondansetron (ZOFRAN) 8 MG tablet Take 1 tablet by mouth.          Allergies:  Allergies   Allergen Reactions    Augmentin [Amoxicillin-Pot Clavulanate] Diarrhea     Has tolerated Ceftriaxone, Cefdinir, Cefuroxime.    Benadryl [Diphenhydramine] Other (See Comments)     High Dose Caused uncontrollable shaking in legs    Codeine Nausea Only    Metformin Diarrhea    Rosuvastatin GI Intolerance       Social Hx:  Social History     Socioeconomic History    Marital status:      Number of children: 3   Tobacco Use    Smoking status: Never    Smokeless tobacco: Never   Vaping Use    Vaping status: Never Used   Substance and Sexual Activity    Alcohol use: Yes     Alcohol/week: 0.0 - 2.0 standard drinks of alcohol     Comment: seldom     Drug use: No    Sexual activity: Defer       Family Hx:  Family History   Problem Relation Age of Onset    Cancer Mother     Ovarian cancer Mother 19    Emphysema Father     COPD Father     No Known Problems Sister     Tuberculosis Maternal Grandmother     Tuberculosis Maternal Grandfather     No Known Problems Paternal Grandmother     No Known Problems Paternal Grandfather     Breast cancer Neg Hx        Review of Imaging: Imaging was discussed with Dr. Henderson.  CT image of head shows 1.5 cm right frontal lobe lesion as well as vasogenic edema.      Laboratory Result:  Lab Results   Component Value Date    WBC 4.29 04/09/2024    HGB 11.5 (L) 04/09/2024    HCT 36.2 04/09/2024    MCV 94.0 04/09/2024     04/09/2024     Lab Results   Component Value Date    GLUCOSE 147 (H) 04/09/2024    CALCIUM 8.6 04/09/2024     04/09/2024    K 3.7 04/09/2024    CO2 21.0 (L) 04/09/2024     04/09/2024    BUN 18 04/09/2024    CREATININE 0.87 04/09/2024    EGFRIFNONA 58 (L) 11/02/2020    BCR 20.7 04/09/2024    ANIONGAP 12.0 04/09/2024     Lab Results   Component Value Date    HGBA1C 6.80 (H) 01/18/2024     Lab Results   Component Value Date    LDLDIRECT 4.0 06/11/2016       Physical Examination:  Vitals:    04/09/24 1414   BP: 150/65   Pulse: 63   Resp:    Temp:    SpO2:         General Appearance:   Well developed, well nourished, well groomed, alert, and cooperative. Sitting up in bed,      Neurological examination: Alert and oriented x 3.  Nonfocal neurologic exam.  Speech is clear.  No facial droop or pronator drift.  I do not appreciate any gross visual field deficits. Strength is symmetric in upper and lower extremities.      Diagnoses/Plan:    Ms.  Wilver is a 79 y.o. female admitted for encephalopathy.  CT imaging of the head shows a suspicious lesion in the right frontal lobe.  Patient is currently oriented and has no focal neurological deficits, although cannot recall what occurred before she was brought to the emergency department.  We will start Decadron 4 mg every 6 hours and Keppra 500 mg twice daily.  Will also obtain an MRI head with and without contrast to further evaluate the lesion.  Will recommend oncology consult.  Will discuss with Dr. Henderson after results of the MRI for further neurosurgical recommendation.  Discussed this plan with patient and family.  Please call with any questions.

## 2024-04-09 NOTE — PLAN OF CARE
Goal Outcome Evaluation:  Plan of Care Reviewed With: patient        Progress: improving  Outcome Evaluation: PT eval is completed. patient presents with confusion. patient demonstrates impaired bed mobility transfers and gait compared to her baseline status patient was able to ambulate 400 ft with assist of 1 mild balance deficits in standing. anticipate patient to be able to go home with assist of family at D/C      Anticipated Discharge Disposition (PT): home with assist

## 2024-04-09 NOTE — PROGRESS NOTES
Pharmacy Consult-Vancomycin Dosing  Ruby Pettit is a  79 y.o. female receiving vancomycin therapy.     Indication: pneumonia  Consulting Provider: hospitalist   ID Consult: N    Goal AUC: 400 - 600 mg/L*hr    Current Antimicrobial Therapy  Anti-Infectives (From admission, onward)      Ordered     Dose/Rate Route Frequency Start Stop    04/09/24 1231  piperacillin-tazobactam (ZOSYN) 3.375 g IVPB in 100 mL NS MBP (CD)        Ordering Provider: Veronica Jimenez DO    3.375 g  over 4 Hours Intravenous Every 8 Hours 04/09/24 2000 04/14/24 1959 04/09/24 1231  piperacillin-tazobactam (ZOSYN) 3.375 g IVPB in 100 mL NS MBP (CD)        Ordering Provider: Veronica Jimenez DO    3.375 g  over 30 Minutes Intravenous Once 04/09/24 1330      04/09/24 1231  Pharmacy to dose vancomycin        Ordering Provider: Veronica Jimenez DO     Does not apply Continuous PRN 04/09/24 1230 04/12/24 1229    04/08/24 2319  vancomycin IVPB 1750 mg in 0.9% Sodium Chloride (premix) 500 mL        Ordering Provider: Sandhya Brown PA-C    20 mg/kg × 83.5 kg  285.7 mL/hr over 105 Minutes Intravenous Once 04/08/24 2335 04/09/24 0500    04/08/24 2319  piperacillin-tazobactam (ZOSYN) 3.375 g IVPB in 100 mL NS MBP (CD)        Ordering Provider: Sandhya Brown PA-C    3.375 g  over 30 Minutes Intravenous Once 04/08/24 2335 04/09/24 0205            Allergies  Allergies as of 04/08/2024 - Reviewed 04/08/2024   Allergen Reaction Noted    Augmentin [amoxicillin-pot clavulanate] Diarrhea 06/07/2021    Benadryl [diphenhydramine] Other (See Comments) 10/17/2023    Codeine Nausea Only 10/17/2023    Metformin Diarrhea 06/29/2023    Rosuvastatin GI Intolerance 06/29/2023       Labs    Results from last 7 days   Lab Units 04/09/24  0640 04/08/24 2018 04/08/24 2016   BUN mg/dL 18  --  21   CREATININE mg/dL 0.87 1.20 1.11*       Results from last 7 days   Lab Units 04/09/24  0640 04/08/24 2016   WBC 10*3/mm3 4.29 5.79       Evaluation of Dosing    "  Last Dose Received in the ED/Outside Facility: 1750 mg in ED on 4/9 @ 0211  Is Patient on Dialysis or Renal Replacement: N    Ht - 160 cm (63\")  Wt - 83.5 kg (184 lb)    Estimated Creatinine Clearance: 53.6 mL/min (by C-G formula based on SCr of 0.87 mg/dL).    I/O last 3 completed shifts:  In: 1600 [IV Piggyback:1600]  Out: 525 [Urine:525]    Microbiology and Radiology  Microbiology Results (last 10 days)       Procedure Component Value - Date/Time    COVID PRE-OP / PRE-PROCEDURE SCREENING ORDER (NO ISOLATION) - Swab, Nasopharynx [[id number]]  (Normal) Collected: 04/08/24 2102    Lab Status: Final result Specimen: Swab from Nasopharynx Updated: 04/08/24 2158    Narrative:      The following orders were created for panel order COVID PRE-OP / PRE-PROCEDURE SCREENING ORDER (NO ISOLATION) - Swab, Nasopharynx.  Procedure                               Abnormality         Status                     ---------                               -----------         ------                     Respiratory Panel PCR w/...[[id number]]  Normal              Final result                 Please view results for these tests on the individual orders.    Respiratory Panel PCR w/COVID-19(SARS-CoV-2) [facility] In-House, NP Swab in UTM/VTM, 2 HR TAT - Swab, Nasopharynx [[id number]]  (Normal) Collected: 04/08/24 2102    Lab Status: Final result Specimen: Swab from Nasopharynx Updated: 04/08/24 2158     ADENOVIRUS, PCR Not Detected     Coronavirus 229E Not Detected     Coronavirus HKU1 Not Detected     Coronavirus NL63 Not Detected     Coronavirus OC43 Not Detected     COVID19 Not Detected     Human Metapneumovirus Not Detected     Human Rhinovirus/Enterovirus Not Detected     Influenza A PCR Not Detected     Influenza B PCR Not Detected     Parainfluenza Virus 1 Not Detected     Parainfluenza Virus 2 Not Detected     Parainfluenza Virus 3 Not Detected     Parainfluenza Virus 4 Not Detected     RSV, PCR Not Detected     Bordetella " pertussis pcr Not Detected     Bordetella parapertussis PCR Not Detected     Chlamydophila pneumoniae PCR Not Detected     Mycoplasma pneumo by PCR Not Detected    Narrative:      In the setting of a positive respiratory panel with a viral infection PLUS a negative procalcitonin without other underlying concern for bacterial infection, consider observing off antibiotics or discontinuation of antibiotics and continue supportive care. If the respiratory panel is positive for atypical bacterial infection (Bordetella pertussis, Chlamydophila pneumoniae, or Mycoplasma pneumoniae), consider antibiotic de-escalation to target atypical bacterial infection.            Reported Vancomycin Levels                         [employer] AUC Calculation:    Current AUC:   353 mg/L*hr    Predicted Steady State AUC on Current Dose: --- mg/L*hr  _________________________________    Predicted Steady State AUC on New Dose:   507 mg/L*hr    Assessment/Plan:    Pharmacy to dose vancomycin for pneumonia. Goal AUC: 400-600 mg/L*hr.  Patient received loading dose of vancomycin 1750 mg (~21 mg/kg) in ED on 4/9 @ 0211.  Currently afebrile with WBC, procalcitonin, and renal function WNL. Blood cultures in process, MRSA PCR pending.   Will initiate maintenance dose of 1500 mg every 24 hours on 4/10 at 0200. Predicted Steady State AUC at current dose: 507 mg/L*hr.  Assess clearance by obtaining vancomycin trough level on 4/11 at prior to dose #3.  Pharmacy will continue to monitor cultures, sensitivities, renal function, and clinical status, and will adjust regimen as necessary.    Thank you.  Leonel Baker, PharmD  4/9/2024  13:50 EDT

## 2024-04-09 NOTE — ED NOTES
Ruby [place]    Nursing Report ED to Floor:  Mental status: A&Ox4 but has forgetful/confused tendencies like repeating questions and pulling her monitoring cords off  Ambulatory status: Uses cane, walker, and wheelchair at home; have not ambulated here  Oxygen Therapy:  2L NC  Cardiac Rhythm: NSR  Admitted from: ED from home  Safety Concerns:  Fall risk  Social Issues: None  ED Room #:  07    ED Nurse Phone [facility] - [phone] or may call [phone].      HPI:   Chief Complaint   Patient presents with    Altered Mental Status       Past Medical History:  Past Medical History:   Diagnosis Date    Anxiety and depression     Arthritis     Carotid stenosis, bilateral     Carotid duplex, 01/16/2019: Bilateral ICA stenosis 0-49%. Tortuous vessels. Vertebral flow antegrade.        Disease of thyroid gland     GERD (gastroesophageal reflux disease)     Head injury due to trauma 1992    fell down stairs     History of transfusion     NO REACTIONS    Hyperlipidemia     Hypertension     Iatrogenic pneumothorax     Liver disorder     surgery 1998 approx , BLOOD CLOTS    Lung cancer     Metastatic cancer     Perennial rhinitis     Peripheral neuropathic pain     Syncope and collapse     Thyroid disease     UTI (urinary tract infection), bacterial     Wears eyeglasses         Past Surgical History:  Past Surgical History:   Procedure Laterality Date    ABDOMINAL SURGERY      LIVER RESECTION    APPENDECTOMY      BRONCHOSCOPY N/A 02/13/2018    Procedure: BRONCHOSCOPY WITH SANDRITA ENDOBRONCHIAL ULTRASOUND WITH FLUORO;  Surgeon: Dixon Fatima MD;  Location:  [facility];  Service:     BRONCHOSCOPY N/A 03/21/2019    Procedure: NAVIGATIONAL BRONCHOSCOPY;  Surgeon: Dixon Fatima MD;  Location:  [facility];  Service: Pulmonary    BRONCHOSCOPY WITH ION ROBOTIC ASSIST N/A 06/29/2023    Procedure: NAVIGATIONAL BRONCHOSCOPY WITH ION ROBOT;  Surgeon: Dixon Fatima MD;  Location:  [facility];  Service: Robotics  - Pulmonary;  Laterality: N/A;  Ion cath 3 - 0010,  3 - 0013.    CHOLECYSTECTOMY      COLONOSCOPY  2019    HYSTERECTOMY      2001    LIVER RESECTION      LUNG LOBECTOMY  11/17/2023    OOPHORECTOMY Bilateral     2001    ORIF WRIST FRACTURE Right     THYROID SURGERY      TUBAL ABDOMINAL LIGATION          Admitting Doctor:   Dixon Constantino MD    Consulting Provider(s):  Consults       No orders found for last 30 day(s).             Admitting Diagnosis:   The primary encounter diagnosis was Altered mental status, unspecified altered mental status type. Diagnoses of Metastatic cancer to brain, Adenocarcinoma of left lung, Pneumonia of left lung due to infectious organism, unspecified part of lung, Orthostatic hypotension, Syncope and collapse, History of hypertension, History of hyperlipidemia, and History of gastroesophageal reflux (GERD) were also pertinent to this visit.    Most Recent Vitals:   Vitals:    04/09/24 0115 04/09/24 0130 04/09/24 0200 04/09/24 0230   BP: 138/64 138/60 125/60 139/66   Pulse:  58 67 55   Resp:       Temp:       TempSrc:       SpO2:  96% 95% 96%   Weight:       Height:           Active LDAs/IV Access:   Lines, Drains & Airways       Active LDAs       Name Placement date Placement time Site Days    Peripheral IV 04/08/24 2025 Left Antecubital 04/08/24 2025  Antecubital  less than 1    Peripheral IV 04/09/24 0158 Posterior;Right Wrist 04/09/24 0158  Wrist  less than 1                    Labs (abnormal labs have a star):   Labs Reviewed   COMPREHENSIVE METABOLIC PANEL - Abnormal; Notable for the following components:       Result Value    Glucose 156 (*)     Creatinine 1.11 (*)     Chloride 108 (*)     Alkaline Phosphatase 133 (*)     eGFR 50.7 (*)     All other components within normal limits    Narrative:     GFR Normal >60  Chronic Kidney Disease <60  Kidney Failure <15    The GFR formula is only valid for adults with stable renal function between ages 18 and 70.   SINGLE HS TROPONIN T  - Abnormal; Notable for the following components:    HS Troponin T 35 (*)     All other components within normal limits    Narrative:     High Sensitive Troponin T Reference Range:  <14.0 ng/L- Negative Female for AMI  <22.0 ng/L- Negative Male for AMI  >=14 - Abnormal Female indicating possible myocardial injury.  >=22 - Abnormal Male indicating possible myocardial injury.   Clinicians would have to utilize clinical acumen, EKG, Troponin, and serial changes to determine if it is an Acute Myocardial Infarction or myocardial injury due to an underlying chronic condition.        CBC WITH AUTO DIFFERENTIAL - Abnormal; Notable for the following components:    RBC 3.65 (*)     Hemoglobin 10.9 (*)     MCHC 31.0 (*)     RDW 16.6 (*)     RDW-SD 58.7 (*)     All other components within normal limits   URINALYSIS W/ MICROSCOPIC IF INDICATED (NO CULTURE) - Abnormal; Notable for the following components:    Protein, UA Trace (*)     All other components within normal limits    Narrative:     Urine microscopic not indicated.   SINGLE HS TROPONIN T - Abnormal; Notable for the following components:    HS Troponin T 32 (*)     All other components within normal limits    Narrative:     High Sensitive Troponin T Reference Range:  <14.0 ng/L- Negative Female for AMI  <22.0 ng/L- Negative Male for AMI  >=14 - Abnormal Female indicating possible myocardial injury.  >=22 - Abnormal Male indicating possible myocardial injury.   Clinicians would have to utilize clinical acumen, EKG, Troponin, and serial changes to determine if it is an Acute Myocardial Infarction or myocardial injury due to an underlying chronic condition.        RESPIRATORY PANEL PCR W/ COVID-19 (SARS-COV-2), NP SWAB IN [facility], 2 HR TAT - Normal    Narrative:     In the setting of a positive respiratory panel with a viral infection PLUS a negative procalcitonin without other underlying concern for bacterial infection, consider observing off antibiotics or  "discontinuation of antibiotics and continue supportive care. If the respiratory panel is positive for atypical bacterial infection (Bordetella pertussis, Chlamydophila pneumoniae, or Mycoplasma pneumoniae), consider antibiotic de-escalation to target atypical bacterial infection.   MAGNESIUM - Normal   LACTIC ACID, PLASMA - Normal   PROCALCITONIN - Normal    Narrative:     As a Marker for Sepsis (Non-Neonates):    1. <0.5 ng/mL represents a low risk of severe sepsis and/or septic shock.  2. >2 ng/mL represents a high risk of severe sepsis and/or septic shock.    As a Marker for Lower Respiratory Tract Infections that require antibiotic therapy:    PCT on Admission    Antibiotic Therapy       6-12 Hrs later    >0.5                Strongly Recommended  >0.25 - <0.5        Recommended   0.1 - 0.25          Discouraged              Remeasure/reassess PCT  <0.1                Strongly Discouraged     Remeasure/reassess PCT    As 28 day mortality risk marker: \"Change in Procalcitonin Result\" (>80% or <=80%) if Day 0 (or Day 1) and Day 4 values are available. Refer to [email or web address]    Change in PCT <=80%  A decrease of PCT levels below or equal to 80% defines a positive change in PCT test result representing a higher risk for 28-day all-cause mortality of patients diagnosed with severe sepsis for septic shock.    Change in PCT >80%  A decrease of PCT levels of more than 80% defines a negative change in PCT result representing a lower risk for 28-day all-cause mortality of patients diagnosed with severe sepsis or septic shock.      TSH RFX ON ABNORMAL TO FREE T4 - Normal   MAGNESIUM - Normal   PHOSPHORUS - Normal   COVID PRE-OP / PRE-PROCEDURE SCREENING ORDER (NO ISOLATION)    Narrative:     The following orders were created for panel order COVID PRE-OP / PRE-PROCEDURE SCREENING ORDER (NO ISOLATION) - Swab, Nasopharynx.  Procedure                               Abnormality         Status                "      ---------                               -----------         ------                     Respiratory Panel PCR w/...[[id number]]  Normal              Final result                 Please view results for these tests on the individual orders.   BLOOD CULTURE   BLOOD CULTURE   RAINBOW DRAW    Narrative:     The following orders were created for panel order [facility] Draw.  Procedure                               Abnormality         Status                     ---------                               -----------         ------                     Green Top (Gel)[[id number]]                                  Final result               Lavender Top[[id number]]                                     Final result               Gold Top - SST[[id number]]                                   Final result               Gray Top[[id number]]                                         Final result               Light Blue Top[[id number]]                                   Final result                 Please view results for these tests on the individual orders.   CBC AND DIFFERENTIAL    Narrative:     The following orders were created for panel order CBC & Differential.  Procedure                               Abnormality         Status                     ---------                               -----------         ------                     CBC Auto Differential[[id number]]        Abnormal            Final result                 Please view results for these tests on the individual orders.   GREEN TOP   LAVENDER TOP   GOLD TOP - SST   GRAY TOP   LIGHT BLUE TOP       Meds Given in ED:   Medications   sodium chloride 0.9 % flush 10 mL (has no administration in time range)   vancomycin IVPB 1750 mg in 0.9% Sodium Chloride (premix) 500 mL (1,750 mg Intravenous New Bag 4/9/24 0211)   dexAMETHasone (DECADRON) injection 4 mg (4 mg Intravenous Given 4/9/24 0206)   levETIRAcetam (KEPPRA) injection 500 mg (500 mg Intravenous Given 4/9/24 0304)   lactated  ringers infusion (100 mL/hr Intravenous New Bag 4/9/24 0308)   sodium chloride 0.9 % bolus 1,000 mL (0 mL Intravenous Stopped 4/8/24 2115)   iopamidol (ISOVUE-370) 76 % injection 100 mL (80 mL Intravenous Given 4/8/24 2152)   piperacillin-tazobactam (ZOSYN) 3.375 g IVPB in 100 mL NS MBP (CD) (0 g Intravenous Stopped 4/9/24 0205)     lactated ringers, 100 mL/hr, Last Rate: 100 mL/hr (04/09/24 0308)         Last NIH score:                                                          Dysphagia screening results:  Patient Factors Component (Dysphagia:Stroke or Rule-out)  Best Eye Response: 4-->(E4) spontaneous (04/08/24 2026)  Best Motor Response: 6-->(M6) obeys commands (04/08/24 2026)  Best Verbal Response: 5-->(V5) oriented (04/08/24 2026)  Erica Coma Scale Score: 15 (04/08/24 2026)     [place] Coma Scale:  No data recorded     CIWA:        Restraint Type:            Isolation Status:  No active isolations

## 2024-04-09 NOTE — PLAN OF CARE
Goal Outcome Evaluation:              Outcome Evaluation: VSS; O2 > 90% on room air; pt can answer all orientation questions correctly; however, she has removed tele leads multiple times and removed two IVs; Pt requires continued reeducation on using call light and waiting for assistance before exiting bed.  Pt resting in bed; family at bedside; alarm on; call light within reach.

## 2024-04-09 NOTE — ED PROVIDER NOTES
"Subjective   History of Present Illness  This is a 79-year-old female that presents the ER with her son for 2-day history of increased confusion and some disorientation.  Patient has no personal history of dementia, but she does have known history of left lung cancer and just completed chemotherapy and radiation according to son.  Patient follows with hematology/oncology, Dr. Resendez.  Patient lives alone.  Son states that he works a lot and has been calling patient to check on her.  She told him that she did not know if she fed her dog over the last couple of days and could not remember if she was eating or drinking properly.  The son went to check on her and says that she has not been taking her medications as prescribed and her \"pillbox\".  He also said that patient said that she got locked out of her home, and send went over to see if he could help her, and she was standing at a house that was not her own.  It was 2-3 doors down from her own home.  Patient does not have any speech changes or unilateral weakness, numbness, or tingling.  She denies any headache or vision changes.  Patient denies any imbalance.  She does report mild rhinorrhea and nonproductive cough.  She denies any neck pain or stiffness or fever/chills.  She also has history of UTIs in the past which cause some confusion, and she reports some \"kidney pain\" bilaterally.  Patient denies any recent falls.  She does not utilize any chronic anticoagulation.  Past medical history is significant for GERD, previous head injury, hyperlipidemia, thyroid disease, peripheral neuropathy, osteoarthritis, anxiety/depression, history of syncope, and stage III adenocarcinoma of the left lung status post left lower lobectomy on 11/17/2023.  Patient had normal MRI of the brain without contrast in January, 2024 and there was no metastatic disease to the brain.  Patient denies any abdominal pain or nausea/vomiting.  She had a normal bowel movement today.  She told her " son that she had not eaten today, but he got her some [employer] and she ate several pieces of chicken like she had not eaten for days according to the son.    History provided by:  Patient and relative (Son)  Altered Mental Status  Presenting symptoms: confusion and disorientation    Duration:  2 days  Timing:  Constant  Progression:  Unchanged  Chronicity:  New  Context: not dementia, not head injury, not recent change in medication, not recent illness and not recent infection    Context comment:  Pt's son reports pt has been confused x 2 days. Didn't feed her dog. Son went over to check on her last evening and pt was standing in front of a house 2 houses down, not even her own house. H/o UTI.  Associated symptoms: no abdominal pain, normal movement, no agitation, no bladder incontinence, no decreased appetite, no depression, no difficulty breathing, no eye deviation, no fever, no hallucinations, no headaches, no light-headedness, no nausea, no palpitations, no rash, no seizures, no slurred speech, no suicidal behavior, no visual change, no vomiting and no weakness        Review of Systems   Constitutional: Negative.  Negative for activity change, appetite change, chills, decreased appetite, diaphoresis, fatigue, fever and unexpected weight change.   HENT:  Positive for rhinorrhea. Negative for congestion, ear pain, postnasal drip, sinus pressure, sinus pain, sneezing and sore throat.    Respiratory:  Positive for cough (NP cough). Negative for shortness of breath.         History of left sided lung cancer and pt just finished chemo and radiation. Following with Dr. Resendez, oncologist.   Cardiovascular: Negative.  Negative for chest pain, palpitations and leg swelling.   Gastrointestinal: Negative.  Negative for abdominal pain, constipation, diarrhea, nausea and vomiting.   Genitourinary: Negative.  Negative for bladder incontinence, dysuria, flank pain, frequency, hematuria and urgency.        History of UTI's in the past  "with confusion.    Musculoskeletal:  Positive for back pain (\"kidney pain\"). Negative for gait problem (No recent falls).   Skin:  Negative for rash.   Neurological: Negative.  Negative for dizziness, seizures, syncope, facial asymmetry, speech difficulty, weakness, light-headedness and headaches.   Hematological:         History of left sided lung CA.   Psychiatric/Behavioral:  Positive for confusion. Negative for agitation, hallucinations and sleep disturbance (Patient denies any recent sleep deprivation). The patient is not nervous/anxious.    All other systems reviewed and are negative.      Past Medical History:   Diagnosis Date    Anxiety and depression     Arthritis     Carotid stenosis, bilateral     Carotid duplex, 01/16/2019: Bilateral ICA stenosis 0-49%. Tortuous vessels. Vertebral flow antegrade.        Disease of thyroid gland     GERD (gastroesophageal reflux disease)     Head injury due to trauma 1992    fell down stairs     History of transfusion     NO REACTIONS    Hyperlipidemia     Hypertension     Iatrogenic pneumothorax     Liver disorder     surgery 1998 approx , BLOOD CLOTS    Lung cancer     Metastatic cancer     Perennial rhinitis     Peripheral neuropathic pain     Syncope and collapse     Thyroid disease     UTI (urinary tract infection), bacterial     Wears eyeglasses        Allergies   Allergen Reactions    Augmentin [Amoxicillin-Pot Clavulanate] Diarrhea     Has tolerated Ceftriaxone, Cefdinir, Cefuroxime.    Benadryl [Diphenhydramine] Other (See Comments)     High Dose Caused uncontrollable shaking in legs    Codeine Nausea Only    Metformin Diarrhea    Rosuvastatin GI Intolerance       Past Surgical History:   Procedure Laterality Date    ABDOMINAL SURGERY      LIVER RESECTION    APPENDECTOMY      BRONCHOSCOPY N/A 02/13/2018    Procedure: BRONCHOSCOPY WITH SANDRITA ENDOBRONCHIAL ULTRASOUND WITH FLUORO;  Surgeon: Dixon Fatima MD;  Location: [facility] ENDOSCOPY;  Service:     " BRONCHOSCOPY N/A 03/21/2019    Procedure: NAVIGATIONAL BRONCHOSCOPY;  Surgeon: Dixon Fatima MD;  Location:  [facility];  Service: Pulmonary    BRONCHOSCOPY WITH ION ROBOTIC ASSIST N/A 06/29/2023    Procedure: NAVIGATIONAL BRONCHOSCOPY WITH ION ROBOT;  Surgeon: Dixon Fatima MD;  Location:  [facility];  Service: Robotics - Pulmonary;  Laterality: N/A;  Ion cath 3 - 0010,  3 - 0013.    CHOLECYSTECTOMY      COLONOSCOPY  2019    HYSTERECTOMY      2001    LIVER RESECTION      LUNG LOBECTOMY  11/17/2023    OOPHORECTOMY Bilateral     2001    ORIF WRIST FRACTURE Right     THYROID SURGERY      TUBAL ABDOMINAL LIGATION         Family History   Problem Relation Age of Onset    Cancer Mother     Ovarian cancer Mother 19    Emphysema Father     COPD Father     No Known Problems Sister     Tuberculosis Maternal Grandmother     Tuberculosis Maternal Grandfather     No Known Problems Paternal Grandmother     No Known Problems Paternal Grandfather     Breast cancer Neg Hx        Social History     Socioeconomic History    Marital status:     Number of children: 3   Tobacco Use    Smoking status: Never    Smokeless tobacco: Never   Vaping Use    Vaping status: Never Used   Substance and Sexual Activity    Alcohol use: Yes     Alcohol/week: 0.0 - 2.0 standard drinks of alcohol     Comment: seldom     Drug use: No    Sexual activity: Defer           Objective   Physical Exam  Vitals and nursing note reviewed.   Constitutional:       General: She is not in acute distress.     Appearance: Normal appearance. She is not ill-appearing, toxic-appearing or diaphoretic.      Comments: Pleasant and talkative.  Nontoxic.  No acute distress.   HENT:      Head: Normocephalic and atraumatic.      Right Ear: Tympanic membrane normal.      Left Ear: Tympanic membrane normal.      Nose: Nose normal. No congestion or rhinorrhea.      Mouth/Throat:      Mouth: Mucous membranes are moist.      Pharynx: Oropharynx  is clear.   Eyes:      Extraocular Movements: Extraocular movements intact.      Right eye: Normal extraocular motion.      Left eye: Normal extraocular motion.      Conjunctiva/sclera: Conjunctivae normal.      Pupils: Pupils are equal, round, and reactive to light.      Comments: Pupils equal round reactive to light.  Extraocular movements intact.  No nystagmus   Neck:      Vascular: No carotid bruit.      Comments: No carotid bruits bilaterally  Cardiovascular:      Rate and Rhythm: Regular rhythm. Bradycardia present. No extrasystoles are present.     Pulses: Normal pulses.      Heart sounds: Normal heart sounds.      Comments: Bradycardia.  No ectopy.  No pedal edema to lower extremities  Pulmonary:      Effort: Pulmonary effort is normal.      Breath sounds: Normal breath sounds.      Comments: Lungs are clear to auscultation bilaterally  Abdominal:      General: Bowel sounds are normal. There is no distension.      Palpations: Abdomen is soft.      Tenderness: There is no abdominal tenderness. There is right CVA tenderness and left CVA tenderness. There is no guarding or rebound. Negative signs include Gross's sign.      Comments: Abdomen soft without distention.  Active bowel sounds in all 4 quadrants.  No abdominal tenderness.  No flank tenderness.  Bilateral CVA tenderness.   Musculoskeletal:         General: Normal range of motion.      Cervical back: Normal range of motion and neck supple.      Right lower leg: No edema.      Left lower leg: No edema.   Skin:     General: Skin is warm and dry.   Neurological:      General: No focal deficit present.      Mental Status: She is alert and oriented to person, place, and time.      Cranial Nerves: Cranial nerves 2-12 are intact.      Sensory: Sensation is intact.      Motor: Motor function is intact.      Coordination: Coordination is intact.      Comments: Alert and oriented x 3.  Patient knows her name, her son's name, the year and date, as well as her  Social Security number.  Smile is equal and tongue is midline.  Equal  strength 5 out of 5 and equal muscle strength to lower extremities 5 out of 5.  No focal neurologic deficits.         Procedures           ED Course  ED Course as of 04/08/24 2357   Mon Apr 08, 2024 2129 Patient's initial blood pressure was 110/55.  I saw her in triage area and she had completely normal neurologic exam.  She was alert and oriented x 3 and had no focal deficits.  She yawned 2-3 times and said that she was feeling lightheaded.  I got her a cold washcloth and patient had decreased responsiveness.  We called a code stroke on her and took her to CT with the ER attending physician.  When she returned to the room, systolic blood pressure was 60, so patient more than likely had a vasovagal episode.  Radiologist contacted me with abnormal CT of the brain results.  Patient had normal MRI of the brain without contrast results in January, 2024 and there was no evidence of metastatic disease with primary left lung adenocarcinoma.  CT of the brain today showed interval development of large volume vasogenic edema within the right frontal lobe with evidence of an underlying hypodense lesion measuring 1.5 cm.  Findings highly suspicious for metastatic disease.  CT of the abdomen/pelvis revealed no acute infectious or inflammatory changes.  Radiologist read chest x-ray as increased airspace opacities to the left midlung and left lower lung, questionable infection.  This is the area of patient's lung cancer.  We will proceed with CTA of the chest with contrast for further assessment.  CBC and chemistries were essentially normal.  Serum glucose is 156.  High-sensitivity troponin is 35.  Urinalysis reveals no acute urinary tract infection.  Last blood pressure was 124/59 and patient is receiving fluid bolus of normal saline. [FC]   2317 Respiratory PCR panel was negative.  CT angiogram of the chest with contrast revealed surgical changes of the  left lung with associated asymmetric volume loss.  There are scattered groundglass opacities and nodular consolidations throughout the left lung consistent with pneumonia.  Patient also has multiple pulmonary nodules in the right lung measuring up to 5 mm, new from September, 2023.  I will initiate vancomycin and Zosyn.  Patient needs to be covered for hospital associated pneumonia since she was in the hospital in January, 2024.  I will page Dr. Liao to discuss results and prepare for admission. [FC]   2350 Discussed admission with Dr. Liao, hospitalist.  Patient's vital signs, including blood pressure, are stable now.  Hospitalist is agreeable to admission.  Patient and son are appreciative. [FC]      ED Course User Index  [FC] Sandhya Brown, SEYMOUR                                           Recent Results (from the past 24 hour(s))   Comprehensive Metabolic Panel    Collection Time: 04/08/24  8:16 PM    Specimen: Blood   Result Value Ref Range    Glucose 156 (H) 65 - 99 mg/dL    BUN 21 8 - 23 mg/dL    Creatinine 1.11 (H) 0.57 - 1.00 mg/dL    Sodium 142 136 - 145 mmol/L    Potassium 4.2 3.5 - 5.2 mmol/L    Chloride 108 (H) 98 - 107 mmol/L    CO2 23.0 22.0 - 29.0 mmol/L    Calcium 9.1 8.6 - 10.5 mg/dL    Total Protein 6.0 6.0 - 8.5 g/dL    Albumin 3.9 3.5 - 5.2 g/dL    ALT (SGPT) 30 1 - 33 U/L    AST (SGOT) 23 1 - 32 U/L    Alkaline Phosphatase 133 (H) 39 - 117 U/L    Total Bilirubin 0.5 0.0 - 1.2 mg/dL    Globulin 2.1 gm/dL    A/G Ratio 1.9 g/dL    BUN/Creatinine Ratio 18.9 7.0 - 25.0    Anion Gap 11.0 5.0 - 15.0 mmol/L    eGFR 50.7 (L) >60.0 mL/min/1.73   Single High Sensitivity Troponin T    Collection Time: 04/08/24  8:16 PM    Specimen: Blood   Result Value Ref Range    HS Troponin T 35 (H) <14 ng/L   Magnesium    Collection Time: 04/08/24  8:16 PM    Specimen: Blood   Result Value Ref Range    Magnesium 2.1 1.6 - 2.4 mg/dL   Green Top (Gel)    Collection Time: 04/08/24  8:16 PM   Result Value Ref Range     Extra Tube Hold for add-ons.    Lavender Top    Collection Time: 04/08/24  8:16 PM   Result Value Ref Range    Extra Tube hold for add-on    Gold Top - SST    Collection Time: 04/08/24  8:16 PM   Result Value Ref Range    Extra Tube Hold for add-ons.    Light Blue Top    Collection Time: 04/08/24  8:16 PM   Result Value Ref Range    Extra Tube Hold for add-ons.    CBC Auto Differential    Collection Time: 04/08/24  8:16 PM    Specimen: Blood   Result Value Ref Range    WBC 5.79 3.40 - 10.80 10*3/mm3    RBC 3.65 (L) 3.77 - 5.28 10*6/mm3    Hemoglobin 10.9 (L) 12.0 - 15.9 g/dL    Hematocrit 35.2 34.0 - 46.6 %    MCV 96.4 79.0 - 97.0 fL    MCH 29.9 26.6 - 33.0 pg    MCHC 31.0 (L) 31.5 - 35.7 g/dL    RDW 16.6 (H) 12.3 - 15.4 %    RDW-SD 58.7 (H) 37.0 - 54.0 fl    MPV 10.6 6.0 - 12.0 fL    Platelets 217 140 - 450 10*3/mm3    Neutrophil % 51.9 42.7 - 76.0 %    Lymphocyte % 35.1 19.6 - 45.3 %    Monocyte % 10.5 5.0 - 12.0 %    Eosinophil % 1.9 0.3 - 6.2 %    Basophil % 0.3 0.0 - 1.5 %    Immature Grans % 0.3 0.0 - 0.5 %    Neutrophils, Absolute 3.00 1.70 - 7.00 10*3/mm3    Lymphocytes, Absolute 2.03 0.70 - 3.10 10*3/mm3    Monocytes, Absolute 0.61 0.10 - 0.90 10*3/mm3    Eosinophils, Absolute 0.11 0.00 - 0.40 10*3/mm3    Basophils, Absolute 0.02 0.00 - 0.20 10*3/mm3    Immature Grans, Absolute 0.02 0.00 - 0.05 10*3/mm3    nRBC 0.0 0.0 - 0.2 /100 WBC   Lactic Acid, Plasma    Collection Time: 04/08/24  8:16 PM    Specimen: Blood   Result Value Ref Range    Lactate 2.0 0.5 - 2.0 mmol/L   Urinalysis With Microscopic If Indicated (No Culture) - Straight Cath    Collection Time: 04/08/24  8:51 PM    Specimen: Straight Cath; Urine   Result Value Ref Range    Color, UA Yellow Yellow, Straw    Appearance, UA Clear Clear    pH, UA 6.0 5.0 - 8.0    Specific Gravity, UA 1.021 1.001 - 1.030    Glucose, UA Negative Negative    Ketones, UA Negative Negative    Bilirubin, UA Negative Negative    Blood, UA Negative Negative    Protein, UA  Trace (A) Negative    Leuk Esterase, UA Negative Negative    Nitrite, UA Negative Negative    Urobilinogen, UA 1.0 E.U./dL 0.2 - 1.0 E.U./dL   ECG 12 Lead ED Triage Standing Order; Weak / Dizzy / AMS    Collection Time: 04/08/24  8:54 PM   Result Value Ref Range    QT Interval 452 ms    QTC Interval 470 ms   Respiratory Panel PCR w/COVID-19(SARS-CoV-2) [facility] In-House, NP Swab in UTM/VTM, 2 HR TAT - Swab, Nasopharynx    Collection Time: 04/08/24  9:02 PM    Specimen: Nasopharynx; Swab   Result Value Ref Range    ADENOVIRUS, PCR Not Detected Not Detected    Coronavirus 229E Not Detected Not Detected    Coronavirus HKU1 Not Detected Not Detected    Coronavirus NL63 Not Detected Not Detected    Coronavirus OC43 Not Detected Not Detected    COVID19 Not Detected Not Detected - Ref. Range    Human Metapneumovirus Not Detected Not Detected    Human Rhinovirus/Enterovirus Not Detected Not Detected    Influenza A PCR Not Detected Not Detected    Influenza B PCR Not Detected Not Detected    Parainfluenza Virus 1 Not Detected Not Detected    Parainfluenza Virus 2 Not Detected Not Detected    Parainfluenza Virus 3 Not Detected Not Detected    Parainfluenza Virus 4 Not Detected Not Detected    RSV, PCR Not Detected Not Detected    Bordetella pertussis pcr Not Detected Not Detected    Bordetella parapertussis PCR Not Detected Not Detected    Chlamydophila pneumoniae PCR Not Detected Not Detected    Mycoplasma pneumo by PCR Not Detected Not Detected     Note: In addition to lab results from this visit, the labs listed above may include labs taken at another facility or during a different encounter within the last 24 hours. Please correlate lab times with ED admission and discharge times for further clarification of the services performed during this visit.    CT Angiogram Chest   Final Result   Impression:   Surgical changes of the left lung with associated asymmetric volume loss. There are scattered groundglass  opacities and nodular consolidations throughout the left lung consistent with pneumonia.      Multiple pulmonary nodules in the right lung measuring up to 5 mm, new from September 2023 PET/CT. Recommend follow-up chest CT in 6 months.            Electronically Signed: Sergio Hernandez MD     4/8/2024 10:15 PM EDT     Workstation ID: [id number]      XR Chest 1 View   Final Result   Impression:   Airspace opacities in the left midlung and left lower lung which may represent pneumonia.      Enlarged cardiac silhouette, unchanged.         Electronically Signed: Sergio Hernandez MD     4/8/2024 8:52 PM EDT     Workstation ID: [id number]      CT Head Without Contrast   Final Result   Impression:      No intracranial hemorrhage is visualized.      Interval development of large volume vasogenic edema within the right frontal lobe with evidence of an underlying hypodense lesion measuring 1.5 cm. Finding highly suspicious for metastatic disease to the brain. Primary intracranial neoplasm is also a    consideration. Correlation with contrast-enhanced MRI would be of value.      Findings discussed with ANTONINO Arthur on April 8, 2024 at 8:45 p.m. by telephone.            Electronically Signed: Jonn Del Cid MD     4/8/2024 8:45 PM EDT     Workstation ID: [id number]      CT Abdomen Pelvis Without Contrast   Final Result   Impression:   No acute abnormality of the abdomen or pelvis.      Partially visualized nodular consolidations in the left lung base which may represent infection.                  Electronically Signed: Sergio Hernandez MD     4/8/2024 8:51 PM EDT     Workstation ID: [id number]        Vitals:    04/08/24 2100 04/08/24 2112 04/08/24 2114 04/08/24 2115   BP: 143/68   124/59   BP Location:       Patient Position:       Pulse: 66 60 62 62   Resp:       Temp:       TempSrc:       SpO2: 93% (!) 87% 96% 97%   Weight:       Height:         Medications   sodium chloride 0.9 % flush 10 mL (has no administration in time range)   vancomycin IVPB  1750 mg in 0.9% Sodium Chloride (premix) 500 mL (has no administration in time range)   piperacillin-tazobactam (ZOSYN) 3.375 g IVPB in 100 mL NS MBP (CD) (has no administration in time range)   sodium chloride 0.9 % bolus 1,000 mL (0 mL Intravenous Stopped 4/8/24 2115)   iopamidol (ISOVUE-370) 76 % injection 100 mL (80 mL Intravenous Given 4/8/24 2152)     ECG/EMG Results (last 24 hours)       ** No results found for the last 24 hours. **          ECG 12 Lead ED Triage Standing Order; Weak / Dizzy / AMS   Preliminary Result   Test Reason : ED Triage Standing Order~   Blood Pressure :   */*   mmHG   Vent. Rate :  65 BPM     Atrial Rate :  65 BPM      P-R Int : 160 ms          QRS Dur :  82 ms       QT Int : 452 ms       P-R-T Axes :  43 -14 115 degrees      QTc Int : 470 ms      Normal sinus rhythm with sinus arrhythmia   Left ventricular hypertrophy with repolarization abnormality   Abnormal ECG   When compared with ECG of 17-JAN-2024 13:36,   QT has shortened      Referred By: EDMD           Confirmed By:       ECG 12 Lead Syncope    (Results Pending)             Medical Decision Making  Problems Addressed:  Adenocarcinoma of left lung: complicated acute illness or injury  Altered mental status, unspecified altered mental status type: complicated acute illness or injury  History of gastroesophageal reflux (GERD): complicated acute illness or injury  History of hyperlipidemia: complicated acute illness or injury  History of hypertension: complicated acute illness or injury  Metastatic cancer to brain: complicated acute illness or injury  Orthostatic hypotension: complicated acute illness or injury  Pneumonia of left lung due to infectious organism, unspecified part of lung: complicated acute illness or injury  Syncope and collapse: complicated acute illness or injury    Amount and/or Complexity of Data Reviewed  Labs: ordered.  Radiology: ordered.  ECG/medicine tests: ordered.    Risk  Prescription drug  management.        Final diagnoses:   Altered mental status, unspecified altered mental status type   Metastatic cancer to brain   Adenocarcinoma of left lung   Pneumonia of left lung due to infectious organism, unspecified part of lung   Orthostatic hypotension   Syncope and collapse   History of hypertension   History of hyperlipidemia   History of gastroesophageal reflux (GERD)       ED Disposition  ED Disposition       ED Disposition   Intended Admit    Condition   --    Comment   --               No follow-up provider specified.       Medication List      No changes were made to your prescriptions during this visit.            Sandhya Brown PA-C  04/08/24 0531

## 2024-04-09 NOTE — THERAPY EVALUATION
"Patient Name: Ruby Pettit  : 1945    MRN: [id number]                              Today's Date: 2024       Admit Date: 2024    Visit Dx:     ICD-10-CM ICD-9-CM   1. Altered mental status, unspecified altered mental status type  R41.82 780.97   2. Metastatic cancer to brain  C79.31 198.3   3. Adenocarcinoma of left lung  C34.92 162.9   4. Pneumonia of left lung due to infectious organism, unspecified part of lung  J18.9 486   5. Orthostatic hypotension  I95.1 458.0   6. Syncope and collapse  R55 780.2   7. History of hypertension  Z86.79 V12.59   8. History of hyperlipidemia  Z86.39 V12.29   9. History of gastroesophageal reflux (GERD)  Z87.19 V12.79   10. Malignant neoplasm of lower lobe of left lung  C34.32 162.5     Patient Active Problem List   Diagnosis    Migratory Lung nodules (\"Soft\" and solid, bilateral)    Non-smoker    History of asthma    Cough    Hypertension    T2DM (type 2 diabetes mellitus)    Neuropathy    Lesion of temporal lobe    GERD    Malignant neoplasm of lower lobe of left lung    Elevated troponin    COVID-19    Encephalopathy     Past Medical History:   Diagnosis Date    Anxiety and depression     Arthritis     Carotid stenosis, bilateral     Carotid duplex, 2019: Bilateral ICA stenosis 0-49%. Tortuous vessels. Vertebral flow antegrade.        Disease of thyroid gland     GERD (gastroesophageal reflux disease)     Head injury due to trauma     fell down stairs     History of transfusion     NO REACTIONS    Hyperlipidemia     Hypertension     Iatrogenic pneumothorax     Liver disorder     surgery  approx , BLOOD CLOTS    Lung cancer     Metastatic cancer     Perennial rhinitis     Peripheral neuropathic pain     Syncope and collapse     Thyroid disease     UTI (urinary tract infection), bacterial     Wears eyeglasses      Past Surgical History:   Procedure Laterality Date    ABDOMINAL SURGERY      LIVER RESECTION    APPENDECTOMY      BRONCHOSCOPY N/A 2018 "    Procedure: BRONCHOSCOPY WITH SANDRITA ENDOBRONCHIAL ULTRASOUND WITH FLUORO;  Surgeon: Dixon Fatima MD;  Location:  [facility];  Service:     BRONCHOSCOPY N/A 03/21/2019    Procedure: NAVIGATIONAL BRONCHOSCOPY;  Surgeon: Dixon Fatima MD;  Location:  [facility];  Service: Pulmonary    BRONCHOSCOPY WITH ION ROBOTIC ASSIST N/A 06/29/2023    Procedure: NAVIGATIONAL BRONCHOSCOPY WITH ION ROBOT;  Surgeon: Dixon Fatima MD;  Location:  [facility];  Service: Robotics - Pulmonary;  Laterality: N/A;  Ion cath 3 - 0010,  3 - 0013.    CHOLECYSTECTOMY      COLONOSCOPY  2019    HYSTERECTOMY      2001    LIVER RESECTION      LUNG LOBECTOMY  11/17/2023    OOPHORECTOMY Bilateral     2001    ORIF WRIST FRACTURE Right     THYROID SURGERY      TUBAL ABDOMINAL LIGATION        General Information       Row Name 04/09/24 1535          Physical Therapy Time and Intention    Document Type evaluation  -CHRISTINA     Mode of Treatment physical therapy  -CHRISTINA       Row Name 04/09/24 1535          General Information    Patient Profile Reviewed yes  -CHRISTINA     Prior Level of Function independent:;bed mobility;ADL's;gait;transfer  -CHRISTINA     Existing Precautions/Restrictions fall  -CHRISTINA     Barriers to Rehab none identified  -CHRISTINA       Row Name 04/09/24 1535          Living Environment    People in Home alone  -CHRISTINA       Row Name 04/09/24 1535          Home Main Entrance    Number of Stairs, Main Entrance none  -CHRISTINA       Row Name 04/09/24 1535          Cognition    Orientation Status (Cognition) oriented to;person;place;situation  -CHRISTINA       Row Name 04/09/24 1535          Safety Issues, Functional Mobility    Safety Issues Affecting Function (Mobility) safety precautions follow-through/compliance;safety precaution awareness  -CHRISTINA     Impairments Affecting Function (Mobility) balance;endurance/activity tolerance;strength  -CHRISTINA               User Key  (r) = Recorded By, (t) = Taken By, (c) = Cosigned By      Initials Name  Provider Type    Julee Hollins PT Physical Therapist                   Mobility       Row Name 04/09/24 1536          Bed Mobility    Bed Mobility rolling left;rolling right;scooting/bridging;supine-sit;sit-supine  -CHRISTINA     Rolling Left [place] (Bed Mobility) minimum assist (75% patient effort)  -CHRISTINA     Rolling Right [place] (Bed Mobility) minimum assist (75% patient effort)  -CHRISTINA     Scooting/Bridging [place] (Bed Mobility) minimum assist (75% patient effort)  -CHRISTINA     Supine-Sit [place] (Bed Mobility) minimum assist (75% patient effort)  -CHRISTINA     Sit-Supine [place] (Bed Mobility) minimum assist (75% patient effort)  -CHRISTINA     Assistive Device (Bed Mobility) draw sheet;head of bed elevated  -CHRISTINA       Row Name 04/09/24 1536          Bed-Chair Transfer    Bed-Chair [place] (Transfers) contact guard  -CHRISTINA       Row Name 04/09/24 1536          Sit-Stand Transfer    Sit-Stand [place] (Transfers) contact guard  -CHRISTINA       Row Name 04/09/24 1536          Gait/Stairs (Locomotion)    [place] Level (Gait) contact guard  -CHRISTINA     Distance in Feet (Gait) 400  -CHRISTINA     Deviations/Abnormal Patterns (Gait) alda decreased;stride length decreased  -CHRISTINA     Comment, (Gait/Stairs) patient uses IV pole for balance  -CHRISTINA               User Key  (r) = Recorded By, (t) = Taken By, (c) = Cosigned By      Initials Name Provider Type    Julee Hollins PT Physical Therapist                   Obj/Interventions       Row Name 04/09/24 1537          Range of Motion Comprehensive    General Range of Motion no range of motion deficits identified  -       Row Name 04/09/24 1537          Strength Comprehensive (MMT)    Comment, General Manual Muscle Testing (MMT) Assessment generalized weakness 4/5  -CHRISTINA       Row Name 04/09/24 1537          Balance    Balance Assessment sitting static balance;sitting dynamic balance;standing static balance;standing dynamic balance  -CHRISTINA     Static Sitting Balance  independent  -CHRISTINA     Dynamic Sitting Balance independent  -CHRISTINA     Position, Sitting Balance unsupported;sitting edge of bed  -CHRISTINA     Static Standing Balance contact guard  -CHRISTINA     Dynamic Standing Balance contact guard  -CHRISTINA     Position/Device Used, Standing Balance supported  HHA of one and use of IV pole for balance  -CHRISTINA               User Key  (r) = Recorded By, (t) = Taken By, (c) = Cosigned By      Initials Name Provider Type    CHRISTINA Julee Saul A, PT Physical Therapist                   Goals/Plan       Row Name 04/09/24 1540          Bed Mobility Goal 1 (PT)    Activity/Assistive Device (Bed Mobility Goal 1, PT) bed mobility activities, all  -CHRISTINA     [place] Level/Cues Needed (Bed Mobility Goal 1, PT) independent  -CHRISTINA     Time Frame (Bed Mobility Goal 1, PT) long term goal (LTG);10 days  -CHRISTINA     Progress/Outcomes (Bed Mobility Goal 1, PT) goal ongoing  -CHRISTINA       Row Name 04/09/24 1540          Transfer Goal 1 (PT)    Activity/Assistive Device (Transfer Goal 1, PT) sit-to-stand/stand-to-sit  -CHRISTINA     [place] Level/Cues Needed (Transfer Goal 1, PT) independent  -CHRISTINA     Time Frame (Transfer Goal 1, PT) long term goal (LTG);10 days  -CHRISTINA     Progress/Outcome (Transfer Goal 1, PT) goal ongoing  -CHRISTINA       Row Name 04/09/24 1540          Gait Training Goal 1 (PT)    Activity/Assistive Device (Gait Training Goal 1, PT) gait (walking locomotion)  -CHRISTINA     [place] Level (Gait Training Goal 1, PT) independent  -CHRISTINA     Distance (Gait Training Goal 1, PT) 500  -CHRISTINA     Time Frame (Gait Training Goal 1, PT) long term goal (LTG);10 days  -CHRISTINA     Progress/Outcome (Gait Training Goal 1, PT) goal ongoing  -CHRISTINA       Row Name 04/09/24 1544          Therapy Assessment/Plan (PT)    Planned Therapy Interventions (PT) balance training;bed mobility training;gait training;home exercise program;transfer training;strengthening  -CHRISTINA               User Key  (r) = Recorded By, (t) = Taken By, (c) = Cosigned By      Initials  Name Provider Type    Julee Hollins, PT Physical Therapist                   Clinical Impression       Row Name 04/09/24 1538          Pain    Pretreatment Pain Rating 0/10 - no pain  -CHRISTINA     Posttreatment Pain Rating 0/10 - no pain  -CHRISTINA       Row Name 04/09/24 1538          Plan of Care Review    Plan of Care Reviewed With patient  -CHRISTINA     Progress improving  -CHRISTINA     Outcome Evaluation PT eval is completed. patient presents with confusion. patient demonstrates impaired bed mobility transfers and gait compared to her baseline status patient was able to ambulate 400 ft with assist of 1 mild balance deficits in standing. anticipate patient to be able to go home with assist of family at D/C  -CHRISTINA       Row Name 04/09/24 1538          Therapy Assessment/Plan (PT)    Patient/Family Therapy Goals Statement (PT) go home  -CHRISTINA     Rehab Potential (PT) good, to achieve stated therapy goals  -CHRISTINA     Criteria for Skilled Interventions Met (PT) yes;skilled treatment is necessary  -CHRISTINA     Therapy Frequency (PT) daily  -CHRISTINA       Row Name 04/09/24 1538          Vital Signs    Pre Patient Position Supine  -CHRISTINA     Intra Patient Position Standing  -CHRISTINA     Post Patient Position Supine  -CHRISTINA       Row Name 04/09/24 1538          Positioning and Restraints    Pre-Treatment Position in bed  -CHRISTINA     Post Treatment Position bed  -CHRISTINA     In Bed notified nsg;supine;encouraged to call for assist;call light within reach;exit alarm on;with family/caregiver  -CHRISTINA               User Key  (r) = Recorded By, (t) = Taken By, (c) = Cosigned By      Initials Name Provider Type    Julee Hollins, PT Physical Therapist                   Outcome Measures       Row Name 04/09/24 1541 04/09/24 0815       How much help from another person do you currently need...    Turning from your back to your side while in flat bed without using bedrails? 4  -CHRISTINA 4  -TH    Moving from lying on back to sitting on the side of a flat bed without bedrails? 4  -CHRISTINA  4  -TH    Moving to and from a bed to a chair (including a wheelchair)? 3  -CHRISTINA 4  -TH    Standing up from a chair using your arms (e.g., wheelchair, bedside chair)? 3  -CHRISTINA 3  -TH    Climbing 3-5 steps with a railing? 3  -CHRISTINA 2  -TH    To walk in hospital room? 3  -CHRISTINA 3  -TH    AM-PAC 6 Clicks Score (PT) 20  -CHRISTINA 20  -TH    Highest Level of Mobility Goal 6 --> Walk 10 steps or more  -CHRISTINA 6 --> Walk 10 steps or more  -TH              User Key  (r) = Recorded By, (t) = Taken By, (c) = Cosigned By      Initials Name Provider Type    Julee Hollins, PT Physical Therapist     Ceferino Kennedy RN Registered Nurse                                 Physical Therapy Education       Title: PT OT SLP Therapies (In Progress)       Topic: Physical Therapy (In Progress)       Point: Mobility training (In Progress)       Learning Progress Summary             Patient Acceptance, E, NR by  at 4/9/2024 1450                         Point: Home exercise program (In Progress)       Learning Progress Summary             Patient Acceptance, E, NR by  at 4/9/2024 1450                         Point: Body mechanics (In Progress)       Learning Progress Summary             Patient Acceptance, E, NR by  at 4/9/2024 1450                         Point: Precautions (In Progress)       Learning Progress Summary             Patient Acceptance, E, NR by  at 4/9/2024 1450                                         User Key       Initials Effective Dates Name Provider Type [facility] 02/03/23 -  Julee Saul PT Physical Therapist PT                  PT Recommendation and Plan  Planned Therapy Interventions (PT): balance training, bed mobility training, gait training, home exercise program, transfer training, strengthening  Plan of Care Reviewed With: patient  Progress: improving  Outcome Evaluation: PT eval is completed. patient presents with confusion. patient demonstrates impaired bed mobility transfers and gait compared to her  baseline status patient was able to ambulate 400 ft with assist of 1 mild balance deficits in standing. anticipate patient to be able to go home with assist of family at D/C     Time Calculation:   PT Evaluation Complexity  History, PT Evaluation Complexity: 3 or more personal factors and/or comorbidities  Examination of Body Systems (PT Eval Complexity): total of 4 or more elements  Clinical Presentation (PT Evaluation Complexity): evolving  Clinical Decision Making (PT Evaluation Complexity): moderate complexity  Overall Complexity (PT Evaluation Complexity): moderate complexity     PT Charges       Row Name 04/09/24 1542             Time Calculation    Start Time 1450  -CHRISTINA      PT Received On 04/09/24  -CHRISTINA      PT Goal Re-Cert Due Date 04/19/24  -CHRISTINA         Untimed Charges    PT Eval/Re-eval Minutes 47  -CHRISTINA         Total Minutes    Untimed Charges Total Minutes 47  -CHRISTINA       Total Minutes 47  -CHRISTINA                User Key  (r) = Recorded By, (t) = Taken By, (c) = Cosigned By      Initials Name Provider Type    Julee Hollins, PT Physical Therapist                  Therapy Charges for Today       Code Description Service Date Service Provider Modifiers Qty    95300107445 HC PT EVAL MOD COMPLEXITY 4 4/9/2024 Julee aSul PT GP 1            PT G-Codes  AM-PAC 6 Clicks Score (PT): 20  PT Discharge Summary  Anticipated Discharge Disposition (PT): home with assist    Julee Saul PT  4/9/2024

## 2024-04-09 NOTE — PROGRESS NOTES
"      [facility] Medicine Services  ADMISSION FOLLOW-UP NOTE          Patient admitted after midnight, H&P by my partner performed earlier on today's date reviewed.  Interim findings, labs, and charting also reviewed.        The [facility] Hospital Problem List has been managed and updated to include any new diagnoses:  Active Hospital Problems    Diagnosis  POA    **Encephalopathy [G93.40]  Yes    Elevated troponin [R79.89]  Yes    Malignant neoplasm of lower lobe of left lung [C34.32]  Yes    T2DM (type 2 diabetes mellitus) [E11.9]  Yes    Hypertension [I10]  Yes    Lesion of temporal lobe [G93.9]  Yes    History of asthma [Z87.09]  Not Applicable    Migratory Lung nodules (\"Soft\" and solid, bilateral) [R91.8]  Yes      Resolved Hospital Problems   No resolved problems to display.         ADDITIONAL PLAN:  - detailed assessment and plan from admission reviewed    Ruby Pettit is a 79 y.o. female with past medical history of GERD, hypertension, metastatic lung cancer, recently completed chemo and radiation treatment, chronic hypoxia on 2 L nasal cannula baseline, hypothyroidism.  She is presenting with confusion from home.  She was brought in by her son.  Patient does not recall what happened, her son reports increasing confusion over the last 2 days.  Her son reported that when he checked in on her by phone call she seemed more confused and disoriented, when he visited he had noticed that she was forgetting to feed her dog and today she had locked herself out of her house and went to a neighbor's house by accident.  He also states that her pill box is full.  Otherwise she has been in her usual state health.  She has recently completed chemotherapy and radiation treatment for a left lower lobe lung mass and follows with Dr. Resendez.  She does not remember any details of what happened however she does not report any current concerns including focal weakness, blurry vision, numbness, tingling, chest " pain, shortness of breath, fevers, chills, abdominal pain, lower extremity edema.  Last bowel movement was yesterday.     Left lower lobe lung cancer, adenocarcinoma.  Status post chemotherapy and radiation.  Status post left lower lobectomy 11/17/2023  New lesion to the brain suspicious for metastasis  New pulmonary nodules in R lung (new from Sept 2023 PET)   -History of temporal lobe lesion in chart however MRI of head obtained in January was normal.  CT imaging of head shows right frontal lobe lesion 1.5 cm with a large amount of vasogenic edema, suspicious for metastatic disease versus primary brain neoplasm.  -She has no focal neurological deficits and is presently oriented but does not have any memory of what happened to her.  -Continue Decadron and Keppra,   -MRI H w+w/o contrast pending  -q 4 neuro-checks  -Hematology oncology and neurosurgery consults pending. Pt follows with Dr Resendez and Dr Qureshi outpatient     Left Lung PNA   -Continue Zosyn/Vanc  -Obtain MRSA PCR  -Respiratory PCR negative   -Strep pneumo, legionella urinary antigen   -On RA-2L NC     Acute encephalopathy  -Suspect secondary to above  -Holding home gabapentin for now as may have contributed especially in light of decrease in renal function     Elevated serum creatinine  -Resolved with IVF      Elevated serum troponin  -Trended down, no complaints of chest pain     Hypothyroidism  -Continue home Synthroid   -TSH ok     Hypertension  -BP elevated  -Resume home meds     Expected Discharge   Expected Discharge Date: 4/12/2024; Expected Discharge Time:      Veronica Jimenez DO  04/09/24

## 2024-04-09 NOTE — H&P
[facility] Medicine Services  HISTORY AND PHYSICAL    Patient Name: Ruby Pettit  : 1945  MRN: [id number]  Primary Care Physician: Ly Funez MD  Date of admission: 2024      Subjective   Subjective     Chief Complaint:  Altered mental status    HPI:  Ruby Pettit is a 79 y.o. female with past medical history of GERD, hypertension, metastatic lung cancer, recently completed chemo and radiation treatment, chronic hypoxia on 2 L nasal cannula baseline, hypothyroidism.  She is presenting with confusion from home.  She was brought in by her son.  Patient does not recall what happened, her son reports increasing confusion over the last 2 days.  Her son reported that when he checked in on her by phone call she seemed more confused and disoriented, when he visited he had noticed that she was forgetting to feed her dog and today she had locked herself out of her house and went to a neighbor's house by accident.  He also states that her pill box is full.  Otherwise she has been in her usual state health.  She has recently completed chemotherapy and radiation treatment for a left lower lobe lung mass and follows with Dr. Resendez.  She does not remember any details of what happened however she does not report any current concerns including focal weakness, blurry vision, numbness, tingling, chest pain, shortness of breath, fevers, chills, abdominal pain, lower extremity edema.  Last bowel movement was yesterday.      Personal History     Past Medical History:   Diagnosis Date    Anxiety and depression     Arthritis     Carotid stenosis, bilateral     Carotid duplex, 2019: Bilateral ICA stenosis 0-49%. Tortuous vessels. Vertebral flow antegrade.        Disease of thyroid gland     GERD (gastroesophageal reflux disease)     Head injury due to trauma     fell down stairs     History of transfusion     NO REACTIONS    Hyperlipidemia     Hypertension     Iatrogenic pneumothorax      Liver disorder     surgery 1998 approx , BLOOD CLOTS    Lung cancer     Metastatic cancer     Perennial rhinitis     Peripheral neuropathic pain     Syncope and collapse     Thyroid disease     UTI (urinary tract infection), bacterial     Wears eyeglasses          Oncology Problem List:  Malignant neoplasm of lower lobe of left lung (07/13/2023; Status:   Active)    Oncology/Hematology History   Malignant neoplasm of lower lobe of left lung   6/26/2023 Initial Diagnosis    Malignant neoplasm of bronchus of left lower lobe     6/29/2023 Cancer Staged    Staging form: Lung, AJCC 8th Edition  - Clinical stage from 6/29/2023: Stage IIIA (cT1c, cN2, cM0) - Signed by Hollie Mike MD on 7/20/2023 7/28/2023 - 9/8/2023 Chemotherapy    OP LUNG  Nivolumab 360mg /  PACLitaxel / CARBOplatin AUC=5     Completed 3 cycles neoadjuvantly     11/17/2023 Surgery    Surgery       Procedure:  Left lower lobectomy and mediastinoscopy with Dr. Kate at [facility] s/p neoadjuvant chemoimmunotherapy per Checkmate [id number]     11/17/2023 Cancer Staged    Staging form: Lung, AJCC 8th Edition  - Pathologic stage from 11/17/2023: Stage IIIA (ypT2, pN2, cM0) - Signed by Hollie Mike MD on 12/18/2023     1/15/2024 - 2/20/2024 Radiation    Radiation OncologyTreatment Course:  Ruby Pettit received 5000 cGy in 25 fractions to mediastinum via External Beam Radiation - EBRT.       Past Surgical History:   Procedure Laterality Date    ABDOMINAL SURGERY      LIVER RESECTION    APPENDECTOMY      BRONCHOSCOPY N/A 02/13/2018    Procedure: BRONCHOSCOPY WITH SANDRITA ENDOBRONCHIAL ULTRASOUND WITH FLUORO;  Surgeon: Dixon Fatima MD;  Location:  [facility];  Service:     BRONCHOSCOPY N/A 03/21/2019    Procedure: NAVIGATIONAL BRONCHOSCOPY;  Surgeon: Dixon Fatima MD;  Location:  [facility];  Service: Pulmonary    BRONCHOSCOPY WITH ION ROBOTIC ASSIST N/A 06/29/2023    Procedure: NAVIGATIONAL BRONCHOSCOPY WITH ION ROBOT;   Surgeon: Dixon Fatima MD;  Location: [facility];  Service: Robotics - Pulmonary;  Laterality: N/A;  Ion cath 3 - 0010,  3 - 0013.    CHOLECYSTECTOMY      COLONOSCOPY  2019    HYSTERECTOMY      2001    LIVER RESECTION      LUNG LOBECTOMY  11/17/2023    OOPHORECTOMY Bilateral     2001    ORIF WRIST FRACTURE Right     THYROID SURGERY      TUBAL ABDOMINAL LIGATION         Family History: family history includes COPD in her father; Cancer in her mother; Emphysema in her father; No Known Problems in her paternal grandfather, paternal grandmother, and sister; Ovarian cancer (age of onset: 19) in her mother; Tuberculosis in her maternal grandfather and maternal grandmother.     Social History:  reports that she has never smoked. She has never used smokeless tobacco. She reports current alcohol use. She reports that she does not use drugs.  Social History     Social History Narrative    Nonsmoker    Has been exposed to secondhand smoke        Has 3 children    No regular alcohol use    Caffeine: 1 cup coffee daily       Medications:  Available home medication information reviewed.  Biotin, HYDROcodone-acetaminophen, Hyoscyamine Sulfate SL, amLODIPine, cetirizine, hydrOXYzine, hydroCHLOROthiazide, levothyroxine, losartan, omeprazole, ondansetron, and vitamin B-12    Allergies   Allergen Reactions    Augmentin [Amoxicillin-Pot Clavulanate] Diarrhea     Has tolerated Ceftriaxone, Cefdinir, Cefuroxime.    Benadryl [Diphenhydramine] Other (See Comments)     High Dose Caused uncontrollable shaking in legs    Codeine Nausea Only    Metformin Diarrhea    Rosuvastatin GI Intolerance       Objective   Objective     Vital Signs:   Temp:  [98.1 °F (36.7 °C)] 98.1 °F (36.7 °C)  Heart Rate:  [52-67] 58  Resp:  [16] 16  BP: ()/(32-76) 138/60  Flow (L/min):  [2] 2       Physical Exam   She is awake alert and oriented x 3  Resting comfortably in bed  Mucous membranes are moist  Neck flexion intact without  pain  No cervical adenopathy or thyromegaly  Heart regular rate and rhythm  Lungs are clear to auscultation bilaterally  There is no abdominal distention or tenderness  Strength equal and intact, symmetric in all extremities  No tremor noted  No lower extremity edema    Result Review:  I have personally reviewed the results from the time of this admission to 4/9/2024 02:03 EDT and agree with these findings:  [x]  Laboratory list / accordion  []  Microbiology  [x]  Radiology  [x]  EKG/Telemetry   []  Cardiology/Vascular   []  Pathology  [x]  Old records  []  Other:  Most notable findings include: See assessment and plan      LAB RESULTS:      Lab 04/08/24 2016   WBC 5.79   HEMOGLOBIN 10.9*   HEMATOCRIT 35.2   PLATELETS 217   NEUTROS ABS 3.00   IMMATURE GRANS (ABS) 0.02   LYMPHS ABS 2.03   MONOS ABS 0.61   EOS ABS 0.11   MCV 96.4   LACTATE 2.0         Lab 04/09/24  0014 04/08/24 2016   SODIUM  --  142   POTASSIUM  --  4.2   CHLORIDE  --  108*   CO2  --  23.0   ANION GAP  --  11.0   BUN  --  21   CREATININE  --  1.11*   EGFR  --  50.7*   GLUCOSE  --  156*   CALCIUM  --  9.1   MAGNESIUM 1.9 2.1   PHOSPHORUS 3.8  --    TSH 3.070  --          Lab 04/08/24 2016   TOTAL PROTEIN 6.0   ALBUMIN 3.9   GLOBULIN 2.1   ALT (SGPT) 30   AST (SGOT) 23   BILIRUBIN 0.5   ALK PHOS 133*         Lab 04/09/24  0014 04/08/24 2016   HSTROP T 32* 35*                 UA          9/15/2023    16:59 1/17/2024    15:30 4/8/2024    20:51   Urinalysis   Squamous Epithelial Cells, UA 7-12  0-2     Specific Gravity, UA 1.023  1.023  1.021    Ketones, UA Negative  Trace  Negative    Blood, UA Trace  Trace  Negative    Leukocytes, UA Small (1+)  Negative  Negative    Nitrite, UA Negative  Negative  Negative    RBC, UA 3-6  0-2     WBC, UA 31-50  6-10     Bacteria, UA 4+  None Seen         Microbiology Results (last 10 days)       Procedure Component Value - Date/Time    COVID PRE-OP / PRE-PROCEDURE SCREENING ORDER (NO ISOLATION) - Swab, Nasopharynx  [[id number]]  (Normal) Collected: 04/08/24 2102    Lab Status: Final result Specimen: Swab from Nasopharynx Updated: 04/08/24 2158    Narrative:      The following orders were created for panel order COVID PRE-OP / PRE-PROCEDURE SCREENING ORDER (NO ISOLATION) - Swab, Nasopharynx.  Procedure                               Abnormality         Status                     ---------                               -----------         ------                     Respiratory Panel PCR w/...[[id number]]  Normal              Final result                 Please view results for these tests on the individual orders.    Respiratory Panel PCR w/COVID-19(SARS-CoV-2) [facility] In-House, NP Swab in UTM/VTM, 2 HR TAT - Swab, Nasopharynx [[id number]]  (Normal) Collected: 04/08/24 2102    Lab Status: Final result Specimen: Swab from Nasopharynx Updated: 04/08/24 2158     ADENOVIRUS, PCR Not Detected     Coronavirus 229E Not Detected     Coronavirus HKU1 Not Detected     Coronavirus NL63 Not Detected     Coronavirus OC43 Not Detected     COVID19 Not Detected     Human Metapneumovirus Not Detected     Human Rhinovirus/Enterovirus Not Detected     Influenza A PCR Not Detected     Influenza B PCR Not Detected     Parainfluenza Virus 1 Not Detected     Parainfluenza Virus 2 Not Detected     Parainfluenza Virus 3 Not Detected     Parainfluenza Virus 4 Not Detected     RSV, PCR Not Detected     Bordetella pertussis pcr Not Detected     Bordetella parapertussis PCR Not Detected     Chlamydophila pneumoniae PCR Not Detected     Mycoplasma pneumo by PCR Not Detected    Narrative:      In the setting of a positive respiratory panel with a viral infection PLUS a negative procalcitonin without other underlying concern for bacterial infection, consider observing off antibiotics or discontinuation of antibiotics and continue supportive care. If the respiratory panel is positive for atypical bacterial infection (Bordetella pertussis,  Chlamydophila pneumoniae, or Mycoplasma pneumoniae), consider antibiotic de-escalation to target atypical bacterial infection.            CT Angiogram Chest    Result Date: 4/8/2024  CT ANGIOGRAM CHEST Date of Exam: 4/8/2024 9:43 PM EDT Indication: History of lung cancer with increasing density to left lung on chest x-ray, radiologist question infection, rule out worsening neoplasm versus infection. Comparison: Same day chest radiograph, 9/26/2023 PET/CT Technique: CTA of the chest was performed after the uneventful administration of intravenous contrast. Reconstructed coronal and sagittal images were also obtained. In addition, a 3-D volume rendered image was created for interpretation. Automated exposure control and iterative reconstruction methods were used. Findings: There is no evidence of pulmonary embolism. The pulmonary arteries are borderline enlarged with the main pulmonary artery measuring up to 3 cm. There is no significant pericardial effusion. Atherosclerotic calcifications of the coronary arteries and aorta. No mediastinal mass or significant lymphadenopathy. There are surgical changes involving the left lung with associated asymmetric volume loss. The central airways are patent. There are scattered groundglass opacities and nodular consolidations throughout the left lung. The right lung shows no focal airspace consolidation. There is a 3 mm right upper lobe pulmonary nodule (axial image 21). There is also a 5 mm right middle lobe pulmonary nodule (axial image 42). Multiple groundglass nodular opacities in the posterior aspect of the right upper and lower lobes, similar to 2023 CT. Chest wall soft tissues show no acute abnormality. There is no evidence of acute fracture or suspicious osseous lesion. Multiple vertebral body hemangiomas. Chronic T11 vertebral body compression deformity. Please see same-day CT of the abdomen and pelvis for findings below the diaphragm.     Impression: Impression:  Surgical changes of the left lung with associated asymmetric volume loss. There are scattered groundglass opacities and nodular consolidations throughout the left lung consistent with pneumonia. Multiple pulmonary nodules in the right lung measuring up to 5 mm, new from September 2023 PET/CT. Recommend follow-up chest CT in 6 months. Electronically Signed: Sergio Hernandez MD  4/8/2024 10:15 PM EDT  Workstation ID: [id number]    XR Chest 1 View    Result Date: 4/8/2024  XR CHEST 1 VW Date of Exam: 4/8/2024 8:39 PM EDT Indication: Weak/Dizzy/AMS triage protocol Comparison: 1/17/2024 Findings: Enlarged cardiac silhouette. Airspace opacities in the left midlung and left lower lung. No visible pleural effusion or pneumothorax. No acute osseous abnormality.     Impression: Impression: Airspace opacities in the left midlung and left lower lung which may represent pneumonia. Enlarged cardiac silhouette, unchanged. Electronically Signed: Sergio Hernandez MD  4/8/2024 8:52 PM EDT  Workstation ID: [id number]    CT Abdomen Pelvis Without Contrast    Result Date: 4/8/2024  CT ABDOMEN PELVIS WO CONTRAST Date of Exam: 4/8/2024 8:29 PM EDT Indication: CVA pain, recurrent UTI, AMS. Comparison: 1/17/2024 Technique: Axial CT images were obtained of the abdomen and pelvis without the administration of contrast. Reconstructed coronal and sagittal images were also obtained. Automated exposure control and iterative construction methods were used. Findings: Partially visualized nodular consolidations in the left lung base. Four-chamber cardiomegaly and pulmonary artery enlargement. Coronary artery calcifications. There are multiple hepatic hypodensities likely representing benign hemangiomas, unchanged from prior studies. No new suspicious hepatic lesion. Cholecystectomy. No significant biliary ductal dilation. The spleen, pancreas, and bilateral adrenal glands are unchanged, including the 2.1 cm nonspecific left adrenal gland nodule. There is no  evidence of hydronephrosis or obstructing nephrolithiasis. Unchanged punctate right mid pole calculus. No evidence of bowel obstruction. Moderate colonic stool burden. No suspicious lymphadenopathy. No evidence of abdominal aortic aneurysm. Atherosclerotic calcifications of the aorta and branch vessels. Urinary bladder is unremarkable. Hysterectomy. The abdominal and pelvic wall soft tissues show no acute abnormality. There is no evidence of acute fracture or suspicious osseous lesion. Healed right lateral rib fractures. Chronic T11 vertebral body compression deformity. Multiple vertebral body hemangiomas..     Impression: Impression: No acute abnormality of the abdomen or pelvis. Partially visualized nodular consolidations in the left lung base which may represent infection. Electronically Signed: Sergio Hernandez MD  4/8/2024 8:51 PM EDT  Workstation ID: [id number]    CT Head Without Contrast    Result Date: 4/8/2024  CT HEAD WO CONTRAST Date of Exam: 4/8/2024 8:29 PM EDT Indication: AMS. Comparison: MRI of the brain performed on January 17, 2024 Technique: Axial CT images were obtained of the head without contrast administration.  Automated exposure control and iterative construction methods were used. Findings: Interval development of large volume vasogenic edema within the right frontal lobe. There is evidence of an underlying hypodense lesion measuring 1.5 cm. There is mild mass effect on the frontal horn of the right lateral ventricle. No intracranial hemorrhage is visualized. Cystic encephalomalacia is visualized in the left temporal lobe measuring 1.4 cm. No evidence of significant midline shift. There is no extra-axial collections. Ventricles are normal in size and configuration for patient's stated age. Posterior fossa is within normal limits. Calvarium and skull base appear intact. Visualized sinuses show no air fluid levels. The mastoid air cells are clear. Visualized orbits are unremarkable.     Impression:  "Impression: No intracranial hemorrhage is visualized. Interval development of large volume vasogenic edema within the right frontal lobe with evidence of an underlying hypodense lesion measuring 1.5 cm. Finding highly suspicious for metastatic disease to the brain. Primary intracranial neoplasm is also a consideration. Correlation with contrast-enhanced MRI would be of value. Findings discussed with ANTONINO Arthur on April 8, 2024 at 8:45 p.m. by telephone. Electronically Signed: Jonn Del Cid MD  4/8/2024 8:45 PM EDT  Workstation ID: [id number]     Results for orders placed during the hospital encounter of 07/08/19    Adult Transthoracic Echo Complete W/ Cont if Necessary Per Protocol    Interpretation Summary  · Estimated EF = 65%. Near cavitary obliteration at rest with contractility  · Left ventricular systolic function is normal.  · Left ventricular diastolic dysfunction (grade I) consistent with impaired relaxation.  · Trace mitral regurgitation  · Trace to mild tricuspid regurgitation      Assessment & Plan   Assessment & Plan       Encephalopathy    Migratory Lung nodules (\"Soft\" and solid, bilateral)    History of asthma    Hypertension    T2DM (type 2 diabetes mellitus)    Lesion of temporal lobe    Malignant neoplasm of lower lobe of left lung    Elevated troponin    Left lower lobe lung cancer, adenocarcinoma.  Status post chemotherapy and radiation.  Status post left lower lobectomy 11/17/2023  New lesion to the brain suspicious for metastasis  -History of temporal lobe lesion in chart however MRI of head obtained in January was normal.  CT imaging of head shows right frontal lobe lesion 1.5 cm with a large amount of vasogenic edema, suspicious for metastatic disease versus primary brain neoplasm.  -She has no focal neurological deficits and is presently oriented but does not have any memory of what happened to her.  -Will start on Decadron and Keppra, obtain MRI H w+w/o, q 4 neurochecks, consult hematology " oncology and neurosurgery in the morning    Acute encephalopathy  -Suspect secondary to consult lobe edema, will also check procalcitonin, thyroid, magnesium, phosphorus  -Hold home gabapentin for now as may have contributed especially in light of decrease in renal function    Elevated serum creatinine  -Creatinine up from baseline, suspect dehydration she is reportedly not been eating or drinking past couple days.  Provide IV hydration overnight and recheck labs in the morning.  -Hold home antihypertensives  -Adjust meds for GFR    Elevated serum troponin  -Now trending down, no complaints of chest pain    Hypothyroidism  -Continue home Synthroid check TSH    Hypertension  -Low range blood pressures on presentation, will hold antihypertensives for now      DVT prophylaxis:  Medical DVT prophylaxis orders are signed and held.            CODE STATUS:    Code Status and Medical Interventions:   Ordered at: 04/09/24 0145     Code Status (Patient has no pulse and is not breathing):    CPR (Attempt to Resuscitate)     Medical Interventions (Patient has pulse or is breathing):    Full Support       Expected Discharge   Expected discharge date/ time has not been documented.     Dixon Constantino MD  04/09/24

## 2024-04-10 LAB
ANION GAP SERPL CALCULATED.3IONS-SCNC: 11 MMOL/L (ref 5–15)
BUN SERPL-MCNC: 16 MG/DL (ref 8–23)
BUN/CREAT SERPL: 20.5 (ref 7–25)
CALCIUM SPEC-SCNC: 9.2 MG/DL (ref 8.6–10.5)
CHLORIDE SERPL-SCNC: 108 MMOL/L (ref 98–107)
CO2 SERPL-SCNC: 22 MMOL/L (ref 22–29)
CREAT SERPL-MCNC: 0.78 MG/DL (ref 0.57–1)
DEPRECATED RDW RBC AUTO: 55.6 FL (ref 37–54)
EGFRCR SERPLBLD CKD-EPI 2021: 77.4 ML/MIN/1.73
ERYTHROCYTE [DISTWIDTH] IN BLOOD BY AUTOMATED COUNT: 16.3 % (ref 12.3–15.4)
GLUCOSE SERPL-MCNC: 148 MG/DL (ref 65–99)
HCT VFR BLD AUTO: 33.1 % (ref 34–46.6)
HGB BLD-MCNC: 10.6 G/DL (ref 12–15.9)
MCH RBC QN AUTO: 29.8 PG (ref 26.6–33)
MCHC RBC AUTO-ENTMCNC: 32 G/DL (ref 31.5–35.7)
MCV RBC AUTO: 93 FL (ref 79–97)
MRSA DNA SPEC QL NAA+PROBE: NEGATIVE
PLATELET # BLD AUTO: 177 10*3/MM3 (ref 140–450)
PMV BLD AUTO: 11.3 FL (ref 6–12)
POTASSIUM SERPL-SCNC: 4.2 MMOL/L (ref 3.5–5.2)
RBC # BLD AUTO: 3.56 10*6/MM3 (ref 3.77–5.28)
SODIUM SERPL-SCNC: 141 MMOL/L (ref 136–145)
WBC NRBC COR # BLD AUTO: 5.69 10*3/MM3 (ref 3.4–10.8)

## 2024-04-10 PROCEDURE — 25010000002 PIPERACILLIN SOD-TAZOBACTAM PER 1 G: Performed by: FAMILY MEDICINE

## 2024-04-10 PROCEDURE — 25010000002 LEVETRIRACETAM PER 10 MG: Performed by: STUDENT IN AN ORGANIZED HEALTH CARE EDUCATION/TRAINING PROGRAM

## 2024-04-10 PROCEDURE — 25010000002 HEPARIN (PORCINE) PER 1000 UNITS: Performed by: STUDENT IN AN ORGANIZED HEALTH CARE EDUCATION/TRAINING PROGRAM

## 2024-04-10 PROCEDURE — 99232 SBSQ HOSP IP/OBS MODERATE 35: CPT | Performed by: FAMILY MEDICINE

## 2024-04-10 PROCEDURE — 87449 NOS EACH ORGANISM AG IA: CPT | Performed by: FAMILY MEDICINE

## 2024-04-10 PROCEDURE — 87641 MR-STAPH DNA AMP PROBE: CPT | Performed by: FAMILY MEDICINE

## 2024-04-10 PROCEDURE — 80048 BASIC METABOLIC PNL TOTAL CA: CPT | Performed by: FAMILY MEDICINE

## 2024-04-10 PROCEDURE — 25010000002 DEXAMETHASONE PER 1 MG: Performed by: STUDENT IN AN ORGANIZED HEALTH CARE EDUCATION/TRAINING PROGRAM

## 2024-04-10 PROCEDURE — 87899 AGENT NOS ASSAY W/OPTIC: CPT | Performed by: FAMILY MEDICINE

## 2024-04-10 PROCEDURE — 85027 COMPLETE CBC AUTOMATED: CPT | Performed by: FAMILY MEDICINE

## 2024-04-10 PROCEDURE — 25010000002 DEXAMETHASONE PER 1 MG: Performed by: NEUROLOGICAL SURGERY

## 2024-04-10 PROCEDURE — 97165 OT EVAL LOW COMPLEX 30 MIN: CPT

## 2024-04-10 PROCEDURE — 99222 1ST HOSP IP/OBS MODERATE 55: CPT | Performed by: INTERNAL MEDICINE

## 2024-04-10 PROCEDURE — 99214 OFFICE O/P EST MOD 30 MIN: CPT | Performed by: NEUROLOGICAL SURGERY

## 2024-04-10 PROCEDURE — 97116 GAIT TRAINING THERAPY: CPT

## 2024-04-10 PROCEDURE — 97110 THERAPEUTIC EXERCISES: CPT

## 2024-04-10 PROCEDURE — 25010000002 VANCOMYCIN HCL IN NACL 1.5-0.9 GM/500ML-% SOLUTION

## 2024-04-10 RX ORDER — DEXAMETHASONE SODIUM PHOSPHATE 4 MG/ML
4 INJECTION, SOLUTION INTRA-ARTICULAR; INTRALESIONAL; INTRAMUSCULAR; INTRAVENOUS; SOFT TISSUE 2 TIMES DAILY
Status: DISCONTINUED | OUTPATIENT
Start: 2024-04-10 | End: 2024-04-11

## 2024-04-10 RX ADMIN — HEPARIN SODIUM 5000 UNITS: 5000 INJECTION INTRAVENOUS; SUBCUTANEOUS at 09:46

## 2024-04-10 RX ADMIN — AMLODIPINE BESYLATE 5 MG: 5 TABLET ORAL at 09:45

## 2024-04-10 RX ADMIN — Medication 10 ML: at 21:00

## 2024-04-10 RX ADMIN — PANTOPRAZOLE SODIUM 40 MG: 40 TABLET, DELAYED RELEASE ORAL at 05:32

## 2024-04-10 RX ADMIN — Medication 1500 MG: at 01:17

## 2024-04-10 RX ADMIN — DEXAMETHASONE SODIUM PHOSPHATE 4 MG: 4 INJECTION, SOLUTION INTRA-ARTICULAR; INTRALESIONAL; INTRAMUSCULAR; INTRAVENOUS; SOFT TISSUE at 23:17

## 2024-04-10 RX ADMIN — DEXAMETHASONE SODIUM PHOSPHATE 4 MG: 4 INJECTION, SOLUTION INTRA-ARTICULAR; INTRALESIONAL; INTRAMUSCULAR; INTRAVENOUS; SOFT TISSUE at 01:17

## 2024-04-10 RX ADMIN — PIPERACILLIN AND TAZOBACTAM 3.38 G: 3; .375 INJECTION, POWDER, LYOPHILIZED, FOR SOLUTION INTRAVENOUS at 12:11

## 2024-04-10 RX ADMIN — HEPARIN SODIUM 5000 UNITS: 5000 INJECTION INTRAVENOUS; SUBCUTANEOUS at 17:38

## 2024-04-10 RX ADMIN — DEXAMETHASONE SODIUM PHOSPHATE 4 MG: 4 INJECTION, SOLUTION INTRA-ARTICULAR; INTRALESIONAL; INTRAMUSCULAR; INTRAVENOUS; SOFT TISSUE at 10:57

## 2024-04-10 RX ADMIN — PIPERACILLIN AND TAZOBACTAM 3.38 G: 3; .375 INJECTION, POWDER, LYOPHILIZED, FOR SOLUTION INTRAVENOUS at 04:30

## 2024-04-10 RX ADMIN — Medication 10 ML: at 09:50

## 2024-04-10 RX ADMIN — LOSARTAN POTASSIUM 50 MG: 50 TABLET, FILM COATED ORAL at 09:45

## 2024-04-10 RX ADMIN — LEVETIRACETAM 500 MG: 100 INJECTION, SOLUTION INTRAVENOUS at 23:17

## 2024-04-10 RX ADMIN — HEPARIN SODIUM 5000 UNITS: 5000 INJECTION INTRAVENOUS; SUBCUTANEOUS at 01:17

## 2024-04-10 RX ADMIN — PIPERACILLIN AND TAZOBACTAM: 3; .375 INJECTION, POWDER, LYOPHILIZED, FOR SOLUTION INTRAVENOUS at 23:17

## 2024-04-10 RX ADMIN — LEVETIRACETAM 500 MG: 100 INJECTION, SOLUTION INTRAVENOUS at 09:50

## 2024-04-10 RX ADMIN — LEVOTHYROXINE SODIUM 50 MCG: 0.05 TABLET ORAL at 05:32

## 2024-04-10 RX ADMIN — HYDROCODONE BITARTRATE AND ACETAMINOPHEN 1 TABLET: 5; 325 TABLET ORAL at 12:12

## 2024-04-10 NOTE — CONSULTS
HEMATOLOGY/ONCOLOGY INPATIENT CONSULTATION      REFERRING PHYSICIAN: Veronica Jimenez DO    PRIMARY CARE PROVIDER: Ly Funez MD    REASON FOR CONSULTATION: Brain mets from lung cancer      HISTORY OF PRESENT ILLNESS: 79-year-old lady treated with neoadjuvant chemotherapy with immunotherapy and proceeded with surgery with Dr. Tra Kate at .  Subsequently developed COVID and was fairly debilitated.  She received involved field radiotherapy to her chest after surgery was completed.  She has fairly high risk disease and now presents with confusion.  Found to have a large right frontal lobe brain met.  She also has multiple lung mets that are presenting now.  She denies any headaches no weakness or vision changes.  She does have some encephalopathy at times.  There is some baseline cognitive issues in the past.  But now she presents with altered mental status that seems to be better overnight.      Allergies   Allergen Reactions   • Augmentin [Amoxicillin-Pot Clavulanate] Diarrhea     Has tolerated Ceftriaxone, Cefdinir, Cefuroxime.   • Benadryl [Diphenhydramine] Other (See Comments)     High Dose Caused uncontrollable shaking in legs   • Codeine Nausea Only   • Metformin Diarrhea   • Rosuvastatin GI Intolerance       Past Medical History:   Diagnosis Date   • Anxiety and depression    • Arthritis    • Carotid stenosis, bilateral     Carotid duplex, 01/16/2019: Bilateral ICA stenosis 0-49%. Tortuous vessels. Vertebral flow antegrade.       • Disease of thyroid gland    • GERD (gastroesophageal reflux disease)    • Head injury due to trauma 1992    fell down stairs    • History of transfusion     NO REACTIONS   • Hyperlipidemia    • Hypertension    • Iatrogenic pneumothorax    • Liver disorder     surgery 1998 approx , BLOOD CLOTS   • Lung cancer    • Metastatic cancer    • Perennial rhinitis    • Peripheral neuropathic pain    • Syncope and collapse    • Thyroid disease    • UTI (urinary tract  infection), bacterial    • Wears eyeglasses          Current Facility-Administered Medications:   •  acetaminophen (TYLENOL) tablet 650 mg, 650 mg, Oral, Q4H PRN, Dixon Constantino MD  •  amLODIPine (NORVASC) tablet 5 mg, 5 mg, Oral, Q24H, Veronica Jimenez DO, 5 mg at 04/10/24 0945  •  sennosides-docusate (PERICOLACE) 8.6-50 MG per tablet 2 tablet, 2 tablet, Oral, BID PRN **AND** polyethylene glycol (MIRALAX) packet 17 g, 17 g, Oral, Daily PRN **AND** bisacodyl (DULCOLAX) EC tablet 5 mg, 5 mg, Oral, Daily PRN **AND** bisacodyl (DULCOLAX) suppository 10 mg, 10 mg, Rectal, Daily PRN, Dixon Constantino MD  •  Calcium Replacement - Follow Nurse / BPA Driven Protocol, , Does not apply, PRN, Dixon Constantino MD  •  dexAMETHasone (DECADRON) injection 4 mg, 4 mg, Intravenous, BID, Jono Henderson MD  •  heparin (porcine) 5000 UNIT/ML injection 5,000 Units, 5,000 Units, Subcutaneous, Q8H, Dixon Constantino MD, 5,000 Units at 04/10/24 0946  •  [Held by provider] hydroCHLOROthiazide (MICROZIDE) capsule 12.5 mg, 12.5 mg, Oral, Daily, Dixon Constantino MD  •  HYDROcodone-acetaminophen (NORCO) 5-325 MG per tablet 1 tablet, 1 tablet, Oral, Q12H PRN, Dixon Constantino MD, 1 tablet at 04/09/24 2004  •  levETIRAcetam (KEPPRA) injection 500 mg, 500 mg, Intravenous, Q12H, Dixon Constantino MD, 500 mg at 04/10/24 0950  •  levothyroxine (SYNTHROID, LEVOTHROID) tablet 50 mcg, 50 mcg, Oral, Daily, Dixon Constantino MD, 50 mcg at 04/10/24 0532  •  losartan (COZAAR) tablet 50 mg, 50 mg, Oral, Q24H, Veronica Jimenez DO, 50 mg at 04/10/24 0945  •  Magnesium Standard Dose Replacement - Follow Nurse / BPA Driven Protocol, , Does not apply, PRN, Dixon Constantino MD  •  pantoprazole (PROTONIX) EC tablet 40 mg, 40 mg, Oral, Q AM, Dixon Constantino MD, 40 mg at 04/10/24 0532  •  Pharmacy to dose vancomycin, , Does not apply, Continuous PRN, Veronica Jimenez DO  •  Phosphorus Replacement - Follow Nurse / BPA Driven Protocol, , Does  not apply, Dougie KHAN John L, MD  •  piperacillin-tazobactam (ZOSYN) 3.375 g IVPB in 100 mL NS MBP (CD), 3.375 g, Intravenous, Q8H, Veronica Jimenez DO, 3.375 g at 04/10/24 0430  •  Potassium Replacement - Follow Nurse / BPA Driven Protocol, , Does not apply, Dougie KHAN John L, MD  •  sodium chloride 0.9 % flush 10 mL, 10 mL, Intravenous, PRN, Riaz Munson MD  •  sodium chloride 0.9 % flush 10 mL, 10 mL, Intravenous, Q12H, Dixon Constantino MD, 10 mL at 04/10/24 0950  •  sodium chloride 0.9 % flush 10 mL, 10 mL, Intravenous, PRN, Dixon Constantino MD  •  sodium chloride 0.9 % infusion 40 mL, 40 mL, Intravenous, PRN, Dixon Constantino MD  •  vancomycin IVPB 1500 mg in 0.9% NaCl (Premix) 500 mL, 1,500 mg, Intravenous, Q24H, Leonel Baker, PharmD, Last Rate: 333.3 mL/hr at 04/10/24 0117, 1,500 mg at 04/10/24 0117    Past Surgical History:   Procedure Laterality Date   • ABDOMINAL SURGERY      LIVER RESECTION   • APPENDECTOMY     • BRONCHOSCOPY N/A 02/13/2018    Procedure: BRONCHOSCOPY WITH SANDRITA ENDOBRONCHIAL ULTRASOUND WITH FLUORO;  Surgeon: Dixon Fatima MD;  Location:  [facility];  Service:    • BRONCHOSCOPY N/A 03/21/2019    Procedure: NAVIGATIONAL BRONCHOSCOPY;  Surgeon: Dixon Fatima MD;  Location:  [facility];  Service: Pulmonary   • BRONCHOSCOPY WITH ION ROBOTIC ASSIST N/A 06/29/2023    Procedure: NAVIGATIONAL BRONCHOSCOPY WITH ION ROBOT;  Surgeon: Dixon Fatima MD;  Location:  [facility];  Service: Robotics - Pulmonary;  Laterality: N/A;  Ion cath 3 - 0010,  3 - 0013.   • CHOLECYSTECTOMY     • COLONOSCOPY  2019   • HYSTERECTOMY      2001   • LIVER RESECTION     • LUNG LOBECTOMY  11/17/2023   • OOPHORECTOMY Bilateral     2001   • ORIF WRIST FRACTURE Right    • THYROID SURGERY     • TUBAL ABDOMINAL LIGATION         Social History     Socioeconomic History   • Marital status:    • Number of children: 3   Tobacco Use   • Smoking status: Never    • Smokeless tobacco: Never   Vaping Use   • Vaping status: Never Used   Substance and Sexual Activity   • Alcohol use: Yes     Alcohol/week: 0.0 - 2.0 standard drinks of alcohol     Comment: seldom    • Drug use: No   • Sexual activity: Defer       Family History   Problem Relation Age of Onset   • Cancer Mother    • Ovarian cancer Mother 19   • Emphysema Father    • COPD Father    • No Known Problems Sister    • Tuberculosis Maternal Grandmother    • Tuberculosis Maternal Grandfather    • No Known Problems Paternal Grandmother    • No Known Problems Paternal Grandfather    • Breast cancer Neg Hx        Oncology/Hematology History   Malignant neoplasm of lower lobe of left lung   6/26/2023 Initial Diagnosis    Malignant neoplasm of bronchus of left lower lobe     6/29/2023 Cancer Staged    Staging form: Lung, AJCC 8th Edition  - Clinical stage from 6/29/2023: Stage IIIA (cT1c, cN2, cM0) - Signed by Hollie Mike MD on 7/20/2023 7/20/2023 Molecular Testing    Molecular testing - [facility]     Negative for EGFR, BRAF and ALK     7/20/2023 Imaging    Pet Scan    IMPRESSION:  Impression:  Hypermetabolic left lower lobe lung mass consistent with known primary malignancy. There are hypermetabolic left hilar and mediastinal lymph nodes consistent with regional metastasis.     Additional groundglass and subsolid lesions within the lungs most prominently along the anterior segment of the right upper lobe demonstrate little to no FDG uptake but remain suspicious for synchronous neoplasm. Recommend attention on follow-up.     Multiple hypodense lesions within the liver without substantial FDG uptake. These appear to be consistent with hemangiomas on prior examinations.     Similar size of a left adrenal nodule with increased metabolic activity. Given the stability of size this is still most likely a benign finding but recommend continued attention on follow-up.     Hypermetabolic focus near the left C4-5 facet  joint with a questionable lytic lesion in this area. Recommend follow-up with MRI of the cervical spine and/or bone scan to further evaluate.        Electronically Signed: Tolu Meghann    7/19/2023 6:05 PM EDT    Workstation ID: [id number]     7/28/2023 - 9/8/2023 Chemotherapy    OP LUNG  Nivolumab 360mg /  PACLitaxel / CARBOplatin AUC=5     Completed 3 cycles neoadjuvantly     9/26/2023 Imaging    NM PET/CT Skull Base to Mid Thigh    Result Date: 9/27/2023  Impression: 1.The left lower lobe mass is significantly decreased in size and metabolic activity, indicating a positive response to therapy. There is also resolution of previously seen hypermetabolic subcarinal lymph node and stable size of a mildly enlarged left hilar lymph node without metabolic activity. 2.Hypermetabolic focus associated with a lucency at the left C3-C4 facet joint. This could represent a metastatic lesion or degenerative erosion. No additional hypermetabolic bone lesions. 3.Stable triangular focus of airspace disease in the anterior right upper lobe with low level metabolic activity. This is favored to represent an area of pneumonia or inflammation. Metastatic disease is considered less likely. Continued follow-up is recommended. Electronically Signed: Sergio Spivey MD  9/27/2023 9:03 AM EDT  Workstation ID: [id number]       11/17/2023 Surgery    Surgery       Procedure:  Left lower lobectomy and mediastinoscopy with Dr. Kate at [facility] s/p neoadjuvant chemoimmunotherapy per [facility] 816     11/17/2023 Cancer Staged    Staging form: Lung, AJCC 8th Edition  - Pathologic stage from 11/17/2023: Stage IIIA (ypT2, pN2, cM0) - Signed by Hollie Mike MD on 12/18/2023     1/15/2024 - 2/20/2024 Radiation    Radiation OncologyTreatment Course:  Ruby Pettit received 5000 cGy in 25 fractions to mediastinum via External Beam Radiation - EBRT.           REVIEW OF SYSTEMS:  A 14 point review of systems was performed and is negative except as  noted below.    Review of Systems   Constitutional: Negative.    HENT:  Negative.     Eyes: Negative.    Respiratory: Negative.     Cardiovascular: Negative.    Gastrointestinal: Negative.    Endocrine: Negative.    Genitourinary: Negative.     Musculoskeletal: Negative.    Skin: Negative.    Neurological: Negative.    Hematological: Negative.    Psychiatric/Behavioral:  Positive for confusion and decreased concentration.          Objective     Vitals:    04/10/24 0500 04/10/24 0600 04/10/24 0740 04/10/24 0945   BP:   151/77 150/72   BP Location:   Left arm    Patient Position:   Lying    Pulse: 58 60  75   Resp: 20 18 18    Temp:   98.2 °F (36.8 °C)    TempSrc:   Oral    SpO2:       Weight:       Height:                       Temp:  [97.9 °F (36.6 °C)-98.7 °F (37.1 °C)] 98.2 °F (36.8 °C)     Performance Status: 2    Physical Exam    General: well appearing female in no acute distress  HEENT: sclerae anicteric,   Lymphatics: no cervical, supraclavicular, or axillary adenopathy  Cardiovascular: regular rate and rhythm, no murmurs  Lungs: clear to auscultation bilaterally  Abdomen: soft, nontender, nondistended.  No palpable organomegaly  Extremities: no lower extremity edema  Skin: no rashes, lesions, bruising, or petechiae      LABS:    Lab Results   Component Value Date    HGB 10.6 (L) 04/10/2024    HCT 33.1 (L) 04/10/2024    MCV 93.0 04/10/2024     04/10/2024    WBC 5.69 04/10/2024    NEUTROABS 3.00 04/08/2024    LYMPHSABS 2.03 04/08/2024    MONOSABS 0.61 04/08/2024    EOSABS 0.11 04/08/2024    BASOSABS 0.02 04/08/2024     Lab Results   Component Value Date    GLUCOSE 148 (H) 04/10/2024    BUN 16 04/10/2024    CREATININE 0.78 04/10/2024     04/10/2024    K 4.2 04/10/2024     (H) 04/10/2024    CO2 22.0 04/10/2024    CALCIUM 9.2 04/10/2024    PROTEINTOT 6.0 04/08/2024    ALBUMIN 3.9 04/08/2024    BILITOT 0.5 04/08/2024    ALKPHOS 133 (H) 04/08/2024    AST 23 04/08/2024    ALT 30 04/08/2024          IMAGING    MRI Brain With & Without Contrast    Result Date: 4/10/2024  MRI BRAIN W WO CONTRAST Date of Exam: 4/9/2024 10:53 PM EDT Indication: Metastatic disease evaluation.  Comparison: CT 4/8/2024. Technique:  Routine multiplanar/multisequence sequence images of the brain were obtained before and after the uneventful administration of 17 mL Multihance. Findings: No acute infarct is present on diffusion weighted sequences. Corresponding with the edematous mass seen on CT, there is a peripherally enhancing and centrally cystic finding present in the right frontal lobe measuring 23 x 20 mm, likely metastatic. There  is surrounding vasogenic edema including some mild associated leftward midline shift of around 4 to 5 mm. The right frontal horn of the lateral ventricle is mildly effaced. No additional hemorrhage, mass or abnormal enhancement is present. The orbits are normal. Mild generalized volume loss is present. An area of cystic volume loss in the left anterior temporal lobe appears similar to prior exams with some mild surrounding gliosis.     Impression: Solitary enhancing cystic and solid right frontal lobe lesion with considerable surrounding vasogenic edema, favored metastatic. Electronically Signed: Destin Henry MD  4/10/2024 9:29 AM EDT  Workstation ID: [id number]    CT Angiogram Chest    Result Date: 4/8/2024  CT ANGIOGRAM CHEST Date of Exam: 4/8/2024 9:43 PM EDT Indication: History of lung cancer with increasing density to left lung on chest x-ray, radiologist question infection, rule out worsening neoplasm versus infection. Comparison: Same day chest radiograph, 9/26/2023 PET/CT Technique: CTA of the chest was performed after the uneventful administration of intravenous contrast. Reconstructed coronal and sagittal images were also obtained. In addition, a 3-D volume rendered image was created for interpretation. Automated exposure control and iterative reconstruction methods were used. Findings:  There is no evidence of pulmonary embolism. The pulmonary arteries are borderline enlarged with the main pulmonary artery measuring up to 3 cm. There is no significant pericardial effusion. Atherosclerotic calcifications of the coronary arteries and aorta. No mediastinal mass or significant lymphadenopathy. There are surgical changes involving the left lung with associated asymmetric volume loss. The central airways are patent. There are scattered groundglass opacities and nodular consolidations throughout the left lung. The right lung shows no focal airspace consolidation. There is a 3 mm right upper lobe pulmonary nodule (axial image 21). There is also a 5 mm right middle lobe pulmonary nodule (axial image 42). Multiple groundglass nodular opacities in the posterior aspect of the right upper and lower lobes, similar to 2023 CT. Chest wall soft tissues show no acute abnormality. There is no evidence of acute fracture or suspicious osseous lesion. Multiple vertebral body hemangiomas. Chronic T11 vertebral body compression deformity. Please see same-day CT of the abdomen and pelvis for findings below the diaphragm.     Impression: Surgical changes of the left lung with associated asymmetric volume loss. There are scattered groundglass opacities and nodular consolidations throughout the left lung consistent with pneumonia. Multiple pulmonary nodules in the right lung measuring up to 5 mm, new from September 2023 PET/CT. Recommend follow-up chest CT in 6 months. Electronically Signed: Sergio Hernandez MD  4/8/2024 10:15 PM EDT  Workstation ID: [id number]    XR Chest 1 View    Result Date: 4/8/2024  XR CHEST 1 VW Date of Exam: 4/8/2024 8:39 PM EDT Indication: Weak/Dizzy/AMS triage protocol Comparison: 1/17/2024 Findings: Enlarged cardiac silhouette. Airspace opacities in the left midlung and left lower lung. No visible pleural effusion or pneumothorax. No acute osseous abnormality.     Impression: Airspace opacities in the  left midlung and left lower lung which may represent pneumonia. Enlarged cardiac silhouette, unchanged. Electronically Signed: Sergio Hernandez MD  4/8/2024 8:52 PM EDT  Workstation ID: [id number]    CT Abdomen Pelvis Without Contrast    Result Date: 4/8/2024  CT ABDOMEN PELVIS WO CONTRAST Date of Exam: 4/8/2024 8:29 PM EDT Indication: CVA pain, recurrent UTI, AMS. Comparison: 1/17/2024 Technique: Axial CT images were obtained of the abdomen and pelvis without the administration of contrast. Reconstructed coronal and sagittal images were also obtained. Automated exposure control and iterative construction methods were used. Findings: Partially visualized nodular consolidations in the left lung base. Four-chamber cardiomegaly and pulmonary artery enlargement. Coronary artery calcifications. There are multiple hepatic hypodensities likely representing benign hemangiomas, unchanged from prior studies. No new suspicious hepatic lesion. Cholecystectomy. No significant biliary ductal dilation. The spleen, pancreas, and bilateral adrenal glands are unchanged, including the 2.1 cm nonspecific left adrenal gland nodule. There is no evidence of hydronephrosis or obstructing nephrolithiasis. Unchanged punctate right mid pole calculus. No evidence of bowel obstruction. Moderate colonic stool burden. No suspicious lymphadenopathy. No evidence of abdominal aortic aneurysm. Atherosclerotic calcifications of the aorta and branch vessels. Urinary bladder is unremarkable. Hysterectomy. The abdominal and pelvic wall soft tissues show no acute abnormality. There is no evidence of acute fracture or suspicious osseous lesion. Healed right lateral rib fractures. Chronic T11 vertebral body compression deformity. Multiple vertebral body hemangiomas..     Impression: No acute abnormality of the abdomen or pelvis. Partially visualized nodular consolidations in the left lung base which may represent infection. Electronically Signed: Sergio Hernandez MD   4/8/2024 8:51 PM EDT  Workstation ID: [id number]    CT Head Without Contrast    Result Date: 4/8/2024  CT HEAD WO CONTRAST Date of Exam: 4/8/2024 8:29 PM EDT Indication: AMS. Comparison: MRI of the brain performed on January 17, 2024 Technique: Axial CT images were obtained of the head without contrast administration.  Automated exposure control and iterative construction methods were used. Findings: Interval development of large volume vasogenic edema within the right frontal lobe. There is evidence of an underlying hypodense lesion measuring 1.5 cm. There is mild mass effect on the frontal horn of the right lateral ventricle. No intracranial hemorrhage is visualized. Cystic encephalomalacia is visualized in the left temporal lobe measuring 1.4 cm. No evidence of significant midline shift. There is no extra-axial collections. Ventricles are normal in size and configuration for patient's stated age. Posterior fossa is within normal limits. Calvarium and skull base appear intact. Visualized sinuses show no air fluid levels. The mastoid air cells are clear. Visualized orbits are unremarkable.     Impression: No intracranial hemorrhage is visualized. Interval development of large volume vasogenic edema within the right frontal lobe with evidence of an underlying hypodense lesion measuring 1.5 cm. Finding highly suspicious for metastatic disease to the brain. Primary intracranial neoplasm is also a consideration. Correlation with contrast-enhanced MRI would be of value. Findings discussed with ANTONINO Arthur on April 8, 2024 at 8:45 p.m. by telephone. Electronically Signed: Jonn Del Cid MD  4/8/2024 8:45 PM EDT  Workstation ID: [id number]      ASSESSMENT/PLAN:    1.  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  She had a fairly aggressive appearing lung cancer.  At this time I have recommended intervention on the brain met with radiation.  Will have to consider systemic therapy once she is feeling better from her treatment.  I  had a conversation with her regarding prognosis today.  Obviously with the brain met and having a fairly aggressive lung cancer her prognosis is poor.  My option would be to treat her systemically with chemo and immunotherapy at this time.  She did respond to treatment in the past though the response was fairly short-lived and came with significant toxicity.  So I will have to dose reduce her treatments and also send her tissue for NexGen sequencing.        Hollie Mike MD    4/10/2024

## 2024-04-10 NOTE — CASE MANAGEMENT/SOCIAL WORK
Discharge Planning Assessment  [facility]     Patient Name: Ruby Pettit  MRN: [id number]  Today's Date: 4/10/2024    Admit Date: 4/8/2024    Plan: Home   Discharge Needs Assessment       Row Name 04/10/24 0853       Living Environment    People in Home alone    Current Living Arrangements home    Primary Care Provided by self    Provides Primary Care For no one    Family Caregiver if Needed child(amalia), adult;friend(s)    Family Caregiver Names Geiovanni Angelucci, Juwan    Quality of Family Relationships helpful;involved;supportive    Able to Return to Prior Arrangements yes       Transition Planning    Patient/Family Anticipates Transition to home with family;home with help/services    Patient/Family Anticipated Services at Transition     Transportation Anticipated family or friend will provide       Discharge Needs Assessment    Readmission Within the Last 30 Days no previous admission in last 30 days    Equipment Currently Used at Home rollator;cane, straight;bp cuff;shower chair                   Discharge Plan       Row Name 04/10/24 0855       Plan    Plan Home    Plan Comments Spoke with patient at bedside to initiate discharge planning. Patient lives alone in [facility]. Prior to admission, was no current with any home health agencies. Mrs. Pettit stated she is independent with ADL's, and has a straight cane and rollator at home that she uses when needed. Spoke with son, Juhi, who confirmed patient has stated DME and that prior to admission was independent at home. Confirmed PCP is Ly Funez. Verified insurance is [employer]. Patient has Rx coverage and has medications filled at [facility]. Will await therapy notes and recommendations to assist with proper discharge placement. CM will continue to follow.    Final Discharge Disposition Code 01 - home or self-care                  Continued Care and Services - Admitted Since 4/8/2024    No active coordination exists for this  encounter.       Expected Discharge Date and Time       Expected Discharge Date Expected Discharge Time    Apr 12, 2024            Demographic Summary       Row Name 04/10/24 0852       General Information    Arrived From emergency department    Reason for Consult discharge planning    Preferred Language English       Contact Information    Permission Granted to Share Info With ;family/designee                   Functional Status       Row Name 04/10/24 0852       Functional Status    Usual Activity Tolerance moderate    Current Activity Tolerance moderate       Functional Status, IADL    Medications independent    Meal Preparation independent    Housekeeping independent    Laundry independent    Shopping assistive person       Employment/    Employment Status retired                   Psychosocial    No documentation.                  Abuse/Neglect    No documentation.                  Legal    No documentation.                  Substance Abuse    No documentation.                  Patient Forms    No documentation.                     Renan Groves RN

## 2024-04-10 NOTE — THERAPY TREATMENT NOTE
"Patient Name: Ruby Pettit  : 1945    MRN: [id number]                              Today's Date: 4/10/2024       Admit Date: 2024    Visit Dx:     ICD-10-CM ICD-9-CM   1. Altered mental status, unspecified altered mental status type  R41.82 780.97   2. Metastatic cancer to brain  C79.31 198.3   3. Adenocarcinoma of left lung  C34.92 162.9   4. Pneumonia of left lung due to infectious organism, unspecified part of lung  J18.9 486   5. Orthostatic hypotension  I95.1 458.0   6. Syncope and collapse  R55 780.2   7. History of hypertension  Z86.79 V12.59   8. History of hyperlipidemia  Z86.39 V12.29   9. History of gastroesophageal reflux (GERD)  Z87.19 V12.79   10. Malignant neoplasm of lower lobe of left lung  C34.32 162.5     Patient Active Problem List   Diagnosis    Migratory Lung nodules (\"Soft\" and solid, bilateral)    Non-smoker    History of asthma    Cough    Hypertension    T2DM (type 2 diabetes mellitus)    Neuropathy    Lesion of temporal lobe    GERD    Malignant neoplasm of lower lobe of left lung    Elevated troponin    COVID-19    Encephalopathy     Past Medical History:   Diagnosis Date    Anxiety and depression     Arthritis     Carotid stenosis, bilateral     Carotid duplex, 2019: Bilateral ICA stenosis 0-49%. Tortuous vessels. Vertebral flow antegrade.        Disease of thyroid gland     GERD (gastroesophageal reflux disease)     Head injury due to trauma     fell down stairs     History of transfusion     NO REACTIONS    Hyperlipidemia     Hypertension     Iatrogenic pneumothorax     Liver disorder     surgery  approx , BLOOD CLOTS    Lung cancer     Metastatic cancer     Perennial rhinitis     Peripheral neuropathic pain     Syncope and collapse     Thyroid disease     UTI (urinary tract infection), bacterial     Wears eyeglasses      Past Surgical History:   Procedure Laterality Date    ABDOMINAL SURGERY      LIVER RESECTION    APPENDECTOMY      BRONCHOSCOPY N/A " 02/13/2018    Procedure: BRONCHOSCOPY WITH SANDRITA ENDOBRONCHIAL ULTRASOUND WITH FLUORO;  Surgeon: Dixon Fatima MD;  Location:  [facility];  Service:     BRONCHOSCOPY N/A 03/21/2019    Procedure: NAVIGATIONAL BRONCHOSCOPY;  Surgeon: Dixon Fatima MD;  Location:  [facility];  Service: Pulmonary    BRONCHOSCOPY WITH ION ROBOTIC ASSIST N/A 06/29/2023    Procedure: NAVIGATIONAL BRONCHOSCOPY WITH ION ROBOT;  Surgeon: Dixon Fatima MD;  Location:  [facility];  Service: Robotics - Pulmonary;  Laterality: N/A;  Ion cath 3 - 0010,  3 - 0013.    CHOLECYSTECTOMY      COLONOSCOPY  2019    HYSTERECTOMY      2001    LIVER RESECTION      LUNG LOBECTOMY  11/17/2023    OOPHORECTOMY Bilateral     2001    ORIF WRIST FRACTURE Right     THYROID SURGERY      TUBAL ABDOMINAL LIGATION        General Information       Row Name 04/10/24 1005          Physical Therapy Time and Intention    Document Type therapy note (daily note)  -AS     Mode of Treatment physical therapy  -AS       Row Name 04/10/24 1005          General Information    Patient Profile Reviewed yes  -AS     Existing Precautions/Restrictions fall  -AS     Barriers to Rehab cognitive status  -AS       Row Name 04/10/24 1005          Cognition    Orientation Status (Cognition) oriented to;person;place  -AS       Row Name 04/10/24 1005          Safety Issues, Functional Mobility    Safety Issues Affecting Function (Mobility) awareness of need for assistance;insight into deficits/self-awareness;positioning of assistive device;safety precaution awareness;safety precautions follow-through/compliance;sequencing abilities;ability to follow commands  -AS     Impairments Affecting Function (Mobility) balance;endurance/activity tolerance;strength  -AS     Comment, Safety Issues/Impairments (Mobility) patient needed constant cues for safe use of walker and to avoid obstacles in hallway/room  -AS               User Key  (r) = Recorded By, (t) =  Taken By, (c) = Cosigned By      Initials Name Provider Type    AS Christie Cisneros PTA Physical Therapist Assistant                   Mobility       Row Name 04/10/24 1006          Bed Mobility    Comment, (Bed Mobility) sitting [facility] pre/post tx  -AS       Row Name 04/10/24 1006          Transfers    Comment, (Transfers) cues for hand placement  -AS       Row Name 04/10/24 1006          Sit-Stand Transfer    Sit-Stand [place] (Transfers) verbal cues;contact guard;1 person assist  -AS     Assistive Device (Sit-Stand Transfers) walker, front-wheeled  -AS       Row Name 04/10/24 1006          Gait/Stairs (Locomotion)    [place] Level (Gait) verbal cues;contact guard;1 person assist  -AS     Assistive Device (Gait) walker, front-wheeled  -AS     Distance in Feet (Gait) 400  -AS     Deviations/Abnormal Patterns (Gait) alda decreased;stride length decreased;bilateral deviations  -AS     Comment, (Gait/Stairs) patient ambulated 400' with rolling walker for support, at times patient needed Min assist x1 to avoid obstacles in hallway and to stay closer to walker. Mild unsteadiness with directional changes and running into obstacles. Decreased safety awareness with all activity.  -AS               User Key  (r) = Recorded By, (t) = Taken By, (c) = Cosigned By      Initials Name Provider Type    AS Christie Cisneros PTA Physical Therapist Assistant                   Obj/Interventions       Row Name 04/10/24 1008          Motor Skills    Therapeutic Exercise knee;ankle;shoulder  -AS       Row Name 04/10/24 1008          Shoulder (Therapeutic Exercise)    Shoulder (Therapeutic Exercise) AROM (active range of motion)  -AS     Shoulder AROM (Therapeutic Exercise) bilateral;flexion;extension;aBduction;aDduction;sitting;10 repetitions  -AS       Row Name 04/10/24 1008          Knee (Therapeutic Exercise)    Knee (Therapeutic Exercise) strengthening exercise  -AS     Knee Strengthening (Therapeutic Exercise)  bilateral;marching while seated;LAQ (long arc quad);sitting;10 repetitions  -AS       [facility] Name 04/10/24 1008          Ankle (Therapeutic Exercise)    Ankle (Therapeutic Exercise) AROM (active range of motion)  -AS     Ankle AROM (Therapeutic Exercise) bilateral;dorsiflexion;plantarflexion;sitting;10 repetitions  -AS       [facility] Name 04/10/24 1008          Balance    Dynamic Standing Balance verbal cues;contact guard;minimal assist;1-person assist  -AS     Position/Device Used, Standing Balance supported;walker, front-wheeled  -AS     Comment, Balance min assist for safety and avoiding obstacles in hallway/room  -AS               User Key  (r) = Recorded By, (t) = Taken By, (c) = Cosigned By      Initials Name Provider Type    AS Christie Cisneros PTA Physical Therapist Assistant                   Goals/Plan    No documentation.                  Clinical Impression       [facility] Name 04/10/24 1009          Pain    Pretreatment Pain Rating 0/10 - no pain  -AS     Posttreatment Pain Rating 0/10 - no pain  -AS       Row Name 04/10/24 1009          Plan of Care Review    Plan of Care Reviewed With patient  -AS     Outcome Evaluation patient ambulated 400' with rolling walker for support, at times patient needed Min assist x1 to avoid obstacles in hallway and to stay closer to walker. Mild unsteadiness with directional changes and running into obstacles. Decreased safety awareness with all activity. Completed B UE/LE exercise with cues for technique.  -AS       [facility] Name 04/10/24 1009          Positioning and Restraints    Pre-Treatment Position sitting in chair/recliner  -AS     Post Treatment Position chair  -AS     In Chair reclined;call light within reach;encouraged to call for assist;exit alarm on;waffle cushion;legs elevated  -AS               User Key  (r) = Recorded By, (t) = Taken By, (c) = Cosigned By      Initials Name Provider Type    AS Christie Cisneros PTA Physical Therapist Assistant                    Outcome Measures       Row Name 04/10/24 1011          How much help from another person do you currently need...    Turning from your back to your side while in flat bed without using bedrails? 4  -AS     Moving from lying on back to sitting on the side of a flat bed without bedrails? 4  -AS     Moving to and from a bed to a chair (including a wheelchair)? 3  -AS     Standing up from a chair using your arms (e.g., wheelchair, bedside chair)? 3  -AS     Climbing 3-5 steps with a railing? 3  -AS     To walk in hospital room? 3  -AS     AM-PAC 6 Clicks Score (PT) 20  -AS     Highest Level of Mobility Goal 6 --> Walk 10 steps or more  -AS       Row Name 04/10/24 1011          Functional Assessment    Outcome Measure Options AM-PAC 6 Clicks Basic Mobility (PT)  -AS               User Key  (r) = Recorded By, (t) = Taken By, (c) = Cosigned By      Initials Name Provider Type    AS Christie Cisneros, PTA Physical Therapist Assistant                                 Physical Therapy Education       Title: PT OT SLP Therapies (In Progress)       Topic: Physical Therapy (In Progress)       Point: Mobility training (In Progress)       Learning Progress Summary             Patient Acceptance, E, NR by AS at 4/10/2024 1012    Acceptance, E, NR by CHRISTINA at 4/9/2024 1450                         Point: Home exercise program (In Progress)       Learning Progress Summary             Patient Acceptance, E, NR by AS at 4/10/2024 1012    Acceptance, E, NR by CHRISTINA at 4/9/2024 1450                         Point: Body mechanics (In Progress)       Learning Progress Summary             Patient Acceptance, E, NR by AS at 4/10/2024 1012    Acceptance, E, NR by CHRISTINA at 4/9/2024 1450                         Point: Precautions (In Progress)       Learning Progress Summary             Patient Acceptance, E, NR by AS at 4/10/2024 1012    Acceptance, E, NR by CHRISTINA at 4/9/2024 1450                                         User Key       Initials Effective  Dates Name Provider Type Discipline    CHRISTINA 02/03/23 -  Julee Saul, PT Physical Therapist PT    AS 04/28/23 -  Christie Cisneros PTA Physical Therapist Assistant PT                  PT Recommendation and Plan     Plan of Care Reviewed With: patient  Outcome Evaluation: patient ambulated 400' with rolling walker for support, at times patient needed Min assist x1 to avoid obstacles in hallway and to stay closer to walker. Mild unsteadiness with directional changes and running into obstacles. Decreased safety awareness with all activity. Completed B UE/LE exercise with cues for technique.     Time Calculation:         PT Charges       Row Name 04/10/24 1012             Time Calculation    Start Time 0936  -AS      PT Received On 04/10/24  -AS      PT Goal Re-Cert Due Date 04/19/24  -AS         Timed Charges    81017 - PT Therapeutic Exercise Minutes 10  -AS      77290 - Gait Training Minutes  14  -AS         Total Minutes    Timed Charges Total Minutes 24  -AS       Total Minutes 24  -AS                User Key  (r) = Recorded By, (t) = Taken By, (c) = Cosigned By      Initials Name Provider Type    AS Christie Cisneros PTA Physical Therapist Assistant                  Therapy Charges for Today       Code Description Service Date Service Provider Modifiers Qty    36906007713 HC PT THER PROC EA 15 MIN 4/10/2024 Christie Cisneros PTA GP 1    39374704071 HC GAIT TRAINING EA 15 MIN 4/10/2024 Christie Cisneros PTA GP 1            PT G-Codes  Outcome Measure Options: AM-PAC 6 Clicks Basic Mobility (PT)  AM-PAC 6 Clicks Score (PT): 20       Christie Cisneros PTA  4/10/2024

## 2024-04-10 NOTE — PLAN OF CARE
Goal Outcome Evaluation:  Plan of Care Reviewed With: patient           Outcome Evaluation: patient ambulated 400' with rolling walker for support, at times patient needed Min assist x1 to avoid obstacles in hallway and to stay closer to walker. Mild unsteadiness with directional changes and running into obstacles. Decreased safety awareness with all activity. Completed B UE/LE exercise with cues for technique.

## 2024-04-10 NOTE — PLAN OF CARE
Goal Outcome Evaluation:           Progress: improving  Outcome Evaluation: Pt. presents below baseline with ADLs and functional mobility. Limited by decreased activity tolerance and mild balance instability. Skilled OT services warranted to promote return to PLOF. Recommend home with assist at discharge.      Anticipated Discharge Disposition (OT): home with assist

## 2024-04-10 NOTE — CONSULTS
In Patient New Consult      Encounter Date/ Consult Date: 4/10/2024   Patient Name: Ruby Pettit  YOB: 1945   Medical Record Number: [id number]   Primary Diagnosis: [unfilled]     Code Status: Code Status (Patient has no pulse and is not breathing): CPR (Attempt to Resuscitate)  Medical Interventions (Patient has pulse or is breathing): Full Support      Cancer Staging: Cancer Staging   Stage IIIA (cT1c, cN2, cM0)  Stage IIIA (ypT2, pN2, cM0)    History of Present Illness: Ruby Pettit is a 79 y.o. female seen for inpatient consultation regarding metastatic disease in the brain from her known NSCLC that originated in the left lung.  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 Tra Kate, disease [id number]). She received adjuvant RT under the care of Dr. Jean Marie Qureshi to a dose of 50 Gy/25 fractions and completed in 2/2024.    She has a known history of dementia, but was admitted following 2 days of worsening confusion and disorientation. Imaging was notable for development of metastatic disease within the brain. She has been evaluated by Dr. Henderson and is not a candidate for resection.     She has been receiving dexamethasone and reports improvement of symptoms.  She is alone in her hospital room, able to provide accurate information about her medical history, and eating lunch independently. She denies HA, visual changes, seizures, or changes in strength/sensation in the last 24 hrs.    Imaging -   CT Head - 1.5 cm right frontal lesion with surrounding edema  MR Brain - 2.3 cm lesion in right frontal lobe with surrounding edema w surrounding edema and effacement of the right lateral ventricle.   CT Chest (angiogram) and CT A/P 4/8/24 - several new subcm pulmonary nodules in right lung, post op  changes and ground glass deformities in left lung, stable vertebral body hemangiomas and T11 compression deformity, stable hepatic hypodensities.       Subjective         Past Medical History:   Past Medical History:   Diagnosis Date    Anxiety and depression     Arthritis     Carotid stenosis, bilateral     Carotid duplex, 01/16/2019: Bilateral ICA stenosis 0-49%. Tortuous vessels. Vertebral flow antegrade.        Disease of thyroid gland     GERD (gastroesophageal reflux disease)     Head injury due to trauma 1992    fell down stairs     History of transfusion     NO REACTIONS    Hyperlipidemia     Hypertension     Iatrogenic pneumothorax     Liver disorder     surgery 1998 approx , BLOOD CLOTS    Lung cancer     Metastatic cancer     Perennial rhinitis     Peripheral neuropathic pain     Syncope and collapse     Thyroid disease     UTI (urinary tract infection), bacterial     Wears eyeglasses        Past Surgical History:   Past Surgical History:   Procedure Laterality Date    ABDOMINAL SURGERY      LIVER RESECTION    APPENDECTOMY      BRONCHOSCOPY N/A 02/13/2018    Procedure: BRONCHOSCOPY WITH SANDRITA ENDOBRONCHIAL ULTRASOUND WITH FLUORO;  Surgeon: Dixon Fatima MD;  Location:  [facility];  Service:     BRONCHOSCOPY N/A 03/21/2019    Procedure: NAVIGATIONAL BRONCHOSCOPY;  Surgeon: Dixon Fatima MD;  Location:  [facility];  Service: Pulmonary    BRONCHOSCOPY WITH ION ROBOTIC ASSIST N/A 06/29/2023    Procedure: NAVIGATIONAL BRONCHOSCOPY WITH ION ROBOT;  Surgeon: Dixon Fatima MD;  Location:  [facility];  Service: Robotics - Pulmonary;  Laterality: N/A;  Ion cath 3 - 0010,  3 - 0013.    CHOLECYSTECTOMY      COLONOSCOPY  2019    HYSTERECTOMY      2001    LIVER RESECTION      LUNG LOBECTOMY  11/17/2023    OOPHORECTOMY Bilateral     2001    ORIF WRIST FRACTURE Right     THYROID SURGERY      TUBAL ABDOMINAL LIGATION         Family History:   Family History   Problem Relation  Age of Onset    Cancer Mother     Ovarian cancer Mother 19    Emphysema Father     COPD Father     No Known Problems Sister     Tuberculosis Maternal Grandmother     Tuberculosis Maternal Grandfather     No Known Problems Paternal Grandmother     No Known Problems Paternal Grandfather     Breast cancer Neg Hx        Social History:   Social History     Socioeconomic History    Marital status:     Number of children: 3   Tobacco Use    Smoking status: Never    Smokeless tobacco: Never   Vaping Use    Vaping status: Never Used   Substance and Sexual Activity    Alcohol use: Yes     Alcohol/week: 0.0 - 2.0 standard drinks of alcohol     Comment: seldom     Drug use: No    Sexual activity: Defer     Marital Status:     Medications:     Current Facility-Administered Medications:     acetaminophen (TYLENOL) tablet 650 mg, 650 mg, Oral, Q4H PRN, Dixon Constantino MD    amLODIPine (NORVASC) tablet 5 mg, 5 mg, Oral, Q24H, Veronica Jimenez DO, 5 mg at 04/10/24 0945    sennosides-docusate (PERICOLACE) 8.6-50 MG per tablet 2 tablet, 2 tablet, Oral, BID PRN **AND** polyethylene glycol (MIRALAX) packet 17 g, 17 g, Oral, Daily PRN **AND** bisacodyl (DULCOLAX) EC tablet 5 mg, 5 mg, Oral, Daily PRN **AND** bisacodyl (DULCOLAX) suppository 10 mg, 10 mg, Rectal, Daily PRN, Dixon Constantino MD    Calcium Replacement - Follow Nurse / BPA Driven Protocol, , Does not apply, PRN, Dixon Constantino MD    dexAMETHasone (DECADRON) injection 4 mg, 4 mg, Intravenous, BID, Jono Henderson MD    heparin (porcine) 5000 UNIT/ML injection 5,000 Units, 5,000 Units, Subcutaneous, Q8H, Dixon Constantino MD, 5,000 Units at 04/10/24 0946    [Held by provider] hydroCHLOROthiazide (MICROZIDE) capsule 12.5 mg, 12.5 mg, Oral, Daily, Dixon Constantino MD    HYDROcodone-acetaminophen (NORCO) 5-325 MG per tablet 1 tablet, 1 tablet, Oral, Q12H PRN, Dixon Constantino MD, 1 tablet at 04/10/24 1212    levETIRAcetam (KEPPRA) injection 500 mg,  500 mg, Intravenous, Q12H, Dixon Constantino MD, 500 mg at 04/10/24 0950    levothyroxine (SYNTHROID, LEVOTHROID) tablet 50 mcg, 50 mcg, Oral, Daily, Dixon Constantino MD, 50 mcg at 04/10/24 0532    losartan (COZAAR) tablet 50 mg, 50 mg, Oral, Q24H, Veronica Jimenez DO, 50 mg at 04/10/24 0945    Magnesium Standard Dose Replacement - Follow Nurse / BPA Driven Protocol, , Does not apply, PRN, Dixon Constantino MD    pantoprazole (PROTONIX) EC tablet 40 mg, 40 mg, Oral, Q AM, Dixon Constantino MD, 40 mg at 04/10/24 0532    Pharmacy to dose vancomycin, , Does not apply, Continuous PRN, Veronica Jimenez DO    Phosphorus Replacement - Follow Nurse / BPA Driven Protocol, , Does not apply, PRNDougie John L, MD    piperacillin-tazobactam (ZOSYN) 3.375 g IVPB in 100 mL NS MBP (CD), 3.375 g, Intravenous, Q8H, Veronica Jimenez DO, 3.375 g at 04/10/24 1211    Potassium Replacement - Follow Nurse / BPA Driven Protocol, , Does not apply, PRDougie RAZA John L, MD    sodium chloride 0.9 % flush 10 mL, 10 mL, Intravenous, PRN, Riaz Munson MD    sodium chloride 0.9 % flush 10 mL, 10 mL, Intravenous, Q12H, Dixon Constantino MD, 10 mL at 04/10/24 0950    sodium chloride 0.9 % flush 10 mL, 10 mL, Intravenous, PRN, Dixon Constantino MD    sodium chloride 0.9 % infusion 40 mL, 40 mL, Intravenous, PRNDougie John L, MD    vancomycin IVPB 1500 mg in 0.9% NaCl (Premix) 500 mL, 1,500 mg, Intravenous, Q24H, Leonel Baker, PharmD, Last Rate: 333.3 mL/hr at 04/10/24 0117, 1,500 mg at 04/10/24 0117    Allergies:   Allergies   Allergen Reactions    Augmentin [Amoxicillin-Pot Clavulanate] Diarrhea     Has tolerated Ceftriaxone, Cefdinir, Cefuroxime.    Benadryl [Diphenhydramine] Other (See Comments)     High Dose Caused uncontrollable shaking in legs    Codeine Nausea Only    Metformin Diarrhea    Rosuvastatin GI Intolerance       KPS/Quality of Life: 80 - Restricted Physical Activity    Objective     Physical Exam:  "  Vital Signs: Temp:  [97.9 °F (36.6 °C)-98.3 °F (36.8 °C)] 98.1 °F (36.7 °C)  Heart Rate:  [58-75] 67  Resp:  [18-20] 18  BP: (139-151)/(62-81) 149/81   Body mass index is 32.59 kg/m².   Height: 160 cm (63\") Weight:       04/08/24 1925   Weight: 83.5 kg (184 lb)        Constitutional: The patient is a well-developed, well-nourished female  in no acute distress.  Alert and oriented ×3.  Eyes: PERRLA.  EOMI.  ENMT:  Ears and nose WNL.  No lesions noted in the oral cavity or oropharynx.  Lymphatics: No cervical, supraclavicular, axillary, or inguinal lymphadenopathy is palpated.  CV: Regular rate and rhythm.  No murmurs, rubs, or gallops are appreciated.  Respiratory: Lungs clear to auscultation.  Breath sounds equal bilaterally.  GI: Abdomen soft, nontender, nondistended, with no hepatosplenomegaly or masses palpated.  Extremities: No clubbing, cyanosis, or edema.  Neurologic: Cranial nerves II through XII are grossly intact, with no focal neurological deficits noted on exam. Strength and sensation intact in all 4 extremities  Psychiatric: Alert and oriented x3. Normal affect, with no anxiety or depression noted.    DVT prophylaxis:  Medical DVT prophylaxis orders are present.      Assessment / Plan      Impressions: Ruby Pettit is a pleasant 79 y.o. 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 Tra Kate, disease [id number]). She received adjuvant RT under the care of Dr. Jean Marie Qureshi to a dose of 50 Gy/25 fractions and completed in 2/2024. She now presents with a symptomatic brain metastasis and is not a candidate for neurosurgical intervention. Her imaging has been reviewed. She would be an appropriate candidate for Cyberknife SRS to this lesion. We discussed indications, risks, and benefits. We also discussed morbidity associated with intracranial disease progression. " "  Consent was obtained and she is interested in proceeding. Simulation and treatment can be performed on an outpatient basis. She will be scheduled for CT simulation under the care of her primary radiation oncologist, Dr. Jean Marie Qureshi next week. She should be discharged on steroids, which she should continue. Dr. Qureshi will manage her steroid taper as appropriate through her treatment course.  Dr. Mike has seen her and is in agreement to move forward with radiation for management of her brain lesion. He intends to send tissue for next gen sequencing and consider starting chemo/immunotherapy.     Assessment/Plan:   Active Hospital Problems    Diagnosis     **Encephalopathy     Elevated troponin     Malignant neoplasm of lower lobe of left lung     T2DM (type 2 diabetes mellitus)     Hypertension     Lesion of temporal lobe     History of asthma     Migratory Lung nodules (\"Soft\" and solid, bilateral)          Luz Marina Olmstead MD  Radiation Oncology  This document has been signed by Luz Marina Olmstead MD on April 10, 2024 13:29 EDT     "

## 2024-04-10 NOTE — PLAN OF CARE
Problem: Adult Inpatient Plan of Care  Goal: Plan of Care Review  Outcome: Ongoing, Progressing  Flowsheets  Taken 4/10/2024 0627 by Sera Sandoval RN  Outcome Evaluation:   Goal Outcome Evaluation:  alert and oriented, forgetful, VSS   on room air   able to answer all orientation questions correctly   family was at bedside until 2100   bed alarm on   call light within reach.  Taken 4/9/2024 1538 by Julee Saul PT  Progress: improving  Plan of Care Reviewed With: patient  Goal: Patient-Specific Goal (Individualized)  Outcome: Ongoing, Progressing  Goal: Absence of Hospital-Acquired Illness or Injury  Outcome: Ongoing, Progressing  Intervention: Identify and Manage Fall Risk  Recent Flowsheet Documentation  Taken 4/10/2024 0600 by Sera Sandoval RN  Safety Promotion/Fall Prevention:   safety round/check completed   lighting adjusted   clutter free environment maintained   assistive device/personal items within reach  Taken 4/10/2024 0400 by Sera Sandoval RN  Safety Promotion/Fall Prevention:   safety round/check completed   lighting adjusted   clutter free environment maintained   assistive device/personal items within reach  Taken 4/10/2024 0200 by Sera Sandoval RN  Safety Promotion/Fall Prevention:   safety round/check completed   lighting adjusted   clutter free environment maintained   assistive device/personal items within reach  Taken 4/10/2024 0000 by Sera Sandoval RN  Safety Promotion/Fall Prevention:   safety round/check completed   clutter free environment maintained   assistive device/personal items within reach  Taken 4/9/2024 2200 by Sera Sandoval, RN  Safety Promotion/Fall Prevention:   safety round/check completed   lighting adjusted   clutter free environment maintained   assistive device/personal items within reach  Taken 4/9/2024 1930 by Sera Sandoval RN  Safety Promotion/Fall Prevention:   safety round/check completed   lighting adjusted   clutter  free environment maintained   assistive device/personal items within reach  Intervention: Prevent and Manage VTE (Venous Thromboembolism) Risk  Recent Flowsheet Documentation  Taken 4/9/2024 1930 by Sera Sandoval RN  Range of Motion: active ROM (range of motion) encouraged  Intervention: Prevent Infection  Recent Flowsheet Documentation  Taken 4/10/2024 0600 by Sera Sandoval RN  Infection Prevention:   rest/sleep promoted   single patient room provided  Taken 4/10/2024 0400 by Sera Sandoval RN  Infection Prevention:   single patient room provided   rest/sleep promoted  Taken 4/10/2024 0200 by Sera Sandoval RN  Infection Prevention:   rest/sleep promoted   single patient room provided  Taken 4/10/2024 0000 by Sera Sandoval RN  Infection Prevention:   single patient room provided   rest/sleep promoted  Taken 4/9/2024 2200 by Sera Sandoval RN  Infection Prevention:   single patient room provided   rest/sleep promoted  Taken 4/9/2024 1930 by Sera Sandoval RN  Infection Prevention:   rest/sleep promoted   single patient room provided  Goal: Optimal Comfort and Wellbeing  Outcome: Ongoing, Progressing  Intervention: Monitor Pain and Promote Comfort  Recent Flowsheet Documentation  Taken 4/9/2024 1930 by Sera Sandoval RN  Pain Management Interventions: see MAR  Intervention: Provide Person-Centered Care  Recent Flowsheet Documentation  Taken 4/9/2024 1930 by Sera Sandoval RN  Trust Relationship/Rapport:   care explained   questions answered   reassurance provided   thoughts/feelings acknowledged  Goal: Readiness for Transition of Care  Outcome: Ongoing, Progressing     Problem: Fall Injury Risk  Goal: Absence of Fall and Fall-Related Injury  Outcome: Ongoing, Progressing  Intervention: Identify and Manage Contributors  Recent Flowsheet Documentation  Taken 4/10/2024 0600 by Sera Sandoval RN  Medication Review/Management: medications reviewed  Taken 4/9/2024 1930  by Sera Sandoval RN  Medication Review/Management: medications reviewed  Intervention: Promote Injury-Free Environment  Recent Flowsheet Documentation  Taken 4/10/2024 0600 by Sera Sandoval RN  Safety Promotion/Fall Prevention:   safety round/check completed   lighting adjusted   clutter free environment maintained   assistive device/personal items within reach  Taken 4/10/2024 0400 by Sera Sandoval RN  Safety Promotion/Fall Prevention:   safety round/check completed   lighting adjusted   clutter free environment maintained   assistive device/personal items within reach  Taken 4/10/2024 0200 by Sera Sandoval RN  Safety Promotion/Fall Prevention:   safety round/check completed   lighting adjusted   clutter free environment maintained   assistive device/personal items within reach  Taken 4/10/2024 0000 by Sera Sandoval RN  Safety Promotion/Fall Prevention:   safety round/check completed   clutter free environment maintained   assistive device/personal items within reach  Taken 4/9/2024 2200 by Sera Sandoval RN  Safety Promotion/Fall Prevention:   safety round/check completed   lighting adjusted   clutter free environment maintained   assistive device/personal items within reach  Taken 4/9/2024 1930 by Sera Sandoval RN  Safety Promotion/Fall Prevention:   safety round/check completed   lighting adjusted   clutter free environment maintained   assistive device/personal items within reach     Problem: Skin Injury Risk Increased  Goal: Skin Health and Integrity  Outcome: Ongoing, Progressing   Goal Outcome Evaluation:              Outcome Evaluation: Goal Outcome Evaluation:  alert and oriented, forgetful, VSS; on room air; able to answer all orientation questions correctly; family was at bedside until 2100; bed alarm on; call light within reach.

## 2024-04-10 NOTE — PROGRESS NOTES
"HOD# : 0    No events last night  Doing well    Encephalopathy    Migratory Lung nodules (\"Soft\" and solid, bilateral)    History of asthma    Hypertension    T2DM (type 2 diabetes mellitus)    Lesion of temporal lobe    Malignant neoplasm of lower lobe of left lung    Elevated troponin      Temp:  [97.9 °F (36.6 °C)-98.7 °F (37.1 °C)] 98.2 °F (36.8 °C)  Heart Rate:  [58-75] 75  Resp:  [18-20] 18  BP: (139-169)/(62-77) 150/72  I/O last 3 completed shifts:  In: 1700 [IV Piggyback:1700]  Out: 1175 [Urine:1175]  No intake/output data recorded.  Vital signs were reviewed and documented in the chart      EXAM   Patient appeared in good neurologic function with normal comprehension   CN grossly intact  Moves all extremities to command      MRI personally reviewed shows right frontal lesion with edema, left-sided cystic lesion stable    No additional multiple lesions    Assessment and plan    1.  History of lung nodules metastatic disease status post treatment    2.  New right frontal lesion with chronic cystic lesion left temporal lobe    I reviewed the chart and discussed with her the treatment options surgery versus interval radiation therapy I think it is very reasonable given her advanced age and diagnosis to treat her with steroids and radiation which she will require anyway    Will consult Jean Marie Qureshi for evaluation she can be pivoted to an outpatient from a neurosurgical standpoint and we can arrange this electively    She is to be continued on 500 mg of Keppra as an outpatient as well as for milligrams twice daily of Decadron and a mucosal protectant until follow-up    "

## 2024-04-10 NOTE — PLAN OF CARE
Goal Outcome Evaluation:              Outcome Evaluation: VSS; Pt continues to have intermittent confusion, requiring multiple reorientation/reeducation to: use call light and wait for staff assistance; not remove tele electrodes; and not remove her IV.  Pt resting in bed; alarm on; call light within reach.

## 2024-04-10 NOTE — THERAPY EVALUATION
"Patient Name: Ruby Pettit  : 1945    MRN: [id number]                              Today's Date: 4/10/2024       Admit Date: 2024    Visit Dx:     ICD-10-CM ICD-9-CM   1. Altered mental status, unspecified altered mental status type  R41.82 780.97   2. Metastatic cancer to brain  C79.31 198.3   3. Adenocarcinoma of left lung  C34.92 162.9   4. Pneumonia of left lung due to infectious organism, unspecified part of lung  J18.9 486   5. Orthostatic hypotension  I95.1 458.0   6. Syncope and collapse  R55 780.2   7. History of hypertension  Z86.79 V12.59   8. History of hyperlipidemia  Z86.39 V12.29   9. History of gastroesophageal reflux (GERD)  Z87.19 V12.79   10. Malignant neoplasm of lower lobe of left lung  C34.32 162.5     Patient Active Problem List   Diagnosis    Migratory Lung nodules (\"Soft\" and solid, bilateral)    Non-smoker    History of asthma    Cough    Hypertension    T2DM (type 2 diabetes mellitus)    Neuropathy    Lesion of temporal lobe    GERD    Malignant neoplasm of lower lobe of left lung    Elevated troponin    COVID-19    Encephalopathy     Past Medical History:   Diagnosis Date    Anxiety and depression     Arthritis     Carotid stenosis, bilateral     Carotid duplex, 2019: Bilateral ICA stenosis 0-49%. Tortuous vessels. Vertebral flow antegrade.        Disease of thyroid gland     GERD (gastroesophageal reflux disease)     Head injury due to trauma     fell down stairs     History of transfusion     NO REACTIONS    Hyperlipidemia     Hypertension     Iatrogenic pneumothorax     Liver disorder     surgery  approx , BLOOD CLOTS    Lung cancer     Metastatic cancer     Perennial rhinitis     Peripheral neuropathic pain     Syncope and collapse     Thyroid disease     UTI (urinary tract infection), bacterial     Wears eyeglasses      Past Surgical History:   Procedure Laterality Date    ABDOMINAL SURGERY      LIVER RESECTION    APPENDECTOMY      BRONCHOSCOPY N/A " 02/13/2018    Procedure: BRONCHOSCOPY WITH SANDRITA ENDOBRONCHIAL ULTRASOUND WITH FLUORO;  Surgeon: Dixon Fatima MD;  Location:  [facility];  Service:     BRONCHOSCOPY N/A 03/21/2019    Procedure: NAVIGATIONAL BRONCHOSCOPY;  Surgeon: Dixon Fatima MD;  Location:  [facility];  Service: Pulmonary    BRONCHOSCOPY WITH ION ROBOTIC ASSIST N/A 06/29/2023    Procedure: NAVIGATIONAL BRONCHOSCOPY WITH ION ROBOT;  Surgeon: Dixon Fatima MD;  Location:  [facility];  Service: Robotics - Pulmonary;  Laterality: N/A;  Ion cath 3 - 0010,  3 - 0013.    CHOLECYSTECTOMY      COLONOSCOPY  2019    HYSTERECTOMY      2001    LIVER RESECTION      LUNG LOBECTOMY  11/17/2023    OOPHORECTOMY Bilateral     2001    ORIF WRIST FRACTURE Right     THYROID SURGERY      TUBAL ABDOMINAL LIGATION        General Information       Row Name 04/10/24 1046          OT Time and Intention    Document Type evaluation  -     Mode of Treatment occupational therapy  -       Row Name 04/10/24 1046          General Information    Patient Profile Reviewed yes  -     Prior Level of Function independent:;all household mobility;transfer;ADL's  -     Existing Precautions/Restrictions fall  -     Barriers to Rehab cognitive status  -       Row Name 04/10/24 1046          Living Environment    People in Home alone  -       Row Name 04/10/24 1046          Home Main Entrance    Number of Stairs, Main Entrance none  -       Row Name 04/10/24 1046          Stairs Within Home, Primary    Number of Stairs, Within Home, Primary none  -       Row Name 04/10/24 1046          Cognition    Orientation Status (Cognition) oriented to;person;place  -       Row Name 04/10/24 1046          Safety Issues, Functional Mobility    Safety Issues Affecting Function (Mobility) safety precaution awareness;insight into deficits/self-awareness  -     Impairments Affecting Function (Mobility) balance;endurance/activity  tolerance;strength  -               User Key  (r) = Recorded By, (t) = Taken By, (c) = Cosigned By      Initials Name Provider Type    Rebekah Louis, RAY Occupational Therapist                     Mobility/ADL's       Row Name 04/10/24 1049          Bed Mobility    Bed Mobility scooting/bridging;supine-sit  -     Scooting/Bridging [place] (Bed Mobility) contact guard  -     Supine-Sit [place] (Bed Mobility) contact guard  -     Assistive Device (Bed Mobility) bed rails;head of bed elevated  -     Comment, (Bed Mobility) Demonstrates good sequencing  -       Row Name 04/10/24 1049          Transfers    Transfers sit-stand transfer  -     Comment, (Transfers) VC's for hand placement and sequencing.  -       Row Name 04/10/24 1049          Bed-Chair Transfer    Bed-Chair [place] (Transfers) contact guard;verbal cues  -[facility] Name 04/10/24 1049          Sit-Stand Transfer    Sit-Stand [place] (Transfers) contact guard;verbal cues  -       Row Name 04/10/24 1049          Activities of Daily Living    BADL Assessment/Intervention lower body dressing;grooming  -       Row Name 04/10/24 1049          Lower Body Dressing Assessment/Training    [place] Level (Lower Body Dressing) don;socks;standby assist  -     Position (Lower Body Dressing) edge of bed sitting  -       Row Name 04/10/24 1049          Grooming Assessment/Training    [place] Level (Grooming) wash face, hands;set up  -     Position (Grooming) unsupported sitting  -               User Key  (r) = Recorded By, (t) = Taken By, (c) = Cosigned By      Initials Name Provider Type    Rebekah Louis OT Occupational Therapist                   Obj/Interventions       Row Name 04/10/24 1050          Sensory Assessment (Somatosensory)    Sensory Assessment (Somatosensory) UE sensation intact  -       Row Name 04/10/24 1050          Vision Assessment/Intervention    Visual Impairment/Limitations  corrective lenses full-time  -[facility] Name 04/10/24 1050          Range of Motion Comprehensive    General Range of Motion bilateral upper extremity ROM WFL  -[facility] Name 04/10/24 1050          Strength Comprehensive (MMT)    General Manual Muscle Testing (MMT) Assessment upper extremity strength deficits identified  -[facility] Name 04/10/24 1050          Upper Extremity (Manual Muscle Testing)    Upper Extremity: Manual Muscle Testing (MMT) left UE strength is WFL;right UE strength is WFL  -[facility] Name 04/10/24 1050          Balance    Balance Assessment sitting static balance;sitting dynamic balance;standing static balance;standing dynamic balance  -     Static Sitting Balance independent  -     Dynamic Sitting Balance supervision  -     Position, Sitting Balance unsupported;sitting edge of bed  -     Static Standing Balance standby assist  -     Dynamic Standing Balance contact guard  -     Position/Device Used, Standing Balance supported  -     Balance Interventions sitting;standing;sit to stand;dynamic;static;supported;occupation based/functional task;weight shifting activity  -     Comment, Balance No LOB, CGA for safety  -               User Key  (r) = Recorded By, (t) = Taken By, (c) = Cosigned By      Initials Name Provider Type     Rebekah Ramirez, RAY Occupational Therapist                   Goals/Plan       [facility] Name 04/10/24 1053          Transfer Goal 1 (OT)    Activity/Assistive Device (Transfer Goal 1, OT) sit-to-stand/stand-to-sit;bed-to-chair/chair-to-bed;walker, rolling  -     [place] Level/Cues Needed (Transfer Goal 1, OT) supervision required  -     Time Frame (Transfer Goal 1, OT) long term goal (LTG);by discharge  -     Progress/Outcome (Transfer Goal 1, OT) goal ongoing  -[facility] Name 04/10/24 1053          Dressing Goal 1 (OT)    Activity/Device (Dressing Goal 1, OT) lower body dressing  -     [place]/Cues Needed (Dressing Goal 1,  OT) supervision required  -     Time Frame (Dressing Goal 1, OT) long term goal (LTG);by discharge  -     Progress/Outcome (Dressing Goal 1, OT) goal ongoing  -       Row Name 04/10/24 1053          Toileting Goal 1 (OT)    Activity/Device (Toileting Goal 1, OT) adjust/manage clothing;perform perineal hygiene;commode;grab bar/safety frame  -     [place] Level/Cues Needed (Toileting Goal 1, OT) supervision required  -     Time Frame (Toileting Goal 1, OT) by discharge;long term goal (LTG)  -     Progress/Outcome (Toileting Goal 1, OT) goal ongoing  -               User Key  (r) = Recorded By, (t) = Taken By, (c) = Cosigned By      Initials Name Provider Type    Rebekah Louis, RAY Occupational Therapist                   Clinical Impression       Row Name 04/10/24 1052          Pain Assessment    Pretreatment Pain Rating 0/10 - no pain  -     Posttreatment Pain Rating 0/10 - no pain  -     Additional Documentation Pain Scale: Word Pre/Post-Treatment (Group)  -       Row Name 04/10/24 1052          Plan of Care Review    Progress improving  -     Outcome Evaluation Pt. presents below baseline with ADLs and functional mobility. Limited by decreased activity tolerance and mild balance instability. Skilled OT services warranted to promote return to PLOF. Recommend home with assist at discharge.  -       Row Name 04/10/24 1052          Therapy Assessment/Plan (OT)    Patient/Family Therapy Goal Statement (OT) To return to PLOF  -     Therapy Frequency (OT) daily  -       Row Name 04/10/24 1052          Therapy Plan Review/Discharge Plan (OT)    Anticipated Discharge Disposition (OT) home with assist  -       Row Name 04/10/24 1052          Positioning and Restraints    Pre-Treatment Position in bed  -     Post Treatment Position chair  -LC     In Chair notified nsg;reclined;call light within reach;encouraged to call for assist;exit alarm on;waffle cushion;legs elevated  -                User Key  (r) = Recorded By, (t) = Taken By, (c) = Cosigned By      Initials Name Provider Type    Rebekah Louis OT Occupational Therapist                   Outcome Measures       Row Name 04/10/24 1054          How much help from another is currently needed...    Putting on and taking off regular lower body clothing? 3  -LC     Bathing (including washing, rinsing, and drying) 3  -LC     Toileting (which includes using toilet bed pan or urinal) 3  -LC     Putting on and taking off regular upper body clothing 4  -LC     Taking care of personal grooming (such as brushing teeth) 3  -LC     Eating meals 4  -     AM-PAC 6 Clicks Score (OT) 20  -       Row Name 04/10/24 1011          How much help from another person do you currently need...    Turning from your back to your side while in flat bed without using bedrails? 4  -AS     Moving from lying on back to sitting on the side of a flat bed without bedrails? 4  -AS     Moving to and from a bed to a chair (including a wheelchair)? 3  -AS     Standing up from a chair using your arms (e.g., wheelchair, bedside chair)? 3  -AS     Climbing 3-5 steps with a railing? 3  -AS     To walk in hospital room? 3  -AS     AM-PAC 6 Clicks Score (PT) 20  -AS     Highest Level of Mobility Goal 6 --> Walk 10 steps or more  -AS       Row Name 04/10/24 1054 04/10/24 1011       Functional Assessment    Outcome Measure Options AM-PAC 6 Clicks Daily Activity (OT)  - AM-PAC 6 Clicks Basic Mobility (PT)  -AS              User Key  (r) = Recorded By, (t) = Taken By, (c) = Cosigned By      Initials Name Provider Type    AS Christie Cisneros PTA Physical Therapist Assistant    Rebekah Louis OT Occupational Therapist                    Occupational Therapy Education       Title: PT OT SLP Therapies (In Progress)       Topic: Occupational Therapy (In Progress)       Point: ADL training (In Progress)       Description:   Instruct learner(s) on proper safety adaptation and  remediation techniques during self care or transfers.   Instruct in proper use of assistive devices.                  Learning Progress Summary             Patient Acceptance, E, NR by  at 4/10/2024 0847                         Point: Home exercise program (Not Started)       Description:   Instruct learner(s) on appropriate technique for monitoring, assisting and/or progressing therapeutic exercises/activities.                  Learner Progress:  Not documented in this visit.              Point: Precautions (In Progress)       Description:   Instruct learner(s) on prescribed precautions during self-care and functional transfers.                  Learning Progress Summary             Patient Acceptance, E, NR by  at 4/10/2024 0847                         Point: Body mechanics (In Progress)       Description:   Instruct learner(s) on proper positioning and spine alignment during self-care, functional mobility activities and/or exercises.                  Learning Progress Summary             Patient Acceptance, E, NR by  at 4/10/2024 0847                                         User Key       Initials Effective Dates Name Provider Type Discipline     06/16/21 -  Rebekah Ramirez, OT Occupational Therapist OT                  OT Recommendation and Plan  Therapy Frequency (OT): daily  Plan of Care Review  Progress: improving  Outcome Evaluation: Pt. presents below baseline with ADLs and functional mobility. Limited by decreased activity tolerance and mild balance instability. Skilled OT services warranted to promote return to PLOF. Recommend home with assist at discharge.     Time Calculation:   Evaluation Complexity (OT)  Review Occupational Profile/Medical/Therapy History Complexity: brief/low complexity  Assessment, Occupational Performance/Identification of Deficit Complexity: 1-3 performance deficits  Clinical Decision Making Complexity (OT): problem focused assessment/low complexity  Overall Complexity of  Evaluation (OT): low complexity     Time Calculation- OT       Row Name 04/10/24 1056 04/10/24 1012          Time Calculation- OT    OT Start Time 0847  -LC --     OT Received On 04/10/24  - --     OT Goal Re-Cert Due Date 04/20/24  -LC --        Timed Charges    60353 - Gait Training Minutes  -- 14  -AS        Untimed Charges    OT Eval/Re-eval Minutes 51  -LC --        Total Minutes    Timed Charges Total Minutes -- 14  -AS     Untimed Charges Total Minutes 51  -LC --      Total Minutes 51  -LC 14  -AS               User Key  (r) = Recorded By, (t) = Taken By, (c) = Cosigned By      Initials Name Provider Type    AS Christie Cisneros, PTA Physical Therapist Assistant    Rebekah Louis OT Occupational Therapist                  Therapy Charges for Today       Code Description Service Date Service Provider Modifiers Qty    [id number] HC OT EVAL LOW COMPLEXITY 4 4/10/2024 Rebekah Ramirez OT GO 1                 Rebekah Ramirez OT  4/10/2024

## 2024-04-10 NOTE — PROGRESS NOTES
[facility] Medicine Services  PROGRESS NOTE    Patient Name: Ruby Pettit  : 1945  MRN: [id number]    Date of Admission: 2024  Primary Care Physician: Ly Funez MD    Subjective   Subjective     CC:  F/U PNA, New R frontal lesion    HPI:  Patient seen and examined. Up in chair. Her nose is runny. Has had a mild cough. No other complaints. No family at bedside.     Objective   Objective     Vital Signs:   Temp:  [97.9 °F (36.6 °C)-98.3 °F (36.8 °C)] 98.1 °F (36.7 °C)  Heart Rate:  [58-75] 67  Resp:  [18-20] 18  BP: (139-151)/(62-81) 149/81     Physical Exam:  Constitutional: No acute distress, awake, alert  HENT: NCAT, mucous membranes moist, up in chair  Respiratory: Coarse L side, respiratory effort normal RA  Cardiovascular: RRR, no murmurs, rubs, or gallops  Gastrointestinal: Positive bowel sounds, soft, nontender, nondistended  Musculoskeletal: No bilateral ankle edema  Psychiatric: Appropriate affect, cooperative  Neurologic: Oriented x 3 but forgetful, impulsive, speech clear  Skin: No rashes     Results Reviewed:  LAB RESULTS:      Lab 04/10/24  0518 24  0640 24  0014 24   WBC 5.69 4.29  --   --  5.79   HEMOGLOBIN 10.6* 11.5*  --   --  10.9*   HEMOGLOBIN, POC  --   --   --  11.2*  --    HEMATOCRIT 33.1* 36.2  --   --  35.2   HEMATOCRIT POC  --   --   --  33*  --    PLATELETS 177 178  --   --  217   NEUTROS ABS  --   --   --   --  3.00   IMMATURE GRANS (ABS)  --   --   --   --  0.02   LYMPHS ABS  --   --   --   --  2.03   MONOS ABS  --   --   --   --  0.61   EOS ABS  --   --   --   --  0.11   MCV 93.0 94.0  --   --  96.4   PROCALCITONIN  --   --  0.08  --   --    LACTATE  --   --   --   --  2.0         Lab 04/10/24  0518 24  0640 24  0014 24   SODIUM 141 140  --   --  142   POTASSIUM 4.2 3.7  --   --  4.2   CHLORIDE 108* 107  --   --  108*   CO2 22.0 21.0*  --   --  23.0   ANION GAP  11.0 12.0  --   --  11.0   BUN 16 18  --   --  21   CREATININE 0.78 0.87  --  1.20 1.11*   EGFR 77.4 67.9  --  46.1* 50.7*   GLUCOSE 148* 147*  --   --  156*   CALCIUM 9.2 8.6  --   --  9.1   MAGNESIUM  --  1.9 1.9  --  2.1   PHOSPHORUS  --   --  3.8  --   --    TSH  --   --  3.070  --   --          Lab 04/08/24 2016   TOTAL PROTEIN 6.0   ALBUMIN 3.9   GLOBULIN 2.1   ALT (SGPT) 30   AST (SGOT) 23   BILIRUBIN 0.5   ALK PHOS 133*         Lab 04/09/24 0014 04/08/24 2016   HSTROP T 32* 35*                 Brief Urine Lab Results  (Last result in the past 365 days)        Color   Clarity   Blood   Leuk Est   Nitrite   Protein   CREAT   Urine HCG        04/08/24 2051 Yellow   Clear   Negative   Negative   Negative   Trace                   Microbiology Results Abnormal       Procedure Component Value - Date/Time    Blood Culture - Blood, Hand, Right [[id number]]  (Normal) Collected: 04/09/24 0014    Lab Status: Preliminary result Specimen: Blood from Hand, Right Updated: 04/10/24 0030     Blood Culture No growth at 24 hours    Narrative:      Less than seven (7) mL's of blood was collected.  Insufficient quantity may yield false negative results.    Blood Culture - Blood, Wrist, Right [[id number]]  (Normal) Collected: 04/09/24 0014    Lab Status: Preliminary result Specimen: Blood from Wrist, Right Updated: 04/10/24 0030     Blood Culture No growth at 24 hours    Narrative:      Less than seven (7) mL's of blood was collected.  Insufficient quantity may yield false negative results.    COVID PRE-OP / PRE-PROCEDURE SCREENING ORDER (NO ISOLATION) - Swab, Nasopharynx [[id number]]  (Normal) Collected: 04/08/24 2102    Lab Status: Final result Specimen: Swab from Nasopharynx Updated: 04/08/24 2158    Narrative:      The following orders were created for panel order COVID PRE-OP / PRE-PROCEDURE SCREENING ORDER (NO ISOLATION) - Swab, Nasopharynx.  Procedure                               Abnormality         Status                      ---------                               -----------         ------                     Respiratory Panel PCR w/...[[id number]]  Normal              Final result                 Please view results for these tests on the individual orders.    Respiratory Panel PCR w/COVID-19(SARS-CoV-2) TOMI/PARISH/TRESA/PAD/COR/JACKIE In-House, NP Swab in UTM/VTM, 2 HR TAT - Swab, Nasopharynx [[id number]]  (Normal) Collected: 04/08/24 2102    Lab Status: Final result Specimen: Swab from Nasopharynx Updated: 04/08/24 2158     ADENOVIRUS, PCR Not Detected     Coronavirus 229E Not Detected     Coronavirus HKU1 Not Detected     Coronavirus NL63 Not Detected     Coronavirus OC43 Not Detected     COVID19 Not Detected     Human Metapneumovirus Not Detected     Human Rhinovirus/Enterovirus Not Detected     Influenza A PCR Not Detected     Influenza B PCR Not Detected     Parainfluenza Virus 1 Not Detected     Parainfluenza Virus 2 Not Detected     Parainfluenza Virus 3 Not Detected     Parainfluenza Virus 4 Not Detected     RSV, PCR Not Detected     Bordetella pertussis pcr Not Detected     Bordetella parapertussis PCR Not Detected     Chlamydophila pneumoniae PCR Not Detected     Mycoplasma pneumo by PCR Not Detected    Narrative:      In the setting of a positive respiratory panel with a viral infection PLUS a negative procalcitonin without other underlying concern for bacterial infection, consider observing off antibiotics or discontinuation of antibiotics and continue supportive care. If the respiratory panel is positive for atypical bacterial infection (Bordetella pertussis, Chlamydophila pneumoniae, or Mycoplasma pneumoniae), consider antibiotic de-escalation to target atypical bacterial infection.            MRI Brain With & Without Contrast    Result Date: 4/10/2024  MRI BRAIN W WO CONTRAST Date of Exam: 4/9/2024 10:53 PM EDT Indication: Metastatic disease evaluation.  Comparison: CT 4/8/2024. Technique:  Routine multiplanar/multisequence  sequence images of the brain were obtained before and after the uneventful administration of 17 mL Multihance. Findings: No acute infarct is present on diffusion weighted sequences. Corresponding with the edematous mass seen on CT, there is a peripherally enhancing and centrally cystic finding present in the right frontal lobe measuring 23 x 20 mm, likely metastatic. There  is surrounding vasogenic edema including some mild associated leftward midline shift of around 4 to 5 mm. The right frontal horn of the lateral ventricle is mildly effaced. No additional hemorrhage, mass or abnormal enhancement is present. The orbits are normal. Mild generalized volume loss is present. An area of cystic volume loss in the left anterior temporal lobe appears similar to prior exams with some mild surrounding gliosis.     Impression: Impression: Solitary enhancing cystic and solid right frontal lobe lesion with considerable surrounding vasogenic edema, favored metastatic. Electronically Signed: Destin Henry MD  4/10/2024 9:29 AM EDT  Workstation ID: [id number]    CT Angiogram Chest    Result Date: 4/8/2024  CT ANGIOGRAM CHEST Date of Exam: 4/8/2024 9:43 PM EDT Indication: History of lung cancer with increasing density to left lung on chest x-ray, radiologist question infection, rule out worsening neoplasm versus infection. Comparison: Same day chest radiograph, 9/26/2023 PET/CT Technique: CTA of the chest was performed after the uneventful administration of intravenous contrast. Reconstructed coronal and sagittal images were also obtained. In addition, a 3-D volume rendered image was created for interpretation. Automated exposure control and iterative reconstruction methods were used. Findings: There is no evidence of pulmonary embolism. The pulmonary arteries are borderline enlarged with the main pulmonary artery measuring up to 3 cm. There is no significant pericardial effusion. Atherosclerotic calcifications of the coronary  arteries and aorta. No mediastinal mass or significant lymphadenopathy. There are surgical changes involving the left lung with associated asymmetric volume loss. The central airways are patent. There are scattered groundglass opacities and nodular consolidations throughout the left lung. The right lung shows no focal airspace consolidation. There is a 3 mm right upper lobe pulmonary nodule (axial image 21). There is also a 5 mm right middle lobe pulmonary nodule (axial image 42). Multiple groundglass nodular opacities in the posterior aspect of the right upper and lower lobes, similar to 2023 CT. Chest wall soft tissues show no acute abnormality. There is no evidence of acute fracture or suspicious osseous lesion. Multiple vertebral body hemangiomas. Chronic T11 vertebral body compression deformity. Please see same-day CT of the abdomen and pelvis for findings below the diaphragm.     Impression: Impression: Surgical changes of the left lung with associated asymmetric volume loss. There are scattered groundglass opacities and nodular consolidations throughout the left lung consistent with pneumonia. Multiple pulmonary nodules in the right lung measuring up to 5 mm, new from September 2023 PET/CT. Recommend follow-up chest CT in 6 months. Electronically Signed: Sergio Hernandez MD  4/8/2024 10:15 PM EDT  Workstation ID: [id number]    XR Chest 1 View    Result Date: 4/8/2024  XR CHEST 1 VW Date of Exam: 4/8/2024 8:39 PM EDT Indication: Weak/Dizzy/AMS triage protocol Comparison: 1/17/2024 Findings: Enlarged cardiac silhouette. Airspace opacities in the left midlung and left lower lung. No visible pleural effusion or pneumothorax. No acute osseous abnormality.     Impression: Impression: Airspace opacities in the left midlung and left lower lung which may represent pneumonia. Enlarged cardiac silhouette, unchanged. Electronically Signed: Sergio Hernandez MD  4/8/2024 8:52 PM EDT  Workstation ID: [id number]    CT Abdomen Pelvis  Without Contrast    Result Date: 4/8/2024  CT ABDOMEN PELVIS WO CONTRAST Date of Exam: 4/8/2024 8:29 PM EDT Indication: CVA pain, recurrent UTI, AMS. Comparison: 1/17/2024 Technique: Axial CT images were obtained of the abdomen and pelvis without the administration of contrast. Reconstructed coronal and sagittal images were also obtained. Automated exposure control and iterative construction methods were used. Findings: Partially visualized nodular consolidations in the left lung base. Four-chamber cardiomegaly and pulmonary artery enlargement. Coronary artery calcifications. There are multiple hepatic hypodensities likely representing benign hemangiomas, unchanged from prior studies. No new suspicious hepatic lesion. Cholecystectomy. No significant biliary ductal dilation. The spleen, pancreas, and bilateral adrenal glands are unchanged, including the 2.1 cm nonspecific left adrenal gland nodule. There is no evidence of hydronephrosis or obstructing nephrolithiasis. Unchanged punctate right mid pole calculus. No evidence of bowel obstruction. Moderate colonic stool burden. No suspicious lymphadenopathy. No evidence of abdominal aortic aneurysm. Atherosclerotic calcifications of the aorta and branch vessels. Urinary bladder is unremarkable. Hysterectomy. The abdominal and pelvic wall soft tissues show no acute abnormality. There is no evidence of acute fracture or suspicious osseous lesion. Healed right lateral rib fractures. Chronic T11 vertebral body compression deformity. Multiple vertebral body hemangiomas..     Impression: Impression: No acute abnormality of the abdomen or pelvis. Partially visualized nodular consolidations in the left lung base which may represent infection. Electronically Signed: Sergio Hernandez MD  4/8/2024 8:51 PM EDT  Workstation ID: [id number]    CT Head Without Contrast    Result Date: 4/8/2024  CT HEAD WO CONTRAST Date of Exam: 4/8/2024 8:29 PM EDT Indication: AMS. Comparison: MRI of the  brain performed on January 17, 2024 Technique: Axial CT images were obtained of the head without contrast administration.  Automated exposure control and iterative construction methods were used. Findings: Interval development of large volume vasogenic edema within the right frontal lobe. There is evidence of an underlying hypodense lesion measuring 1.5 cm. There is mild mass effect on the frontal horn of the right lateral ventricle. No intracranial hemorrhage is visualized. Cystic encephalomalacia is visualized in the left temporal lobe measuring 1.4 cm. No evidence of significant midline shift. There is no extra-axial collections. Ventricles are normal in size and configuration for patient's stated age. Posterior fossa is within normal limits. Calvarium and skull base appear intact. Visualized sinuses show no air fluid levels. The mastoid air cells are clear. Visualized orbits are unremarkable.     Impression: Impression: No intracranial hemorrhage is visualized. Interval development of large volume vasogenic edema within the right frontal lobe with evidence of an underlying hypodense lesion measuring 1.5 cm. Finding highly suspicious for metastatic disease to the brain. Primary intracranial neoplasm is also a consideration. Correlation with contrast-enhanced MRI would be of value. Findings discussed with ANTONINO Arthur on April 8, 2024 at 8:45 p.m. by telephone. Electronically Signed: Jonn Del Cid MD  4/8/2024 8:45 PM EDT  Workstation ID: [id number]     Results for orders placed during the hospital encounter of 07/08/19    Adult Transthoracic Echo Complete W/ Cont if Necessary Per Protocol    Interpretation Summary  · Estimated EF = 65%. Near cavitary obliteration at rest with contractility  · Left ventricular systolic function is normal.  · Left ventricular diastolic dysfunction (grade I) consistent with impaired relaxation.  · Trace mitral regurgitation  · Trace to mild tricuspid regurgitation      Current  "medications:  Scheduled Meds:amLODIPine, 5 mg, Oral, Q24H  dexAMETHasone, 4 mg, Intravenous, BID  heparin (porcine), 5,000 Units, Subcutaneous, Q8H  [Held by provider] hydroCHLOROthiazide, 12.5 mg, Oral, Daily  levETIRAcetam, 500 mg, Intravenous, Q12H  levothyroxine, 50 mcg, Oral, Daily  losartan, 50 mg, Oral, Q24H  pantoprazole, 40 mg, Oral, Q AM  piperacillin-tazobactam, 3.375 g, Intravenous, Q8H  sodium chloride, 10 mL, Intravenous, Q12H  vancomycin, 1,500 mg, Intravenous, Q24H      Continuous Infusions:Pharmacy to dose vancomycin,       PRN Meds:.  acetaminophen    senna-docusate sodium **AND** polyethylene glycol **AND** bisacodyl **AND** bisacodyl    Calcium Replacement - Follow Nurse / BPA Driven Protocol    HYDROcodone-acetaminophen    Magnesium Standard Dose Replacement - Follow Nurse / BPA Driven Protocol    Pharmacy to dose vancomycin    Phosphorus Replacement - Follow Nurse / BPA Driven Protocol    Potassium Replacement - Follow Nurse / BPA Driven Protocol    sodium chloride    sodium chloride    sodium chloride    Assessment & Plan   Assessment & Plan     Active Hospital Problems    Diagnosis  POA    **Encephalopathy [G93.40]  Yes    Elevated troponin [R79.89]  Yes    Malignant neoplasm of lower lobe of left lung [C34.32]  Yes    T2DM (type 2 diabetes mellitus) [E11.9]  Yes    Hypertension [I10]  Yes    Lesion of temporal lobe [G93.9]  Yes    History of asthma [Z87.09]  Not Applicable    Migratory Lung nodules (\"Soft\" and solid, bilateral) [R91.8]  Yes      Resolved Hospital Problems   No resolved problems to display.        Brief Hospital Course to date:  Ruby Pettit is a 79 y.o. female  with past medical history of GERD, hypertension, metastatic lung cancer, recently completed chemo and radiation treatment, chronic hypoxia on 2 L nasal cannula baseline, hypothyroidism.  She presented with confusion from home.  She was brought in by her son.  Patient does not recall what happened, her son reported " increasing confusion over the last 2 days PTA.  Her son reported that when he checked in on her by phone call she seemed more confused and disoriented, when he visited he had noticed that she was forgetting to feed her dog and today she had locked herself out of her house and went to a neighbor's house by accident.  He also stated that her pill box was full.  Otherwise she has been in her usual state health.  She recently completed chemotherapy and radiation treatment for a left lower lobe lung mass and follows with Dr. Resendez.      This patient's problems and plans were partially entered by my partner and updated as appropriate by me 04/10/24.      Left lower lobe lung cancer, adenocarcinoma.  Status post chemotherapy and radiation.  Status post left lower lobectomy 11/17/2023  New lesion to the brain suspicious for metastasis  New pulmonary nodules in R lung (new from Sept 2023 PET)   -MRI brain with and without contrast: Solitary enhancing cystic and solid R fontal lobe lesion with considerable surrounding vasogenic edema, favored metastatic   -Dr Henderson following, recommends radiation, Dr Qureshi consulted. Pt can follow-up with NSGY outpatient   -Continue Keppra 500mg BID and Decadron 4mg IV BID  -Dr Resendez following      Left Lung PNA   -Continue Zosyn/Vanc  -Obtain MRSA PCR (discussed with RN to collect)   -Respiratory PCR negative   -Strep pneumo, legionella urinary antigen pending (also discussed with RN to collect)   -On RA     Acute encephalopathy  -Suspect secondary to above  -Holding home gabapentin for now as may have contributed especially in light of decrease in renal function on admission      Elevated serum creatinine  -Resolved with IVF      Elevated serum troponin  -Trended down, no complaints of chest pain     Hypothyroidism  -Continue home Synthroid   -TSH ok     Hypertension  -Continue home meds     Expected Discharge Location and Transportation: Home  Expected Discharge   Expected Discharge Date:  4/12/2024; Expected Discharge Time:      DVT prophylaxis:  Medical DVT prophylaxis orders are present.         AM-PAC 6 Clicks Score (PT): 20 (04/10/24 1011)    CODE STATUS:   Code Status and Medical Interventions:   Ordered at: 04/09/24 0145     Code Status (Patient has no pulse and is not breathing):    CPR (Attempt to Resuscitate)     Medical Interventions (Patient has pulse or is breathing):    Full Support       Veronica Jimenez, DO  04/10/24

## 2024-04-11 LAB
ANION GAP SERPL CALCULATED.3IONS-SCNC: 15 MMOL/L (ref 5–15)
BUN SERPL-MCNC: 19 MG/DL (ref 8–23)
BUN/CREAT SERPL: 23.8 (ref 7–25)
CALCIUM SPEC-SCNC: 8.9 MG/DL (ref 8.6–10.5)
CHLORIDE SERPL-SCNC: 107 MMOL/L (ref 98–107)
CO2 SERPL-SCNC: 20 MMOL/L (ref 22–29)
CREAT SERPL-MCNC: 0.8 MG/DL (ref 0.57–1)
DEPRECATED RDW RBC AUTO: 59.1 FL (ref 37–54)
EGFRCR SERPLBLD CKD-EPI 2021: 75.1 ML/MIN/1.73
ERYTHROCYTE [DISTWIDTH] IN BLOOD BY AUTOMATED COUNT: 16.6 % (ref 12.3–15.4)
GLUCOSE SERPL-MCNC: 161 MG/DL (ref 65–99)
HCT VFR BLD AUTO: 34.4 % (ref 34–46.6)
HGB BLD-MCNC: 10.8 G/DL (ref 12–15.9)
L PNEUMO1 AG UR QL IA: NEGATIVE
MCH RBC QN AUTO: 30.6 PG (ref 26.6–33)
MCHC RBC AUTO-ENTMCNC: 31.4 G/DL (ref 31.5–35.7)
MCV RBC AUTO: 97.5 FL (ref 79–97)
PLATELET # BLD AUTO: 189 10*3/MM3 (ref 140–450)
PMV BLD AUTO: 11 FL (ref 6–12)
POTASSIUM SERPL-SCNC: 3.7 MMOL/L (ref 3.5–5.2)
RBC # BLD AUTO: 3.53 10*6/MM3 (ref 3.77–5.28)
S PNEUM AG SPEC QL LA: NEGATIVE
SODIUM SERPL-SCNC: 142 MMOL/L (ref 136–145)
WBC NRBC COR # BLD AUTO: 5.72 10*3/MM3 (ref 3.4–10.8)

## 2024-04-11 PROCEDURE — 80048 BASIC METABOLIC PNL TOTAL CA: CPT

## 2024-04-11 PROCEDURE — 25010000002 LEVETRIRACETAM PER 10 MG: Performed by: STUDENT IN AN ORGANIZED HEALTH CARE EDUCATION/TRAINING PROGRAM

## 2024-04-11 PROCEDURE — 25010000002 PIPERACILLIN SOD-TAZOBACTAM PER 1 G: Performed by: FAMILY MEDICINE

## 2024-04-11 PROCEDURE — 85027 COMPLETE CBC AUTOMATED: CPT

## 2024-04-11 PROCEDURE — 99232 SBSQ HOSP IP/OBS MODERATE 35: CPT | Performed by: PEDIATRICS

## 2024-04-11 PROCEDURE — 25010000002 HEPARIN (PORCINE) PER 1000 UNITS: Performed by: STUDENT IN AN ORGANIZED HEALTH CARE EDUCATION/TRAINING PROGRAM

## 2024-04-11 PROCEDURE — 25010000002 VANCOMYCIN HCL IN NACL 1.5-0.9 GM/500ML-% SOLUTION

## 2024-04-11 PROCEDURE — 63710000001 DEXAMETHASONE PER 0.25 MG: Performed by: PEDIATRICS

## 2024-04-11 PROCEDURE — 25010000002 DEXAMETHASONE PER 1 MG: Performed by: NEUROLOGICAL SURGERY

## 2024-04-11 RX ORDER — GABAPENTIN 300 MG/1
300 CAPSULE ORAL 3 TIMES DAILY
COMMUNITY
End: 2024-04-12 | Stop reason: HOSPADM

## 2024-04-11 RX ORDER — CEFUROXIME AXETIL 250 MG/1
500 TABLET ORAL EVERY 12 HOURS SCHEDULED
Qty: 12 TABLET | Refills: 0 | Status: DISCONTINUED | OUTPATIENT
Start: 2024-04-11 | End: 2024-04-12 | Stop reason: HOSPADM

## 2024-04-11 RX ORDER — CITALOPRAM 20 MG/1
20 TABLET ORAL DAILY
COMMUNITY
End: 2024-04-18 | Stop reason: SDUPTHER

## 2024-04-11 RX ORDER — DEXAMETHASONE 4 MG/1
4 TABLET ORAL EVERY 12 HOURS SCHEDULED
Status: DISCONTINUED | OUTPATIENT
Start: 2024-04-11 | End: 2024-04-12 | Stop reason: HOSPADM

## 2024-04-11 RX ORDER — LEVETIRACETAM 500 MG/1
500 TABLET ORAL EVERY 12 HOURS SCHEDULED
Status: DISCONTINUED | OUTPATIENT
Start: 2024-04-11 | End: 2024-04-12 | Stop reason: HOSPADM

## 2024-04-11 RX ORDER — GABAPENTIN 300 MG/1
300 CAPSULE ORAL 3 TIMES DAILY
Status: DISCONTINUED | OUTPATIENT
Start: 2024-04-11 | End: 2024-04-11

## 2024-04-11 RX ORDER — CITALOPRAM 20 MG/1
20 TABLET ORAL DAILY
Status: DISCONTINUED | OUTPATIENT
Start: 2024-04-11 | End: 2024-04-12 | Stop reason: HOSPADM

## 2024-04-11 RX ORDER — GABAPENTIN 300 MG/1
300 CAPSULE ORAL EVERY 12 HOURS SCHEDULED
Status: DISCONTINUED | OUTPATIENT
Start: 2024-04-11 | End: 2024-04-12 | Stop reason: HOSPADM

## 2024-04-11 RX ADMIN — DEXAMETHASONE SODIUM PHOSPHATE 4 MG: 4 INJECTION, SOLUTION INTRA-ARTICULAR; INTRALESIONAL; INTRAMUSCULAR; INTRAVENOUS; SOFT TISSUE at 09:20

## 2024-04-11 RX ADMIN — HEPARIN SODIUM 5000 UNITS: 5000 INJECTION INTRAVENOUS; SUBCUTANEOUS at 18:16

## 2024-04-11 RX ADMIN — Medication 10 ML: at 09:21

## 2024-04-11 RX ADMIN — GABAPENTIN 300 MG: 300 CAPSULE ORAL at 11:36

## 2024-04-11 RX ADMIN — LOSARTAN POTASSIUM 50 MG: 50 TABLET, FILM COATED ORAL at 09:21

## 2024-04-11 RX ADMIN — CEFUROXIME AXETIL 500 MG: 250 TABLET, FILM COATED ORAL at 14:27

## 2024-04-11 RX ADMIN — Medication 10 ML: at 20:55

## 2024-04-11 RX ADMIN — LEVETIRACETAM 500 MG: 500 TABLET, FILM COATED ORAL at 20:54

## 2024-04-11 RX ADMIN — HEPARIN SODIUM 5000 UNITS: 5000 INJECTION INTRAVENOUS; SUBCUTANEOUS at 09:20

## 2024-04-11 RX ADMIN — Medication 1500 MG: at 03:10

## 2024-04-11 RX ADMIN — HYDROCODONE BITARTRATE AND ACETAMINOPHEN 1 TABLET: 5; 325 TABLET ORAL at 15:29

## 2024-04-11 RX ADMIN — CEFUROXIME AXETIL 500 MG: 250 TABLET, FILM COATED ORAL at 20:54

## 2024-04-11 RX ADMIN — PIPERACILLIN AND TAZOBACTAM 3.38 G: 3; .375 INJECTION, POWDER, LYOPHILIZED, FOR SOLUTION INTRAVENOUS at 11:36

## 2024-04-11 RX ADMIN — AMLODIPINE BESYLATE 5 MG: 5 TABLET ORAL at 09:20

## 2024-04-11 RX ADMIN — CITALOPRAM HYDROBROMIDE 20 MG: 20 TABLET ORAL at 18:16

## 2024-04-11 RX ADMIN — PIPERACILLIN AND TAZOBACTAM 3.38 G: 3; .375 INJECTION, POWDER, LYOPHILIZED, FOR SOLUTION INTRAVENOUS at 05:57

## 2024-04-11 RX ADMIN — LEVETIRACETAM 500 MG: 100 INJECTION, SOLUTION INTRAVENOUS at 09:20

## 2024-04-11 RX ADMIN — PANTOPRAZOLE SODIUM 40 MG: 40 TABLET, DELAYED RELEASE ORAL at 05:57

## 2024-04-11 RX ADMIN — DEXAMETHASONE 4 MG: 4 TABLET ORAL at 20:54

## 2024-04-11 RX ADMIN — GABAPENTIN 300 MG: 300 CAPSULE ORAL at 20:54

## 2024-04-11 RX ADMIN — LEVOTHYROXINE SODIUM 50 MCG: 0.05 TABLET ORAL at 05:57

## 2024-04-11 NOTE — PROGRESS NOTES
[facility] Medicine Services  PROGRESS NOTE    Patient Name: Ruby Pettit  : 1945  MRN: [id number]    Date of Admission: 2024  Primary Care Physician: Ly Funez MD    Subjective   Subjective     CC:  F/U PNA, New R frontal lesion    HPI:  Pt doing ok this AM.  Sleeping some.  Tolerating her diet.    Objective   Objective     Vital Signs:   Temp:  [97.5 °F (36.4 °C)-98.5 °F (36.9 °C)] 98.5 °F (36.9 °C)  Heart Rate:  [59-84] 71  Resp:  [16-20] 18  BP: (142-183)/(68-93) 142/75     Physical Exam:  Constitutional: No acute distress, awake, alert  HENT: NCAT, mucous membranes moist, up in chair  Respiratory: respiratory effort normal RA  Cardiovascular: RRR, no murmurs, rubs, or gallops  Gastrointestinal: Positive bowel sounds, soft, nontender, nondistended  Musculoskeletal: No bilateral ankle edema  Psychiatric: Appropriate affect, cooperative  Neurologic: Oriented x 3 speech clear, MAEW  Skin: No rashes     Results Reviewed:  LAB RESULTS:      Lab 24  0716 04/10/24  0518 24  0640 24  0014 24   WBC 5.72 5.69 4.29  --   --  5.79   HEMOGLOBIN 10.8* 10.6* 11.5*  --   --  10.9*   HEMOGLOBIN, POC  --   --   --   --  11.2*  --    HEMATOCRIT 34.4 33.1* 36.2  --   --  35.2   HEMATOCRIT POC  --   --   --   --  33*  --    PLATELETS 189 177 178  --   --  217   NEUTROS ABS  --   --   --   --   --  3.00   IMMATURE GRANS (ABS)  --   --   --   --   --  0.02   LYMPHS ABS  --   --   --   --   --  2.03   MONOS ABS  --   --   --   --   --  0.61   EOS ABS  --   --   --   --   --  0.11   MCV 97.5* 93.0 94.0  --   --  96.4   PROCALCITONIN  --   --   --  0.08  --   --    LACTATE  --   --   --   --   --  2.0         Lab 24  0716 04/10/24  0518 24  0640 24  0014 24   SODIUM 142 141 140  --   --  142   POTASSIUM 3.7 4.2 3.7  --   --  4.2   CHLORIDE 107 108* 107  --   --  108*   CO2 20.0* 22.0 21.0*  --   --   23.0   ANION GAP 15.0 11.0 12.0  --   --  11.0   BUN 19 16 18  --   --  21   CREATININE 0.80 0.78 0.87  --  1.20 1.11*   EGFR 75.1 77.4 67.9  --  46.1* 50.7*   GLUCOSE 161* 148* 147*  --   --  156*   CALCIUM 8.9 9.2 8.6  --   --  9.1   MAGNESIUM  --   --  1.9 1.9  --  2.1   PHOSPHORUS  --   --   --  3.8  --   --    TSH  --   --   --  3.070  --   --          Lab 04/08/24 2016   TOTAL PROTEIN 6.0   ALBUMIN 3.9   GLOBULIN 2.1   ALT (SGPT) 30   AST (SGOT) 23   BILIRUBIN 0.5   ALK PHOS 133*         Lab 04/09/24 0014 04/08/24 2016   HSTROP T 32* 35*                 Brief Urine Lab Results  (Last result in the past 365 days)        Color   Clarity   Blood   Leuk Est   Nitrite   Protein   CREAT   Urine HCG        04/08/24 2051 Yellow   Clear   Negative   Negative   Negative   Trace                   Microbiology Results Abnormal       Procedure Component Value - Date/Time    Blood Culture - Blood, Hand, Right [[id number]]  (Normal) Collected: 04/09/24 0014    Lab Status: Preliminary result Specimen: Blood from Hand, Right Updated: 04/11/24 0030     Blood Culture No growth at 2 days    Narrative:      Less than seven (7) mL's of blood was collected.  Insufficient quantity may yield false negative results.    Blood Culture - Blood, Wrist, Right [[id number]]  (Normal) Collected: 04/09/24 0014    Lab Status: Preliminary result Specimen: Blood from Wrist, Right Updated: 04/11/24 0030     Blood Culture No growth at 2 days    Narrative:      Less than seven (7) mL's of blood was collected.  Insufficient quantity may yield false negative results.    S. Pneumo Ag Urine or CSF - Urine, Urine, Clean Catch [[id number]]  (Normal) Collected: 04/10/24 1717    Lab Status: Final result Specimen: Urine, Clean Catch Updated: 04/11/24 0002     Strep Pneumo Ag Negative    Legionella Antigen, Urine - Urine, Urine, Clean Catch [[id number]]  (Normal) Collected: 04/10/24 1717    Lab Status: Final result Specimen: Urine, Clean Catch Updated: 04/11/24  0002     LEGIONELLA ANTIGEN, URINE Negative    MRSA Screen, PCR (Inpatient) - Swab, Nares [[id number]]  (Normal) Collected: 04/10/24 1551    Lab Status: Final result Specimen: Swab from Nares Updated: 04/10/24 1724     MRSA PCR Negative    Narrative:      The negative predictive value of this diagnostic test is high and should only be used to consider de-escalating anti-MRSA therapy. A positive result may indicate colonization with MRSA and must be correlated clinically.  MRSA Negative    COVID PRE-OP / PRE-PROCEDURE SCREENING ORDER (NO ISOLATION) - Swab, Nasopharynx [[id number]]  (Normal) Collected: 04/08/24 2102    Lab Status: Final result Specimen: Swab from Nasopharynx Updated: 04/08/24 2158    Narrative:      The following orders were created for panel order COVID PRE-OP / PRE-PROCEDURE SCREENING ORDER (NO ISOLATION) - Swab, Nasopharynx.  Procedure                               Abnormality         Status                     ---------                               -----------         ------                     Respiratory Panel PCR w/...[[id number]]  Normal              Final result                 Please view results for these tests on the individual orders.    Respiratory Panel PCR w/COVID-19(SARS-CoV-2) TOMI/PARISH/TRESA/PAD/COR/JACKIE In-House, NP Swab in UTM/VTM, 2 HR TAT - Swab, Nasopharynx [[id number]]  (Normal) Collected: 04/08/24 2102    Lab Status: Final result Specimen: Swab from Nasopharynx Updated: 04/08/24 2158     ADENOVIRUS, PCR Not Detected     Coronavirus 229E Not Detected     Coronavirus HKU1 Not Detected     Coronavirus NL63 Not Detected     Coronavirus OC43 Not Detected     COVID19 Not Detected     Human Metapneumovirus Not Detected     Human Rhinovirus/Enterovirus Not Detected     Influenza A PCR Not Detected     Influenza B PCR Not Detected     Parainfluenza Virus 1 Not Detected     Parainfluenza Virus 2 Not Detected     Parainfluenza Virus 3 Not Detected     Parainfluenza Virus 4 Not Detected     RSV,  PCR Not Detected     Bordetella pertussis pcr Not Detected     Bordetella parapertussis PCR Not Detected     Chlamydophila pneumoniae PCR Not Detected     Mycoplasma pneumo by PCR Not Detected    Narrative:      In the setting of a positive respiratory panel with a viral infection PLUS a negative procalcitonin without other underlying concern for bacterial infection, consider observing off antibiotics or discontinuation of antibiotics and continue supportive care. If the respiratory panel is positive for atypical bacterial infection (Bordetella pertussis, Chlamydophila pneumoniae, or Mycoplasma pneumoniae), consider antibiotic de-escalation to target atypical bacterial infection.            MRI Brain With & Without Contrast    Result Date: 4/10/2024  MRI BRAIN W WO CONTRAST Date of Exam: 4/9/2024 10:53 PM EDT Indication: Metastatic disease evaluation.  Comparison: CT 4/8/2024. Technique:  Routine multiplanar/multisequence sequence images of the brain were obtained before and after the uneventful administration of 17 mL Multihance. Findings: No acute infarct is present on diffusion weighted sequences. Corresponding with the edematous mass seen on CT, there is a peripherally enhancing and centrally cystic finding present in the right frontal lobe measuring 23 x 20 mm, likely metastatic. There  is surrounding vasogenic edema including some mild associated leftward midline shift of around 4 to 5 mm. The right frontal horn of the lateral ventricle is mildly effaced. No additional hemorrhage, mass or abnormal enhancement is present. The orbits are normal. Mild generalized volume loss is present. An area of cystic volume loss in the left anterior temporal lobe appears similar to prior exams with some mild surrounding gliosis.     Impression: Impression: Solitary enhancing cystic and solid right frontal lobe lesion with considerable surrounding vasogenic edema, favored metastatic. Electronically Signed: Destin Henry MD   "4/10/2024 9:29 AM EDT  Workstation ID: [id number]     Results for orders placed during the hospital encounter of 07/08/19    Adult Transthoracic Echo Complete W/ Cont if Necessary Per Protocol    Interpretation Summary  · Estimated EF = 65%. Near cavitary obliteration at rest with contractility  · Left ventricular systolic function is normal.  · Left ventricular diastolic dysfunction (grade I) consistent with impaired relaxation.  · Trace mitral regurgitation  · Trace to mild tricuspid regurgitation      Current medications:  Scheduled Meds:amLODIPine, 5 mg, Oral, Q24H  dexAMETHasone, 4 mg, Oral, Q12H  gabapentin, 300 mg, Oral, TID  heparin (porcine), 5,000 Units, Subcutaneous, Q8H  [Held by provider] hydroCHLOROthiazide, 12.5 mg, Oral, Daily  levETIRAcetam, 500 mg, Oral, Q12H  levothyroxine, 50 mcg, Oral, Daily  losartan, 50 mg, Oral, Q24H  pantoprazole, 40 mg, Oral, Q AM  piperacillin-tazobactam, 3.375 g, Intravenous, Q8H  sodium chloride, 10 mL, Intravenous, Q12H      Continuous Infusions:     PRN Meds:.  acetaminophen    senna-docusate sodium **AND** polyethylene glycol **AND** bisacodyl **AND** bisacodyl    Calcium Replacement - Follow Nurse / BPA Driven Protocol    HYDROcodone-acetaminophen    Magnesium Standard Dose Replacement - Follow Nurse / BPA Driven Protocol    Phosphorus Replacement - Follow Nurse / BPA Driven Protocol    Potassium Replacement - Follow Nurse / BPA Driven Protocol    sodium chloride    sodium chloride    sodium chloride    Assessment & Plan   Assessment & Plan     Active Hospital Problems    Diagnosis  POA    **Encephalopathy [G93.40]  Yes    Elevated troponin [R79.89]  Yes    Malignant neoplasm of lower lobe of left lung [C34.32]  Yes    T2DM (type 2 diabetes mellitus) [E11.9]  Yes    Hypertension [I10]  Yes    Lesion of temporal lobe [G93.9]  Yes    History of asthma [Z87.09]  Not Applicable    Migratory Lung nodules (\"Soft\" and solid, bilateral) [R91.8]  Yes      Resolved Hospital " Problems   No resolved problems to display.        Brief Hospital Course to date:  Ruby Pettit is a 79 y.o. female  with past medical history of GERD, hypertension, metastatic lung cancer, recently completed chemo and radiation treatment, chronic hypoxia on 2 L nasal cannula baseline, hypothyroidism.  She presented with confusion from home.  She recently completed chemotherapy and radiation treatment for a left lower lobe lung mass and follows with Dr. Resendez.      This patient's problems and plans were partially entered by my partner and updated as appropriate by me 04/11/24.      Left lower lobe lung cancer, adenocarcinoma.  Status post chemotherapy and radiation.  Status post left lower lobectomy 11/17/2023  New lesion to the brain suspicious for metastasis  New pulmonary nodules in R lung (new from Sept 2023 PET)   -MRI brain with and without contrast: Solitary enhancing cystic and solid R fontal lobe lesion with considerable surrounding vasogenic edema, favored metastatic   -Dr Henderson will f/u outpt.    -Continue Keppra 500mg BID and Decadron 4mg po BID  -Dr Resendez following -- will see outpatient  -Radiation oncology--scheduling her for CT simulation with Dr. Qureshi next week.     Left Lung PNA   -will switch to ceftin  -Neg MRSA PCR   -Respiratory PCR negative   -Strep pneumo, legionella urinary antigen neg  -On RA     Acute encephalopathy  -Suspect secondary to above  -Restart home gabapentin, but at decreased dose BID      Elevated serum creatinine  -Resolved with IVF      Elevated serum troponin  -Trended down, no complaints of chest pain     Hypothyroidism  -Continue home Synthroid   -TSH ok     Hypertension  -Continue home meds     Expected Discharge Location and Transportation: Home with family  Expected Discharge   Expected Discharge Date: 4/12/2024; Expected Discharge Time:      DVT prophylaxis:  Medical DVT prophylaxis orders are present.         AM-PAC 6 Clicks Score (PT): 20 (04/11/24 0800)    CODE  STATUS:   Code Status and Medical Interventions:   Ordered at: 04/09/24 0145     Code Status (Patient has no pulse and is not breathing):    CPR (Attempt to Resuscitate)     Medical Interventions (Patient has pulse or is breathing):    Full Support       Gisell Dyer MD  04/11/24

## 2024-04-11 NOTE — PLAN OF CARE
Problem: Adult Inpatient Plan of Care  Goal: Plan of Care Review  Outcome: Ongoing, Progressing  Flowsheets (Taken 4/11/2024 0630)  Progress: no change  Plan of Care Reviewed With: patient  Outcome Evaluation:   Goal Outcome Evaluation: alert, forgetful, pulled out iv in middle of night, place new 20 in left fa, iv abx infused as ordered, VSS   nsr, continues to have intermittent confusion, bed alarm on   call light within reach, easily redirected, follows commands,.  Goal: Patient-Specific Goal (Individualized)  Outcome: Ongoing, Progressing  Goal: Absence of Hospital-Acquired Illness or Injury  Outcome: Ongoing, Progressing  Intervention: Identify and Manage Fall Risk  Recent Flowsheet Documentation  Taken 4/11/2024 0600 by Sera Sandoval RN  Safety Promotion/Fall Prevention:   toileting scheduled   safety round/check completed   lighting adjusted   clutter free environment maintained   assistive device/personal items within reach  Taken 4/11/2024 0400 by Sera Sandoval RN  Safety Promotion/Fall Prevention:   safety round/check completed   lighting adjusted   clutter free environment maintained   assistive device/personal items within reach  Taken 4/11/2024 0200 by Sera Sandoval RN  Safety Promotion/Fall Prevention:   safety round/check completed   lighting adjusted   clutter free environment maintained   assistive device/personal items within reach  Taken 4/11/2024 0000 by Sera Sandoval RN  Safety Promotion/Fall Prevention:   toileting scheduled   safety round/check completed   lighting adjusted   clutter free environment maintained   assistive device/personal items within reach   activity supervised  Taken 4/10/2024 2200 by Sera Sandoval RN  Safety Promotion/Fall Prevention:   safety round/check completed   lighting adjusted   clutter free environment maintained   assistive device/personal items within reach  Taken 4/10/2024 2100 by Sera Sandoval RN  Safety Promotion/Fall  Prevention:   safety round/check completed   lighting adjusted   assistive device/personal items within reach   clutter free environment maintained  Taken 4/10/2024 2000 by Sera Sandoval RN  Safety Promotion/Fall Prevention:   safety round/check completed   lighting adjusted   clutter free environment maintained   assistive device/personal items within reach  Taken 4/10/2024 1915 by Sera Sandoval RN  Safety Promotion/Fall Prevention:   safety round/check completed   lighting adjusted  Intervention: Prevent Infection  Recent Flowsheet Documentation  Taken 4/11/2024 0600 by Sera Sandoval RN  Infection Prevention:   single patient room provided   rest/sleep promoted  Taken 4/11/2024 0400 by Sera Sandoval RN  Infection Prevention:   rest/sleep promoted   single patient room provided  Taken 4/11/2024 0200 by Sera Sandoval RN  Infection Prevention:   single patient room provided   rest/sleep promoted  Taken 4/11/2024 0000 by Sera Sandoval RN  Infection Prevention:   single patient room provided   rest/sleep promoted  Taken 4/10/2024 2200 by Sera Sandoval RN  Infection Prevention:   single patient room provided   rest/sleep promoted  Taken 4/10/2024 2100 by Sera Sandoval RN  Infection Prevention:   single patient room provided   rest/sleep promoted  Taken 4/10/2024 2000 by Sera Sandoval RN  Infection Prevention:   rest/sleep promoted   single patient room provided  Taken 4/10/2024 1915 by Sera Sandoval RN  Infection Prevention:   single patient room provided   rest/sleep promoted  Goal: Optimal Comfort and Wellbeing  Outcome: Ongoing, Progressing  Intervention: Provide Person-Centered Care  Recent Flowsheet Documentation  Taken 4/10/2024 2100 by Sera Sandoval RN  Trust Relationship/Rapport:   care explained   questions answered   reassurance provided   thoughts/feelings acknowledged  Goal: Readiness for Transition of Care  Outcome: Ongoing, Progressing      Problem: Fall Injury Risk  Goal: Absence of Fall and Fall-Related Injury  Outcome: Ongoing, Progressing  Intervention: Identify and Manage Contributors  Recent Flowsheet Documentation  Taken 4/11/2024 0600 by Sera Sandoval RN  Medication Review/Management: medications reviewed  Taken 4/10/2024 2100 by Sera Sandoval RN  Medication Review/Management: medications reviewed  Intervention: Promote Injury-Free Environment  Recent Flowsheet Documentation  Taken 4/11/2024 0600 by Sera Sandoval RN  Safety Promotion/Fall Prevention:   toileting scheduled   safety round/check completed   lighting adjusted   clutter free environment maintained   assistive device/personal items within reach  Taken 4/11/2024 0400 by Sera Sandoval RN  Safety Promotion/Fall Prevention:   safety round/check completed   lighting adjusted   clutter free environment maintained   assistive device/personal items within reach  Taken 4/11/2024 0200 by Sera Sandoval RN  Safety Promotion/Fall Prevention:   safety round/check completed   lighting adjusted   clutter free environment maintained   assistive device/personal items within reach  Taken 4/11/2024 0000 by Sera Sandoval RN  Safety Promotion/Fall Prevention:   toileting scheduled   safety round/check completed   lighting adjusted   clutter free environment maintained   assistive device/personal items within reach   activity supervised  Taken 4/10/2024 2200 by Sera Sandoval RN  Safety Promotion/Fall Prevention:   safety round/check completed   lighting adjusted   clutter free environment maintained   assistive device/personal items within reach  Taken 4/10/2024 2100 by Sera Sandoval RN  Safety Promotion/Fall Prevention:   safety round/check completed   lighting adjusted   assistive device/personal items within reach   clutter free environment maintained  Taken 4/10/2024 2000 by Sera Sandoval RN  Safety Promotion/Fall Prevention:   safety round/check  completed   lighting adjusted   clutter free environment maintained   assistive device/personal items within reach  Taken 4/10/2024 1915 by Sera Sandoval, RN  Safety Promotion/Fall Prevention:   safety round/check completed   lighting adjusted     Problem: Skin Injury Risk Increased  Goal: Skin Health and Integrity  Outcome: Ongoing, Progressing   Goal Outcome Evaluation:  Plan of Care Reviewed With: patient        Progress: no change  Outcome Evaluation: Goal Outcome Evaluation: alert, forgetful, pulled out iv in middle of night, place new 20 in left fa, iv abx infused as ordered, VSS; nsr, continues to have intermittent confusion, bed alarm on; call light within reach, easily redirected, follows commands,.

## 2024-04-11 NOTE — PROGRESS NOTES
"                  Clinical Nutrition   Nutrition Support Assessment  Reason for Visit: Identified at risk by screening criteria, MST score 2+    Patient Name: Ruby Pettit  YOB: 1945  MRN: [id number]  Date of Encounter: 04/10/24 22:00 EDT  Admission date: 4/8/2024    Comments: Pt w/o significant documented wt loss at this time and wanting to eat. Allow told to avoid sugar. Have removed sugar pkts from trays and sending one Boost Glucose Control/da. Leaving Reg diet order in place to assure max choices given CA dx.       Nutrition Assessment   Admission Diagnosis:  Encephalopathy [G93.40]      Problem List:    Encephalopathy    Migratory Lung nodules (\"Soft\" and solid, bilateral)    History of asthma    Hypertension    T2DM (type 2 diabetes mellitus)    Lesion of temporal lobe    Malignant neoplasm of lower lobe of left lung    Elevated troponin        PMH:   She  has a past medical history of Anxiety and depression, Arthritis, Carotid stenosis, bilateral, Disease of thyroid gland, GERD (gastroesophageal reflux disease), Head injury due to trauma (1992), History of transfusion, Hyperlipidemia, Hypertension, Iatrogenic pneumothorax, Liver disorder, Lung cancer, Metastatic cancer, Perennial rhinitis, Peripheral neuropathic pain, Syncope and collapse, Thyroid disease, UTI (urinary tract infection), bacterial, and Wears eyeglasses.    PSH:  She  has a past surgical history that includes Hysterectomy; Oophorectomy (Bilateral); Abdominal surgery; Liver resection; Appendectomy; Cholecystectomy; Tubal ligation; Bronchoscopy (N/A, 02/13/2018); Thyroid surgery; Colonoscopy (2019); Bronchoscopy (N/A, 03/21/2019); ORIF wrist fracture (Right); BRONCHOSCOPY WITH ION ROBOTIC ASSIST (N/A, 06/29/2023); and Lung lobectomy (11/17/2023).    Applicable Nutrition Concerns:   Skin:  Oral:  GI:    Applicable Interval History:       Reported/Observed/Food/Nutrition Related History:     4/10  Pt/family allow eating ok at home " "Allow told to avoid sugar.     Anthropometrics     Height: Height: 160 cm (63\")  Last Filed Weight: Weight: 83.5 kg (184 lb) (04/08/24 1925)  Method: Weight Method: Stated  BMI: BMI (Calculated): 32.6    UBW:  Per EMR wt of 167 lbs on11/30/23 and 161 lbs on 12/20/23   Weight change:   Loss of 6 lbs 3.5% body wt in one month  < criterion    Nutrition Focused Physical Exam     Date:   4/10      Patient meets criteria for malnutrition diagnosis, see MSA note.  No wasting detected    Current Nutrition Prescription   PO: Diet: Regular/House; Fluid Consistency: Thin (IDDSI 0)  Oral Nutrition Supplement: Boost Glucose Control daily added per RD  Intake: Insufficient data25% x 1 meal recorded.      Nutrition Diagnosis   Date:  4/10            Updated:    Problem Potential for inadequate intake   Etiology CA dx   Signs/Symptoms Follow for intake progression 25% x 1 meal recorded   Status:       Goal:   Nutrition to support treatment and Establish PO      Nutrition Intervention      Follow treatment progress, Care plan reviewed, Advise alternate selection, Menu provided, Encourage intake, Supplement provided      Monitoring/Evaluation:   Per protocol, I&O, PO intake, Supplement intake, Pertinent labs, Weight, Symptoms      Ivory Scanlon RD  Time Spent: 30 min  "

## 2024-04-12 VITALS
RESPIRATION RATE: 18 BRPM | OXYGEN SATURATION: 98 % | SYSTOLIC BLOOD PRESSURE: 154 MMHG | TEMPERATURE: 98.4 F | WEIGHT: 184 LBS | DIASTOLIC BLOOD PRESSURE: 70 MMHG | HEIGHT: 63 IN | HEART RATE: 71 BPM | BODY MASS INDEX: 32.6 KG/M2

## 2024-04-12 PROBLEM — R79.89 ELEVATED TROPONIN: Status: RESOLVED | Noted: 2024-01-17 | Resolved: 2024-04-12

## 2024-04-12 PROCEDURE — 99239 HOSP IP/OBS DSCHRG MGMT >30: CPT | Performed by: STUDENT IN AN ORGANIZED HEALTH CARE EDUCATION/TRAINING PROGRAM

## 2024-04-12 PROCEDURE — 97116 GAIT TRAINING THERAPY: CPT

## 2024-04-12 PROCEDURE — 97535 SELF CARE MNGMENT TRAINING: CPT

## 2024-04-12 PROCEDURE — 63710000001 DEXAMETHASONE PER 0.25 MG: Performed by: PEDIATRICS

## 2024-04-12 PROCEDURE — 25010000002 CYANOCOBALAMIN PER 1000 MCG: Performed by: INTERNAL MEDICINE

## 2024-04-12 PROCEDURE — 97110 THERAPEUTIC EXERCISES: CPT

## 2024-04-12 PROCEDURE — 25010000002 HEPARIN (PORCINE) PER 1000 UNITS: Performed by: STUDENT IN AN ORGANIZED HEALTH CARE EDUCATION/TRAINING PROGRAM

## 2024-04-12 PROCEDURE — 97530 THERAPEUTIC ACTIVITIES: CPT

## 2024-04-12 RX ORDER — CYANOCOBALAMIN 1000 UG/ML
1000 INJECTION, SOLUTION INTRAMUSCULAR; SUBCUTANEOUS ONCE
Status: COMPLETED | OUTPATIENT
Start: 2024-04-12 | End: 2024-04-12

## 2024-04-12 RX ORDER — HYDROCODONE BITARTRATE AND ACETAMINOPHEN 5; 325 MG/1; MG/1
1 TABLET ORAL EVERY 12 HOURS PRN
Start: 2024-04-12

## 2024-04-12 RX ORDER — CEFUROXIME AXETIL 500 MG/1
500 TABLET ORAL EVERY 12 HOURS SCHEDULED
Qty: 3 TABLET | Refills: 0 | Status: SHIPPED | OUTPATIENT
Start: 2024-04-12 | End: 2024-04-14

## 2024-04-12 RX ORDER — GABAPENTIN 300 MG/1
300 CAPSULE ORAL EVERY 12 HOURS SCHEDULED
Qty: 14 CAPSULE | Refills: 0 | Status: SHIPPED | OUTPATIENT
Start: 2024-04-12 | End: 2024-04-19

## 2024-04-12 RX ORDER — AMLODIPINE BESYLATE 5 MG/1
5 TABLET ORAL
Qty: 30 TABLET | Refills: 0 | Status: SHIPPED | OUTPATIENT
Start: 2024-04-13

## 2024-04-12 RX ORDER — LEVETIRACETAM 500 MG/1
500 TABLET ORAL EVERY 12 HOURS SCHEDULED
Qty: 60 TABLET | Refills: 0 | Status: SHIPPED | OUTPATIENT
Start: 2024-04-12

## 2024-04-12 RX ORDER — NALOXONE HYDROCHLORIDE 4 MG/.1ML
SPRAY NASAL
Qty: 2 EACH | Refills: 0 | Status: SHIPPED | OUTPATIENT
Start: 2024-04-12

## 2024-04-12 RX ORDER — DEXAMETHASONE 4 MG/1
4 TABLET ORAL EVERY 12 HOURS SCHEDULED
Qty: 28 TABLET | Refills: 0 | Status: SHIPPED | OUTPATIENT
Start: 2024-04-12 | End: 2024-04-26

## 2024-04-12 RX ADMIN — LEVOTHYROXINE SODIUM 50 MCG: 0.05 TABLET ORAL at 06:11

## 2024-04-12 RX ADMIN — LOSARTAN POTASSIUM 50 MG: 50 TABLET, FILM COATED ORAL at 09:15

## 2024-04-12 RX ADMIN — GABAPENTIN 300 MG: 300 CAPSULE ORAL at 09:13

## 2024-04-12 RX ADMIN — CITALOPRAM HYDROBROMIDE 20 MG: 20 TABLET ORAL at 09:13

## 2024-04-12 RX ADMIN — AMLODIPINE BESYLATE 5 MG: 5 TABLET ORAL at 09:14

## 2024-04-12 RX ADMIN — PANTOPRAZOLE SODIUM 40 MG: 40 TABLET, DELAYED RELEASE ORAL at 06:11

## 2024-04-12 RX ADMIN — HEPARIN SODIUM 5000 UNITS: 5000 INJECTION INTRAVENOUS; SUBCUTANEOUS at 09:12

## 2024-04-12 RX ADMIN — LEVETIRACETAM 500 MG: 500 TABLET, FILM COATED ORAL at 09:16

## 2024-04-12 RX ADMIN — DEXAMETHASONE 4 MG: 4 TABLET ORAL at 09:16

## 2024-04-12 RX ADMIN — CYANOCOBALAMIN 1000 MCG: 1000 INJECTION, SOLUTION INTRAMUSCULAR; SUBCUTANEOUS at 15:22

## 2024-04-12 RX ADMIN — CEFUROXIME AXETIL 500 MG: 250 TABLET, FILM COATED ORAL at 09:13

## 2024-04-12 RX ADMIN — HYDROCODONE BITARTRATE AND ACETAMINOPHEN 1 TABLET: 5; 325 TABLET ORAL at 15:22

## 2024-04-12 NOTE — PLAN OF CARE
Problem: Adult Inpatient Plan of Care  Goal: Plan of Care Review  Outcome: Ongoing, Progressing  Flowsheets (Taken 4/12/2024 0609)  Progress: no change  Plan of Care Reviewed With: patient  Outcome Evaluation:   Plan of Care Reviewed With: patient  Progress: no change  she is alert, forgetful, up with standby assist to bsc, VSS   nsr, plan to go home with sister per son, bed alarm on   call light within reach, easily redirected, follows commands,.  Goal: Patient-Specific Goal (Individualized)  Outcome: Ongoing, Progressing  Goal: Absence of Hospital-Acquired Illness or Injury  Outcome: Ongoing, Progressing  Intervention: Identify and Manage Fall Risk  Recent Flowsheet Documentation  Taken 4/12/2024 0600 by Sera Sandoval RN  Safety Promotion/Fall Prevention:   safety round/check completed   lighting adjusted   assistive device/personal items within reach  Taken 4/12/2024 0400 by eSra Sandoval RN  Safety Promotion/Fall Prevention:   safety round/check completed   lighting adjusted   clutter free environment maintained   assistive device/personal items within reach  Taken 4/12/2024 0200 by Sera Sandoval RN  Safety Promotion/Fall Prevention:   safety round/check completed   lighting adjusted   clutter free environment maintained   assistive device/personal items within reach  Taken 4/12/2024 0000 by Sera Sandoval RN  Safety Promotion/Fall Prevention:   safety round/check completed   lighting adjusted   assistive device/personal items within reach   clutter free environment maintained  Taken 4/11/2024 2200 by Sera Sandoval RN  Safety Promotion/Fall Prevention:   safety round/check completed   lighting adjusted   clutter free environment maintained   assistive device/personal items within reach  Taken 4/11/2024 2000 by Sera Sandoval RN  Safety Promotion/Fall Prevention:   safety round/check completed   lighting adjusted   clutter free environment maintained   assistive device/personal  items within reach   activity supervised  Intervention: Prevent Skin Injury  Recent Flowsheet Documentation  Taken 4/11/2024 2000 by Sera Sandoval RN  Skin Protection: adhesive use limited  Intervention: Prevent and Manage VTE (Venous Thromboembolism) Risk  Recent Flowsheet Documentation  Taken 4/11/2024 2000 by Sera Sandoval RN  Range of Motion: active ROM (range of motion) encouraged  Intervention: Prevent Infection  Recent Flowsheet Documentation  Taken 4/12/2024 0600 by Sera Sandoval RN  Infection Prevention:   rest/sleep promoted   single patient room provided  Taken 4/12/2024 0400 by Sera Sandoval RN  Infection Prevention:   rest/sleep promoted   single patient room provided  Taken 4/12/2024 0200 by Sera Sandoval RN  Infection Prevention:   rest/sleep promoted   single patient room provided  Taken 4/12/2024 0000 by Sera Sandoval RN  Infection Prevention:   rest/sleep promoted   single patient room provided  Taken 4/11/2024 2200 by Sera Sandoval RN  Infection Prevention:   rest/sleep promoted   single patient room provided   hand hygiene promoted  Taken 4/11/2024 2000 by Sera Sandoval RN  Infection Prevention: single patient room provided  Goal: Optimal Comfort and Wellbeing  Outcome: Ongoing, Progressing  Intervention: Provide Person-Centered Care  Recent Flowsheet Documentation  Taken 4/11/2024 2000 by Sera Sandoval RN  Trust Relationship/Rapport:   care explained   questions answered   reassurance provided   thoughts/feelings acknowledged  Goal: Readiness for Transition of Care  Outcome: Ongoing, Progressing     Problem: Fall Injury Risk  Goal: Absence of Fall and Fall-Related Injury  Outcome: Ongoing, Progressing  Intervention: Identify and Manage Contributors  Recent Flowsheet Documentation  Taken 4/11/2024 2200 by Sera Sandoval RN  Medication Review/Management: medications reviewed  Taken 4/11/2024 2000 by Sera Sandoval RN  Medication  Review/Management: medications reviewed  Intervention: Promote Injury-Free Environment  Recent Flowsheet Documentation  Taken 4/12/2024 0600 by Sera Sandoval RN  Safety Promotion/Fall Prevention:   safety round/check completed   lighting adjusted   assistive device/personal items within reach  Taken 4/12/2024 0400 by Sera Sandoval RN  Safety Promotion/Fall Prevention:   safety round/check completed   lighting adjusted   clutter free environment maintained   assistive device/personal items within reach  Taken 4/12/2024 0200 by Sera Sandoval RN  Safety Promotion/Fall Prevention:   safety round/check completed   lighting adjusted   clutter free environment maintained   assistive device/personal items within reach  Taken 4/12/2024 0000 by Sera Sandoval RN  Safety Promotion/Fall Prevention:   safety round/check completed   lighting adjusted   assistive device/personal items within reach   clutter free environment maintained  Taken 4/11/2024 2200 by Sera Sandoval RN  Safety Promotion/Fall Prevention:   safety round/check completed   lighting adjusted   clutter free environment maintained   assistive device/personal items within reach  Taken 4/11/2024 2000 by Sera Sandoval RN  Safety Promotion/Fall Prevention:   safety round/check completed   lighting adjusted   clutter free environment maintained   assistive device/personal items within reach   activity supervised   Goal Outcome Evaluation:  Plan of Care Reviewed With: patient        Progress: no change  Outcome Evaluation: Plan of Care Reviewed With: patient  Progress: no change  she is alert, forgetful, up with standby assist to bsc, VSS; nsr, plan to go home with sister per son, bed alarm on; call light within reach, easily redirected, follows commands,.

## 2024-04-12 NOTE — PROGRESS NOTES
Continued Stay Note  [facility]     Patient Name: Ruby Pettit  MRN: [id number]  Today's Date: 4/12/2024    Admit Date: 4/8/2024    Plan: Home   Discharge Plan       Row Name 04/12/24 0355       Plan    Plan Comments Called the aptients sister and provided her with the contact information to apply for Medicaid and she will try to see if the patient qualifies for Medicaid.      Row Name 04/12/24 0581       Plan    Plan Home    Patient/Family in Agreement with Plan yes    Final Discharge Disposition Code 01 - home or self-care    Final Note Spoke to patient and patient's friend at bedside. Patient's plan is to discharge home today, 4/12. Called and spoke with HANNA Russell. Nelsy, patient's sister, had some questions regarding medicaid, and how to apply the patient for it. Confirmed with Yvonne that she was able to speak with sister.  attempted to reach sister via phone, but was unable to get through or leave a message. The patient and the friend were given papers with numbers for private caregivers, as well as contact information for [email or web address]. No other questions or concerns were voiced at this time. Patient's friend will transport home.                   Discharge Codes    No documentation.                 Expected Discharge Date and Time       Expected Discharge Date Expected Discharge Time    Apr 12, 2024               HANNA Bynum

## 2024-04-12 NOTE — DISCHARGE SUMMARY
"    [facility] Medicine Services  DISCHARGE SUMMARY    Patient Name: Ruby Pettit  : 1945  MRN: [id number]    Date of Admission: 2024  8:16 PM  Date of Discharge:  2024  Primary Care Physician: Ly Funez MD    Consults       Date and Time Order Name Status Description    4/10/2024 10:38 AM Inpatient Radiation Oncology Consult Completed     2024  4:02 AM Inpatient Neurosurgery Consult Completed     2024  4:02 AM Hematology & Oncology Inpatient Consult Completed             Hospital Course     Presenting Problem: encephalopathy    Active Hospital Problems    Diagnosis  POA    **Encephalopathy [G93.40]  Yes    Malignant neoplasm of lower lobe of left lung [C34.32]  Yes    T2DM (type 2 diabetes mellitus) [E11.9]  Yes    Hypertension [I10]  Yes    Lesion of temporal lobe [G93.9]  Yes    History of asthma [Z87.09]  Not Applicable    Migratory Lung nodules (\"Soft\" and solid, bilateral) [R91.8]  Yes      Resolved Hospital Problems    Diagnosis Date Resolved POA    Elevated troponin [R79.89] 2024 Yes          Hospital Course:  Ruby Pettit is a 79 y.o. female with past medical history of GERD, hypertension, metastatic lung cancer, recently completed chemo and radiation treatment, chronic hypoxia on 2 L nasal cannula baseline, hypothyroidism.  She presented with confusion from home.  She recently completed chemotherapy and radiation treatment for a left lower lobe lung mass and follows with Dr. Resendez.      Left lower lobe lung cancer, adenocarcinoma.  Status post chemotherapy and radiation.  Status post left lower lobectomy 2023  New lesion to the brain suspicious for metastasis  New pulmonary nodules in R lung (new from 2023 PET)   -MRI brain with and without contrast: Solitary enhancing cystic and solid R fontal lobe lesion with considerable surrounding vasogenic edema, favored metastatic   -Dr Henderson will f/u outpt.  Continue Keppra 500mg BID and " Decadron 4mg po BID. Agrees w radiation therapy. Rad onc to manage steroid taper outpatient  -Dr Resendez following -- will see outpatient, tentative plan w chemo/immunotherapy  -Radiation oncology--scheduling her for CT simulation with Dr. Qureshi next week.     Left Lung PNA   -will need to complete ceftin  -Neg MRSA PCR   -Respiratory PCR negative   -Strep pneumo, legionella urinary antigen neg  -On RA     Acute encephalopathy  -Suspect secondary to above  -Restart home gabapentin, but at decreased dose BID   -complicates all aspects of care     Elevated serum creatinine  -Resolved with IVF      Elevated serum troponin  -Trended down, no complaints of chest pain     Hypothyroidism  -Continue home Synthroid   -TSH ok     Hypertension  -Continue home meds       Discharge Follow Up Recommendations for outpatient labs/diagnostics:   PCP, oncology, rad onc, nsgy    Day of Discharge     HPI:   No new issues overnight. Sitting comfortably, walked without issues w PT    Review of Systems  Gen- No fevers, chills  CV- No chest pain, palpitations  Resp- No cough, dyspnea  GI- No N/V/D, abd pain      Vital Signs:   Temp:  [97.5 °F (36.4 °C)-98.5 °F (36.9 °C)] 98.4 °F (36.9 °C)  Heart Rate:  [53-78] 57  Resp:  [18-20] 18  BP: (146-154)/(70-94) 154/70      Physical Exam:  Constitutional: No acute distress, awake, alert  HENT: NCAT, mucous membranes moist, up in chair  Respiratory: respiratory effort normal RA  Cardiovascular: RRR, no murmurs, rubs, or gallops  Gastrointestinal: Positive bowel sounds, soft, nontender, nondistended  Musculoskeletal: No bilateral ankle edema  Psychiatric: Appropriate affect, cooperative  Neurologic: Oriented x 3 speech clear, MAEW  Skin: No rashes        Pertinent  and/or Most Recent Results     LAB RESULTS:      Lab 04/11/24  0716 04/10/24  0518 04/09/24  0640 04/09/24  0014 04/08/24 2018 04/08/24 2016   WBC 5.72 5.69 4.29  --   --  5.79   HEMOGLOBIN 10.8* 10.6* 11.5*  --   --  10.9*   HEMOGLOBIN,  POC  --   --   --   --  11.2*  --    HEMATOCRIT 34.4 33.1* 36.2  --   --  35.2   HEMATOCRIT POC  --   --   --   --  33*  --    PLATELETS 189 177 178  --   --  217   NEUTROS ABS  --   --   --   --   --  3.00   IMMATURE GRANS (ABS)  --   --   --   --   --  0.02   LYMPHS ABS  --   --   --   --   --  2.03   MONOS ABS  --   --   --   --   --  0.61   EOS ABS  --   --   --   --   --  0.11   MCV 97.5* 93.0 94.0  --   --  96.4   PROCALCITONIN  --   --   --  0.08  --   --    LACTATE  --   --   --   --   --  2.0         Lab 04/11/24  0716 04/10/24  0518 04/09/24  0640 04/09/24  0014 04/08/24 2018 04/08/24 2016   SODIUM 142 141 140  --   --  142   POTASSIUM 3.7 4.2 3.7  --   --  4.2   CHLORIDE 107 108* 107  --   --  108*   CO2 20.0* 22.0 21.0*  --   --  23.0   ANION GAP 15.0 11.0 12.0  --   --  11.0   BUN 19 16 18  --   --  21   CREATININE 0.80 0.78 0.87  --  1.20 1.11*   EGFR 75.1 77.4 67.9  --  46.1* 50.7*   GLUCOSE 161* 148* 147*  --   --  156*   CALCIUM 8.9 9.2 8.6  --   --  9.1   MAGNESIUM  --   --  1.9 1.9  --  2.1   PHOSPHORUS  --   --   --  3.8  --   --    TSH  --   --   --  3.070  --   --          Lab 04/08/24 2016   TOTAL PROTEIN 6.0   ALBUMIN 3.9   GLOBULIN 2.1   ALT (SGPT) 30   AST (SGOT) 23   BILIRUBIN 0.5   ALK PHOS 133*         Lab 04/09/24  0014 04/08/24 2016   HSTROP T 32* 35*                 Brief Urine Lab Results  (Last result in the past 365 days)        Color   Clarity   Blood   Leuk Est   Nitrite   Protein   CREAT   Urine HCG        04/08/24 2051 Yellow   Clear   Negative   Negative   Negative   Trace                 Microbiology Results (last 10 days)       Procedure Component Value - Date/Time    S. Pneumo Ag Urine or CSF - Urine, Urine, Clean Catch [[id number]]  (Normal) Collected: 04/10/24 1717    Lab Status: Final result Specimen: Urine, Clean Catch Updated: 04/11/24 0002     Strep Pneumo Ag Negative    Legionella Antigen, Urine - Urine, Urine, Clean Catch [[id number]]  (Normal) Collected: 04/10/24  1717    Lab Status: Final result Specimen: Urine, Clean Catch Updated: 04/11/24 0002     LEGIONELLA ANTIGEN, URINE Negative    MRSA Screen, PCR (Inpatient) - Swab, Nares [[id number]]  (Normal) Collected: 04/10/24 1551    Lab Status: Final result Specimen: Swab from Nares Updated: 04/10/24 1724     MRSA PCR Negative    Narrative:      The negative predictive value of this diagnostic test is high and should only be used to consider de-escalating anti-MRSA therapy. A positive result may indicate colonization with MRSA and must be correlated clinically.  MRSA Negative    Blood Culture - Blood, Hand, Right [[id number]]  (Normal) Collected: 04/09/24 0014    Lab Status: Preliminary result Specimen: Blood from Hand, Right Updated: 04/12/24 0030     Blood Culture No growth at 3 days    Narrative:      Less than seven (7) mL's of blood was collected.  Insufficient quantity may yield false negative results.    Blood Culture - Blood, Wrist, Right [[id number]]  (Normal) Collected: 04/09/24 0014    Lab Status: Preliminary result Specimen: Blood from Wrist, Right Updated: 04/12/24 0030     Blood Culture No growth at 3 days    Narrative:      Less than seven (7) mL's of blood was collected.  Insufficient quantity may yield false negative results.    COVID PRE-OP / PRE-PROCEDURE SCREENING ORDER (NO ISOLATION) - Swab, Nasopharynx [[id number]]  (Normal) Collected: 04/08/24 2102    Lab Status: Final result Specimen: Swab from Nasopharynx Updated: 04/08/24 2158    Narrative:      The following orders were created for panel order COVID PRE-OP / PRE-PROCEDURE SCREENING ORDER (NO ISOLATION) - Swab, Nasopharynx.  Procedure                               Abnormality         Status                     ---------                               -----------         ------                     Respiratory Panel PCR w/...[[id number]]  Normal              Final result                 Please view results for these tests on the individual orders.     Respiratory Panel PCR w/COVID-19(SARS-CoV-2) [facility] In-House, NP Swab in UTM/VTM, 2 HR TAT - Swab, Nasopharynx [[id number]]  (Normal) Collected: 04/08/24 2102    Lab Status: Final result Specimen: Swab from Nasopharynx Updated: 04/08/24 2158     ADENOVIRUS, PCR Not Detected     Coronavirus 229E Not Detected     Coronavirus HKU1 Not Detected     Coronavirus NL63 Not Detected     Coronavirus OC43 Not Detected     COVID19 Not Detected     Human Metapneumovirus Not Detected     Human Rhinovirus/Enterovirus Not Detected     Influenza A PCR Not Detected     Influenza B PCR Not Detected     Parainfluenza Virus 1 Not Detected     Parainfluenza Virus 2 Not Detected     Parainfluenza Virus 3 Not Detected     Parainfluenza Virus 4 Not Detected     RSV, PCR Not Detected     Bordetella pertussis pcr Not Detected     Bordetella parapertussis PCR Not Detected     Chlamydophila pneumoniae PCR Not Detected     Mycoplasma pneumo by PCR Not Detected    Narrative:      In the setting of a positive respiratory panel with a viral infection PLUS a negative procalcitonin without other underlying concern for bacterial infection, consider observing off antibiotics or discontinuation of antibiotics and continue supportive care. If the respiratory panel is positive for atypical bacterial infection (Bordetella pertussis, Chlamydophila pneumoniae, or Mycoplasma pneumoniae), consider antibiotic de-escalation to target atypical bacterial infection.            MRI Brain With & Without Contrast    Result Date: 4/10/2024  MRI BRAIN W WO CONTRAST Date of Exam: 4/9/2024 10:53 PM EDT Indication: Metastatic disease evaluation.  Comparison: CT 4/8/2024. Technique:  Routine multiplanar/multisequence sequence images of the brain were obtained before and after the uneventful administration of 17 mL Multihance. Findings: No acute infarct is present on diffusion weighted sequences. Corresponding with the edematous mass seen on CT, there is a  peripherally enhancing and centrally cystic finding present in the right frontal lobe measuring 23 x 20 mm, likely metastatic. There  is surrounding vasogenic edema including some mild associated leftward midline shift of around 4 to 5 mm. The right frontal horn of the lateral ventricle is mildly effaced. No additional hemorrhage, mass or abnormal enhancement is present. The orbits are normal. Mild generalized volume loss is present. An area of cystic volume loss in the left anterior temporal lobe appears similar to prior exams with some mild surrounding gliosis.     Impression: Solitary enhancing cystic and solid right frontal lobe lesion with considerable surrounding vasogenic edema, favored metastatic. Electronically Signed: Destin Henry MD  4/10/2024 9:29 AM EDT  Workstation ID: [id number]    CT Angiogram Chest    Result Date: 4/8/2024  CT ANGIOGRAM CHEST Date of Exam: 4/8/2024 9:43 PM EDT Indication: History of lung cancer with increasing density to left lung on chest x-ray, radiologist question infection, rule out worsening neoplasm versus infection. Comparison: Same day chest radiograph, 9/26/2023 PET/CT Technique: CTA of the chest was performed after the uneventful administration of intravenous contrast. Reconstructed coronal and sagittal images were also obtained. In addition, a 3-D volume rendered image was created for interpretation. Automated exposure control and iterative reconstruction methods were used. Findings: There is no evidence of pulmonary embolism. The pulmonary arteries are borderline enlarged with the main pulmonary artery measuring up to 3 cm. There is no significant pericardial effusion. Atherosclerotic calcifications of the coronary arteries and aorta. No mediastinal mass or significant lymphadenopathy. There are surgical changes involving the left lung with associated asymmetric volume loss. The central airways are patent. There are scattered groundglass opacities and nodular  consolidations throughout the left lung. The right lung shows no focal airspace consolidation. There is a 3 mm right upper lobe pulmonary nodule (axial image 21). There is also a 5 mm right middle lobe pulmonary nodule (axial image 42). Multiple groundglass nodular opacities in the posterior aspect of the right upper and lower lobes, similar to 2023 CT. Chest wall soft tissues show no acute abnormality. There is no evidence of acute fracture or suspicious osseous lesion. Multiple vertebral body hemangiomas. Chronic T11 vertebral body compression deformity. Please see same-day CT of the abdomen and pelvis for findings below the diaphragm.     Impression: Surgical changes of the left lung with associated asymmetric volume loss. There are scattered groundglass opacities and nodular consolidations throughout the left lung consistent with pneumonia. Multiple pulmonary nodules in the right lung measuring up to 5 mm, new from September 2023 PET/CT. Recommend follow-up chest CT in 6 months. Electronically Signed: Sergio Hernandez MD  4/8/2024 10:15 PM EDT  Workstation ID: [id number]    XR Chest 1 View    Result Date: 4/8/2024  XR CHEST 1 VW Date of Exam: 4/8/2024 8:39 PM EDT Indication: Weak/Dizzy/AMS triage protocol Comparison: 1/17/2024 Findings: Enlarged cardiac silhouette. Airspace opacities in the left midlung and left lower lung. No visible pleural effusion or pneumothorax. No acute osseous abnormality.     Impression: Airspace opacities in the left midlung and left lower lung which may represent pneumonia. Enlarged cardiac silhouette, unchanged. Electronically Signed: Sergio Hernandez MD  4/8/2024 8:52 PM EDT  Workstation ID: [id number]    CT Abdomen Pelvis Without Contrast    Result Date: 4/8/2024  CT ABDOMEN PELVIS WO CONTRAST Date of Exam: 4/8/2024 8:29 PM EDT Indication: CVA pain, recurrent UTI, AMS. Comparison: 1/17/2024 Technique: Axial CT images were obtained of the abdomen and pelvis without the administration of  contrast. Reconstructed coronal and sagittal images were also obtained. Automated exposure control and iterative construction methods were used. Findings: Partially visualized nodular consolidations in the left lung base. Four-chamber cardiomegaly and pulmonary artery enlargement. Coronary artery calcifications. There are multiple hepatic hypodensities likely representing benign hemangiomas, unchanged from prior studies. No new suspicious hepatic lesion. Cholecystectomy. No significant biliary ductal dilation. The spleen, pancreas, and bilateral adrenal glands are unchanged, including the 2.1 cm nonspecific left adrenal gland nodule. There is no evidence of hydronephrosis or obstructing nephrolithiasis. Unchanged punctate right mid pole calculus. No evidence of bowel obstruction. Moderate colonic stool burden. No suspicious lymphadenopathy. No evidence of abdominal aortic aneurysm. Atherosclerotic calcifications of the aorta and branch vessels. Urinary bladder is unremarkable. Hysterectomy. The abdominal and pelvic wall soft tissues show no acute abnormality. There is no evidence of acute fracture or suspicious osseous lesion. Healed right lateral rib fractures. Chronic T11 vertebral body compression deformity. Multiple vertebral body hemangiomas..     Impression: No acute abnormality of the abdomen or pelvis. Partially visualized nodular consolidations in the left lung base which may represent infection. Electronically Signed: Sergio Hernandez MD  4/8/2024 8:51 PM EDT  Workstation ID: [id number]    CT Head Without Contrast    Result Date: 4/8/2024  CT HEAD WO CONTRAST Date of Exam: 4/8/2024 8:29 PM EDT Indication: AMS. Comparison: MRI of the brain performed on January 17, 2024 Technique: Axial CT images were obtained of the head without contrast administration.  Automated exposure control and iterative construction methods were used. Findings: Interval development of large volume vasogenic edema within the right frontal  lobe. There is evidence of an underlying hypodense lesion measuring 1.5 cm. There is mild mass effect on the frontal horn of the right lateral ventricle. No intracranial hemorrhage is visualized. Cystic encephalomalacia is visualized in the left temporal lobe measuring 1.4 cm. No evidence of significant midline shift. There is no extra-axial collections. Ventricles are normal in size and configuration for patient's stated age. Posterior fossa is within normal limits. Calvarium and skull base appear intact. Visualized sinuses show no air fluid levels. The mastoid air cells are clear. Visualized orbits are unremarkable.     Impression: No intracranial hemorrhage is visualized. Interval development of large volume vasogenic edema within the right frontal lobe with evidence of an underlying hypodense lesion measuring 1.5 cm. Finding highly suspicious for metastatic disease to the brain. Primary intracranial neoplasm is also a consideration. Correlation with contrast-enhanced MRI would be of value. Findings discussed with ANTONINO Arthur on April 8, 2024 at 8:45 p.m. by telephone. Electronically Signed: Jonn Del Cid MD  4/8/2024 8:45 PM EDT  Workstation ID: [id number]     Results for orders placed during the hospital encounter of 03/10/21    Duplex carotid ultrasound CAR    Interpretation Summary  · Proximal right internal carotid artery plaque without significant stenosis.  · Proximal left internal carotid artery plaque without significant stenosis.  · Bilateral antegrade vertebral flow.      Results for orders placed during the hospital encounter of 03/10/21    Duplex carotid ultrasound CAR    Interpretation Summary  · Proximal right internal carotid artery plaque without significant stenosis.  · Proximal left internal carotid artery plaque without significant stenosis.  · Bilateral antegrade vertebral flow.      Results for orders placed during the hospital encounter of 07/08/19    Adult Transthoracic Echo Complete W/ Cont  if Necessary Per Protocol    Interpretation Summary  · Estimated EF = 65%. Near cavitary obliteration at rest with contractility  · Left ventricular systolic function is normal.  · Left ventricular diastolic dysfunction (grade I) consistent with impaired relaxation.  · Trace mitral regurgitation  · Trace to mild tricuspid regurgitation      Plan for Follow-up of Pending Labs/Results: f/u w PCP  Pending Labs       Order Current Status    Blood Culture - Blood, Hand, Right Preliminary result    Blood Culture - Blood, Wrist, Right Preliminary result          Discharge Details        Discharge Medications        New Medications        Instructions Start Date   cefuroxime 500 MG tablet  Commonly known as: CEFTIN   500 mg, Oral, Every 12 Hours Scheduled      dexAMETHasone 4 MG tablet  Commonly known as: DECADRON   4 mg, Oral, Every 12 Hours Scheduled      levETIRAcetam 500 MG tablet  Commonly known as: KEPPRA   500 mg, Oral, Every 12 Hours Scheduled      naloxone 4 MG/0.1ML nasal spray  Commonly known as: NARCAN   Call 911. Don't prime. [place]a in 1 nostril for overdose. Repeat in 2-3 minutes in other nostril if no or minimal breathing/responsiveness.             Changes to Medications        Instructions Start Date   amLODIPine 5 MG tablet  Commonly known as: NORVASC  What changed:   medication strength  See the new instructions.   5 mg, Oral, Every 24 Hours Scheduled   Start Date: April 13, 2024     HYDROcodone-acetaminophen 5-325 MG per tablet  Commonly known as: NORCO  What changed:   how much to take  when to take this  reasons to take this   1 tablet, Oral, Every 12 Hours PRN             Continue These Medications        Instructions Start Date   Biotin 1000 MCG chewable tablet   Oral      cetirizine 10 MG tablet  Commonly known as: zyrTEC   10 mg, Oral      citalopram 20 MG tablet  Commonly known as: CeleXA   20 mg, Oral, Daily      hydrOXYzine 25 MG tablet  Commonly known as: ATARAX   12.5-25 mg, Oral       Hyoscyamine Sulfate SL 0.125 MG sublingual tablet   125 mcg, Sublingual      levothyroxine 50 MCG tablet  Commonly known as: SYNTHROID, LEVOTHROID   50 mcg, Oral, Daily      losartan 50 MG tablet  Commonly known as: COZAAR   50 mg, Oral      omeprazole 20 MG capsule  Commonly known as: priLOSEC   20 mg, Oral, 3 Times Daily      ondansetron 8 MG tablet  Commonly known as: ZOFRAN   8 mg, Oral      vitamin B-12 1000 MCG tablet  Commonly known as: [facility] Vitamin B-12   1,000 mcg, Oral, Daily             Stop These Medications      hydroCHLOROthiazide 12.5 MG capsule  Commonly known as: MICROZIDE            ASK your doctor about these medications        Instructions Start Date   gabapentin 300 MG capsule  Commonly known as: NEURONTIN  Ask about: Which instructions should I use?   900 mg, Oral, 3 Times Daily      gabapentin 300 MG capsule  Commonly known as: NEURONTIN  Ask about: Which instructions should I use?   300 mg, Oral, Every 12 Hours Scheduled               Allergies   Allergen Reactions    Augmentin [Amoxicillin-Pot Clavulanate] Diarrhea     Has tolerated Ceftriaxone, Cefdinir, Cefuroxime.    Benadryl [Diphenhydramine] Other (See Comments)     High Dose Caused uncontrollable shaking in legs    Codeine Nausea Only    Metformin Diarrhea    Rosuvastatin GI Intolerance         Discharge Disposition:  Home or Self Care    Diet:  Hospital:  Diet Order   Procedures    Diet: Regular/House; Fluid Consistency: Thin (IDDSI 0)            Activity:  as tolerated    Restrictions or Other Recommendations:  none       CODE STATUS:    Code Status and Medical Interventions:   Ordered at: 04/09/24 0145     Code Status (Patient has no pulse and is not breathing):    CPR (Attempt to Resuscitate)     Medical Interventions (Patient has pulse or is breathing):    Full Support       Future Appointments   Date Time Provider Department Center   4/18/2024 12:30 PM Jean Marie Qureshi MD [facility] None   4/18/2024  1:00 PM [facility]   6/26/2024 11:30 AM Paola Reid APRN [facility] None                 Alis Rae MD  04/12/24      Time Spent on Discharge:  I spent  45  minutes on this discharge activity which included: face-to-face encounter with the patient, reviewing the data in the system, coordination of the care with the nursing staff as well as consultants, documentation, and entering orders.

## 2024-04-12 NOTE — PLAN OF CARE
Goal Outcome Evaluation:           Progress: improving  Outcome Evaluation: Pt improving with ADL's and mobility. Pt requires multiple verbal cues for safety awareness. Recommend continued skilled OT services as pt remains below baseline with ADL's and mobility due decreased balance, strength, activity tolerance and decreased safety awareness. Recommend home with 24/7 supervision for safety and HH PT/OT.      Anticipated Discharge Disposition (OT): home with 24/7 care, home with home health

## 2024-04-12 NOTE — THERAPY TREATMENT NOTE
"Patient Name: Ruby Pettit  : 1945    MRN: [id number]                              Today's Date: 2024       Admit Date: 2024    Visit Dx:     ICD-10-CM ICD-9-CM   1. Altered mental status, unspecified altered mental status type  R41.82 780.97   2. Metastatic cancer to brain  C79.31 198.3   3. Adenocarcinoma of left lung  C34.92 162.9   4. Pneumonia of left lung due to infectious organism, unspecified part of lung  J18.9 486   5. Orthostatic hypotension  I95.1 458.0   6. Syncope and collapse  R55 780.2   7. History of hypertension  Z86.79 V12.59   8. History of hyperlipidemia  Z86.39 V12.29   9. History of gastroesophageal reflux (GERD)  Z87.19 V12.79   10. Malignant neoplasm of lower lobe of left lung  C34.32 162.5     Patient Active Problem List   Diagnosis    Migratory Lung nodules (\"Soft\" and solid, bilateral)    Non-smoker    History of asthma    Cough    Hypertension    T2DM (type 2 diabetes mellitus)    Neuropathy    Lesion of temporal lobe    GERD    Malignant neoplasm of lower lobe of left lung    Elevated troponin    COVID-19    Encephalopathy     Past Medical History:   Diagnosis Date    Anxiety and depression     Arthritis     Carotid stenosis, bilateral     Carotid duplex, 2019: Bilateral ICA stenosis 0-49%. Tortuous vessels. Vertebral flow antegrade.        Disease of thyroid gland     GERD (gastroesophageal reflux disease)     Head injury due to trauma     fell down stairs     History of transfusion     NO REACTIONS    Hyperlipidemia     Hypertension     Iatrogenic pneumothorax     Liver disorder     surgery  approx , BLOOD CLOTS    Lung cancer     Metastatic cancer     Perennial rhinitis     Peripheral neuropathic pain     Syncope and collapse     Thyroid disease     UTI (urinary tract infection), bacterial     Wears eyeglasses      Past Surgical History:   Procedure Laterality Date    ABDOMINAL SURGERY      LIVER RESECTION    APPENDECTOMY      BRONCHOSCOPY N/A " 02/13/2018    Procedure: BRONCHOSCOPY WITH SANDRITA ENDOBRONCHIAL ULTRASOUND WITH FLUORO;  Surgeon: Dixon Fatima MD;  Location:  [facility];  Service:     BRONCHOSCOPY N/A 03/21/2019    Procedure: NAVIGATIONAL BRONCHOSCOPY;  Surgeon: Dixon Fatima MD;  Location:  [facility];  Service: Pulmonary    BRONCHOSCOPY WITH ION ROBOTIC ASSIST N/A 06/29/2023    Procedure: NAVIGATIONAL BRONCHOSCOPY WITH ION ROBOT;  Surgeon: Dixon Fatima MD;  Location:  [facility];  Service: Robotics - Pulmonary;  Laterality: N/A;  Ion cath 3 - 0010,  3 - 0013.    CHOLECYSTECTOMY      COLONOSCOPY  2019    HYSTERECTOMY      2001    LIVER RESECTION      LUNG LOBECTOMY  11/17/2023    OOPHORECTOMY Bilateral     2001    ORIF WRIST FRACTURE Right     THYROID SURGERY      TUBAL ABDOMINAL LIGATION        General Information       Row Name 04/12/24 1051          Physical Therapy Time and Intention    Document Type therapy note (daily note)  -AS     Mode of Treatment physical therapy  -AS       Row Name 04/12/24 1051          General Information    Patient Profile Reviewed yes  -AS     Existing Precautions/Restrictions fall  -AS     Barriers to Rehab cognitive status  -AS       Row Name 04/12/24 1051          Cognition    Orientation Status (Cognition) oriented to;person;place  -AS       Row Name 04/12/24 1051          Safety Issues, Functional Mobility    Safety Issues Affecting Function (Mobility) awareness of need for assistance;positioning of assistive device;safety precaution awareness;safety precautions follow-through/compliance;judgment;ability to follow commands  -AS     Impairments Affecting Function (Mobility) balance;endurance/activity tolerance;strength  -AS     Comment, Safety Issues/Impairments (Mobility) decreased safety awareness with all activity  -AS               User Key  (r) = Recorded By, (t) = Taken By, (c) = Cosigned By      Initials Name Provider Type    AS Christie Cisneros PTA  Physical Therapist Assistant                   Mobility       Row Name 04/12/24 1052          Bed Mobility    Comment, (Bed Mobility) [facility] pre/post tx  -AS       Row Name 04/12/24 1052          Transfers    Comment, (Transfers) cues for hand placement  -AS       Row Name 04/12/24 1052          Sit-Stand Transfer    Sit-Stand [place] (Transfers) verbal cues;contact guard;1 person assist  -AS     Assistive Device (Sit-Stand Transfers) walker, front-wheeled  -AS     Comment, (Sit-Stand Transfer) mulitple verbal cues for hand placement  -AS       Row Name 04/12/24 1052          Gait/Stairs (Locomotion)    [place] Level (Gait) verbal cues;contact guard;1 person assist  -AS     Assistive Device (Gait) walker, front-wheeled  -AS     Distance in Feet (Gait) 400  -AS     Deviations/Abnormal Patterns (Gait) alda decreased;stride length decreased;bilateral deviations  -AS     Bilateral Gait Deviations forward flexed posture  -AS     Comment, (Gait/Stairs) patient ambulated 400' with CGA x1 and rolling walker for support, constant verbal cues for walker placement, looking forward and directional changes. Mild unsteadiness, decreased safety awareness with all activity.  -AS               User Key  (r) = Recorded By, (t) = Taken By, (c) = Cosigned By      Initials Name Provider Type    AS Christie Cisneros, GABRIELLE Physical Therapist Assistant                   Obj/Interventions       Row Name 04/12/24 1054          Motor Skills    Therapeutic Exercise knee;ankle;shoulder  -AS       Row Name 04/12/24 1054          Shoulder (Therapeutic Exercise)    Shoulder (Therapeutic Exercise) AROM (active range of motion)  -AS     Shoulder AROM (Therapeutic Exercise) bilateral;flexion;extension;aBduction;aDduction;sitting;10 repetitions  -AS       Row Name 04/12/24 1054          Hip (Therapeutic Exercise)    Hip (Therapeutic Exercise) strengthening exercise  -AS       Row Name 04/12/24 1054          Knee (Therapeutic Exercise)     Knee (Therapeutic Exercise) strengthening exercise  -AS     Knee Strengthening (Therapeutic Exercise) bilateral;marching while seated;LAQ (long arc quad);10 repetitions;sitting  -AS       Row Name 04/12/24 1054          Ankle (Therapeutic Exercise)    Ankle (Therapeutic Exercise) AROM (active range of motion)  -AS     Ankle AROM (Therapeutic Exercise) bilateral;dorsiflexion;plantarflexion;sitting;10 repetitions  -AS       [facility] Name 04/12/24 1054          Balance    Dynamic Standing Balance verbal cues;contact guard;1-person assist  -AS     Position/Device Used, Standing Balance supported;walker, front-wheeled  -AS     Comment, Balance CGA for safety  -AS               User Key  (r) = Recorded By, (t) = Taken By, (c) = Cosigned By      Initials Name Provider Type    AS Christie Cisneros PTA Physical Therapist Assistant                   Goals/Plan    No documentation.                  Clinical Impression       Row Name 04/12/24 1054          Pain    Pretreatment Pain Rating 0/10 - no pain  -AS     Posttreatment Pain Rating 0/10 - no pain  -AS       Row Name 04/12/24 1054          Plan of Care Review    Plan of Care Reviewed With patient  -AS     Progress no change  -AS     Outcome Evaluation patient ambulated 400' with CGA x1 and rolling walker for support. Constant verbal cues for walker placement, looking forward and directional changes. Difficulty following commands at times. Mild unsteadiness, decreased safety awareness with all activity. Recommend home with 24/7 assist and HHPT.  -AS       Row Name 04/12/24 1054          Positioning and Restraints    Pre-Treatment Position sitting in chair/recliner  -AS     Post Treatment Position chair  -AS     In Chair reclined;call light within reach;encouraged to call for assist;exit alarm on;waffle cushion;legs elevated  -AS               User Key  (r) = Recorded By, (t) = Taken By, (c) = Cosigned By      Initials Name Provider Type    AS Christie Cisneros PTA Physical  Therapist Assistant                   Outcome Measures       Row Name 04/12/24 1055          How much help from another person do you currently need...    Turning from your back to your side while in flat bed without using bedrails? 4  -AS     Moving from lying on back to sitting on the side of a flat bed without bedrails? 4  -AS     Moving to and from a bed to a chair (including a wheelchair)? 3  -AS     Standing up from a chair using your arms (e.g., wheelchair, bedside chair)? 3  -AS     Climbing 3-5 steps with a railing? 3  -AS     To walk in hospital room? 3  -AS     AM-PAC 6 Clicks Score (PT) 20  -AS     Highest Level of Mobility Goal 6 --> Walk 10 steps or more  -AS       Row Name 04/12/24 1055          Functional Assessment    Outcome Measure Options AM-PAC 6 Clicks Basic Mobility (PT)  -AS               User Key  (r) = Recorded By, (t) = Taken By, (c) = Cosigned By      Initials Name Provider Type    AS Christie Cisneros PTA Physical Therapist Assistant                                 Physical Therapy Education       Title: PT OT SLP Therapies (In Progress)       Topic: Physical Therapy (In Progress)       Point: Mobility training (In Progress)       Learning Progress Summary             Patient Acceptance, E, NR by AS at 4/12/2024 1056    Acceptance, E, NR by AS at 4/10/2024 1012    Acceptance, E, NR by CHRISTINA at 4/9/2024 1450                         Point: Home exercise program (In Progress)       Learning Progress Summary             Patient Acceptance, E, NR by AS at 4/12/2024 1056    Acceptance, E, NR by AS at 4/10/2024 1012    Acceptance, E, NR by CHRISTINA at 4/9/2024 1450                         Point: Body mechanics (In Progress)       Learning Progress Summary             Patient Acceptance, E, NR by AS at 4/12/2024 1056    Acceptance, E, NR by AS at 4/10/2024 1012    Acceptance, E, NR by CHRISTINA at 4/9/2024 1450                         Point: Precautions (In Progress)       Learning Progress Summary              Patient Acceptance, E, NR by AS at 4/12/2024 1056    Acceptance, E, NR by AS at 4/10/2024 1012    Acceptance, E, NR by CHRISTINA at 4/9/2024 1450                                         User Key       Initials Effective Dates Name Provider Type Discipline    CHRISTINA 02/03/23 -  Julee Saul PT Physical Therapist PT    AS 04/28/23 -  Christie Cisneros PTA Physical Therapist Assistant PT                  PT Recommendation and Plan     Plan of Care Reviewed With: patient  Progress: no change  Outcome Evaluation: patient ambulated 400' with CGA x1 and rolling walker for support. Constant verbal cues for walker placement, looking forward and directional changes. Difficulty following commands at times. Mild unsteadiness, decreased safety awareness with all activity. Recommend home with 24/7 assist and HHPT.     Time Calculation:         PT Charges       Row Name 04/12/24 1056             Time Calculation    Start Time 0956  -AS      PT Received On 04/12/24  -AS      PT Goal Re-Cert Due Date 04/19/24  -AS         Timed Charges    23709 - PT Therapeutic Exercise Minutes 10  -AS      23605 - Gait Training Minutes  14  -AS         Total Minutes    Timed Charges Total Minutes 24  -AS       Total Minutes 24  -AS                User Key  (r) = Recorded By, (t) = Taken By, (c) = Cosigned By      Initials Name Provider Type    AS Christie Cisneros PTA Physical Therapist Assistant                  Therapy Charges for Today       Code Description Service Date Service Provider Modifiers Qty    02445346256 HC PT THER PROC EA 15 MIN 4/12/2024 Christie Cisneros PTA GP 1    [id number] HC GAIT TRAINING EA 15 MIN 4/12/2024 Christie Cisneros PTA GP 1            PT G-Codes  Outcome Measure Options: AM-PAC 6 Clicks Basic Mobility (PT)  AM-PAC 6 Clicks Score (PT): 20  AM-PAC 6 Clicks Score (OT): 20       Christie Cisneros PTA  4/12/2024

## 2024-04-12 NOTE — CASE MANAGEMENT/SOCIAL WORK
Case Management Discharge Note      Final Note: Spoke to patient and patient's friend at bedside. Patient's plan is to discharge home today, 4/12. Called and spoke with [facility] [facility]. Nelsy, patient's sister, had some questions regarding medicaid, and how to apply the patient for it. Confirmed with Yvonne that she was able to speak with sister. CM attempted to reach sister via phone, but was unable to get through or leave a message. The patient and the friend were given papers with numbers for private caregivers, as well as contact information for [email or web address]. No other questions or concerns were voiced at this time. Patient's friend will transport home.         Selected Continued Care - Admitted Since 4/8/2024       Destination    No services have been selected for the patient.                Durable Medical Equipment    No services have been selected for the patient.                Dialysis/Infusion    No services have been selected for the patient.                Home Medical Care    No services have been selected for the patient.                Therapy    No services have been selected for the patient.                Community Resources    No services have been selected for the patient.                Community & DME    No services have been selected for the patient.                         Final Discharge Disposition Code: 01 - home or self-care

## 2024-04-12 NOTE — PLAN OF CARE
Goal Outcome Evaluation:  Plan of Care Reviewed With: patient        Progress: no change  Outcome Evaluation: patient ambulated 400' with CGA x1 and rolling walker for support. Constant verbal cues for walker placement, looking forward and directional changes. Difficulty following commands at times. Mild unsteadiness, decreased safety awareness with all activity. Recommend home with 24/7 assist and HHPT.

## 2024-04-12 NOTE — THERAPY TREATMENT NOTE
"Patient Name: Ruby Pettit  : 1945    MRN: [id number]                              Today's Date: 2024       Admit Date: 2024    Visit Dx:     ICD-10-CM ICD-9-CM   1. Altered mental status, unspecified altered mental status type  R41.82 780.97   2. Metastatic cancer to brain  C79.31 198.3   3. Adenocarcinoma of left lung  C34.92 162.9   4. Pneumonia of left lung due to infectious organism, unspecified part of lung  J18.9 486   5. Orthostatic hypotension  I95.1 458.0   6. Syncope and collapse  R55 780.2   7. History of hypertension  Z86.79 V12.59   8. History of hyperlipidemia  Z86.39 V12.29   9. History of gastroesophageal reflux (GERD)  Z87.19 V12.79   10. Malignant neoplasm of lower lobe of left lung  C34.32 162.5     Patient Active Problem List   Diagnosis    Migratory Lung nodules (\"Soft\" and solid, bilateral)    Non-smoker    History of asthma    Cough    Hypertension    T2DM (type 2 diabetes mellitus)    Neuropathy    Lesion of temporal lobe    GERD    Malignant neoplasm of lower lobe of left lung    Elevated troponin    COVID-19    Encephalopathy     Past Medical History:   Diagnosis Date    Anxiety and depression     Arthritis     Carotid stenosis, bilateral     Carotid duplex, 2019: Bilateral ICA stenosis 0-49%. Tortuous vessels. Vertebral flow antegrade.        Disease of thyroid gland     GERD (gastroesophageal reflux disease)     Head injury due to trauma     fell down stairs     History of transfusion     NO REACTIONS    Hyperlipidemia     Hypertension     Iatrogenic pneumothorax     Liver disorder     surgery  approx , BLOOD CLOTS    Lung cancer     Metastatic cancer     Perennial rhinitis     Peripheral neuropathic pain     Syncope and collapse     Thyroid disease     UTI (urinary tract infection), bacterial     Wears eyeglasses      Past Surgical History:   Procedure Laterality Date    ABDOMINAL SURGERY      LIVER RESECTION    APPENDECTOMY      BRONCHOSCOPY N/A " 02/13/2018    Procedure: BRONCHOSCOPY WITH SANDRITA ENDOBRONCHIAL ULTRASOUND WITH FLUORO;  Surgeon: Dixon Fatima MD;  Location:  [facility];  Service:     BRONCHOSCOPY N/A 03/21/2019    Procedure: NAVIGATIONAL BRONCHOSCOPY;  Surgeon: Dixon Fatima MD;  Location:  [facility];  Service: Pulmonary    BRONCHOSCOPY WITH ION ROBOTIC ASSIST N/A 06/29/2023    Procedure: NAVIGATIONAL BRONCHOSCOPY WITH ION ROBOT;  Surgeon: Dixon Fatima MD;  Location:  [facility];  Service: Robotics - Pulmonary;  Laterality: N/A;  Ion cath 3 - 0010,  3 - 0013.    CHOLECYSTECTOMY      COLONOSCOPY  2019    HYSTERECTOMY      2001    LIVER RESECTION      LUNG LOBECTOMY  11/17/2023    OOPHORECTOMY Bilateral     2001    ORIF WRIST FRACTURE Right     THYROID SURGERY      TUBAL ABDOMINAL LIGATION        General Information       Row Name 04/12/24 1231          OT Time and Intention    Document Type therapy note (daily note)  -     Mode of Treatment occupational therapy  -       Row Name 04/12/24 1231          General Information    Patient Profile Reviewed yes  -JR     Existing Precautions/Restrictions fall  -JR     Barriers to Rehab cognitive status  -       Row Name 04/12/24 1231          Cognition    Orientation Status (Cognition) oriented x 3  Oriented x 3 except month this date.  -       Row Name 04/12/24 1231          Safety Issues, Functional Mobility    Safety Issues Affecting Function (Mobility) awareness of need for assistance;insight into deficits/self-awareness;judgment;problem-solving;safety precaution awareness;safety precautions follow-through/compliance  -JR     Impairments Affecting Function (Mobility) balance;endurance/activity tolerance;strength  -               User Key  (r) = Recorded By, (t) = Taken By, (c) = Cosigned By      Initials Name Provider Type    JR Darby Harrell OT Occupational Therapist                     Mobility/ADL's       Row Name 04/12/24 1239           Bed Mobility    Comment, (Bed Mobility) up in chair upon arrival  -       Row Name 04/12/24 1232          Transfers    Transfers sit-stand transfer;toilet transfer  -       Row Name 04/12/24 1232          Sit-Stand Transfer    Sit-Stand [place] (Transfers) contact guard;verbal cues  -     Assistive Device (Sit-Stand Transfers) walker, front-wheeled  -     Comment, (Sit-Stand Transfer) verbal cues for hand placement with transfers  -       Row Name 04/12/24 1232          Toilet Transfer    Type (Toilet Transfer) sit-stand;stand-sit  -     [place] Level (Toilet Transfer) contact guard;verbal cues  -     Assistive Device (Toilet Transfer) commode;grab bars/safety frame;walker, front-wheeled  -     Comment, (Toilet Transfer) Verbal cues for hand placement and walker safety  -       Row Name 04/12/24 1232          Functional Mobility    Functional Mobility- Ind. Level contact guard assist;verbal cues required  -     Functional Mobility- Device walker, front-wheeled  -     Functional Mobility-Distance (Feet) --  household distance  -     Functional Mobility- Comment No LOB noted this date  -       Row Name 04/12/24 1232          Activities of Daily Living    BADL Assessment/Intervention lower body dressing;toileting;grooming  -       Row Name 04/12/24 1232          Lower Body Dressing Assessment/Training    [place] Level (Lower Body Dressing) socks;standby assist  -     Position (Lower Body Dressing) supported sitting  -     Comment, (Lower Body Dressing) pt able to adjust B socks this date  -       Row Name 04/12/24 1232          Grooming Assessment/Training    [place] Level (Grooming) wash face, hands;standby assist;verbal cues  -     Position (Grooming) sink side  -     Comment, (Grooming) verbal cues for walker safety  -       Row Name 04/12/24 1232          Toileting Assessment/Training    [place] Level (Toileting) minimum assist (75% patient  effort);adjust/manage clothing;set up;perform perineal hygiene  -JR     Assistive Devices (Toileting) commode;grab bar/safety frame  -JR     Position (Toileting) supported sitting  -JR               User Key  (r) = Recorded By, (t) = Taken By, (c) = Cosigned By      Initials Name Provider Type    Darby Atwood, OT Occupational Therapist                   Obj/Interventions       [facility] Name 04/12/24 1234          Balance    Balance Assessment sitting static balance;standing dynamic balance  -JR     Static Sitting Balance independent  -JR     Dynamic Standing Balance contact guard;verbal cues  -JR     Position/Device Used, Standing Balance supported;walker, rolling  -JR               User Key  (r) = Recorded By, (t) = Taken By, (c) = Cosigned By      Initials Name Provider Type    Darby Atwood, OT Occupational Therapist                   Goals/Plan    No documentation.                  Clinical Impression       [facility] Name 04/12/24 1235          Pain Assessment    Pretreatment Pain Rating 0/10 - no pain  -     Posttreatment Pain Rating 0/10 - no pain  -JR       Row Name 04/12/24 1235          Plan of Care Review    Progress improving  -     Outcome Evaluation Pt improving with ADL's and mobility. Pt requires multiple verbal cues for safety awareness. Recommend continued skilled OT services as pt remains below baseline with ADL's and mobility due decreased balance, strength, activity tolerance and decreased safety awareness. Recommend home with 24/7 supervision for safety and HH PT/OT.  -       Row Name 04/12/24 1235          Therapy Assessment/Plan (OT)    Patient/Family Therapy Goal Statement (OT) go home  -       Row Name 04/12/24 1235          Therapy Plan Review/Discharge Plan (OT)    Anticipated Discharge Disposition (OT) home with 24/7 care;home with home health  -       Row Name 04/12/24 1235          Vital Signs    Pre Systolic BP Rehab 153  -JR     Pre Treatment Diastolic BP 95  -JR      Post Systolic BP Rehab 162  -JR     Post Treatment Diastolic BP 83  -JR     Pretreatment Heart Rate (beats/min) 74  -JR     Posttreatment Heart Rate (beats/min) 67  -JR     Pre SpO2 (%) 95  -JR     O2 Delivery Pre Treatment room air  -JR     Post SpO2 (%) 97  -JR     O2 Delivery Post Treatment room air  -JR     Pre Patient Position Sitting  -JR     Intra Patient Position Standing  -JR     Post Patient Position Sitting  -JR       Row Name 04/12/24 1235          Positioning and Restraints    Pre-Treatment Position sitting in chair/recliner  -JR     Post Treatment Position chair  -JR     In Chair notified nsg;reclined;call light within reach;encouraged to call for assist;exit alarm on;with family/caregiver;waffle cushion  -JR               User Key  (r) = Recorded By, (t) = Taken By, (c) = Cosigned By      Initials Name Provider Type    Darby Atwood, OT Occupational Therapist                   Outcome Measures       Row Name 04/12/24 1237          How much help from another is currently needed...    Putting on and taking off regular lower body clothing? 3  -JR     Bathing (including washing, rinsing, and drying) 3  -JR     Toileting (which includes using toilet bed pan or urinal) 3  -JR     Putting on and taking off regular upper body clothing 3  -JR     Taking care of personal grooming (such as brushing teeth) 3  -JR     Eating meals 4  -JR     AM-PAC 6 Clicks Score (OT) 19  -JR       Row Name 04/12/24 1055          How much help from another person do you currently need...    Turning from your back to your side while in flat bed without using bedrails? 4  -AS     Moving from lying on back to sitting on the side of a flat bed without bedrails? 4  -AS     Moving to and from a bed to a chair (including a wheelchair)? 3  -AS     Standing up from a chair using your arms (e.g., wheelchair, bedside chair)? 3  -AS     Climbing 3-5 steps with a railing? 3  -AS     To walk in hospital room? 3  -AS     AM-PAC 6 Clicks  Score (PT) 20  -AS     Highest Level of Mobility Goal 6 --> Walk 10 steps or more  -AS       Row Name 04/12/24 1237 04/12/24 1055       Functional Assessment    Outcome Measure Options AM-PAC 6 Clicks Daily Activity (OT)  - AM-PAC 6 Clicks Basic Mobility (PT)  -AS              User Key  (r) = Recorded By, (t) = Taken By, (c) = Cosigned By      Initials Name Provider Type    Darby Atwood, OT Occupational Therapist    AS Christie Cisneros PTA Physical Therapist Assistant                    Occupational Therapy Education       Title: PT OT SLP Therapies (In Progress)       Topic: Occupational Therapy (In Progress)       Point: ADL training (In Progress)       Description:   Instruct learner(s) on proper safety adaptation and remediation techniques during self care or transfers.   Instruct in proper use of assistive devices.                  Learning Progress Summary             Patient Acceptance, E, NR by  at 4/12/2024 1156    Acceptance, E, NR by LUCRECIA at 4/10/2024 0847   Family Acceptance, E, NR by  at 4/12/2024 1156                         Point: Home exercise program (Not Started)       Description:   Instruct learner(s) on appropriate technique for monitoring, assisting and/or progressing therapeutic exercises/activities.                  Learner Progress:  Not documented in this visit.              Point: Precautions (In Progress)       Description:   Instruct learner(s) on prescribed precautions during self-care and functional transfers.                  Learning Progress Summary             Patient Acceptance, E, NR by  at 4/12/2024 1156    Acceptance, E, NR by LUCRECIA at 4/10/2024 0847   Family Acceptance, E, NR by  at 4/12/2024 1156                         Point: Body mechanics (In Progress)       Description:   Instruct learner(s) on proper positioning and spine alignment during self-care, functional mobility activities and/or exercises.                  Learning Progress Summary              Patient Acceptance, E, NR by  at 4/10/2024 0847                                         User Key       Initials Effective Dates Name Provider Type [facility] 02/03/23 -  Darby Harrell OT Occupational Therapist OT    LUCRECIA 06/16/21 -  Rebekah Ramirez OT Occupational Therapist OT                  OT Recommendation and Plan     Plan of Care Review  Progress: improving  Outcome Evaluation: Pt improving with ADL's and mobility. Pt requires multiple verbal cues for safety awareness. Recommend continued skilled OT services as pt remains below baseline with ADL's and mobility due decreased balance, strength, activity tolerance and decreased safety awareness. Recommend home with 24/7 supervision for safety and HH PT/OT.     Time Calculation:         Time Calculation- OT       Row Name 04/12/24 1238 04/12/24 1056          Time Calculation- OT    OT Start Time 1156  -JR --     OT Received On 04/12/24  - --        Timed Charges    31012 - Gait Training Minutes  -- 14  -AS     74015 - OT Therapeutic Activity Minutes 8  -JR --     02872 - OT Self Care/Mgmt Minutes 15  -JR --        Total Minutes    Timed Charges Total Minutes 23  - 14  -AS      Total Minutes 23  - 14  -AS               User Key  (r) = Recorded By, (t) = Taken By, (c) = Cosigned By      Initials Name Provider Type     Darby Harrell, OT Occupational Therapist    AS Christie Cisneros, PTA Physical Therapist Assistant                  Therapy Charges for Today       Code Description Service Date Service Provider Modifiers Qty    38782879823 HC OT THERAPEUTIC ACT EA 15 MIN 4/12/2024 Darby Harrell OT GO 1    16494390611 HC OT SELF CARE/MGMT/TRAIN EA 15 MIN 4/12/2024 Darby Harrell OT GO 1                 Darby Harrell OT  4/12/2024

## 2024-04-13 ENCOUNTER — READMISSION MANAGEMENT (OUTPATIENT)
Dept: CALL CENTER | Facility: HOSPITAL | Age: 79
End: 2024-04-13
Payer: MEDICARE

## 2024-04-13 NOTE — OUTREACH NOTE
Prep Survey      Flowsheet Row Responses   [facility] facility patient discharged from? [place]   Is LACE score < 7 ? No   Eligibility Readm Mgmt   Discharge diagnosis Encephalopathy   Does the patient have one of the following disease processes/diagnoses(primary or secondary)? Other   Does the patient have Home health ordered? No   Is there a DME ordered? No   Medication alerts for this patient see AVS   Prep survey completed? Yes            Dianna MERCHANT - Registered Nurse

## 2024-04-14 ENCOUNTER — NURSE TRIAGE (OUTPATIENT)
Dept: CALL CENTER | Facility: HOSPITAL | Age: 79
End: 2024-04-14
Payer: MEDICARE

## 2024-04-14 LAB
BACTERIA SPEC AEROBE CULT: NORMAL
BACTERIA SPEC AEROBE CULT: NORMAL

## 2024-04-14 NOTE — TELEPHONE ENCOUNTER
Caller has antibiotic induced diarrhea.  Discussed adding yogurt in addition to other care advice.

## 2024-04-14 NOTE — TELEPHONE ENCOUNTER
"Reason for Disposition   MODERATE diarrhea (e.g., 4-6 times / day more than normal)    Additional Information   Negative: Shock suspected (e.g., cold/pale/clammy skin, too weak to stand, low BP, rapid pulse)   Negative: Difficult to awaken or acting confused (e.g., disoriented, slurred speech)   Negative: Sounds like a life-threatening emergency to the triager   Negative: Vomiting also present and worse than the diarrhea   Negative: [1] Blood in stool AND [2] without diarrhea   Negative: Diarrhea in a cancer patient who is currently (or recently) receiving chemotherapy or radiation therapy, or cancer patient who has metastatic or end-stage cancer and is receiving palliative care   Negative: SEVERE abdominal pain (e.g., excruciating)   Negative: [1] Blood in the stool AND [2] moderate or large amount of blood (e.g., any blood clots, passing blood without stool, toilet water turns red)   Negative: Black or tarry bowel movements  (Exception: Chronic-unchanged black-grey BMs AND is taking iron pills or Pepto-Bismol.)   Negative: [1] Drinking very little AND [2] dehydration suspected (e.g., no urine > 12 hours, very dry mouth, very lightheaded)   Negative: Patient sounds very sick or weak to the triager   Negative: SEVERE diarrhea (e.g., 7 or more times / day more than normal)   Negative: [1] Constant abdominal pain AND [2] present > 2 hours   Negative: Abdomen looks much more swollen than usual   Negative: [1] Taking antibiotic > 48 hours (2 days) AND [2] fever still present or recurs    Answer Assessment - Initial Assessment Questions  1. ANTIBIOTIC: \"What antibiotic are you taking?\" \"How many times per day?\"      ceftin  2. ANTIBIOTIC ONSET: \"When was the antibiotic started?\"      During hospital stay  3. DIARRHEA SEVERITY: \"How bad is the diarrhea?\" \"How many more stools have you had in the past 24 hours than normal?\"     - NO DIARRHEA (SCALE 0)    - MILD (SCALE 1-3): Few loose or mushy BMs; increase of 1-3 stools " "over normal daily number of stools; mild increase in ostomy output.    -  MODERATE (SCALE 4-7): Increase of 4-6 stools daily over normal; moderate increase in ostomy output.  * SEVERE (SCALE 8-10; OR 'WORST POSSIBLE'): Increase of 7 or more stools daily over normal; moderate increase in ostomy output; incontinence.      mod  4. ONSET: \"When did the diarrhea begin?\"       today  5. BM CONSISTENCY: \"How loose or watery is the diarrhea?\"       watery  6. VOMITING: \"Are you also vomiting?\" If Yes, ask: \"How many times in the past 24 hours?\"       no  7. ABDOMEN PAIN: \"Are you having any abdomen pain?\" If Yes, ask: \"What does it feel like?\" (e.g., crampy, dull, intermittent, constant)       no  8. ABDOMEN PAIN SEVERITY: If present, ask: \"How bad is the pain?\"  (e.g., Scale 1-10; mild, moderate, or severe)    - MILD (1-3): doesn't interfere with normal activities, abdomen soft and not tender to touch     - MODERATE (4-7): interferes with normal activities or awakens from sleep, abdomen tender to touch     - SEVERE (8-10): excruciating pain, doubled over, unable to do any normal activities        na  9. ORAL INTAKE: If vomiting, \"Have you been able to drink liquids?\" \"How much liquids have you had in the past 24 hours?\"      Drinking fine  10. HYDRATION: \"Any signs of dehydration?\" (e.g., dry mouth [not just dry lips], too weak to stand, dizziness, new weight loss) \"When did you last urinate?\"        no  11. EXPOSURE: \"Have you traveled to a foreign country recently?\" \"Have you been exposed to anyone with diarrhea?\" \"Could you have eaten any food that was spoiled?\"        no  12. OTHER SYMPTOMS: \"Do you have any other symptoms?\" (e.g., fever, blood in stool)        no  13. PREGNANCY: \"Is there any chance you are pregnant?\" \"When was your last menstrual period?\"        na    Protocols used: Diarrhea on Antibiotics-ADULT-AH    "

## 2024-04-16 ENCOUNTER — NURSE TRIAGE (OUTPATIENT)
Dept: CALL CENTER | Facility: HOSPITAL | Age: 79
End: 2024-04-16
Payer: MEDICARE

## 2024-04-16 NOTE — TELEPHONE ENCOUNTER
Mainly, caller asking if she can drink liquid IV drink mix. Advised yes.  Water is best.  One to two bottle a day of the drink mix is fine it is an electrolyte mix.  Safe for ages 1+.

## 2024-04-16 NOTE — TELEPHONE ENCOUNTER
Reason for Disposition   [1] Recent hospitalization AND [2] diarrhea present > 3 days    Additional Information   Negative: Shock suspected (e.g., cold/pale/clammy skin, too weak to stand, low BP, rapid pulse)   Negative: Difficult to awaken or acting confused (e.g., disoriented, slurred speech)   Negative: Sounds like a life-threatening emergency to the triager   Negative: Vomiting also present and worse than the diarrhea   Negative: [1] Blood in stool AND [2] without diarrhea   Negative: Diarrhea in a cancer patient who is currently (or recently) receiving chemotherapy or radiation therapy, or cancer patient who has metastatic or end-stage cancer and is receiving palliative care   Negative: Diarrhea begins while taking an antibiotic by mouth (oral antibiotic)   Negative: [1] SEVERE abdominal pain (e.g., excruciating) AND [2] present > 1 hour   Negative: [1] SEVERE abdominal pain AND [2] age > 60 years   Negative: [1] Blood in the stool AND [2] moderate or large amount of blood   Negative: Black or tarry bowel movements  (Exception: Chronic-unchanged black-grey BMs AND is taking iron pills or Pepto-Bismol.)   Negative: [1] Drinking very little AND [2] dehydration suspected (e.g., no urine > 12 hours, very dry mouth, very lightheaded)   Negative: Patient sounds very sick or weak to the triager   Negative: [1] SEVERE diarrhea (e.g., 7 or more times / day more than normal) AND [2] age > 60 years   Negative: [1] Constant abdominal pain AND [2] present > 2 hours   Negative: [1] Fever > 103 F (39.4 C) AND [2] not able to get the fever down using Fever Care Advice   Negative: [1] SEVERE diarrhea (e.g., 7 or more times / day more than normal) AND [2] present > 24 hours (1 day)   Negative: [1] MODERATE diarrhea (e.g., 4-6 times / day more than normal) AND [2] present > 48 hours (2 days)   Negative: [1] MODERATE diarrhea (e.g., 4-6 times / day more than normal) AND [2] age > 70 years   Negative: Fever > 101 F (38.3 C)    "Negative: Fever present > 3 days (72 hours)   Negative: Abdominal pain  (Exception: Pain clears with each passage of diarrhea stool.)   Negative: [1] Blood in the stool AND [2] small amount of blood  (Exception: Only on toilet paper. Reason: Diarrhea can cause rectal irritation with blood on wiping.)   Negative: [1] Mucus or pus in stool AND [2] present > 2 days AND [3] diarrhea is more than mild   Negative: [1] Recent antibiotic therapy (i.e., within last 2 months) AND [2] diarrhea present > 3 days since antibiotic was stopped    Answer Assessment - Initial Assessment Questions  1. DIARRHEA SEVERITY: \"How bad is the diarrhea?\" \"How many more stools have you had in the past 24 hours than normal?\"     - NO DIARRHEA (SCALE 0)    - MILD (SCALE 1-3): Few loose or mushy BMs; increase of 1-3 stools over normal daily number of stools; mild increase in ostomy output.    -  MODERATE (SCALE 4-7): Increase of 4-6 stools daily over normal; moderate increase in ostomy output.    -  SEVERE (SCALE 8-10; OR \"WORST POSSIBLE\"): Increase of 7 or more stools daily over normal; moderate increase in ostomy output; incontinence.      moderate  2. ONSET: \"When did the diarrhea begin?\"       4 days  3. BM CONSISTENCY: \"How loose or watery is the diarrhea?\"       loose  4. VOMITING: \"Are you also vomiting?\" If Yes, ask: \"How many times in the past 24 hours?\"       no  5. ABDOMEN PAIN: \"Are you having any abdomen pain?\" If Yes, ask: \"What does it feel like?\" (e.g., crampy, dull, intermittent, constant)       cramping  6. ABDOMEN PAIN SEVERITY: If present, ask: \"How bad is the pain?\"  (e.g., Scale 1-10; mild, moderate, or severe)    - MILD (1-3): doesn't interfere with normal activities, abdomen soft and not tender to touch     - MODERATE (4-7): interferes with normal activities or awakens from sleep, abdomen tender to touch     - SEVERE (8-10): excruciating pain, doubled over, unable to do any normal activities        mild  7. ORAL INTAKE: If " "vomiting, \"Have you been able to drink liquids?\" \"How much liquids have you had in the past 24 hours?\"      Sipping on 20 oz bottle of body armor all day, advised she needs much more to drink  8. HYDRATION: \"Any signs of dehydration?\" (e.g., dry mouth [not just dry lips], too weak to stand, dizziness, new weight loss) \"When did you last urinate?\"      Urinated 3 times today-yes  9. EXPOSURE: \"Have you traveled to a foreign country recently?\" \"Have you been exposed to anyone with diarrhea?\" \"Could you have eaten any food that was spoiled?\"      no  10. ANTIBIOTIC USE: \"Are you taking antibiotics now or have you taken antibiotics in the past 2 months?\"        Yes on Ceftin currently, also recently finished up chemo  11. OTHER SYMPTOMS: \"Do you have any other symptoms?\" (e.g., fever, blood in stool)        No fever, no blood  12. PREGNANCY: \"Is there any chance you are pregnant?\" \"When was your last menstrual period?\"        na    Protocols used: Diarrhea-ADULT-AH    "

## 2024-04-17 ENCOUNTER — READMISSION MANAGEMENT (OUTPATIENT)
Dept: CALL CENTER | Facility: HOSPITAL | Age: 79
End: 2024-04-17
Payer: MEDICARE

## 2024-04-17 NOTE — OUTREACH NOTE
Medical Week 1 Survey      Flowsheet Row Responses   [facility] patient discharged from? [place]   Does the patient have one of the following disease processes/diagnoses(primary or secondary)? Other   Week 1 attempt successful? No   Unsuccessful attempts Attempt 1            ATUL MOONEY - Registered Nurse

## 2024-04-18 ENCOUNTER — OFFICE VISIT (OUTPATIENT)
Dept: RADIATION ONCOLOGY | Facility: HOSPITAL | Age: 79
End: 2024-04-18
Payer: MEDICARE

## 2024-04-18 ENCOUNTER — HOSPITAL ENCOUNTER (OUTPATIENT)
Dept: RADIATION ONCOLOGY | Facility: HOSPITAL | Age: 79
Setting detail: RADIATION/ONCOLOGY SERIES
Discharge: HOME | End: 2024-04-18
Payer: MEDICARE

## 2024-04-18 ENCOUNTER — HOSPITAL ENCOUNTER (OUTPATIENT)
Dept: RADIATION ONCOLOGY | Facility: HOSPITAL | Age: 79
Discharge: HOME | End: 2024-04-18

## 2024-04-18 VITALS
BODY MASS INDEX: 27.42 KG/M2 | DIASTOLIC BLOOD PRESSURE: 69 MMHG | TEMPERATURE: 97.4 F | HEART RATE: 72 BPM | RESPIRATION RATE: 20 BRPM | WEIGHT: 154.8 LBS | SYSTOLIC BLOOD PRESSURE: 144 MMHG | OXYGEN SATURATION: 94 %

## 2024-04-18 DIAGNOSIS — C79.31 METASTATIC CANCER TO BRAIN: Primary | ICD-10-CM

## 2024-04-18 PROCEDURE — G0463 HOSPITAL OUTPT CLINIC VISIT: HCPCS

## 2024-04-18 PROCEDURE — 77399 UNLISTED PX MED RADJ PHYSICS: CPT | Performed by: RADIOLOGY

## 2024-04-18 PROCEDURE — 77334 RADIATION TREATMENT AID(S): CPT | Performed by: RADIOLOGY

## 2024-04-18 PROCEDURE — 77290 THER RAD SIMULAJ FIELD CPLX: CPT | Performed by: RADIOLOGY

## 2024-04-18 RX ORDER — CITALOPRAM 20 MG/1
TABLET ORAL DAILY
COMMUNITY
Start: 2024-01-08

## 2024-04-18 RX ORDER — LOSARTAN POTASSIUM 50 MG/1
TABLET ORAL
COMMUNITY
Start: 2024-01-08

## 2024-04-18 RX ORDER — BUPROPION HYDROCHLORIDE 150 MG/1
TABLET, EXTENDED RELEASE ORAL DAILY
COMMUNITY

## 2024-04-18 RX ORDER — ASPIRIN 81 MG/1
TABLET ORAL DAILY
COMMUNITY

## 2024-04-18 RX ORDER — HYDROCHLOROTHIAZIDE 12.5 MG/1
1 CAPSULE, GELATIN COATED ORAL DAILY
COMMUNITY
Start: 2024-01-08

## 2024-04-18 RX ORDER — LEVOTHYROXINE SODIUM 0.05 MG/1
TABLET ORAL DAILY
COMMUNITY
Start: 2023-11-06

## 2024-04-18 RX ORDER — BISOPROLOL FUMARATE 5 MG/1
TABLET, FILM COATED ORAL DAILY
COMMUNITY
Start: 2024-02-03

## 2024-04-18 RX ORDER — LOVASTATIN 20 MG/1
TABLET ORAL
COMMUNITY
Start: 2024-01-26

## 2024-04-18 RX ORDER — GABAPENTIN 800 MG/1
TABLET ORAL 3 TIMES DAILY
COMMUNITY
Start: 2024-01-08 | End: 2024-04-19 | Stop reason: DRUGHIGH

## 2024-04-18 RX ORDER — ACETAMINOPHEN 500 MG
TABLET ORAL
COMMUNITY

## 2024-04-18 RX ORDER — BENZONATATE 100 MG/1
1 CAPSULE ORAL
COMMUNITY
Start: 2024-02-03

## 2024-04-18 RX ORDER — HYOSCYAMINE SULFATE 0.125 MG
TABLET ORAL 4 TIMES DAILY
COMMUNITY

## 2024-04-18 NOTE — RESEARCH
Patient Name:  Ruby Pettit  YOB: 1945  Patient Age:  79 y.o.  Patient's Sex:  female    Date of Service:  04/18/2024    Provider:  YANIRA Mike MD                     RESEARCH NOTE                  Neida  Subject ID# [id number]    3 month longitudinal follow-up completed today.    Blood specimen collected and processed/shipped per protocol.    Thank you.  Asiya Aguayo RN, BSN, CRC

## 2024-04-18 NOTE — PROGRESS NOTES
CONSULTATION NOTE      :                                                          1945  DATE OF CONSULTATION:                       2024   REQUESTING PHYSICIAN:                   Jono Henderson MD  REASON FOR CONSULTATION:           Brain metastasis  Malignant neoplasm of lower lobe of left lung  Clinical- Stage IIIA (cT1c, cN2, cM0)  Pathologic- Stage IIIA (ypT2, pN2, cM0)         BRIEF HISTORY:  The patient is a very pleasant 79 y.o. female  with recently treated lung cancer status post partial resection followed by salvage chest irradiation and systemic treatment.  She was recently hospitalized and found to have a new solitary 2 x 2.3 cm right frontal cystic/solid tumor and vasogenic edema consistent with brain metastasis.  Mental status change and function has improved slightly on steroids, dexamethasone 4 mg twice daily.  Body imaging studies show some new subcentimeter right lung pulmonary metastases.  She will be following with Dr. Mal hays for consideration of systemic treatment with Opdivo/Yervoy.  She is seen today for simulation prior to radiosurgery for the new brain tumor.    Allergy:   Allergies   Allergen Reactions    Augmentin [Amoxicillin-Pot Clavulanate] Diarrhea     Has tolerated Ceftriaxone, Cefdinir, Cefuroxime.    Benadryl [Diphenhydramine] Other (See Comments)     High Dose Caused uncontrollable shaking in legs    Codeine Nausea Only    Metformin Diarrhea    Rosuvastatin GI Intolerance       Social History:   Social History     Socioeconomic History    Marital status:     Number of children: 3   Tobacco Use    Smoking status: Never    Smokeless tobacco: Never   Vaping Use    Vaping status: Never Used   Substance and Sexual Activity    Alcohol use: Yes     Alcohol/week: 0.0 - 2.0 standard drinks of alcohol     Comment: seldom     Drug use: No    Sexual activity: Defer       Past Medical History:   Past Medical History:   Diagnosis Date    Anxiety and depression      Arthritis     Carotid stenosis, bilateral     Carotid duplex, 01/16/2019: Bilateral ICA stenosis 0-49%. Tortuous vessels. Vertebral flow antegrade.        Disease of thyroid gland     GERD (gastroesophageal reflux disease)     Head injury due to trauma 1992    fell down stairs     History of transfusion     NO REACTIONS    Hyperlipidemia     Hypertension     Iatrogenic pneumothorax     Liver disorder     surgery 1998 approx , BLOOD CLOTS    Lung cancer     Metastatic cancer     Perennial rhinitis     Peripheral neuropathic pain     Syncope and collapse     Thyroid disease     UTI (urinary tract infection), bacterial     Wears eyeglasses        Family History: family history includes COPD in her father; Cancer in her mother; Emphysema in her father; No Known Problems in her paternal grandfather, paternal grandmother, and sister; Ovarian cancer (age of onset: 19) in her mother; Tuberculosis in her maternal grandfather and maternal grandmother.     Surgical History:   Past Surgical History:   Procedure Laterality Date    ABDOMINAL SURGERY      LIVER RESECTION    APPENDECTOMY      BRONCHOSCOPY N/A 02/13/2018    Procedure: BRONCHOSCOPY WITH SANDRITA ENDOBRONCHIAL ULTRASOUND WITH FLUORO;  Surgeon: Dixon Fatima MD;  Location:  [facility];  Service:     BRONCHOSCOPY N/A 03/21/2019    Procedure: NAVIGATIONAL BRONCHOSCOPY;  Surgeon: Dixon Fatima MD;  Location:  [facility];  Service: Pulmonary    BRONCHOSCOPY WITH ION ROBOTIC ASSIST N/A 06/29/2023    Procedure: NAVIGATIONAL BRONCHOSCOPY WITH ION ROBOT;  Surgeon: Dixon Fatima MD;  Location:  [facility];  Service: Robotics - Pulmonary;  Laterality: N/A;  Ion cath 3 - 0010,  3 - 0013.    CHOLECYSTECTOMY      COLONOSCOPY  2019    HYSTERECTOMY      2001    LIVER RESECTION      LUNG LOBECTOMY  11/17/2023    OOPHORECTOMY Bilateral     2001    ORIF WRIST FRACTURE Right     THYROID SURGERY      TUBAL ABDOMINAL LIGATION          Review of  Systems:   Review of Systems   Constitutional:  Positive for appetite change and fatigue.   Respiratory:  Positive for cough and shortness of breath.    Cardiovascular:  Positive for leg swelling.   Musculoskeletal:  Positive for back pain.   Neurological:  Positive for dizziness, headaches and light-headedness.           Objective   VITAL SIGNS:   Vitals:    04/18/24 1230   BP: 144/69   Pulse: 72   Resp: 20   Temp: 97.4 °F (36.3 °C)   TempSrc: Skin   SpO2: 94%   Weight: 70.2 kg (154 lb 12.8 oz)   PainSc: 0-No pain        Karnofsky score: 80       Physical Exam:   Physical Exam  Vitals and nursing note reviewed.   Constitutional:       Appearance: She is well-developed.   HENT:      Head: Normocephalic and atraumatic.      Comments: Previously noted scalp keratoses are softer and improved with topical lotion treatments.  1 cm fluid-filled cystic lesion right frontal scalp patient reports to the present for many years.  Cardiovascular:      Rate and Rhythm: Normal rate and regular rhythm.      Heart sounds: Normal heart sounds. No murmur heard.  Pulmonary:      Effort: Pulmonary effort is normal.      Breath sounds: Normal breath sounds. No wheezing or rales.   Abdominal:      General: Bowel sounds are normal. There is no distension.      Palpations: Abdomen is soft.      Tenderness: There is no abdominal tenderness.   Musculoskeletal:         General: No tenderness. Normal range of motion.      Cervical back: Normal range of motion and neck supple.   Lymphadenopathy:      Cervical: No cervical adenopathy.      Upper Body:      Right upper body: No supraclavicular adenopathy.      Left upper body: No supraclavicular adenopathy.   Skin:     General: Skin is warm and dry.   Neurological:      Mental Status: She is alert and oriented to person, place, and time.      Sensory: No sensory deficit.   Psychiatric:         Behavior: Behavior normal.         Thought Content: Thought content normal.         Judgment: Judgment  normal.              The following portions of the patient's history were reviewed and updated as appropriate: allergies, current medications, past family history, past medical history, past social history, past surgical history, and problem list.    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  New solitary right frontal brain metastasis.  Stereotactic radiosurgery would be of benefit for this patient.  She was previously seen in the hospital by her nurse practitioner and informed consent obtained.  We again reviewed the use of radiosurgery in this situation as well as potential side effects.  All questions were answered.    RECOMMENDATIONS: CT simulation today.  Fabrication of mask for immobilization.  Continue dexamethasone 4 mg twice daily until treatment starts and then we will arrange for steroid taper.  We can use her recent thin slice MRI for treatment planning.  I will be happy to work with Dr. Henderson on this case.  The right frontal tumor will likely receive a single fraction of 18 Gray, delivered on the CyberKnife.  Patient will subsequently follow-up with Dr. Resendez for additional systemic treatment for control of extracranial disease.    If the benign-appearing cyst on the right frontal scalp changes or begins to grow, we can evaluate that for potential malignancy.    I spent a total of 40 minutes on todays visit, with more than 25 minutes in direct face to face communication, and the remainder of the time spent in reviewing the relevant history, records, available imaging, and for documentation.    Follow Up:   Return in about 1 week (around 4/25/2024) for CyberKnife treatment.  Diagnoses and all orders for this visit:    1. Metastatic cancer to brain (Primary)  -     Ambulatory Referral to ONC Social Work         Jean Marie Qureshi MD

## 2024-04-19 DIAGNOSIS — G62.9 NEUROPATHY: ICD-10-CM

## 2024-04-19 RX ORDER — GABAPENTIN 300 MG/1
300 CAPSULE ORAL EVERY 12 HOURS
Qty: 180 CAPSULE | Refills: 3 | Status: SHIPPED | OUTPATIENT
Start: 2024-04-19

## 2024-04-19 RX ORDER — GABAPENTIN 800 MG/1
TABLET ORAL 3 TIMES DAILY
Status: CANCELLED | OUTPATIENT
Start: 2024-04-19

## 2024-04-19 NOTE — TELEPHONE ENCOUNTER
Patient left a voicemail she needs this medication refilled, and this needs to go to [facility] in [place], [place]

## 2024-04-22 ENCOUNTER — DOCUMENTATION (OUTPATIENT)
Dept: OTHER | Facility: HOSPITAL | Age: 79
End: 2024-04-22
Payer: MEDICARE

## 2024-04-22 ENCOUNTER — DOCUMENTATION (OUTPATIENT)
Dept: RADIATION ONCOLOGY | Facility: HOSPITAL | Age: 79
End: 2024-04-22
Payer: MEDICARE

## 2024-04-22 NOTE — PROGRESS NOTES
TREATMENT PLANNING NOTE    The right frontal tumor contoured by Dr. Henderson has a volume of 10.8 cc.  This would be best and more safely treated using fractionated stereotactic radiotherapy.  Instead of single fraction radiosurgery I will deliver 3 fractions of 9 Gray to the tumor on the CyberKnife.  This was communicated to the patient via her son.

## 2024-04-22 NOTE — PROGRESS NOTES
Distress Screening Follow-up    Name: Ruby Pettit    : 1945    Diagnosis: Malignant neoplasm of lower lobe of left lung    Location of Distress Screening: Radiation Oncology    Distress Level: 5 (2024 12:00 PM)      Physical Concerns:  Sleep: Y  Fatigue: Y  Pain: Y    Practical Concerns:  Transportation: Y     Interventions: n/a      Comments:  SW called to follow up with pt regarding recent distress screen results. SW left a VM with pt introducing self, and offering additional support. SW provided call back number, ([phone]) and encouraged pt to reach out at her convenience.     SW will remain available to offer support.     Rebekah King, HANNA  Oncology Social Worker

## 2024-04-23 ENCOUNTER — READMISSION MANAGEMENT (OUTPATIENT)
Dept: CALL CENTER | Facility: HOSPITAL | Age: 79
End: 2024-04-23
Payer: MEDICARE

## 2024-04-23 PROCEDURE — 77300 RADIATION THERAPY DOSE PLAN: CPT | Performed by: RADIOLOGY

## 2024-04-23 PROCEDURE — 77295 3-D RADIOTHERAPY PLAN: CPT | Performed by: RADIOLOGY

## 2024-04-23 PROCEDURE — 77334 RADIATION TREATMENT AID(S): CPT | Performed by: RADIOLOGY

## 2024-04-23 NOTE — OUTREACH NOTE
Medical Week 2 Survey      Flowsheet Row Responses   [facility] patient discharged from? [place]   Does the patient have one of the following disease processes/diagnoses(primary or secondary)? Other   Week 2 attempt successful? No   Unsuccessful attempts Attempt 1            Treva Aguilera Registered Nurse

## 2024-04-25 ENCOUNTER — TELEPHONE (OUTPATIENT)
Dept: NEUROSURGERY | Facility: CLINIC | Age: 79
End: 2024-04-25
Payer: MEDICARE

## 2024-04-25 DIAGNOSIS — C79.31 METASTATIC CANCER TO BRAIN: Primary | ICD-10-CM

## 2024-04-25 RX ORDER — ALPRAZOLAM 0.5 MG/1
0.5 TABLET ORAL DAILY PRN
Qty: 6 TABLET | Refills: 0 | Status: SHIPPED | OUTPATIENT
Start: 2024-04-25

## 2024-04-25 NOTE — TELEPHONE ENCOUNTER
----- Message from More Shelby sent at 4/25/2024  8:15 AM EDT -----  Could you put an MRI order in for this lady for 90 day F/U Cyberknife..  Thanks

## 2024-04-26 DIAGNOSIS — C34.32 MALIGNANT NEOPLASM OF LOWER LOBE OF LEFT LUNG: Primary | ICD-10-CM

## 2024-04-30 ENCOUNTER — READMISSION MANAGEMENT (OUTPATIENT)
Dept: CALL CENTER | Facility: HOSPITAL | Age: 79
End: 2024-04-30
Payer: MEDICARE

## 2024-04-30 ENCOUNTER — HOSPITAL ENCOUNTER (OUTPATIENT)
Dept: ONCOLOGY | Facility: HOSPITAL | Age: 79
Discharge: HOME | End: 2024-04-30
Payer: MEDICARE

## 2024-04-30 ENCOUNTER — SPECIALTY PHARMACY (OUTPATIENT)
Dept: ONCOLOGY | Facility: HOSPITAL | Age: 79
End: 2024-04-30
Payer: MEDICARE

## 2024-04-30 VITALS
RESPIRATION RATE: 18 BRPM | HEART RATE: 96 BPM | SYSTOLIC BLOOD PRESSURE: 113 MMHG | TEMPERATURE: 97.1 F | DIASTOLIC BLOOD PRESSURE: 59 MMHG

## 2024-04-30 DIAGNOSIS — C34.32 MALIGNANT NEOPLASM OF LOWER LOBE OF LEFT LUNG: ICD-10-CM

## 2024-04-30 DIAGNOSIS — C34.32 MALIGNANT NEOPLASM OF LOWER LOBE OF LEFT LUNG: Primary | ICD-10-CM

## 2024-04-30 LAB
ALBUMIN SERPL-MCNC: 3.4 G/DL (ref 3.5–5.2)
ALBUMIN/GLOB SERPL: 1.5 G/DL
ALP SERPL-CCNC: 130 U/L (ref 39–117)
ALT SERPL W P-5'-P-CCNC: 158 U/L (ref 1–33)
ANION GAP SERPL CALCULATED.3IONS-SCNC: 11 MMOL/L (ref 5–15)
AST SERPL-CCNC: 44 U/L (ref 1–32)
BASOPHILS # BLD AUTO: 0.02 10*3/MM3 (ref 0–0.2)
BASOPHILS NFR BLD AUTO: 0.3 % (ref 0–1.5)
BILIRUB SERPL-MCNC: 0.6 MG/DL (ref 0–1.2)
BUN SERPL-MCNC: 34 MG/DL (ref 8–23)
BUN/CREAT SERPL: 39.5 (ref 7–25)
CALCIUM SPEC-SCNC: 8.5 MG/DL (ref 8.6–10.5)
CHLORIDE SERPL-SCNC: 99 MMOL/L (ref 98–107)
CO2 SERPL-SCNC: 21 MMOL/L (ref 22–29)
CREAT SERPL-MCNC: 0.86 MG/DL (ref 0.57–1)
DEPRECATED RDW RBC AUTO: 53.5 FL (ref 37–54)
EGFRCR SERPLBLD CKD-EPI 2021: 68.8 ML/MIN/1.73
EOSINOPHIL # BLD AUTO: 0.03 10*3/MM3 (ref 0–0.4)
EOSINOPHIL NFR BLD AUTO: 0.4 % (ref 0.3–6.2)
ERYTHROCYTE [DISTWIDTH] IN BLOOD BY AUTOMATED COUNT: 15.3 % (ref 12.3–15.4)
GLOBULIN UR ELPH-MCNC: 2.2 GM/DL
GLUCOSE SERPL-MCNC: 144 MG/DL (ref 65–99)
HCT VFR BLD AUTO: 37.9 % (ref 34–46.6)
HGB BLD-MCNC: 12.4 G/DL (ref 12–15.9)
IMM GRANULOCYTES # BLD AUTO: 0.08 10*3/MM3 (ref 0–0.05)
IMM GRANULOCYTES NFR BLD AUTO: 1.2 % (ref 0–0.5)
LYMPHOCYTES # BLD AUTO: 0.54 10*3/MM3 (ref 0.7–3.1)
LYMPHOCYTES NFR BLD AUTO: 7.8 % (ref 19.6–45.3)
MCH RBC QN AUTO: 30.7 PG (ref 26.6–33)
MCHC RBC AUTO-ENTMCNC: 32.7 G/DL (ref 31.5–35.7)
MCV RBC AUTO: 93.8 FL (ref 79–97)
MONOCYTES # BLD AUTO: 0.54 10*3/MM3 (ref 0.1–0.9)
MONOCYTES NFR BLD AUTO: 7.8 % (ref 5–12)
NEUTROPHILS NFR BLD AUTO: 5.68 10*3/MM3 (ref 1.7–7)
NEUTROPHILS NFR BLD AUTO: 82.5 % (ref 42.7–76)
PLATELET # BLD AUTO: 115 10*3/MM3 (ref 140–450)
PMV BLD AUTO: 10.5 FL (ref 6–12)
POTASSIUM SERPL-SCNC: 4.4 MMOL/L (ref 3.5–5.2)
PROT SERPL-MCNC: 5.6 G/DL (ref 6–8.5)
RBC # BLD AUTO: 4.04 10*6/MM3 (ref 3.77–5.28)
SODIUM SERPL-SCNC: 131 MMOL/L (ref 136–145)
T4 FREE SERPL-MCNC: 1.08 NG/DL (ref 0.92–1.68)
TSH SERPL DL<=0.05 MIU/L-ACNC: 1.1 UIU/ML (ref 0.27–4.2)
WBC NRBC COR # BLD AUTO: 6.89 10*3/MM3 (ref 3.4–10.8)

## 2024-04-30 PROCEDURE — 36415 COLL VENOUS BLD VENIPUNCTURE: CPT

## 2024-04-30 PROCEDURE — 84443 ASSAY THYROID STIM HORMONE: CPT | Performed by: INTERNAL MEDICINE

## 2024-04-30 PROCEDURE — 84439 ASSAY OF FREE THYROXINE: CPT | Performed by: INTERNAL MEDICINE

## 2024-04-30 PROCEDURE — 80053 COMPREHEN METABOLIC PANEL: CPT | Performed by: INTERNAL MEDICINE

## 2024-04-30 PROCEDURE — 85025 COMPLETE CBC W/AUTO DIFF WBC: CPT | Performed by: INTERNAL MEDICINE

## 2024-04-30 PROCEDURE — 82024 ASSAY OF ACTH: CPT | Performed by: INTERNAL MEDICINE

## 2024-04-30 RX ORDER — OLANZAPINE 5 MG/1
5 TABLET ORAL NIGHTLY
Qty: 4 TABLET | Refills: 1 | Status: SHIPPED | OUTPATIENT
Start: 2024-04-30 | End: 2024-05-01 | Stop reason: SDUPTHER

## 2024-04-30 RX ORDER — FOLIC ACID 1 MG/1
1 TABLET ORAL DAILY
Qty: 30 TABLET | Refills: 2 | Status: SHIPPED | OUTPATIENT
Start: 2024-04-30

## 2024-04-30 RX ORDER — DEXAMETHASONE 4 MG/1
TABLET ORAL
Qty: 6 TABLET | Refills: 2 | Status: SHIPPED | OUTPATIENT
Start: 2024-04-30 | End: 2024-05-01 | Stop reason: SDUPTHER

## 2024-04-30 NOTE — PROGRESS NOTES
Outpatient Infusion  [address] [id number]  [phone]      CHEMOTHERAPY EDUCATION    NAME:  Ruby Pettit      : 1945           DATE: 24    Medication Education Sheets: (select all that apply)  Carboplatin, Ipilimumab, Nivolumab, and Pemetrexed    Other Education Sheets: (select all that apply)  CINV, Diarrhea, HOPA Immune Checkpoint Inhibitors, Checkpoint Inhibitor Wallet Card, and Symptom Tracker Sheet and FARHEEN Information    Chemotherapy Regimen:   OP LUNG Nivolumab / Ipilimumab /  PEMEtrexed / CARBOplatin AUC=6 every 42 days  Nivolumab (Opdivo) IV at the infusion center over 30 minutes on day 1 and 22 of a 42-day cycle.  Ipilimumab (Yervoy) IV at the infusion center over 30 minutes on day 1 of a 42-day cycle.  Pemetrexed (Alimta) IV at the infusion center over 10 minutes on day 1 and 22 of a 42-day cycle. ONLY given for CYCLE 1.    Carboplatin (Paraplatin) IV at the infusion center over 30 minutes on day 1 and 22 of a 42-day cycle. ONLY given for CYCLE 1.      CHEMOTHERAPY (cycle 1, only)  TOPICS EDUCATION PROVIDED COMMENTS   ANEMIA:  role of RBC, cause, s/s, ways to manage, role of transfusion [x] Reviewed the role of RBC and the use of transfusions if hemoglobin decreases too much.  Patient to notify us if she experiences shortness of breath, dizziness, or palpitations.  Also let patient know she could feel more tired than usual and to try to stay active, but rest if she needs to.    THROMBOCYTOPENIA:  role of platelet, cause, s/s, ways to prevent bleeding, things to avoid, when to seek help [x] Reviewed the role of platelets in blood clotting and when to call clinic (bloody nose that bleeds for 5 minutes despite pressure, a cut that won't stop bleeding despite pressure, gums that bleed excessively with brushing or flossing). Recommended using a soft bristle toothbrush and blowing the nose gently.    NEUTROPENIA:  role of WBC, cause, infection  precautions, s/s of infection, when to call MD [x] Reviewed the role of WBC, good infection prevention practices, and when to call the clinic (temperature 100.4F, sore throat, burning urination, etc).   NUTRITION & APPETITE CHANGES:  importance of maintaining healthy diet & weight, ways to manage to improve intake, dietary consult, exercise regimen, electrolyte and/or blood glucose abnormalities [x] Metallic Taste: Informed patient of the potential for developing metallic taste and smell. Recommended flavoring water if it tastes metallic, using plastic utensils instead of metal utensils, and avoiding drinking from a metal can or cup to help mitigate this adverse effect.   Electrolyte Abnormalities:  Explained that the oncology therapy may lead to abnormalities in electrolytes, specifically: low calcium, magnesium, potassium, sodium.   NAUSEA & VOMITING:  cause, use of antiemetics, dietary changes, when to call MD [x] Emetic risk: High   Premeds: Dexamethasone (by mouth at home), Fosaprepitant, and Palonosetron   Scheduled home meds: Olanzapine 5 mg PO QHS on days 1, 2, 3, 4 and days 22, 23, 24, 25 after chemotherapy in cycle 1, only  PRN home meds: Ondansetron 8mg PO TID PRN N/V  Pharmacy home meds sent to: [facility]    Instructed the patient to take the scheduled anti-nausea medications even if she does not feel nauseous.  I explained this is to prevent delayed nausea.    Instructed the patient to take a dose of the PRN medication at the first onset of nausea and if it's not working to call us for additional medications.  Also provided non-drug measures to mitigate nausea.   NERVOUS SYSTEM CHANGES:  causes, s/s, neuropathies, cognitive changes, ways to manage [x] Discussed the adverse effect of peripheral neuropathy and signs/symptoms associated with this adverse effect. Instructed patient to call if symptoms become more frequent or worsen. Note she has stopped taking gabapentin due to  excessive drowsiness.   SKIN & NAIL CHANGES:  cause, s/s, ways to manage [x] Discussed the potential for a rash or itchy skin from chemotherapy and immunotherapy, offered nonpharmacologic prevention strategies, and instructed the patient to call if a rash develops and worsens. Also reviewed the possibility that chemotherapy may cause a skin rash in places previously treated with radiation. She was encouraged to speak with Dr Qureshi if this occurs.   ORGAN TOXICITIES:  cause, s/s, need for diagnostic tests, labs, when to notify MD [x] Discussed potential effects on organ systems, monitoring, diagnostic tests, labs, and when to notify the clinic. Discussed the signs/symptoms of the following: hepatotoxicity, lung changes, nephrotoxicity, and ototoxicity (loss of ability to hear high-pitched sounds).    INFUSION RELATED REACTIONS or INJECTION-SITE REACTION:  Cause, s/s, anaphylaxis, monitoring, etc. [x] Discussed the risk of an infusion or allergic reaction and symptoms such as: fever, chills, dizziness, itchiness or rash, flushing, trouble breathing, wheezing, sudden back pain, or feeling faint.  Instructed the patient to notify her nurse if she starts feeling weird at any point during her infusion.    Reviewed how infusion reactions are managed.   MISCELLANEOUS:  drug interactions, administration, labs, etc. [x] Discussed chemotherapy schedule, lab draws, infusion times, and total expected visit time.   DDIs:   Omeprazole may increase the hematologic toxicities of pemetrexed. Will monitor labs closely.  Increased risk of QTc prolongation with concomitant use of citalopram, olanzapine, ondansetron, hydroxyzine, levetiracetam. Patient was instructed to call the clinic if she notices heart palpitations or dizziness.  Increased risk of CNS depression with concomitant use of hydroxyzine, hydrocodone, cetirizine, gabapentin, levetiracetam. Patient was instructed to call the clinic for slowed breathing, excessive fatigue,  or increased confusion.  Increased risk of serotonin syndrome with concomitant use of citalopram, olanzapine. Patient was instructed to call the clinic for mental status changes, neuromuscular hyperactivity, and/or autonomic instability.  Lab draws: On or before days 1 and 22 of each cycle, no sooner than 3 days early.  Reviewed purpose and dosing of other supportive care medications:  Vitamin B-12 intramuscular injection at least 7 days prior to the first dose of pemetrexed, completed 4/12/24 while inpatient.  Dexamethasone 4 mg - Take 1 tablet by mouth with breakfast and lunch.  Starting day before, day of, and day after chemotherapy. Take during first cycle ONLY.     Folic acid 1 mg - Take 1 tablet by mouth every day.  Start 7 days prior to chemotherapy and continue until 3 weeks after the last dose of pemetrexed chemotherapy.  Patient instructed not to take NSAIDs (aspirin, naproxen, ibuprofen, etc.) from 2 days prior to pemetrexed through 2 days post pemetrexed administration.    INFERTILITY & SEXUALITY:    causes, fertility preservation options, sexuality changes, ways to manage, importance of birth control [x] IV Oncology Therapy: Reviewed safe sex practices and the importance of minimizing exposure to body fluids for 48 hours after each dose of IV oncology therapy. The patient is not of childbearing potential.   HOME CARE:  storing of oral chemo, how to manage bodily fluids [x] IV - Counseled on management of soiled linens and proper flush technique.  Discussed how to manage all the side effects at home and advised when to contact the MD office.       IMMUNOTHERAPY EDUCATION  TOPICS EDUCATION PROVIDED COMMENTS   DIARRHEA:  causes, s/s of dehydration, ways to manage, dietary changes, when to call MD [x] Discussed risk of diarrhea and immune related colitis. Instructed patient to call MD if 4-6 episodes in 24 hours and discussed role of high dose steroids for the treatment of immune related colitis.    NAUSEA  & VOMITING:  cause, use of antiemetics, dietary changes, when to call MD [x] Emetic risk: Low  Premeds: None  Scheduled home meds: None  PRN home meds: Ondansetron 8 mg PO TID PRN N/V    Instructed the patient to take a dose of the PRN medication at the first onset of nausea and if it's not working to call us for additional medications.  Also provided non-drug measures to mitigate nausea.   NERVOUS SYSTEM CHANGES:  causes, s/s, neuropathies, cognitive changes, ways to manage [x] Discussed the less common, but possible risk of immune cells attacking the nerves.  I described nerve pain and instructed the patient to call clinic if new or worsening nerve pain developed.   PAIN:  causes, ways to manage [x] Discussed the less common, but possible risk of immune cells attacking the muscles.  The patient was instructed to call clinic for new or worsening muscle weakness or pain.   SKIN & NAIL CHANGES:  cause, s/s, ways to manage [x] Discussed potential for a rash or itchy skin from immunotherapy, offered nonpharmacologic prevention strategies, and instructed patient to call if a rash develops that worsens or gets larger.   ORGAN TOXICITIES:  cause, s/s, need for diagnostic tests, labs, when to notify MD [x] Discussed potential effects on organ systems, monitoring, diagnostic tests, labs, and when to notify their MD. Discussed the signs/symptoms of the following: cardiotoxicity, central neurotoxicity (confusion, vision changes, stiff neck, seizure), hepatotoxicity, hormone gland changes (most commonly the thyroid - note patient is already on levothyroxine), lung changes and nephrotoxicity   INFUSION RELATED REACTIONS or INJECTION-SITE REACTION:  Cause, s/s, anaphylaxis, monitoring, etc. [x] Discussed the uncommon, but possible risk of an infusion reaction and symptoms such as: fever, chills, dizziness, itchiness or rash, flushing, trouble breathing, wheezing, sudden back pain, or feeling faint.  Instructed the patient to  notify their nurse if they start feeling weird at any point during their infusion.    Reviewed how infusion reactions are managed.     Medications:  Prior to Admission medications    Medication Sig Start Date End Date Taking? Authorizing Provider   acetaminophen (TYLENOL) 500 MG tablet Take  by mouth.    Naresh Jordan MD   ALPRAZolam (Xanax) 0.5 MG tablet Take 1 tablet by mouth Daily As Needed for Anxiety (1-2 tab po prior to procedures). 4/25/24   Jean Marie Qureshi MD   amLODIPine (NORVASC) 5 MG tablet Take 1 tablet by mouth Daily. 4/13/24   Alis Rae MD   aspirin 81 MG EC tablet Take  by mouth Daily.    Naresh Jordan MD   benzonatate (TESSALON) 100 MG capsule Take 1 capsule by mouth. 2/3/24   Naresh Jordan MD   Biotin 1000 MCG chewable tablet Chew.    Naresh Jordan MD   bisoprolol (ZEBeta) 5 MG tablet Take  by mouth Daily. 2/3/24   Naresh Jordan MD   buPROPion SR (Wellbutrin SR) 150 MG 12 hr tablet Take  by mouth Daily.    Naresh Jordan MD   cefuroxime (CEFTIN) 500 MG tablet Take 1 tablet by mouth Every 12 (Twelve) Hours for 3 doses. Indications: Pneumonia 4/12/24 4/14/24  Alis Rae MD   cetirizine (zyrTEC) 10 MG tablet Take 1 tablet by mouth.    Naresh Jordan MD   citalopram (CeleXA) 20 MG tablet Take  by mouth Daily. 1/8/24   Naresh Jordan MD   cyanocobalamin ([employer] Vitamin B-12) 1000 MCG tablet Take 1 tablet by mouth Daily. 1/19/24   Yamel Jordan DO   dexAMETHasone (DECADRON) 4 MG tablet Take 1 tablet by mouth Every 12 (Twelve) Hours for 14 days. 4/12/24 4/26/24  Alis Rae MD   gabapentin (NEURONTIN) 300 MG capsule Take 1 capsule by mouth Every 12 (Twelve) Hours. 4/19/24   Hollie Mike MD   hydroCHLOROthiazide (MICROZIDE) 12.5 MG capsule Take 1 capsule by mouth Daily. 1/8/24   Naresh Jordan MD   HYDROcodone-acetaminophen (NORCO) 5-325 MG per tablet Take 1 tablet by mouth Every 12 (Twelve) Hours As Needed for  Moderate Pain. 4/12/24   Alis Rae MD   hydrOXYzine (ATARAX) 25 MG tablet Take 0.5-1 tablets by mouth. 7/31/23   Naresh Jordan MD   hyoscyamine (ANASPAZ,LEVSIN) 0.125 MG tablet Take  by mouth 4 (Four) Times a Day.    Naresh Jordan MD   levETIRAcetam (KEPPRA) 500 MG tablet Take 1 tablet by mouth Every 12 (Twelve) Hours. 4/12/24   Alis Rae MD   levothyroxine (SYNTHROID, LEVOTHROID) 50 MCG tablet Take 1 tablet by mouth Daily.    Naresh Jordan MD   levothyroxine (SYNTHROID, LEVOTHROID) 50 MCG tablet Take  by mouth Daily. 11/6/23   Naresh Jordan MD   losartan (COZAAR) 50 MG tablet Take  by mouth. 1/8/24   aNresh Jordan MD   lovastatin (MEVACOR) 20 MG tablet Take  by mouth. 1/26/24   Naresh Jordan MD   naloxone (NARCAN) 4 MG/0.1ML nasal spray Call 911. Don't prime. [place] in 1 nostril for overdose. Repeat in 2-3 minutes in other nostril if no or minimal breathing/responsiveness. 4/12/24   Alis Rae MD   omeprazole (priLOSEC) 20 MG capsule Take 1 capsule by mouth 3 (Three) Times a Day. 7/20/23   Naresh Jordan MD   ondansetron (ZOFRAN) 8 MG tablet Take 1 tablet by mouth.    Naresh Jordan MD   amLODIPine (NORVASC) 10 MG tablet  12/1/23 4/12/24  Naresh Jordan MD   citalopram (CeleXA) 20 MG tablet Take 1 tablet by mouth Daily.  4/18/24  Naresh Jordan MD   gabapentin (NEURONTIN) 300 MG capsule Take 3 capsules by mouth 3 (Three) Times a Day. 12/15/23 4/12/24  Naersh Jordan MD   gabapentin (NEURONTIN) 300 MG capsule Take 1 capsule by mouth 3 (Three) Times a Day.  4/12/24  Naresh Jordan MD   gabapentin (NEURONTIN) 300 MG capsule Take 1 capsule by mouth Every 12 (Twelve) Hours for 7 days. 4/12/24 4/19/24  Alis Rae MD   gabapentin (NEURONTIN) 800 MG tablet Take  by mouth 3 (Three) Times a Day. 1/8/24 4/19/24  ProviderNaresh MD   hydroCHLOROthiazide (MICROZIDE) 12.5 MG capsule Take 1 capsule by mouth Daily.  4/12/24   Provider, MD Naresh   HYDROcodone-acetaminophen (NORCO) 5-325 MG per tablet Take  by mouth Every 12 (Twelve) Hours. 9/9/23 4/12/24  Naresh Jordan MD   Hyoscyamine Sulfate SL 0.125 MG sublingual tablet Place 125 mcg under the tongue.  4/18/24  Naresh Jordan MD   losartan (COZAAR) 50 MG tablet Take 1 tablet by mouth. 9/25/23 4/18/24  ProviderNaresh MD     Notes: All questions and concerns were addressed. Provided a personalized treatment calendar to patient (includes treatment and lab schedule). Provided patient with contact information for the pharmacist and clinic while instructing her to call if any questions or concerns arise. Informed consent for treatment was obtained. Patient and her sister were both receptive to information and expressed understanding.     Ann Cowden Mayer, PharmD, [facility]  Oncology Clinical Pharmacist  Phone [phone]    4/30/2024  10:36 EDT

## 2024-04-30 NOTE — OUTREACH NOTE
Medical Week 3 Survey      Flowsheet Row Responses   [facility] patient discharged from? [facility]   Does the patient have one of the following disease processes/diagnoses(primary or secondary)? Other   Week 3 attempt successful? No   Unsuccessful attempts Attempt 1   Revoke Decline to participate            Bushra CASTELLANO - Licensed Nurse

## 2024-05-01 ENCOUNTER — OFFICE VISIT (OUTPATIENT)
Dept: ONCOLOGY | Facility: CLINIC | Age: 79
End: 2024-05-01
Payer: MEDICARE

## 2024-05-01 ENCOUNTER — DOCUMENTATION (OUTPATIENT)
Dept: OTHER | Facility: HOSPITAL | Age: 79
End: 2024-05-01
Payer: MEDICARE

## 2024-05-01 ENCOUNTER — DOCUMENTATION (OUTPATIENT)
Dept: NUTRITION | Facility: HOSPITAL | Age: 79
End: 2024-05-01
Payer: MEDICARE

## 2024-05-01 ENCOUNTER — HOSPITAL ENCOUNTER (OUTPATIENT)
Dept: ONCOLOGY | Facility: HOSPITAL | Age: 79
Discharge: HOME | End: 2024-05-01
Admitting: INTERNAL MEDICINE
Payer: MEDICARE

## 2024-05-01 VITALS
OXYGEN SATURATION: 98 % | HEART RATE: 76 BPM | TEMPERATURE: 97.1 F | RESPIRATION RATE: 20 BRPM | DIASTOLIC BLOOD PRESSURE: 71 MMHG | HEIGHT: 63 IN | BODY MASS INDEX: 26.22 KG/M2 | SYSTOLIC BLOOD PRESSURE: 124 MMHG | WEIGHT: 148 LBS

## 2024-05-01 DIAGNOSIS — C34.32 MALIGNANT NEOPLASM OF LOWER LOBE OF LEFT LUNG: ICD-10-CM

## 2024-05-01 DIAGNOSIS — C34.32 MALIGNANT NEOPLASM OF LOWER LOBE OF LEFT LUNG: Primary | ICD-10-CM

## 2024-05-01 LAB — ACTH PLAS-MCNC: <1.5 PG/ML (ref 7.2–63.3)

## 2024-05-01 PROCEDURE — 25010000002 IPILIMUMAB 50 MG/10ML SOLUTION 10 ML VIAL: Performed by: INTERNAL MEDICINE

## 2024-05-01 PROCEDURE — 25010000002 CARBOPLATIN PER 50 MG: Performed by: INTERNAL MEDICINE

## 2024-05-01 PROCEDURE — 25810000003 SODIUM CHLORIDE 0.9 % SOLUTION: Performed by: INTERNAL MEDICINE

## 2024-05-01 PROCEDURE — 25010000002 NIVOLUMAB 240 MG/24ML SOLUTION 24 ML VIAL: Performed by: INTERNAL MEDICINE

## 2024-05-01 PROCEDURE — 96417 CHEMO IV INFUS EACH ADDL SEQ: CPT

## 2024-05-01 PROCEDURE — 25010000002 PEMETREXED PER 10 MG: Performed by: INTERNAL MEDICINE

## 2024-05-01 PROCEDURE — 25810000003 SODIUM CHLORIDE 0.9 % SOLUTION 250 ML FLEX CONT: Performed by: INTERNAL MEDICINE

## 2024-05-01 PROCEDURE — 96367 TX/PROPH/DG ADDL SEQ IV INF: CPT

## 2024-05-01 PROCEDURE — 96411 CHEMO IV PUSH ADDL DRUG: CPT

## 2024-05-01 PROCEDURE — 25010000002 PALONOSETRON PER 25 MCG: Performed by: INTERNAL MEDICINE

## 2024-05-01 PROCEDURE — 96413 CHEMO IV INFUSION 1 HR: CPT

## 2024-05-01 PROCEDURE — 25010000002 NIVOLUMAB 40 MG/4ML SOLUTION 4 ML VIAL: Performed by: INTERNAL MEDICINE

## 2024-05-01 PROCEDURE — 25010000002 PEMETREXED 100 MG RECONSTITUTED SOLUTION 1 EACH VIAL: Performed by: INTERNAL MEDICINE

## 2024-05-01 PROCEDURE — 96375 TX/PRO/DX INJ NEW DRUG ADDON: CPT

## 2024-05-01 PROCEDURE — 25010000002 FOSAPREPITANT PER 1 MG: Performed by: INTERNAL MEDICINE

## 2024-05-01 RX ORDER — SODIUM CHLORIDE 9 MG/ML
20 INJECTION, SOLUTION INTRAVENOUS ONCE
Status: CANCELLED | OUTPATIENT
Start: 2024-05-01

## 2024-05-01 RX ORDER — FAMOTIDINE 10 MG/ML
20 INJECTION, SOLUTION INTRAVENOUS AS NEEDED
Status: CANCELLED | OUTPATIENT
Start: 2024-05-01

## 2024-05-01 RX ORDER — SODIUM CHLORIDE 9 MG/ML
20 INJECTION, SOLUTION INTRAVENOUS ONCE
OUTPATIENT
Start: 2024-05-22

## 2024-05-01 RX ORDER — OLANZAPINE 5 MG/1
5 TABLET ORAL NIGHTLY
Qty: 30 TABLET | Refills: 1 | Status: SHIPPED | OUTPATIENT
Start: 2024-05-01

## 2024-05-01 RX ORDER — PALONOSETRON 0.05 MG/ML
0.25 INJECTION, SOLUTION INTRAVENOUS ONCE
Status: COMPLETED | OUTPATIENT
Start: 2024-05-01 | End: 2024-05-01

## 2024-05-01 RX ORDER — FAMOTIDINE 10 MG/ML
20 INJECTION, SOLUTION INTRAVENOUS AS NEEDED
Status: DISCONTINUED | OUTPATIENT
Start: 2024-05-01 | End: 2024-05-02 | Stop reason: HOSPADM

## 2024-05-01 RX ORDER — PALONOSETRON 0.05 MG/ML
0.25 INJECTION, SOLUTION INTRAVENOUS ONCE
OUTPATIENT
Start: 2024-05-22

## 2024-05-01 RX ORDER — SODIUM CHLORIDE 9 MG/ML
20 INJECTION, SOLUTION INTRAVENOUS ONCE
OUTPATIENT
Start: 2024-06-12

## 2024-05-01 RX ORDER — SODIUM CHLORIDE 9 MG/ML
20 INJECTION, SOLUTION INTRAVENOUS ONCE
Status: COMPLETED | OUTPATIENT
Start: 2024-05-01 | End: 2024-05-01

## 2024-05-01 RX ORDER — FAMOTIDINE 10 MG/ML
20 INJECTION, SOLUTION INTRAVENOUS AS NEEDED
OUTPATIENT
Start: 2024-05-22

## 2024-05-01 RX ORDER — DIPHENHYDRAMINE HYDROCHLORIDE 50 MG/ML
50 INJECTION INTRAMUSCULAR; INTRAVENOUS AS NEEDED
OUTPATIENT
Start: 2024-05-22

## 2024-05-01 RX ORDER — SODIUM CHLORIDE 9 MG/ML
20 INJECTION, SOLUTION INTRAVENOUS ONCE
OUTPATIENT
Start: 2024-07-03

## 2024-05-01 RX ORDER — DIPHENHYDRAMINE HYDROCHLORIDE 50 MG/ML
50 INJECTION INTRAMUSCULAR; INTRAVENOUS AS NEEDED
Status: DISCONTINUED | OUTPATIENT
Start: 2024-05-01 | End: 2024-05-02 | Stop reason: HOSPADM

## 2024-05-01 RX ORDER — ONDANSETRON HYDROCHLORIDE 8 MG/1
8 TABLET, FILM COATED ORAL EVERY 8 HOURS PRN
Qty: 30 TABLET | Refills: 5 | Status: SHIPPED | OUTPATIENT
Start: 2024-05-01

## 2024-05-01 RX ORDER — PALONOSETRON 0.05 MG/ML
0.25 INJECTION, SOLUTION INTRAVENOUS ONCE
Status: CANCELLED | OUTPATIENT
Start: 2024-05-01

## 2024-05-01 RX ORDER — DIPHENHYDRAMINE HYDROCHLORIDE 50 MG/ML
50 INJECTION INTRAMUSCULAR; INTRAVENOUS AS NEEDED
Status: CANCELLED | OUTPATIENT
Start: 2024-05-01

## 2024-05-01 RX ORDER — DEXAMETHASONE 4 MG/1
TABLET ORAL
Qty: 30 TABLET | Refills: 2 | Status: SHIPPED | OUTPATIENT
Start: 2024-05-01

## 2024-05-01 RX ADMIN — PEMETREXED DISODIUM 900 MG: 100 INJECTION, POWDER, LYOPHILIZED, FOR SOLUTION INTRAVENOUS at 12:56

## 2024-05-01 RX ADMIN — SODIUM CHLORIDE 360 MG: 9 INJECTION, SOLUTION INTRAVENOUS at 10:39

## 2024-05-01 RX ADMIN — PALONOSETRON HYDROCHLORIDE 0.25 MG: 0.25 INJECTION, SOLUTION INTRAVENOUS at 12:01

## 2024-05-01 RX ADMIN — CARBOPLATIN 490 MG: 10 INJECTION, SOLUTION INTRAVENOUS at 13:16

## 2024-05-01 RX ADMIN — SODIUM CHLORIDE 20 ML/HR: 9 INJECTION, SOLUTION INTRAVENOUS at 10:38

## 2024-05-01 RX ADMIN — SODIUM CHLORIDE 65 MG: 9 INJECTION, SOLUTION INTRAVENOUS at 11:19

## 2024-05-01 RX ADMIN — FOSAPREPITANT 150 MG: 150 INJECTION, POWDER, LYOPHILIZED, FOR SOLUTION INTRAVENOUS at 12:08

## 2024-05-01 NOTE — PROGRESS NOTES
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 There are hypermetabolic left hilar and mediastinal lymph nodes consistent with regional metastasis      Additional groundglass and subsolid lesions within the lungs most prominently along the anterior segment of the right upper lobe demonstrate little to no FDG uptake but remain suspicious for synchronous neoplasm.      Hypermetabolic focus near the left C4-5 facet joint with a questionable lytic lesion in this area.    Hospitalized 4/9/24 - 4/12/24; found to have a new solitary 2 x 2.3 cm right frontal cystic/solid tumor and vasogenic edema consistent with brain metastasis     April 2024 -  Body imaging studies show some new subcentimeter right lung pulmonary metastases     Surgery: She underwent robotic assisted left lower lobectomy at the [facility] 11/17/2023. Surgical margins were negative. There was residual tumor in two station 10 L lymph nodes and also again station 7 lymph node sample.     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  Pathologic stage yp IIIA (T2a, N2, M0)      Chemotherapy:  Checkmate-816 / 3 cycles of neoadjuvant Nivolumab with Carboplatin and Taxol - treatment completed 9/8/23     She had excellent radiographic response based on restaging PET scan    Current Chemotherapy:   OP LUNG Nivolumab / Ipilimumab / PEMEtrexed / CARBOplatin AUC=6   Start date 5/1/24    Radiation:  Mediastinum encompassing regions at risk will receive a dose of approximately 50 Hines delivered over 5 weeks / she completed 25/25 treatments 2/20/24 April 2024 - The right frontal tumor will likely receive a single fraction of 18 Gray, delivered on the CyberKnife / treatment plan in progress    Weight 148 lbs / 11 lbs weight loss past 4 months (7% weight loss)    Nutrition Diagnosis       Problem Unintentional weight loss   Etiology Diagnosis related  Social situation  Poor appetite   Signs / Symptoms 11 lbs weight loss past 4 months (7%)  Intake < 75% of calculated needs         Follow up with patient in Infusion OP as she begins new treatment plan -   LUNG Nivolumab / Ipilimumab / PEMEtrexed / CARBOplatin.    Prior to most recent hospitalization, patient had been living alone; her son did the grocery shopping and oversaw her care.  She did little food preparation for herself during this time; her son would bring takeout to her often to support her nutritional intake.    Patient is now living with her sister who is preparing meals / snacks and overseeing her care.  Her son remains very supportive and will bring her takeout when requested or assume other care needs. Reviewed nutritional intake, which when compared to prior intake, has substantially improved in variety and quality of food choices.  Focus has been on higher protein, higher calories. Sister states that the past week has not been one of her better weeks and appetite has been less than normal.    Patient recently on antibiotics, which have now been completed.  She had diarrhea, which has slowed, but patient continues to experience if she eats too many sweets. Discussed bowel management and tips that might be helpful to establish regularity.    She is trying to drink at least one serving Boost Plus daily.    Stressed importance of maintaining good oral intake for support of nutritional status, prevention of weight loss and management of possible side effects related to chemo.    Will follow.

## 2024-05-01 NOTE — PROGRESS NOTES
Distress Screening Follow-up    Name: Ruby Pettit    : 1945    Diagnosis: Lung Cancer    Location of Distress Screening: Infusion    Distress Level: 9 (2024 10:00 AM)      Physical Concerns:  Sleep: Y  Substance abuse: N  Fatigue: Y  Tobacco use: N  Memory or concentration: Y  Sexual health: N  Changes in eating: Y  Loss or change of physical abilities: Y  Pain: Y      Emotional Concerns:  Worry or anxiety: Y  Sadness or depression: Y  Loss of interest or enjoyment: Y  Grief or loss: N  Fear: Y  Loneliness: Y  Anger: Y  Changes in appearance: Y  Feelings of worthlessness or being a burden: Y      Social Concerns:  Relationship with spouse or partner: N  Relationship with children: N  Relationship with family members: N  Relationship with friends or coworkers: N  Communication with health care team: N  Ability to have children: N      Practical Concerns:  Taking care of myself: Y  Taking care of others: Y  Work: N  School: N  Housing: Y  Finances: Y  Insurance: Y  Transportation: Y  : N  Having enough food: N  Access to medicine: N  Treatment decisions: N      Spiritual Concerns:  Sense of meaning or purpose: Y  Changes in sharif or beliefs: Y  Death, dying or afterlife: N  Conflict between beliefs and cancer treatments: N  Relationship with the sacred: N  Ritual or dietary needs: N       Interventions:   Physical Needs: caregiving resource (home care assistance through [facility])    Comments:  MACIEJ met with pt in infusion to provide support and assistance with psychosocial needs. Pt was accompanied to appointment by her sister. Pt reported she is doing well overall. SW explained nature of visit, and addressed distress screen concerns. Pt is living with her sister in [place], and stated her son and friend stay with pt when her sister has apts. Pt's sister asked about home care for when she does have apts, and individuals aren't available. MACIEJ educated on [facility] home care program and will make referral on this date. Pt and her sister thanked MACIEJ. SW provided contact information and will be available should other needs arise.

## 2024-05-01 NOTE — PROGRESS NOTES
PROBLEM LIST:  Oncology/Hematology History   Malignant neoplasm of lower lobe of left lung   6/26/2023 Initial Diagnosis    Malignant neoplasm of bronchus of left lower lobe     6/29/2023 Cancer Staged    Staging form: Lung, AJCC 8th Edition  - Clinical stage from 6/29/2023: Stage IIIA (cT1c, cN2, cM0) - Signed by Hollie Mike MD on 7/20/2023 7/20/2023 Molecular Testing    Molecular testing - [facility]     Negative for EGFR, BRAF and ALK     7/20/2023 Imaging    Pet Scan    IMPRESSION:  Impression:  Hypermetabolic left lower lobe lung mass consistent with known primary malignancy. There are hypermetabolic left hilar and mediastinal lymph nodes consistent with regional metastasis.     Additional groundglass and subsolid lesions within the lungs most prominently along the anterior segment of the right upper lobe demonstrate little to no FDG uptake but remain suspicious for synchronous neoplasm. Recommend attention on follow-up.     Multiple hypodense lesions within the liver without substantial FDG uptake. These appear to be consistent with hemangiomas on prior examinations.     Similar size of a left adrenal nodule with increased metabolic activity. Given the stability of size this is still most likely a benign finding but recommend continued attention on follow-up.     Hypermetabolic focus near the left C4-5 facet joint with a questionable lytic lesion in this area. Recommend follow-up with MRI of the cervical spine and/or bone scan to further evaluate.        Electronically Signed: Tolu Zavaleta    7/19/2023 6:05 PM EDT    Workstation ID: [id number]     7/28/2023 - 9/8/2023 Chemotherapy    OP LUNG  Nivolumab 360mg /  PACLitaxel / CARBOplatin AUC=5     Completed 3 cycles neoadjuvantly     9/26/2023 Imaging    NM PET/CT Skull Base to Mid Thigh    Result Date: 9/27/2023  Impression: 1.The left lower lobe mass is significantly decreased in size and metabolic activity, indicating a positive response to  therapy. There is also resolution of previously seen hypermetabolic subcarinal lymph node and stable size of a mildly enlarged left hilar lymph node without metabolic activity. 2.Hypermetabolic focus associated with a lucency at the left C3-C4 facet joint. This could represent a metastatic lesion or degenerative erosion. No additional hypermetabolic bone lesions. 3.Stable triangular focus of airspace disease in the anterior right upper lobe with low level metabolic activity. This is favored to represent an area of pneumonia or inflammation. Metastatic disease is considered less likely. Continued follow-up is recommended. Electronically Signed: Sergio Spivey MD  9/27/2023 9:03 AM EDT  Workstation ID: [id number]       11/17/2023 Surgery    Surgery       Procedure:  Left lower lobectomy and mediastinoscopy with Dr. Kate at [facility] s/p neoadjuvant chemoimmunotherapy per Checkmate 816     11/17/2023 Cancer Staged    Staging form: Lung, AJCC 8th Edition  - Pathologic stage from 11/17/2023: Stage IIIA (ypT2, pN2, cM0) - Signed by Hollie Mike MD on 12/18/2023     1/15/2024 - 2/20/2024 Radiation    Radiation OncologyTreatment Course:  Ruby Pettit received 5000 cGy in 25 fractions to mediastinum via External Beam Radiation - EBRT.     4/8/2024 Progression    Brain Metastasis - solitary lesion in the right frontal lobe     5/1/2024 -  Chemotherapy    OP LUNG Nivolumab / Ipilimumab /  PEMEtrexed / CARBOplatin AUC=6         REASON FOR VISIT: Lung cancer    HISTORY OF PRESENT ILLNESS:   79 y.o.  female presents today for follow-up.  She was too weak to start adjuvant immunotherapy after radiation.  Subsequently presented with solitary brain met within the right frontal lobe.  She is profoundly weak.  But otherwise relatively stable.  Denies any issues with pain.  She has some issues with confusion at times.  Denies any issues with fevers or chills.  Eating reasonably well.    Past medical history, social history and  family history was reviewed 05/01/24 and unchanged from prior visit.    Review of Systems:    Review of Systems   Constitutional:  Positive for fatigue.   HENT:  Negative.     Eyes: Negative.    Respiratory: Negative.     Cardiovascular: Negative.    Gastrointestinal: Negative.    Endocrine: Negative.    Genitourinary: Negative.     Skin: Negative.    Neurological:  Positive for extremity weakness.   Hematological: Negative.    Psychiatric/Behavioral:  Positive for confusion. The patient is nervous/anxious.             Medications:        Current Outpatient Medications:     acetaminophen (TYLENOL) 500 MG tablet, Take  by mouth., Disp: , Rfl:     ALPRAZolam (Xanax) 0.5 MG tablet, Take 1 tablet by mouth Daily As Needed for Anxiety (1-2 tab po prior to procedures)., Disp: 6 tablet, Rfl: 0    amLODIPine (NORVASC) 5 MG tablet, Take 1 tablet by mouth Daily., Disp: 30 tablet, Rfl: 0    aspirin 81 MG EC tablet, Take  by mouth Daily., Disp: , Rfl:     benzonatate (TESSALON) 100 MG capsule, Take 1 capsule by mouth., Disp: , Rfl:     Biotin 1000 MCG chewable tablet, Chew., Disp: , Rfl:     bisoprolol (ZEBeta) 5 MG tablet, Take  by mouth Daily., Disp: , Rfl:     cetirizine (zyrTEC) 10 MG tablet, Take 1 tablet by mouth., Disp: , Rfl:     citalopram (CeleXA) 20 MG tablet, Take  by mouth Daily., Disp: , Rfl:     cyanocobalamin ([employer] Vitamin B-12) 1000 MCG tablet, Take 1 tablet by mouth Daily., Disp: 2 tablet, Rfl: 0    dexAMETHasone (DECADRON) 4 MG tablet, Take 1 tablet oral twice a day for 3 consecutive days beginning the day before chemotherapy and continue for 6 doses. Take with food., Disp: 6 tablet, Rfl: 2    folic acid (FOLVITE) 1 MG tablet, Take 1 tablet by mouth Daily. Start at least 7 days prior to chemotherapy until at least 3 weeks after all chemotherapy., Disp: 30 tablet, Rfl: 2    gabapentin (NEURONTIN) 300 MG capsule, Take 1 capsule by mouth Every 12 (Twelve) Hours., Disp: 180 capsule, Rfl: 3    hydroCHLOROthiazide  (MICROZIDE) 12.5 MG capsule, Take 1 capsule by mouth Daily., Disp: , Rfl:     HYDROcodone-acetaminophen (NORCO) 5-325 MG per tablet, Take 1 tablet by mouth Every 12 (Twelve) Hours As Needed for Moderate Pain., Disp: , Rfl:     hydrOXYzine (ATARAX) 25 MG tablet, Take 0.5-1 tablets by mouth., Disp: , Rfl:     hyoscyamine (ANASPAZ,LEVSIN) 0.125 MG tablet, Take  by mouth 4 (Four) Times a Day., Disp: , Rfl:     levETIRAcetam (KEPPRA) 500 MG tablet, Take 1 tablet by mouth Every 12 (Twelve) Hours., Disp: 60 tablet, Rfl: 0    levothyroxine (SYNTHROID, LEVOTHROID) 50 MCG tablet, Take 1 tablet by mouth Daily., Disp: , Rfl:     losartan (COZAAR) 50 MG tablet, Take  by mouth., Disp: , Rfl:     lovastatin (MEVACOR) 20 MG tablet, Take  by mouth., Disp: , Rfl:     naloxone (NARCAN) 4 MG/0.1ML nasal spray, Call 911. Don't prime. Taylor in 1 nostril for overdose. Repeat in 2-3 minutes in other nostril if no or minimal breathing/responsiveness., Disp: 2 each, Rfl: 0    OLANZapine (zyPREXA) 5 MG tablet, Take 1 tablet by mouth Every Night. Take nightly x 4 starting night of chemotherapy.., Disp: 4 tablet, Rfl: 1    omeprazole (priLOSEC) 20 MG capsule, Take 1 capsule by mouth 3 (Three) Times a Day., Disp: , Rfl:     ondansetron (ZOFRAN) 8 MG tablet, Take 1 tablet by mouth., Disp: , Rfl:   No current facility-administered medications for this visit.    Facility-Administered Medications Ordered in Other Visits:     CARBOplatin (PARAPLATIN) 490 mg in sodium chloride 0.9 % 299 mL chemo IVPB, 490 mg, Intravenous, Once, Hollie Mike MD    diphenhydrAMINE (BENADRYL) injection 50 mg, 50 mg, Intravenous, PRN, Hollie Mike MD    famotidine (PEPCID) injection 20 mg, 20 mg, Intravenous, PRN, Hollie Mike MD    fosaprepitant (EMEND) 150 mg/100mL NS, 150 mg, Intravenous, Once, Hollie Mike MD    Hydrocortisone Sod Suc (PF) (Solu-CORTEF) injection 100 mg, 100 mg, Intravenous, PRN, Hollie Mike MD     "ipilimumab (YERVOY) 1 mg/kg = 65 mg in sodium chloride 0.9 % 63 mL chemo IVPB, 1 mg/kg (Treatment Plan Recorded), Intravenous, Once, Hollie Mike MD    nivolumab (OPDIVO) 360 mg in sodium chloride 0.9 % 136 mL chemo IVPB, 360 mg, Intravenous, Once, Hollie Mike MD    palonosetron (ALOXI) injection 0.25 mg, 0.25 mg, Intravenous, Once, Hollie Mike MD    PEMEtrexed (ALIMTA) 900 mg in sodium chloride 0.9 % 100 mL chemo IVPB, 900 mg, Intravenous, Once, Hollie Mike MD    sodium chloride 0.9 % infusion, 20 mL/hr, Intravenous, Once, Hollie Mike MD    Pain Medications               acetaminophen (TYLENOL) 500 MG tablet Take  by mouth.    aspirin 81 MG EC tablet Take  by mouth Daily.    citalopram (CeleXA) 20 MG tablet Take  by mouth Daily.    dexAMETHasone (DECADRON) 4 MG tablet Take 1 tablet oral twice a day for 3 consecutive days beginning the day before chemotherapy and continue for 6 doses. Take with food.    gabapentin (NEURONTIN) 300 MG capsule Take 1 capsule by mouth Every 12 (Twelve) Hours.    HYDROcodone-acetaminophen (NORCO) 5-325 MG per tablet Take 1 tablet by mouth Every 12 (Twelve) Hours As Needed for Moderate Pain.    levETIRAcetam (KEPPRA) 500 MG tablet Take 1 tablet by mouth Every 12 (Twelve) Hours.    OLANZapine (zyPREXA) 5 MG tablet Take 1 tablet by mouth Every Night. Take nightly x 4 starting night of chemotherapy..               ALLERGIES:    Allergies   Allergen Reactions    Augmentin [Amoxicillin-Pot Clavulanate] Diarrhea     Has tolerated Ceftriaxone, Cefdinir, Cefuroxime.    Benadryl [Diphenhydramine] Other (See Comments)     High Dose Caused uncontrollable shaking in legs    Codeine Nausea Only    Metformin Diarrhea    Rosuvastatin GI Intolerance         Physical Exam    VITAL SIGNS:  /71 Comment: LUE  Pulse 76   Temp 97.1 °F (36.2 °C) (Infrared)   Resp 20   Ht 160 cm (63\")   Wt 67.1 kg (148 lb)   LMP  (LMP Unknown)   SpO2 98% Comment: RA  " BMI 26.22 kg/m²     ECOG score: 1           Wt Readings from Last 3 Encounters:   05/01/24 67.1 kg (148 lb)   04/18/24 70.2 kg (154 lb 12.8 oz)   04/08/24 83.5 kg (184 lb)       Body mass index is 26.22 kg/m². Body surface area is 1.7 meters squared.       Performance Status: 0    General: well appearing, in no acute distress  HEENT: sclera anicteric, neck is supple  Lymphatics: no cervical, supraclavicular, or axillary adenopathy  Cardiovascular: regular rate and rhythm, no murmurs, rubs or gallops  Lungs: clear to auscultation bilaterally  Abdomen: soft, nontender, nondistended.  No palpable organomegaly  Extremities: no lower extremity edema  Skin: no rashes, lesions, bruising, or petechiae  Msk:  Shows no weakness of the large muscle groups  Psych: Mood is stable        RECENT LABS:    Lab Results   Component Value Date    HGB 12.4 04/30/2024    HCT 37.9 04/30/2024    MCV 93.8 04/30/2024     (L) 04/30/2024    WBC 6.89 04/30/2024    NEUTROABS 5.68 04/30/2024    LYMPHSABS 0.54 (L) 04/30/2024    MONOSABS 0.54 04/30/2024    EOSABS 0.03 04/30/2024    BASOSABS 0.02 04/30/2024       Lab Results   Component Value Date    GLUCOSE 144 (H) 04/30/2024    BUN 34 (H) 04/30/2024    CREATININE 0.86 04/30/2024     (L) 04/30/2024    K 4.4 04/30/2024    CL 99 04/30/2024    CO2 21.0 (L) 04/30/2024    CALCIUM 8.5 (L) 04/30/2024    PROTEINTOT 5.6 (L) 04/30/2024    ALBUMIN 3.4 (L) 04/30/2024    BILITOT 0.6 04/30/2024    ALKPHOS 130 (H) 04/30/2024    AST 44 (H) 04/30/2024     (H) 04/30/2024     Lab Results   Component Value Date    FINALDX  06/29/2023     Amendment:    Immunohistochemical stains were performed on the corresponding cytology case, [id number], which revealed the tumor to be TTF-1 positive while p40 negative.  The findings are compatible with invasive moderately differentiated adenocarcinoma, rather than squamous cell carcinoma.  The previous diagnosis of this case was made on the morphologic findings,  however given the TTF-1 positivity the diagnosis should read as follows:    2) LUNG, LEFT LOWER LOBE, TRANSBRONCHIAL BIOPSY:  Invasive moderately differentiated adenocarcinoma    2) LUNG, LEFT LOWER LOBE, TRANSBRONCHIAL BIOPSY:      3) LUNG, RIGHT LOWER LOBE, BIOPSIES:  Bronchial mucosa with reactive changes and nonspecific chronic inflammation  No evidence of malignancy identified    4) LUNG, LEFT LOWER LOBE, BIOPSIES:  Bronchial mucosa with reactive changes and nonspecific chronic inflammation  No evidence of malignancy identified    5) LYMPH NODE, STATION 11 L, TBNA:  Lymph node with no evidence of metastatic carcinoma identified    6) LYMPH NODE, STATION 7, TBNA:  Scant, inflammatory debris present; no definite lymphocytes identified     ]      NM PET/CT Skull Base to Mid Thigh    Result Date: 9/27/2023  Impression: 1.The left lower lobe mass is significantly decreased in size and metabolic activity, indicating a positive response to therapy. There is also resolution of previously seen hypermetabolic subcarinal lymph node and stable size of a mildly enlarged left hilar lymph node without metabolic activity. 2.Hypermetabolic focus associated with a lucency at the left C3-C4 facet joint. This could represent a metastatic lesion or degenerative erosion. No additional hypermetabolic bone lesions. 3.Stable triangular focus of airspace disease in the anterior right upper lobe with low level metabolic activity. This is favored to represent an area of pneumonia or inflammation. Metastatic disease is considered less likely. Continued follow-up is recommended. Electronically Signed: Sergio Spivey MD  9/27/2023 9:03 AM EDT  Workstation ID: [id number]    CT Abdomen Pelvis With Contrast    Result Date: 9/15/2023  Impression: 1.Proximal descending colonic epiploic appendagitis. 2.Decrease in size of left lower lobe pulmonary mass. 3.Other relatively stable findings in the abdomen and pelvis. Electronically Signed: Long  MD Edilberto  9/15/2023 5:19 PM CDT  Workstation ID: [id number]          Assessment/Plan    1.  Recurrent lung cancer.  She had a minimal response to treatment with CarboTaxol and Opdivo neoadjuvantly prior to surgery.  And now has brain metastases.  I have recommended treatment with carboplatin with Alimta along with ipilimumab and nivolumab.  I am hoping that we have some disease response with this regimen.  She is also going to undergo CyberKnife for her brain metastases.    2.  Brain metastases.  Will undergo CyberKnife therapy.    3.  Profound weakness and dehydration at times.  Will see if home health can help with physical therapy.      Total time of patient care on day of service including time prior  to, face to face with patient, and following visit spent in reviewing records, lab results, imaging studies, discussion with patient, and documentation/charting was > 32 minutes.     Hollie Mike MD  [facility] Hematology and Oncology    Return on: 05/22/24  Return in (Approximately): 3 weeks, Schedule with next infusion    Orders Placed This Encounter   Procedures    Comprehensive metabolic panel    Comprehensive metabolic panel    TSH    T4, free    ACTH    Comprehensive metabolic panel    CBC and Differential    CBC and Differential    CBC and Differential       5/1/2024     Future Appointments           Provider Department Center     5/2/2024  5:00 AM  [facility] BILL[facility] ONLY [place] [facility]     5/2/2024 12:00 PM  [facility] [place] [facility]     5/3/2024 11:00 AM  [facility] [place] [facility]     5/6/2024 11:00 AM  [facility] [place] [facility]     5/22/2024 9:00 AM (Arrive by 8:45 AM) Hollie Mike MD [facility]AT[facility]     5/22/2024  9:30 AM CHAIR 17 [facility] OUTPATIENT ONCOLOGY [facility]     6/26/2024 11:30 AM Paola Reid APRN [facility]      8/6/2024 12:00 PM [facility] 3[facility]     8/8/2024 12:00 PM (Arrive by 11:30 AM) Jono Henderson MD [facility] NEUROSURGERY [facility]

## 2024-05-02 ENCOUNTER — HOSPITAL ENCOUNTER (OUTPATIENT)
Dept: RADIATION ONCOLOGY | Facility: HOSPITAL | Age: 79
Setting detail: RADIATION/ONCOLOGY SERIES
Discharge: HOME | End: 2024-05-02
Payer: MEDICARE

## 2024-05-02 ENCOUNTER — HOME HEALTH ADMISSION (OUTPATIENT)
Dept: HOME HEALTH SERVICES | Facility: HOME HEALTHCARE | Age: 79
End: 2024-05-02
Payer: MEDICARE

## 2024-05-03 ENCOUNTER — TELEPHONE (OUTPATIENT)
Dept: RADIATION ONCOLOGY | Facility: HOSPITAL | Age: 79
End: 2024-05-03
Payer: MEDICARE

## 2024-05-03 ENCOUNTER — HOSPITAL ENCOUNTER (OUTPATIENT)
Dept: RADIATION ONCOLOGY | Facility: HOSPITAL | Age: 79
Setting detail: RADIATION/ONCOLOGY SERIES
Discharge: HOME | End: 2024-05-03
Payer: MEDICARE

## 2024-05-03 ENCOUNTER — TELEPHONE (OUTPATIENT)
Dept: ONCOLOGY | Facility: CLINIC | Age: 79
End: 2024-05-03
Payer: MEDICARE

## 2024-05-03 LAB
QT INTERVAL: 452 MS
QT INTERVAL: 456 MS
QTC INTERVAL: 451 MS
QTC INTERVAL: 470 MS

## 2024-05-03 NOTE — TELEPHONE ENCOUNTER
Reviewed in detail verbally and provided in writing, instructions for Dexamethasone taper post CK brain radiation with patient's sister who dispenses patient's medication. Also provided contact information for nursing if sister should have any questions. Patient's sister verbalized understanding of instructions.      Copy of written taper instructions given to registrar in front office to be scanned into chart.

## 2024-05-03 NOTE — TELEPHONE ENCOUNTER
Caller: NATHALIA    Relationship: Other    Best call back number: [phone]    What is the best time to reach you: ANYTIME    Who are you requesting to speak with (clinical staff, provider,  specific staff member): CLINICAL    What was the call regarding: NATHALIA STATED THEY JUST NEED AN ETA OR [employer] TRACKING NUMBER ON THE ONCOEXTRA TEST - PLEASE CALL TO ADVISE.

## 2024-05-06 ENCOUNTER — HOSPITAL ENCOUNTER (OUTPATIENT)
Dept: RADIATION ONCOLOGY | Facility: HOSPITAL | Age: 79
Setting detail: RADIATION/ONCOLOGY SERIES
Discharge: HOME | End: 2024-05-06
Payer: MEDICARE

## 2024-05-07 ENCOUNTER — TELEPHONE (OUTPATIENT)
Dept: ONCOLOGY | Facility: CLINIC | Age: 79
End: 2024-05-07
Payer: MEDICARE

## 2024-05-07 DIAGNOSIS — C34.32 MALIGNANT NEOPLASM OF LOWER LOBE OF LEFT LUNG: Primary | ICD-10-CM

## 2024-05-07 RX ORDER — DIPHENOXYLATE HYDROCHLORIDE AND ATROPINE SULFATE 2.5; .025 MG/1; MG/1
1 TABLET ORAL 4 TIMES DAILY PRN
Qty: 30 TABLET | Refills: 2 | Status: ON HOLD | OUTPATIENT
Start: 2024-05-07

## 2024-05-08 ENCOUNTER — HOSPITAL ENCOUNTER (OUTPATIENT)
Dept: ONCOLOGY | Facility: HOSPITAL | Age: 79
Discharge: HOME | End: 2024-05-08
Payer: MEDICARE

## 2024-05-08 VITALS — TEMPERATURE: 97.5 F | DIASTOLIC BLOOD PRESSURE: 31 MMHG | SYSTOLIC BLOOD PRESSURE: 91 MMHG | HEART RATE: 79 BPM

## 2024-05-08 DIAGNOSIS — C34.32 MALIGNANT NEOPLASM OF LOWER LOBE OF LEFT LUNG: Primary | ICD-10-CM

## 2024-05-08 PROBLEM — A41.9 SEPSIS: Status: ACTIVE | Noted: 2024-05-08

## 2024-05-08 PROBLEM — C34.90 PRIMARY LUNG CANCER WITH METASTASIS FROM LUNG TO OTHER SITE: Status: ACTIVE | Noted: 2024-05-08

## 2024-05-08 PROBLEM — G62.9 NEUROPATHY: Status: ACTIVE | Noted: 2024-05-08

## 2024-05-08 PROBLEM — J45.20 MILD INTERMITTENT ASTHMA: Status: ACTIVE | Noted: 2024-05-08

## 2024-05-08 PROBLEM — I95.9 HYPOTENSION: Status: ACTIVE | Noted: 2024-05-08

## 2024-05-08 PROBLEM — R19.7 DIARRHEA: Status: ACTIVE | Noted: 2024-05-08

## 2024-05-08 PROBLEM — R11.2 NAUSEA & VOMITING: Status: ACTIVE | Noted: 2024-05-08

## 2024-05-08 PROBLEM — R63.0 ANOREXIA: Status: ACTIVE | Noted: 2024-05-08

## 2024-05-08 LAB — GLUCOSE BLDC GLUCOMTR-MCNC: 289 MG/DL (ref 70–130)

## 2024-05-08 PROCEDURE — 25010000002 ONDANSETRON PER 1 MG: Performed by: INTERNAL MEDICINE

## 2024-05-08 PROCEDURE — 96375 TX/PRO/DX INJ NEW DRUG ADDON: CPT

## 2024-05-08 PROCEDURE — 25810000003 SODIUM CHLORIDE 0.9 % SOLUTION: Performed by: INTERNAL MEDICINE

## 2024-05-08 PROCEDURE — 96360 HYDRATION IV INFUSION INIT: CPT

## 2024-05-08 PROCEDURE — 82948 REAGENT STRIP/BLOOD GLUCOSE: CPT

## 2024-05-08 RX ORDER — ONDANSETRON 2 MG/ML
12 INJECTION INTRAMUSCULAR; INTRAVENOUS ONCE
Status: CANCELLED | OUTPATIENT
Start: 2024-05-08

## 2024-05-08 RX ADMIN — ONDANSETRON 12 MG: 2 INJECTION INTRAMUSCULAR; INTRAVENOUS at 14:42

## 2024-05-08 RX ADMIN — SODIUM CHLORIDE 1000 ML: 9 INJECTION, SOLUTION INTRAVENOUS at 14:00

## 2024-05-08 NOTE — PROGRESS NOTES
"Pharmacy Consult-Vancomycin Dosing  Ruby Pettit is a  79 y.o. female receiving vancomycin therapy.     Indication: IAI  Consulting Provider: Hospitalist  ID Consult: N    Goal Trough: 400-600     Current Antimicrobial Therapy  Anti-Infectives (From admission, onward)      Ordered     Dose/Rate Route Frequency Start Stop    05/08/24 1711  piperacillin-tazobactam (ZOSYN) 3.375 g IVPB in 100 mL NS MBP (CD)        Ordering Provider: Judith Fatima APRN    3.375 g  over 4 Hours Intravenous Every 8 Hours 05/09/24 0000 05/15/24 2359    05/08/24 1730  vancomycin IVPB 1500 mg in 0.9% NaCl (Premix) 500 mL        Ordering Provider: Yanick Gonzales RPH    20 mg/kg × 71.8 kg  333.3 mL/hr over 90 Minutes Intravenous Once 05/08/24 1830      05/08/24 1635  vancomycin (VANCOCIN) capsule 125 mg        Ordering Provider: Judith Fatima APRN    125 mg Oral Every 6 Hours Scheduled 05/08/24 1800 05/18/24 1759    05/08/24 1711  piperacillin-tazobactam (ZOSYN) 3.375 g IVPB in 100 mL NS MBP (CD)        Ordering Provider: Judith Fatima APRN    3.375 g  over 30 Minutes Intravenous Once 05/08/24 1800      05/08/24 1711  Pharmacy to dose vancomycin        Ordering Provider: Judith Fatima APRN     Does not apply Continuous PRN 05/08/24 1710 05/15/24 1709            Allergies  Allergies as of 05/08/2024 - Reviewed 05/08/2024   Allergen Reaction Noted    Augmentin [amoxicillin-pot clavulanate] Diarrhea 06/07/2021    Benadryl [diphenhydramine] Other (See Comments) 10/17/2023    Codeine Nausea Only 10/17/2023    Metformin Diarrhea 06/29/2023    Rosuvastatin GI Intolerance 06/29/2023       Labs    Results from last 7 days   Lab Units 05/08/24  1553   BUN mg/dL 51*   CREATININE mg/dL 1.96*       Results from last 7 days   Lab Units 05/08/24  1553   WBC 10*3/mm3 0.59*       Evaluation of Dosing     Last Dose Received in the ED/Outside Facility: N/A  Is Patient on Dialysis or Renal Replacement: N/A    Ht - 154.9 cm (61\")  Wt - 71.8 " kg (158 lb 4.8 oz)    Estimated Creatinine Clearance: 21.1 mL/min (A) (by C-G formula based on SCr of 1.96 mg/dL (H)).    Intake & Output (last 3 days)       None            Microbiology and Radiology  Microbiology Results (last 10 days)       ** No results found for the last 240 hours. **            Vancomycin Levels:                        Assessment/Plan:      Pharmacy consulted to dose vancomycin for IAI, goal -600 mg/L*hr.  Patient loaded with vancomycin 1500mg ( ~ 21 mg/kg)  Blood cultures and MRSA PCR ordered.  Patient remains afebrile, serum creatinine is 1.96  and is elevated from baseline (0.8), BUN is 51.   Based on evaluation of elderly pt with unstable renal function, will obtain random vancomycin level tomorrow to better estimate vancomycin clearance before scheduling further doses.  A vancomycin random will be assessed on 5/9 with AM labs.  Will continue to follow and adjust vancomycin dose as needed based on renal function, cultures, and patient clinical status.    Thank you,    Yanick Gonzales RP  5/8/2024  17:31 EDT

## 2024-05-08 NOTE — H&P
[facility] Medicine Services  HISTORY AND PHYSICAL    Patient Name: Ruby Pettit  : 1945  MRN: [id number]  Primary Care Physician: Ly Funez MD  Date of admission: 2024    Subjective   Subjective     Chief Complaint:  Diarrhea, Nausea, vomiting, hypotension    HPI:  Ruby Pettit is a 79 y.o. female PMH T2DM, HTN, asthma, lung cancer with mets to brain presenting with diarrhea (up to 10xdaily), nausea, vomiting, anorexia that started 3 days ago. Pt is presently receiving cyberknife for the brain mets and chemo. Pt had antibioics for pneumonia the first week of April but has not had any since. She has also been eating fresh strawberries, watermelon and other raw fruits and vegetables. Pt presenting for fluids and was found hypotensive. Dr Mike wanted patient admitted to the hospital for further eval.     Review of Systems   Constitutional:  Positive for activity change, appetite change, chills and fatigue.   HENT: Negative.     Eyes: Negative.    Respiratory: Negative.     Cardiovascular: Negative.    Gastrointestinal:  Positive for abdominal pain, diarrhea, nausea and vomiting.   Endocrine: Negative.    Genitourinary: Negative.    Musculoskeletal: Negative.    Skin: Negative.    Allergic/Immunologic: Negative.    Neurological: Negative.    Hematological: Negative.    Psychiatric/Behavioral: Negative.                  Personal History     Past Medical History:   Diagnosis Date    Anxiety and depression     Arthritis     Carotid stenosis, bilateral     Carotid duplex, 2019: Bilateral ICA stenosis 0-49%. Tortuous vessels. Vertebral flow antegrade.        Disease of thyroid gland     GERD (gastroesophageal reflux disease)     Head injury due to trauma     fell down stairs     History of transfusion     NO REACTIONS    Hyperlipidemia     Hypertension     Iatrogenic pneumothorax     Liver disorder     surgery  approx , BLOOD CLOTS    Lung cancer      Metastatic cancer     Nausea & vomiting 5/8/2024    Perennial rhinitis     Peripheral neuropathic pain     Syncope and collapse     Thyroid disease     UTI (urinary tract infection), bacterial     Wears eyeglasses          Oncology Problem List:  Primary lung cancer with metastasis from lung to other site (05/08/ 2024; Status: Active)  Metastatic cancer to brain (04/18/2024; Status: Active)  Malignant neoplasm of lower lobe of left lung (07/13/2023; Status:   Active)    Oncology/Hematology History   Malignant neoplasm of lower lobe of left lung   6/26/2023 Initial Diagnosis    Malignant neoplasm of bronchus of left lower lobe     6/29/2023 Cancer Staged    Staging form: Lung, AJCC 8th Edition  - Clinical stage from 6/29/2023: Stage IIIA (cT1c, cN2, cM0) - Signed by Hollie Mike MD on 7/20/2023 7/28/2023 - 9/8/2023 Chemotherapy    OP LUNG  Nivolumab 360mg /  PACLitaxel / CARBOplatin AUC=5     Completed 3 cycles neoadjuvantly     11/17/2023 Surgery    Surgery       Procedure:  Left lower lobectomy and mediastinoscopy with Dr. Kate at [facility] s/p neoadjuvant chemoimmunotherapy per Checkmate 816     11/17/2023 Cancer Staged    Staging form: Lung, AJCC 8th Edition  - Pathologic stage from 11/17/2023: Stage IIIA (ypT2, pN2, cM0) - Signed by Hollie Mike MD on 12/18/2023     1/15/2024 - 2/20/2024 Radiation    Radiation OncologyTreatment Course:  Ruby Pettit received 5000 cGy in 25 fractions to mediastinum via External Beam Radiation - EBRT.     5/1/2024 -  Chemotherapy    OP LUNG Nivolumab / Ipilimumab /  PEMEtrexed / CARBOplatin AUC=6     5/8/2024 -  Chemotherapy    OP SUPPORTIVE HYDRATION + ANTIEMETICS     Metastatic cancer to brain   4/18/2024 Initial Diagnosis    Metastatic cancer to brain     5/2/2024 - 5/6/2024 Radiation    Radiation OncologyTreatment Course:  Ruby Pettit received 2700 cGy in 3 fractions to brain tumor via Stereotactic Radiation Therapy - SRT.         Past Surgical History:    Procedure Laterality Date    ABDOMINAL SURGERY      LIVER RESECTION    APPENDECTOMY      BRONCHOSCOPY N/A 02/13/2018    Procedure: BRONCHOSCOPY WITH SANDRITA ENDOBRONCHIAL ULTRASOUND WITH FLUORO;  Surgeon: Dixon Fatima MD;  Location:  [facility];  Service:     BRONCHOSCOPY N/A 03/21/2019    Procedure: NAVIGATIONAL BRONCHOSCOPY;  Surgeon: Dixon Fatima MD;  Location:  [facility];  Service: Pulmonary    BRONCHOSCOPY WITH ION ROBOTIC ASSIST N/A 06/29/2023    Procedure: NAVIGATIONAL BRONCHOSCOPY WITH ION ROBOT;  Surgeon: Dixon Fatima MD;  Location:  [facility];  Service: Robotics - Pulmonary;  Laterality: N/A;  Ion cath 3 - 0010,  3 - 0013.    CHOLECYSTECTOMY      COLONOSCOPY  2019    HYSTERECTOMY      2001    LIVER RESECTION      LUNG LOBECTOMY  11/17/2023    OOPHORECTOMY Bilateral     2001    ORIF WRIST FRACTURE Right     THYROID SURGERY      TUBAL ABDOMINAL LIGATION         Family History:  family history includes COPD in her father; Cancer in her mother; Emphysema in her father; No Known Problems in her paternal grandfather, paternal grandmother, and sister; Ovarian cancer (age of onset: 19) in her mother; Tuberculosis in her maternal grandfather and maternal grandmother.     Social History:  reports that she has never smoked. She has never used smokeless tobacco. She reports that she does not currently use alcohol. She reports that she does not use drugs.  Social History     Social History Narrative    Nonsmoker    Has been exposed to secondhand smoke        Has 3 children    No regular alcohol use    Caffeine: 1 cup coffee daily       Medications:  ALPRAZolam, Biotin, HYDROcodone-acetaminophen, OLANZapine, acetaminophen, amLODIPine, aspirin, benzonatate, bisoprolol, cetirizine, citalopram, dexAMETHasone, diphenoxylate-atropine, folic acid, gabapentin, hydrOXYzine, hydroCHLOROthiazide, hyoscyamine, levETIRAcetam, levothyroxine, losartan, lovastatin, naloxone,  omeprazole, ondansetron, and vitamin B-12    Allergies   Allergen Reactions    Augmentin [Amoxicillin-Pot Clavulanate] Diarrhea     Has tolerated Ceftriaxone, Cefdinir, Cefuroxime.    Benadryl [Diphenhydramine] Other (See Comments)     High Dose Caused uncontrollable shaking in legs    Codeine Nausea Only    Metformin Diarrhea    Rosuvastatin GI Intolerance       Objective   Objective     Vital Signs:   Temp:  [97.5 °F (36.4 °C)-97.8 °F (36.6 °C)] 97.8 °F (36.6 °C)  Heart Rate:  [] 79  Resp:  [18] 18  BP: ()/(31-96) 109/96  Flow (L/min):  [2] 2    Physical Exam  Constitutional:       Appearance: She is ill-appearing.   HENT:      Head: Normocephalic and atraumatic.      Mouth/Throat:      Mouth: Mucous membranes are dry.   Eyes:      General: No scleral icterus.     Conjunctiva/sclera: Conjunctivae normal.   Cardiovascular:      Rate and Rhythm: Normal rate and regular rhythm.      Heart sounds: No murmur heard.     No friction rub. No gallop.   Pulmonary:      Effort: Pulmonary effort is normal.      Breath sounds: Normal breath sounds.   Abdominal:      General: Bowel sounds are normal. There is no distension.      Tenderness: There is abdominal tenderness.   Musculoskeletal:      Cervical back: Neck supple.      Right lower leg: No edema.      Left lower leg: No edema.   Skin:     General: Skin is dry.   Neurological:      General: No focal deficit present.      Mental Status: She is alert.   Psychiatric:         Mood and Affect: Mood normal.         Behavior: Behavior normal.            Result Review:  I have personally reviewed the results from the time of this admission to 5/8/2024 17:57 EDT and agree with these findings:  [x]  Laboratory list / accordion  []  Microbiology  [x]  Radiology  []  EKG/Telemetry   [x]  Cardiology/Vascular   []  Pathology  [x]  Old records  []  Other:      LAB RESULTS:      Lab 05/08/24  1553   WBC 0.59*   HEMOGLOBIN 10.8*   HEMATOCRIT 33.2*   PLATELETS 47*   NEUTROS ABS  0.40*   IMMATURE GRANS (ABS) 0.02   LYMPHS ABS 0.12*   MONOS ABS 0.04*   EOS ABS 0.00   MCV 92.0   SED RATE 4   CRP 19.87*   PROCALCITONIN 80.55*   LACTATE 5.8*         Lab 05/08/24  1553   SODIUM 137   POTASSIUM 4.9   CHLORIDE 104   CO2 18.0*   ANION GAP 15.0   BUN 51*   CREATININE 1.96*   EGFR 25.6*   GLUCOSE 318*   CALCIUM 7.4*         Lab 05/08/24  1553   TOTAL PROTEIN 4.3*   ALBUMIN 2.5*   GLOBULIN 1.8   ALT (SGPT) 97*   AST (SGOT) 23   BILIRUBIN 0.3   ALK PHOS 85   LIPASE 26         Lab 05/08/24  1553   PROBNP 4,680.0*                 Brief Urine Lab Results  (Last result in the past 365 days)        Color   Clarity   Blood   Leuk Est   Nitrite   Protein   CREAT   Urine HCG        04/08/24 2051 Yellow   Clear   Negative   Negative   Negative   Trace                 Microbiology Results (last 10 days)       ** No results found for the last 240 hours. **            XR Chest 1 View    Result Date: 5/8/2024  XR CHEST 1 VW Date of Exam: 5/8/2024 5:10 PM EDT Indication: cough Comparison: 4/8/2024 Findings: Left basilar opacity may represent atelectasis, scarring, or infiltrate.. No pleural effusion or pneumothorax is identified. Cardiomediastinal silhouette is enlarged. Pulmonary vasculature is unremarkable. No acute or suspicious osseous lesion is identified.     Impression: Impression: Cardiomegaly with left basilar atelectasis, scarring, or infiltrate. Electronically Signed: Benitez Mayo MD  5/8/2024 5:34 PM EDT  Workstation ID: [id number]     Results for orders placed during the hospital encounter of 07/08/19    Adult Transthoracic Echo Complete W/ Cont if Necessary Per Protocol    Interpretation Summary  · Estimated EF = 65%. Near cavitary obliteration at rest with contractility  · Left ventricular systolic function is normal.  · Left ventricular diastolic dysfunction (grade I) consistent with impaired relaxation.  · Trace mitral regurgitation  · Trace to mild tricuspid regurgitation      Assessment & Plan    Assessment & Plan       Diarrhea    Anorexia    Sepsis    Hypotension    Nausea & vomiting    Type 2 diabetes mellitus without complication, without long-term current use of insulin    Mild intermittent asthma    Primary lung cancer with metastasis from lung to other site    Neuropathy    Hospital Course to date:  Ruby Pettit is a 79 y.o. female PMH T2DM, HTN, asthma, lung cancer with mets to brain and liver presenting with diarrhea (up to 10xdaily), nausea, vomiting, anorexia that started 3 days ago.     Sepsis  Diarrhea, Nausea, vomiting, anorexia  Neutroopenia (WBC 0.59)  Lactic acidosis  Hypotension secondary to dehydration  -stool PCR. C-diff  -zofran PRN nausea  -IV resuscitation, holding all blood pressure medication  -Lactic acid is 5.8 with Pro-Amaury 80.55, sed rate 4 CRP 19.87  -Vanco and Zosyn=>Cefepime    KANDY likely prerenal due to dehydration  -Cr  1.96  baseline 0.78-0.80  -No nephrotoxic medications.  Patient receiving fluid resuscitation    Thrombocytopenia  -Platelets 47 continue to monitor    Lung Cancer with metastatic disease to the brain   -Follows with   -Patient receiving carboplatin with alimta along with ipilimumab and nivolumab.  Is also receiving CyberKnife therapy.  -Will consult oncology    HTN, presently hypotensive  -holding HTN meds    Elevated BNP  - ECHO  -Last ECHO 7/2019 LVEF 65% with diastolic dysfunction grade 1     T2DM  -no home meds  -SSI  -A1C    Asthma  -no home meds    Neuropathy  -gabapentin    Anemia    DVT prophylaxis:  compression device    CODE STATUS:    Medical Intervention Limits: NO intubation (DNI)  Code Status (Patient has no pulse and is not breathing): No CPR (Do Not Attempt to Resuscitate)  Medical Interventions (Patient has pulse or is breathing): Limited Support      Expected Discharge 05/10/2024        Signature: Electronically signed by EMMIE Arboleda, 05/08/24, 4:43 PM EDT.    Patient seen and examined.  Chart reviewed  On exam She is  slightly hypotensive, shivering,  Greay  Heart tachycardic  Lungs shallow  Abd soft diffusely tender  Extermities cool    Labs reviewed, hgb dropped, ct reviewed    Enteritis  -must cover for infectious etiolgies  -- monitor HnH for possibly bleed, transfuse as needed  -start PPI  Consider steroids if this is immune mediated    Metastatic lung cancer  -per Dr Resendez    Pancytopenia  -one dose of neupogen    Hypotension, Hypovolemia and KANDY with lactic acidosis  -IVFs    Mari Espinal MD 05/09/24 00:07 EDT

## 2024-05-08 NOTE — LETTER
[facility]  [address] [facility] [id number]  [phone]        May 12, 2024      Patient: Ruby Pettit  YOB: 1945  Date of Visit: 5/8/2024      Inpatient referral at [facility]   Attending: Stacey OLEA  Certifying: Dr. Chio Holley   Referring: Dr. Dixon Richter RN

## 2024-05-09 PROBLEM — R57.9 SHOCK: Status: ACTIVE | Noted: 2024-05-09

## 2024-05-09 PROBLEM — D70.9 NEUTROPENIA: Status: ACTIVE | Noted: 2024-05-09

## 2024-05-09 PROBLEM — J18.9 PNEUMONIA OF BOTH LOWER LOBES DUE TO INFECTIOUS ORGANISM: Status: ACTIVE | Noted: 2024-05-09

## 2024-05-09 NOTE — PROGRESS NOTES
Clinical Nutrition     Nutrition Support Assessment  Reason for Visit: Identified at risk by screening criteria      Patient Name: Ruby Pettit  YOB: 1945  MRN: [id number]  Date of Encounter: 05/09/24 17:11 EDT  Admission date: 5/8/2024    Nutrition Assessment   Admission Diagnosis:  Diarrhea [R19.7]  Neutropenia [D70.9]      Problem List:    Diarrhea    Type 2 diabetes mellitus without complication, without long-term current use of insulin    Acute kidney injury    Hypotension    Mild intermittent asthma    Nausea & vomiting    Primary lung cancer with metastasis from lung to other site    Neuropathy    Anorexia    Sepsis    Neutropenia    Shock -septic versus hypovolemic    Pneumonia of both lower lobes due to infectious organism        PMH:   She  has a past medical history of Anxiety and depression, Arthritis, Carotid stenosis, bilateral, Disease of thyroid gland, GERD (gastroesophageal reflux disease), Head injury due to trauma (1992), History of transfusion, Hyperlipidemia, Hypertension, Iatrogenic pneumothorax, Liver disorder, Lung cancer, Metastatic cancer, Nausea & vomiting (5/8/2024), Perennial rhinitis, Peripheral neuropathic pain, Syncope and collapse, Thyroid disease, UTI (urinary tract infection), bacterial, and Wears eyeglasses.    PSH:  She  has a past surgical history that includes Hysterectomy; Oophorectomy (Bilateral); Abdominal surgery; Liver resection; Appendectomy; Cholecystectomy; Tubal ligation; Bronchoscopy (N/A, 02/13/2018); Thyroid surgery; Colonoscopy (2019); Bronchoscopy (N/A, 03/21/2019); ORIF wrist fracture (Right); BRONCHOSCOPY WITH ION ROBOTIC ASSIST (N/A, 06/29/2023); and Lung lobectomy (11/17/2023).      Applicable Nutrition Concerns:   Skin:R gluteal, L leg papula rash  GI:bloody diarrhea    Reported/Observed/Food/Nutrition Related History:     Pt resting in bed, on nasal cannula, reports no appetite at present, no nausea or vomiting, but is having  diarrhea    Son reports pt developed diarrhea after taking abx in April, diarrhea got worse after pt resumed chemo, pt has been gradually losing weight since surgery and starting chemo, eats well when not undergoing chemo, UBW was ~ 178lb's, she normally drinks ensure supplements at home    Per RN: pt having bloody stool, and some bloody vaginal discharge      Labs    Labs Reviewed: Yes     Results from last 7 days   Lab Units 05/09/24  1553 05/09/24  0450 05/09/24  0118 05/08/24  2209 05/08/24  1941 05/08/24  1553   GLUCOSE mg/dL  --   --  53*  --   --  318*   BUN mg/dL  --   --  51*  --   --  51*   CREATININE mg/dL  --   --  1.95*  --   --  1.96*   SODIUM mmol/L  --   --  141  --   --  137   CHLORIDE mmol/L  --   --  111*  --   --  104   POTASSIUM mmol/L  --   --  3.9  --   --  4.9   PHOSPHORUS mg/dL 4.1  --   --   --   --   --    ALT (SGPT) U/L  --   --  73*  --   --  97*   LACTATE mmol/L  --  2.5* 4.2* 5.7*   < > 5.8*    < > = values in this interval not displayed.       Results from last 7 days   Lab Units 05/09/24  0118 05/08/24  1553   ALBUMIN g/dL 1.9* 2.5*   CRP mg/dL  --  19.87*       Results from last 7 days   Lab Units 05/09/24  1230 05/09/24  0747 05/08/24  2141 05/08/24  1613   GLUCOSE mg/dL 77 78 218* 289*     Lab Results   Lab Value Date/Time    HGBA1C 6.80 (H) 01/18/2024 0612    HGBA1C 7.00 (H) 01/16/2019 0517         Results from last 7 days   Lab Units 05/08/24  1553   PROBNP pg/mL 4,680.0*         Medications    Medications Reviewed: Yes  Pertinent: abx, steroids, albumin, heparin, insulin, keppra, protonix  Infusion:D5%NS@75ml      Intake/Ouptut 24 hrs (0701 - 0700)   I&O's Reviewed: Yes     Intake & Output (last day)         05/08 0701 05/09 0700 05/09 0701  05/10 0700    I.V. (mL/kg) 1000 (13.9) 5677.7 (79.1)    IV Piggyback 2200 1155    Total Intake(mL/kg) 3200 (44.6) 6832.7 (95.2)    Urine (mL/kg/hr)  550 (0.8)    Total Output  550    Net +3200 +6282.7                    Anthropometrics  "    Flowsheet Rows      Flowsheet Row First Filed Value   Admission Height 154.9 cm (61\") Documented at 05/08/2024 1555   Admission Weight 71.8 kg (158 lb 4.8 oz) Documented at 05/08/2024 1555          Height: Height: 149 cm (58.66\")  Last Filed Weight: Weight: 71.8 kg (158 lb 4.6 oz) (05/09/24 1428)  Method: Weight Method: Bed scale  BMI: BMI (Calculated): 32.3  BMI classification: Obese Class I: 30-34.9kg/m2  IBW:      UBW: 178lb    Weight change: 20lb wt loss over 1 year, ~ 11% wt loss     Weight       Weight (kg) Weight (lbs) Weight Method Visit Report   11/23/2020 81.92 kg  180 lb 9.6 oz   --    3/10/2021 79.833 kg  176 lb   --    4/5/2021 80.287 kg  177 lb   --    5/6/2021 80.74 kg  178 lb   --    6/7/2021 81.194 kg  179 lb   --    6/15/2021 80.74 kg  178 lb   --    4/24/2023 76.658 kg  169 lb   --    6/22/2023 76.1 kg  167 lb 12.3 oz  Standing scale     7/13/2023 76.204 kg  168 lb      7/27/2023 77.565 kg  171 lb   --    7/28/2023 78.019 kg  172 lb   --    8/17/2023 77.111 kg  170 lb   --    8/18/2023 77.111 kg  170 lb      9/7/2023 75.751 kg  167 lb      9/8/2023 76.204 kg  168 lb   --    9/15/2023 75.751 kg  167 lb  Stated     9/28/2023 75.751 kg  167 lb   --    10/17/2023 73.936 kg  163 lb   --    12/18/2023 72.576 kg  160 lb   --    12/20/2023 73.392 kg  161 lb 12.8 oz   --    1/16/2024 72.485 kg  159 lb 12.8 oz      1/17/2024 72.576 kg  160 lb  Bed scale     1/30/2024 72.349 kg  159 lb 8 oz      2/13/2024 70.761 kg  156 lb      2/20/2024 70.625 kg  155 lb 11.2 oz      4/8/2024 83.462 kg  184 lb  Stated     4/18/2024 70.217 kg  154 lb 12.8 oz   --    5/1/2024 67.132 kg  148 lb   --    5/8/2024 71.804 kg  158 lb 4.8 oz  Bed scale     5/9/2024 71.8 kg  158 lb 4.6 oz          Nutrition Focused Physical Exam     Date: 5-8-24    Difficult to appreciate any muscle wasting 2nd to body habitus, no wasting observed on physical exam, adequate fat stores    Current Nutrition Prescription     PO: Diet: Regular/House; " Neutropenic/Low Microbial; Fluid Consistency: Thin (IDDSI 0)  Oral Nutrition Supplement:   Intake: Insufficient data      Nutrition Diagnosis   Date: 5-9-24 Updated:   Problem Predicted suboptimal energy intake   Etiology Severe enterocolitis   Signs/Symptoms Decreased oral intake     Goal:   General: Nutrition to support treatment  PO: Establish PO  EN/PN: N/A    Nutrition Intervention      Follow treatment progress, Care plan reviewed, Advised available snacks, Interview for preferences, Menu provided, Menu adjusted, Encourage intake, Supplement provided, Education provided    Add Boost GC 2x/day    Educated on neutropenic diet     Monitoring/Evaluation:   Per protocol, I&O, PO intake, Supplement intake, Pertinent labs, Weight, Skin status, GI status, Symptoms, Swallow function      Merry Wooten, TILA  Time Spent: 45min

## 2024-05-09 NOTE — PLAN OF CARE
Goal Outcome Evaluation:      Off Levo since noon. 1L LR given. D5NS at 75 ml/hr. Trending labs. x3 liquid stools. Straight cathed after patient unable to void,  ml. Family updated at bedside.

## 2024-05-09 NOTE — PLAN OF CARE
Goal Outcome Evaluation:              Outcome Evaluation: .       Pt arrived to ICU from floor ~0630, noted to be tachycardic, hypotensive, drowsy, oriented x4. APRN notified, Levo ordered and initiated. Pt having dark brown liquid BM in very small amounts, unable to collect specimens at this time.

## 2024-05-09 NOTE — PROGRESS NOTES
Pulmonary/Critical Care Follow-up     LOS: 0 days   Patient Care Team:  Ly Funez MD as PCP - General  Hollie Mike MD as Consulting Physician (Hematology)  Jean Marie Qureshi MD as Consulting Physician (Radiation Oncology)  Tra Kate DO as Consulting Physician (Thoracic Surgery)  Patrick Murdock MD as Consulting Physician (Dermatology)  Jono Henderson MD as Surgeon (Neurosurgery)  Ly Funez MD as Primary Care Provider (Internal Medicine)    Chief Complaint: Shock      Subjective     79 y.o. lifetime non-smoker with history of HTN, HLD, T2DM, reported asthma, previously followed by Dr. Fatima for migratory pulmonary infiltrates and status post nondiagnostic bronchoscopies and 2018 and 2019, the latter of which complicated by iatrogenic pneumothorax, who was ultimately diagnosed with metastatic adenocarcinoma after repeat bronchoscopy in June 2023.  Treated with neoadjuvant chemo/immunotherapy with subsequent left lower lobectomy November 2023.  She was found to have solitary brain metastasis undergoing CyberKnife.  Follows with Dr. Mike with plan for carboplatin and Alimta along with ipilimumab and nivolumab, which was started on 5/1/2024.  Patient presented for admission to the hospital this on 5/8/2024 secondary to developing diarrhea up to 10 times daily along with nausea, vomiting, and anorexia which started on 5/5/2024.      Interval History:     Patient transferred to the intensive care unit on the morning of 5/9/2024 secondary to hypotension for initiation of pressors.  She was started on norepinephrine.    History taken from: Patient, staff, chart    PMH/FH/Social History were reviewed and updated appropriately in the electronic medical record.     Past Medical History:   Diagnosis Date    Anxiety and depression     Arthritis     Carotid stenosis, bilateral     Carotid duplex, 01/16/2019: Bilateral ICA stenosis 0-49%. Tortuous vessels. Vertebral flow  antegrade.        Disease of thyroid gland     GERD (gastroesophageal reflux disease)     Head injury due to trauma 1992    fell down stairs     History of transfusion     NO REACTIONS    Hyperlipidemia     Hypertension     Iatrogenic pneumothorax     Liver disorder     surgery 1998 approx , BLOOD CLOTS    Lung cancer     Metastatic cancer     Nausea & vomiting 5/8/2024    Perennial rhinitis     Peripheral neuropathic pain     Syncope and collapse     Thyroid disease     UTI (urinary tract infection), bacterial     Wears eyeglasses      Past Surgical History:   Procedure Laterality Date    ABDOMINAL SURGERY      LIVER RESECTION    APPENDECTOMY      BRONCHOSCOPY N/A 02/13/2018    Procedure: BRONCHOSCOPY WITH SANDRITA ENDOBRONCHIAL ULTRASOUND WITH FLUORO;  Surgeon: Dixon Fatima MD;  Location:  [facility];  Service:     BRONCHOSCOPY N/A 03/21/2019    Procedure: NAVIGATIONAL BRONCHOSCOPY;  Surgeon: Dixon Fatima MD;  Location:  [facility];  Service: Pulmonary    BRONCHOSCOPY WITH ION ROBOTIC ASSIST N/A 06/29/2023    Procedure: NAVIGATIONAL BRONCHOSCOPY WITH ION ROBOT;  Surgeon: Dixon Fatima MD;  Location:  [facility];  Service: Robotics - Pulmonary;  Laterality: N/A;  Ion cath 3 - 0010,  3 - 0013.    CHOLECYSTECTOMY      COLONOSCOPY  2019    HYSTERECTOMY      2001    LIVER RESECTION      LUNG LOBECTOMY  11/17/2023    OOPHORECTOMY Bilateral     2001    ORIF WRIST FRACTURE Right     THYROID SURGERY      TUBAL ABDOMINAL LIGATION       Family History   Problem Relation Age of Onset    Cancer Mother     Ovarian cancer Mother 19    Emphysema Father     COPD Father     No Known Problems Sister     Tuberculosis Maternal Grandmother     Tuberculosis Maternal Grandfather     No Known Problems Paternal Grandmother     No Known Problems Paternal Grandfather     Breast cancer Neg Hx      Social History     Socioeconomic History    Marital status:     Number of children: 3   Tobacco  Use    Smoking status: Never    Smokeless tobacco: Never   Vaping Use    Vaping status: Never Used   Substance and Sexual Activity    Alcohol use: Not Currently     Alcohol/week: 0.0 - 2.0 standard drinks of alcohol     Comment: seldom     Drug use: No    Sexual activity: Defer         Review of Systems:    Review of 14 systems was completed with positives and pertinent negatives noted in the subjective section.  All other systems reviewed and are negative.       Objective     Vital Signs  Temp:  [97.5 °F (36.4 °C)-100.3 °F (37.9 °C)] 99 °F (37.2 °C)  Heart Rate:  [] 97  Resp:  [16-22] 20  BP: ()/(31-99) 113/99  05/08 0701 - 05/09 0700  In: 3200 [I.V.:1000]  Out: -   Body mass index is 29.91 kg/m².     IV drips:  norepinephrine, Last Rate: Stopped (05/09/24 1217)  Pharmacy to dose vancomycin  sodium chloride, Last Rate: 100 mL/hr (05/08/24 2200)       Physical Exam:     Constitutional:   Alert, in no acute distress   Head:   Normocephalic, atraumatic   Eyes:           Lids and lashes normal, conjunctivae and sclerae normal.  PER   ENMT:  Ears appear intact with no abnormalities noted     Lips normal.     Neck:  Trachea midline, no JVD   Lungs/Resp:    Normal effort, symmetric chest rise, no crepitus, clear to      auscultation bilaterally.               Heart/CV:   Mildly tachycardic and regular, no murmur   Abdomen/GI:    Soft, nontender, nondistended   :    Deferred   Extremities/MSK:  No clubbing or cyanosis.  Trace bilateral lower extremity edema.     Pulses:  Pulses palpable and equal bilaterally   Skin:  No bleeding, bruising or rash   Heme/Lymph:  No cervical or supraclavicular adenopathy.   Neurologic:    Psychiatric:    Moves all extremities with no obvious focal motor deficit.  Cranial nerves 2 - 12 grossly intact  Non-agitated, normal affect.    The above physical exam findings were reviewed and reflect my exam findings as of today's exam.   Electronically signed by:  Johnny Sanchez  MD  05/09/24  13:26 EDT      Results Review:     I reviewed the patient's new clinical results.   Results from last 7 days   Lab Units 05/09/24  0118 05/08/24  1553   SODIUM mmol/L 141 137   POTASSIUM mmol/L 3.9 4.9   CHLORIDE mmol/L 111* 104   CO2 mmol/L 17.0* 18.0*   BUN mg/dL 51* 51*   CREATININE mg/dL 1.95* 1.96*   CALCIUM mg/dL 6.5* 7.4*   BILIRUBIN mg/dL 0.2 0.3   ALK PHOS U/L 63 85   ALT (SGPT) U/L 73* 97*   AST (SGOT) U/L 22 23   GLUCOSE mg/dL 53* 318*     Results from last 7 days   Lab Units 05/09/24  1246 05/09/24  0450 05/09/24  0118 05/08/24  2209 05/08/24  1553   WBC 10*3/mm3  --  0.33* 0.36*  --  0.59*   HEMOGLOBIN g/dL 8.3* 7.6* 8.8*   < > 10.8*   HEMATOCRIT % 25.7* 23.4* 27.7*   < > 33.2*   PLATELETS 10*3/mm3  --  18* 25*  --  47*   MONOCYTES % %  --   --  10.0  --   --    EOSINOPHIL % %  --   --  5.0  --   --     < > = values in this interval not displayed.     Results from last 7 days   Lab Units 05/09/24  0337   PH, ARTERIAL pH units 7.371   PO2 ART mm Hg 58.7*   PCO2, ARTERIAL mm Hg 28.7*   HCO3 ART mmol/L 16.6*               I reviewed the patient's new imaging including images and reports.    CT chest from 5/9/2024 was reviewed.  There are bilateral infiltrates versus atelectasis, right greater than left.  Radiologist's impression follows:    Impression:  Improving infiltrates on the left. New infiltrates on the right may represent combination of atelectasis and/or pneumonia. Small effusions.    Medication Review:   aspirin, 81 mg, Oral, Daily  atorvastatin, 10 mg, Oral, Daily  [START ON 5/10/2024] cefepime, 2,000 mg, Intravenous, Q24H  citalopram, 20 mg, Oral, Daily  [START ON 5/10/2024] filgrastim, 300 mcg, Subcutaneous, Q PM  gabapentin, 100 mg, Oral, Q12H  hydrocortisone sodium succinate, 100 mg, Intravenous, Q12H  insulin lispro, 2-7 Units, Subcutaneous, 4x Daily AC & at Bedtime  levETIRAcetam, 500 mg, Oral, Q12H  levothyroxine, 50 mcg, Oral, Daily  pantoprazole, 40 mg, Intravenous, Q  AM  sodium chloride, 10 mL, Intravenous, Q12H  vancomycin (dosing per levels), , Does not apply, Daily  vancomycin, 500 mg, Intravenous, Once  vancomycin, 125 mg, Oral, Q6H      norepinephrine, 0.02-0.3 mcg/kg/min (Order-Specific), Last Rate: Stopped (05/09/24 1217)  Pharmacy to dose vancomycin,   sodium chloride, 100 mL/hr, Last Rate: 100 mL/hr (05/08/24 2200)        Assessment & Plan         Diarrhea    Type 2 diabetes mellitus without complication, without long-term current use of insulin    Acute kidney injury    Hypotension    Mild intermittent asthma    Nausea & vomiting    Primary lung cancer with metastasis from lung to other site    Neuropathy    Anorexia    Sepsis    Neutropenia    Shock -septic versus hypovolemic    79 y.o. lifetime non-smoker with history of HTN, HLD, T2DM, reported asthma, previously followed by Dr. Fatima for migratory pulmonary infiltrates and status post nondiagnostic bronchoscopies and 2018 and 2019, the latter of which complicated by iatrogenic pneumothorax, who was ultimately diagnosed with metastatic adenocarcinoma after repeat bronchoscopy in June 2023.  Treated with neoadjuvant chemo/immunotherapy with subsequent left lower lobectomy November 2023.  She was found to have solitary brain metastasis undergoing CyberKnife.  Follows with Dr. Mike with plan for carboplatin and Alimta along with ipilimumab and nivolumab, which was started on 5/1/2024.  Patient presented for admission to the hospital this on 5/8/2024 secondary to developing diarrhea up to 10 times daily along with nausea, vomiting, and anorexia which started on 5/5/2024.    Patient was admitted to the hospitalist service and received fluid and antibiotics.  Despite this, she developed worsening hypotension refractory to fluid resuscitation was transferred to the ICU on the morning of 5/9/2024.  Patient is currently on norepinephrine 0.06 mcg/kg/h.    Plan:  1.  For shock in the setting of neutropenia post  chemotherapy and low-grade elevation in temperature/basilar pneumonia: Broad-spectrum antibiotics.  Fluid resuscitation.  Pressors, wean as tolerated.  Oncology evaluation getting Neupogen.  Sputum cultures.  Follow-up blood cultures  2.  For KANDY: Secondary to shock.  Fluid resuscitation, pressors.  Monitor.  Avoid nephrotoxins when feasible.  3.  For T2DM: Most recent hemoglobin A1c 6.8% on 1/18/2024.  Borderline low blood sugars currently.  Start D5 normal saline.  Does not appear to be on home diabetes medications.  Monitor blood sugars for now.  4.  Diarrhea: Awaiting C. difficile.  Fluid resuscitation.  Check O & P.   5.  DVT prophylaxis: Add subcutaneous heparin.    Patient is critically ill secondary to shock and has high risk of life-threatening decompensation in condition.   As such, the patient requires continuous monitoring and frequent reassessment for consideration of adjustment in management to minimize this risk.  I personally reassessed the patient multiple times today.    Critical care time : 50 minutes spent by me personally and independently.(This excludes time spent performing separately reportable procedures and services).  Critical care time includes high complexity decision making to assess, manipulate, and support vital organ system failure in this individual who has impairment of one or more vital organ systems such that there is a high probability of imminent or life threatening deterioration in the patient’s condition.    Electronically signed by:    Johnny Sanchez MD  05/09/24  13:26 EDT      *. Please note that portions of this note were completed with [employer] - a voice recognition program.

## 2024-05-09 NOTE — PROGRESS NOTES
Pharmacy Consult-Vancomycin Dosing  Ruby Pettit is a  79 y.o. female receiving vancomycin therapy.     Indication: sepsis  Consulting Provider: Hospitalist  ID Consult: N    Goal Trough: 400-600     Current Antimicrobial Therapy  Anti-Infectives (From admission, onward)      Ordered     Dose/Rate Route Frequency Start Stop    05/09/24 0942  cefepime 2000 mg IVPB in 100 mL NS (MBP)        Ordering Provider: Andreas Campos RPH    2,000 mg  over 4 Hours Intravenous Every 24 Hours Scheduled 05/10/24 0600 05/14/24 0559    05/08/24 1730  vancomycin IVPB 1500 mg in 0.9% NaCl (Premix) 500 mL        Ordering Provider: Yanick Gonzales RPH    20 mg/kg × 71.8 kg  333.3 mL/hr over 90 Minutes Intravenous Once 05/08/24 1830 05/08/24 2318    05/08/24 1635  vancomycin (VANCOCIN) capsule 125 mg        Ordering Provider: Judith Fatima APRN    125 mg Oral Every 6 Hours Scheduled 05/08/24 1800 05/18/24 1759    05/08/24 1711  piperacillin-tazobactam (ZOSYN) 3.375 g IVPB in 100 mL NS MBP (CD)        Ordering Provider: Judith Fatima APRN    3.375 g  over 30 Minutes Intravenous Once 05/08/24 1800 05/08/24 2030    05/08/24 1711  Pharmacy to dose vancomycin        Ordering Provider: Judith Fatima APRN     Does not apply Continuous PRN 05/08/24 1710 05/15/24 1709            Allergies  Allergies as of 05/08/2024 - Reviewed 05/08/2024   Allergen Reaction Noted    Augmentin [amoxicillin-pot clavulanate] Diarrhea 06/07/2021    Benadryl [diphenhydramine] Other (See Comments) 10/17/2023    Codeine Nausea Only 10/17/2023    Metformin Diarrhea 06/29/2023    Rosuvastatin GI Intolerance 06/29/2023       Labs    Results from last 7 days   Lab Units 05/09/24  0118 05/08/24  1553   BUN mg/dL 51* 51*   CREATININE mg/dL 1.95* 1.96*       Results from last 7 days   Lab Units 05/09/24  0450 05/09/24  0118 05/08/24  1553   WBC 10*3/mm3 0.33* 0.36* 0.59*       Evaluation of Dosing     Last Dose Received in the ED/Outside Facility: N/A  Is  "Patient on Dialysis or Renal Replacement: N/A    Ht - 154.9 cm (61\")  Wt - 71.8 kg (158 lb 4.8 oz)    Estimated Creatinine Clearance: 21.2 mL/min (A) (by C-G formula based on SCr of 1.95 mg/dL (H)).    Intake & Output (last 3 days)       None            Microbiology and Radiology  Microbiology Results (last 10 days)       Procedure Component Value - Date/Time    MRSA Screen, PCR (Inpatient) - Swab, Nares [[id number]]  (Normal) Collected: 05/08/24 1805    Lab Status: Final result Specimen: Swab from Nares Updated: 05/08/24 2052     MRSA PCR Negative    Narrative:      The negative predictive value of this diagnostic test is high and should only be used to consider de-escalating anti-MRSA therapy. A positive result may indicate colonization with MRSA and must be correlated clinically.  MRSA Negative    Gastrointestinal Panel, PCR - Stool, Per Rectum [[id number]]  (Normal) Collected: 05/08/24 1706    Lab Status: Final result Specimen: Stool from Per Rectum Updated: 05/08/24 1827     Campylobacter Not Detected     Plesiomonas shigelloides Not Detected     Salmonella Not Detected     Vibrio Not Detected     Vibrio cholerae Not Detected     Yersinia enterocolitica Not Detected     Enteroaggregative E. coli (EAEC) Not Detected     Enteropathogenic E. coli (EPEC) Not Detected     Enterotoxigenic E. coli (ETEC) lt/st Not Detected     Shiga-like toxin-producing E. coli (STEC) stx1/stx2 Not Detected     Shigella/Enteroinvasive E. coli (EIEC) Not Detected     Cryptosporidium Not Detected     Cyclospora cayetanensis Not Detected     Entamoeba histolytica Not Detected     Giardia lamblia Not Detected     Adenovirus F40/41 Not Detected     Astrovirus Not Detected     Norovirus GI/GII Not Detected     Rotavirus A Not Detected     Sapovirus (I, II, IV or V) Not Detected            Vancomycin Levels:    Results from last 7 days   Lab Units 05/09/24  0118   VANCOMYCIN RM mcg/mL 24.30                     Assessment/Plan:    Pharmacy " consulted to dose vancomycin for IAI, goal -600 mg/L*hr.  Patient loaded with vancomycin 1500mg (~21 mg/kg)  Blood cultures pending and MRSA PCR negative.  Random vancomycin level drawn early, resulted at 24.3 mg/L less than 4 hours after first dose infusion started  Patient is anuric with significant KANDY. Will refrain from ordering a scheduled dose and instead order a one-time reduced dose of vancomycin 500mg (~7mg/kg) IV on 5/9 @ 2100.  Assess vancomycin random level on 5/10 with AM labs  Will continue to follow and adjust vancomycin dose as needed based on renal function, cultures, and patient clinical status.    Thank you,    Andreas Campos [facility]  5/9/2024  11:34 EDT

## 2024-05-09 NOTE — SIGNIFICANT NOTE
[facility] Medicine Services  CROSS COVER NOTE        EVENT:  RN called due to hypotension.  Patient has received a total of 3.5L of normal saline along with solucortef and albumin.  Per RN patient is having black colored stools mixed with bright red.  Currently BP is 88/46 with MAP of 58.        OBJECTIVE DATA:  Vitals:    05/09/24 0321   BP: (!) 72/43   Pulse: 94   Resp:    Temp: 97.5 °F (36.4 °C)   SpO2: 92%         ACTION TAKEN: repeat CBC, fibrinogen, d-dimer, aptt, lactic acid and procal are pending. Patient is DNR/DNI, is ok with pressor support.  Discussed with Dr. Davis.

## 2024-05-09 NOTE — NURSING NOTE
[facility] consult for     Pressure Injury     Was Pressure Injury Present on Admission? Yes    Indicate Wound Location / Details R glute and L posterior leg      Visited patient in ICU.    Noted two areas of localized, irregularly shaped, likely papular rashes that have opened up. Area by right gluteal has darker almost sloughing appearing tissue and blistered edges.  No erythema or induration although patient is immunocompromised. Painful to touch.    Some linear dotted scabbing on right gluteal folds.     Left gluteal with similar appearance although wounds look  with pale pink wound beds, some marbling indicative of subcutaneous layer vs deeper dermis, dry.     Doesn't appear to be due to pressure, moisture damage. Wonder if infectious vs rash due to chemo. Family states area on left thigh improved with desitin.    Recommend cleansing with teal wipes daily and application of xeroform and new silicone foam dressings to protect from stooling.     Close assessment of worsening wounds by nursing staff or evidence of infection.    Will follow.

## 2024-05-09 NOTE — CASE MANAGEMENT/SOCIAL WORK
Discharge Planning Assessment  [facility]     Patient Name: Ruby Pettit  MRN: [id number]  Today's Date: 5/9/2024    Admit Date: 5/8/2024    Plan: IDP   Discharge Needs Assessment       Row Name 05/09/24 1512       Living Environment    People in Home alone    Current Living Arrangements home    Potentially Unsafe Housing Conditions none    In the past 12 months has the electric, gas, oil, or water company threatened to shut off services in your home? No    Primary Care Provided by child(amalia)    Provides Primary Care For no one    Family Caregiver if Needed child(amalia), adult;sibling(s)    Family Caregiver Names ANGELUCCI,GEIOVANNI (Son)  [phone] (Mobile)    Quality of Family Relationships helpful;involved;supportive    Able to Return to Prior Arrangements yes       Resource/Environmental Concerns    Resource/Environmental Concerns none    Transportation Concerns none       Transportation Needs    In the past 12 months, has lack of transportation kept you from medical appointments or from getting medications? yes    In the past 12 months, has lack of transportation kept you from meetings, work, or from getting things needed for daily living? No       Food Insecurity    Within the past 12 months, you worried that your food would run out before you got the money to buy more. Never true    Within the past 12 months, the food you bought just didn't last and you didn't have money to get more. Never true       Transition Planning    Patient/Family Anticipates Transition to home with help/services    Patient/Family Anticipated Services at Transition ;home health care    Transportation Anticipated family or friend will provide       Discharge Needs Assessment    Readmission Within the Last 30 Days current reason for admission unrelated to previous admission    Equipment Currently Used at Home walker, rolling;oxygen    Concerns to be Addressed discharge planning    Anticipated Changes Related to Illness  inability to care for self    Equipment Needed After Discharge nebulizer    Discharge Facility/Level of Care Needs home with home health    Current Discharge Risk chronically ill;dependent with mobility/activities of daily living                   Discharge Plan       Row Name 05/09/24 1527       Plan    Plan IDP    Patient/Family in Agreement with Plan yes    Plan Comments I spoke with the patient's son by phone today to complete the initial discharge planning. Per Juhi, the patient has a duplex in [facility] but has been staying with her sister in [place] since April. Until February, the patient was able to function independently and was able to drive. Per son's report, she has slowly lost her ability to care for herself. Her sister cooks all of her meals and assists with daily ADLs as needed. Juhi assists with all shopping and medication needs. He also pays all of her bills. He stated that her insurance is [employer] and her PCP has been Ly Funez. He plans to get her a new PCP soon. He is interested in home health at discharge and the [facility] PCP service. CM will make the referral to [facility] today. Referral to [facility] has been accepted. Juhi wishes for the home health aide and PCP to be set up through [facility]. I communicated all of that to [facility]. CM will need to put in orders closer to time of discharge. Juhi plans for his mother to return to her sister's home in [place] with home health at the time of discharge. [facility] will need the sister's address for start of care. Family will transport. CM following.    Final Discharge Disposition Code 06 - home with home health care                  Continued Care and Services - Admitted Since 5/8/2024       Home Medical Care       Service Provider Request Status Selected Services Address Phone Fax Patient Preferred    [facility] [place] Pending - No Request Sent N/A [address], [address], [facility] [place]  [id number] [phone] --                     Demographic Summary    No documentation.                  Functional Status       Row Name 05/09/24 1526       Functional Status    Usual Activity Tolerance poor    Current Activity Tolerance other (see comments)  see therapy/RN notes       Physical Activity    On average, how many days per week do you engage in moderate to strenuous exercise (like a brisk walk)? 0 days    On average, how many minutes do you engage in exercise at this level? 0 min    Number of minutes of exercise per week 0       Assessment of Health Literacy    How often do you have someone help you read hospital materials? Never    How often do you have problems learning about your medical condition because of difficulty understanding written information? Never    How often do you have a problem understanding what is told to you about your medical condition? Never    How confident are you filling out medical forms by yourself? Quite a bit    Health Literacy Good       Functional Status, IADL    Medications assistive equipment and person    Meal Preparation assistive equipment and person    Housekeeping assistive equipment and person    Laundry assistive equipment and person    Shopping completely dependent       Mental Status Summary    Recent Changes in Mental Status/Cognitive Functioning unable to assess       Employment/    Employment Status retired                   Psychosocial    No documentation.                  Abuse/Neglect    No documentation.                  Legal    No documentation.                  Substance Abuse    No documentation.                  Patient Forms    No documentation.                     Gladys Brush RN

## 2024-05-09 NOTE — CONSULTS
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  She received 1 cycle of chemotherapy with carboplatin Alimta and Keytruda.  Unfortunately she presents with severe diarrhea and hypertension and appears to be infected.  She was admitted to the ICU early this morning from the floor after she was found to be severely hypotensive.  Her disease so far has been very difficult to treat.  And functionally patient has declined considerably.      Allergies   Allergen Reactions    Augmentin [Amoxicillin-Pot Clavulanate] Diarrhea     Has tolerated Ceftriaxone, Cefdinir, Cefuroxime.    Benadryl [Diphenhydramine] Other (See Comments)     High Dose Caused uncontrollable shaking in legs    Codeine Nausea Only    Metformin Diarrhea    Rosuvastatin GI Intolerance       Past Medical History:   Diagnosis Date    Anxiety and depression     Arthritis     Carotid stenosis, bilateral     Carotid duplex, 01/16/2019: Bilateral ICA stenosis 0-49%. Tortuous vessels. Vertebral flow antegrade.        Disease of thyroid gland     GERD (gastroesophageal reflux disease)     Head injury due to trauma 1992    fell down stairs     History of transfusion     NO REACTIONS    Hyperlipidemia     Hypertension     Iatrogenic pneumothorax     Liver disorder     surgery 1998 approx , BLOOD CLOTS    Lung cancer     Metastatic cancer     Nausea & vomiting 5/8/2024    Perennial rhinitis     Peripheral neuropathic pain     Syncope and collapse     Thyroid disease     UTI (urinary tract infection), bacterial     Wears eyeglasses          Current Facility-Administered Medications:     acetaminophen (TYLENOL) tablet 650 mg, 650 mg, Oral, Q4H PRN **OR** acetaminophen (TYLENOL) 160 MG/5ML oral  solution 650 mg, 650 mg, Oral, Q4H PRN **OR** acetaminophen (TYLENOL) suppository 650 mg, 650 mg, Rectal, Q4H PRN, Judith Fatima APRN    aspirin EC tablet 81 mg, 81 mg, Oral, Daily, Judith Fatima APRN, 81 mg at 05/09/24 0904    atorvastatin (LIPITOR) tablet 10 mg, 10 mg, Oral, Daily, Judith Fatima APRN, 10 mg at 05/08/24 2146    [START ON 5/10/2024] cefepime 2000 mg IVPB in 100 mL NS (MBP), 2,000 mg, Intravenous, Q24H, Andreas Campos, H    citalopram (CeleXA) tablet 20 mg, 20 mg, Oral, Daily, Judith Fatima APRN, 20 mg at 05/09/24 0904    dextrose (D50W) (25 g/50 mL) IV injection 25 g, 25 g, Intravenous, Q15 Min PRN, Judith Fatima APRN    dextrose (GLUTOSE) oral gel 15 g, 15 g, Oral, Q15 Min PRN, Judith Fatima APRN    gabapentin (NEURONTIN) capsule 100 mg, 100 mg, Oral, Q12H, Mari Espinal MD, 100 mg at 05/09/24 0904    glucagon (GLUCAGEN) injection 1 mg, 1 mg, Intramuscular, Q15 Min PRN, Judith Fatima APRN    Hydrocortisone Sod Suc (PF) (Solu-CORTEF) injection 100 mg, 100 mg, Intravenous, Q12H, Mari Espinal MD, 100 mg at 05/09/24 1212    Insulin Lispro (humaLOG) injection 2-7 Units, 2-7 Units, Subcutaneous, 4x Daily AC & at Bedtime, Judith Fatima APRN, 3 Units at 05/08/24 2159    levETIRAcetam (KEPPRA) tablet 500 mg, 500 mg, Oral, Q12H, Judith Fatima APRN, 500 mg at 05/09/24 0904    levothyroxine (SYNTHROID, LEVOTHROID) tablet 50 mcg, 50 mcg, Oral, Daily, Judith Fatima APRN, 50 mcg at 05/09/24 0615    nitroglycerin (NITROSTAT) SL tablet 0.4 mg, 0.4 mg, Sublingual, Q5 Min PRN, Judith Fatima APRN    norepinephrine (LEVOPHED) 8 mg in 250 mL NS infusion (premix), 0.02-0.3 mcg/kg/min (Order-Specific), Intravenous, Titrated, Bon Echols APRN, Stopped at 05/09/24 1217    ondansetron (ZOFRAN) injection 4 mg, 4 mg, Intravenous, Q6H PRN, Judith Fatima APRN    pantoprazole (PROTONIX) injection 40 mg, 40 mg, Intravenous, Q AM, Mari Espinal MD, 40 mg at  05/09/24 0659    Pharmacy to dose vancomycin, , Does not apply, Continuous PRN, Fatima, Judith, APRN    sodium chloride 0.9 % flush 10 mL, 10 mL, Intravenous, Q12H, Kushal, Judith, APRN, 10 mL at 05/09/24 0858    sodium chloride 0.9 % flush 10 mL, 10 mL, Intravenous, PRN, Fatima, Judith, APRN    sodium chloride 0.9 % infusion 40 mL, 40 mL, Intravenous, PRN, Fatima, Judith, APRN    sodium chloride 0.9 % infusion, 100 mL/hr, Intravenous, Continuous, Fatima, Judith, APRN, Last Rate: 100 mL/hr at 05/08/24 2200, 100 mL/hr at 05/08/24 2200    vancomycin (dosing per levels), , Does not apply, Daily, Andreas Campos, TATA    vancomycin (VANCOCIN) 500 mg IVPB in 100 mL NS (MBP), 500 mg, Intravenous, Once, Andreas Campos RPH    vancomycin (VANCOCIN) capsule 125 mg, 125 mg, Oral, Q6H, Kushal, Judith, APRN, 125 mg at 05/09/24 1212    Past Surgical History:   Procedure Laterality Date    ABDOMINAL SURGERY      LIVER RESECTION    APPENDECTOMY      BRONCHOSCOPY N/A 02/13/2018    Procedure: BRONCHOSCOPY WITH SANDRITA ENDOBRONCHIAL ULTRASOUND WITH FLUORO;  Surgeon: Dixon Fatima MD;  Location:  [facility];  Service:     BRONCHOSCOPY N/A 03/21/2019    Procedure: NAVIGATIONAL BRONCHOSCOPY;  Surgeon: Dixon Fatima MD;  Location:  [facility];  Service: Pulmonary    BRONCHOSCOPY WITH ION ROBOTIC ASSIST N/A 06/29/2023    Procedure: NAVIGATIONAL BRONCHOSCOPY WITH ION ROBOT;  Surgeon: Dixon Fatima MD;  Location:  [facility];  Service: Robotics - Pulmonary;  Laterality: N/A;  Ion cath 3 - 0010,  3 - 0013.    CHOLECYSTECTOMY      COLONOSCOPY  2019    HYSTERECTOMY      2001    LIVER RESECTION      LUNG LOBECTOMY  11/17/2023    OOPHORECTOMY Bilateral     2001    ORIF WRIST FRACTURE Right     THYROID SURGERY      TUBAL ABDOMINAL LIGATION         Social History     Socioeconomic History    Marital status:     Number of children: 3   Tobacco Use    Smoking status: Never    Smokeless  tobacco: Never   Vaping Use    Vaping status: Never Used   Substance and Sexual Activity    Alcohol use: Not Currently     Alcohol/week: 0.0 - 2.0 standard drinks of alcohol     Comment: seldom     Drug use: No    Sexual activity: Defer       Family History   Problem Relation Age of Onset    Cancer Mother     Ovarian cancer Mother 19    Emphysema Father     COPD Father     No Known Problems Sister     Tuberculosis Maternal Grandmother     Tuberculosis Maternal Grandfather     No Known Problems Paternal Grandmother     No Known Problems Paternal Grandfather     Breast cancer Neg Hx        Oncology/Hematology History   Malignant neoplasm of lower lobe of left lung   6/26/2023 Initial Diagnosis    Malignant neoplasm of bronchus of left lower lobe     6/29/2023 Cancer Staged    Staging form: Lung, AJCC 8th Edition  - Clinical stage from 6/29/2023: Stage IIIA (cT1c, cN2, cM0) - Signed by Hollie Mike MD on 7/20/2023 7/20/2023 Molecular Testing    Molecular testing - [facility]     Negative for EGFR, BRAF and ALK     7/20/2023 Imaging    Pet Scan    IMPRESSION:  Impression:  Hypermetabolic left lower lobe lung mass consistent with known primary malignancy. There are hypermetabolic left hilar and mediastinal lymph nodes consistent with regional metastasis.     Additional groundglass and subsolid lesions within the lungs most prominently along the anterior segment of the right upper lobe demonstrate little to no FDG uptake but remain suspicious for synchronous neoplasm. Recommend attention on follow-up.     Multiple hypodense lesions within the liver without substantial FDG uptake. These appear to be consistent with hemangiomas on prior examinations.     Similar size of a left adrenal nodule with increased metabolic activity. Given the stability of size this is still most likely a benign finding but recommend continued attention on follow-up.     Hypermetabolic focus near the left C4-5 facet joint with a  questionable lytic lesion in this area. Recommend follow-up with MRI of the cervical spine and/or bone scan to further evaluate.        Electronically Signed: Tolu Meghann    7/19/2023 6:05 PM EDT    Workstation ID: [id number]     7/28/2023 - 9/8/2023 Chemotherapy    OP LUNG  Nivolumab 360mg /  PACLitaxel / CARBOplatin AUC=5     Completed 3 cycles neoadjuvantly     9/26/2023 Imaging    NM PET/CT Skull Base to Mid Thigh    Result Date: 9/27/2023  Impression: 1.The left lower lobe mass is significantly decreased in size and metabolic activity, indicating a positive response to therapy. There is also resolution of previously seen hypermetabolic subcarinal lymph node and stable size of a mildly enlarged left hilar lymph node without metabolic activity. 2.Hypermetabolic focus associated with a lucency at the left C3-C4 facet joint. This could represent a metastatic lesion or degenerative erosion. No additional hypermetabolic bone lesions. 3.Stable triangular focus of airspace disease in the anterior right upper lobe with low level metabolic activity. This is favored to represent an area of pneumonia or inflammation. Metastatic disease is considered less likely. Continued follow-up is recommended. Electronically Signed: Sergio Spivey MD  9/27/2023 9:03 AM EDT  Workstation ID: [id number]       11/17/2023 Surgery    Surgery       Procedure:  Left lower lobectomy and mediastinoscopy with Dr. Kate at [facility] s/p neoadjuvant chemoimmunotherapy per [id number]     11/17/2023 Cancer Staged    Staging form: Lung, AJCC 8th Edition  - Pathologic stage from 11/17/2023: Stage IIIA (ypT2, pN2, cM0) - Signed by Hollie Mike MD on 12/18/2023     1/15/2024 - 2/20/2024 Radiation    Radiation OncologyTreatment Course:  Ruby Pettit received 5000 cGy in 25 fractions to mediastinum via External Beam Radiation - EBRT.     4/8/2024 Progression    Brain Metastasis - solitary lesion in the right frontal lobe     5/1/2024 -   Chemotherapy    OP LUNG Nivolumab / Ipilimumab /  PEMEtrexed / CARBOplatin AUC=6     5/8/2024 -  Chemotherapy    OP SUPPORTIVE HYDRATION + ANTIEMETICS     Metastatic cancer to brain   4/18/2024 Initial Diagnosis    Metastatic cancer to brain     5/2/2024 - 5/6/2024 Radiation    Radiation OncologyTreatment Course:  Ruby Pettit received 2700 cGy in 3 fractions to brain tumor via Stereotactic Radiation Therapy - SRT.           REVIEW OF SYSTEMS:  A 14 point review of systems was performed and is negative except as noted below.    Review of Systems   Constitutional:  Positive for appetite change and fatigue.   HENT:  Negative.     Eyes: Negative.    Respiratory:  Positive for shortness of breath.    Cardiovascular: Negative.    Gastrointestinal:  Positive for diarrhea.   Endocrine: Negative.    Genitourinary: Negative.     Skin: Negative.    Neurological:  Positive for extremity weakness.   Hematological: Negative.    Psychiatric/Behavioral:  Positive for depression. The patient is nervous/anxious.          Objective     Vitals:    05/09/24 0900 05/09/24 1000 05/09/24 1100 05/09/24 1200   BP: 115/55 103/52 120/66 120/71   BP Location:       Patient Position:       Pulse: 95 93 94 94   Resp:       Temp:       TempSrc:       SpO2: 100% 93% 97% 97%   Weight:       Height:                       Temp:  [97.5 °F (36.4 °C)-100.3 °F (37.9 °C)] 99 °F (37.2 °C)     Performance Status: 4    Physical Exam    General: ill appearing female in no acute distress  HEENT: sclerae anicteric,   Extremities: no lower extremity edema  Skin: no rashes, lesions, bruising, or petechiae      LABS:    Lab Results   Component Value Date    HGB 7.6 (L) 05/09/2024    HCT 23.4 (L) 05/09/2024    MCV 94.7 05/09/2024    PLT 18 (C) 05/09/2024    WBC 0.33 (C) 05/09/2024    NEUTROABS 0.15 (L) 05/09/2024    LYMPHSABS 0.10 (L) 05/09/2024    MONOSABS 0.05 (L) 05/09/2024    EOSABS 0.00 05/09/2024    BASOSABS 0.00 05/09/2024     Lab Results   Component Value  "Date    GLUCOSE 53 (L) 05/09/2024    BUN 51 (H) 05/09/2024    CREATININE 1.95 (H) 05/09/2024     05/09/2024    K 3.9 05/09/2024     (H) 05/09/2024    CO2 17.0 (L) 05/09/2024    CALCIUM 6.5 (L) 05/09/2024    PROTEINTOT 3.6 (L) 05/09/2024    ALBUMIN 1.9 (L) 05/09/2024    BILITOT 0.2 05/09/2024    ALKPHOS 63 05/09/2024    AST 22 05/09/2024    ALT 73 (H) 05/09/2024     Lab Results   Component Value Date    HGB 7.6 (L) 05/09/2024    HGB 8.8 (L) 05/09/2024    HGB 9.4 (L) 05/08/2024     No results found for: \"FERRITIN\"  Lab Results   Component Value Date    MCV 94.7 05/09/2024    MCV 94.9 05/09/2024    MCV 92.0 05/08/2024         Lab Results   Component Value Date    HAAPJMSF36 217 01/18/2024     Lab Results   Component Value Date    FOLATE >20.00 01/18/2024         IMAGING    CT Chest Without Contrast Diagnostic    Result Date: 5/9/2024  CT CHEST WO CONTRAST DIAGNOSTIC Date of Exam: 5/9/2024 8:35 AM EDT Indication: sepsis. History of lung cancer. Comparison: 4/8/2024. Technique: Axial CT images were obtained of the chest without contrast administration.  Reconstructed coronal and sagittal images were also obtained. Automated exposure control and iterative construction methods were used. Findings: There are no enlarged mediastinal nodes. There are coronary calcifications. There is moderate cardiomegaly. There is a small left pleural effusion, increased. There is a new small right pleural effusion. There is volume loss on the left. There is improving infiltrate of the left lower lobe. There is new infiltrate with atelectasis of the right lower lobe. There is a 5 mm right middle lobe pulmonary nodule is seen on image #53 of series 4. Previously noted right upper lobe pulmonary nodule is no longer identified. There is a stable 6 mm left lower lobe pulmonary nodule.     Impression: Improving infiltrates on the left. New infiltrates on the right may represent combination of atelectasis and/or pneumonia. Small " effusions. Electronically Signed: Leola English MD  5/9/2024 9:25 AM EDT  Workstation ID: [id number]    CT Abdomen Pelvis Without Contrast    Result Date: 5/8/2024  CT ABDOMEN PELVIS WO CONTRAST Date of Exam: 5/8/2024 6:22 PM EDT Indication: abd pain, diarrhea, nausea, vomiting. Comparison: 4/8/2024 Technique: Axial CT images were obtained of the abdomen and pelvis without the administration of contrast. Reconstructed coronal and sagittal images were also obtained. Automated exposure control and iterative construction methods were used. Findings: Visualized Chest: Mild patchy opacities in the left lower lung otherwise unremarkable Liver: Previously noted hypodense lesions within the right hepatic lobe are not clearly visible on this study. These were previously characterized as hemangiomas. Unchanged coarse calcifications within the right hepatic lobe with area of capsular retraction. Gallbladder: Status post cholecystectomy Bile Ducts: No billiary dcutal dilation. Spleen: Spleen is normal in size and CT density. Pancreas: 1.5 cm cystic lesion within the pancreatic neck. This is grossly unchanged since prior MRI from 2017. Mild parenchymal atrophy. Otherwise the pancreas is unremarkable. Adrenals: Stable 2.3 cm left adrenal gland nodule, most likely representing an adenoma. Kidneys: No stones or hydronephrosis. Mild right renal parenchymal atrophy. Gastrointestinal: Very limited evaluation due to lack of IV and oral contrast. There is mild fat stranding and possible mild wall thickening of multiple loops of small bowel in the lower abdomen on the right. These loops of bowel are also fluid-filled. No significant distention to suggest bowel obstruction. Otherwise the GI tract is unremarkable Bladder: The bladder is normal. Pelvis:  No suspecious mass. Peritoneum/Mesentery: No fluid collection, ascities, or free air.   Lymph Nodes: No lymphadenopathy. Vasculature: Vascular calcifications of the abdominal aorta.  Otherwise unremarkable on this noncontrast study. Abdominal Wall: Unremarkable Bony Structures: No definite acute osseous abnormality. Unchanged severe compression deformity of the T10-11 vertebral body.     Impression: Although evaluation is limited due to lack of IV and oral contrast, findings are highly concerning for enteritis. Additional chronic/nonemergent findings as characterized above. Electronically Signed: Jason Mari,   5/8/2024 7:10 PM EDT  Workstation ID: [id number]    XR Chest 1 View    Result Date: 5/8/2024  XR CHEST 1 VW Date of Exam: 5/8/2024 5:10 PM EDT Indication: cough Comparison: 4/8/2024 Findings: Left basilar opacity may represent atelectasis, scarring, or infiltrate.. No pleural effusion or pneumothorax is identified. Cardiomediastinal silhouette is enlarged. Pulmonary vasculature is unremarkable. No acute or suspicious osseous lesion is identified.     Impression: Cardiomegaly with left basilar atelectasis, scarring, or infiltrate. Electronically Signed: Benitez Mayo MD  5/8/2024 5:34 PM EDT  Workstation ID: [id number]    MRI Brain With & Without Contrast    Result Date: 4/10/2024  MRI BRAIN W WO CONTRAST Date of Exam: 4/9/2024 10:53 PM EDT Indication: Metastatic disease evaluation.  Comparison: CT 4/8/2024. Technique:  Routine multiplanar/multisequence sequence images of the brain were obtained before and after the uneventful administration of 17 mL Multihance. Findings: No acute infarct is present on diffusion weighted sequences. Corresponding with the edematous mass seen on CT, there is a peripherally enhancing and centrally cystic finding present in the right frontal lobe measuring 23 x 20 mm, likely metastatic. There  is surrounding vasogenic edema including some mild associated leftward midline shift of around 4 to 5 mm. The right frontal horn of the lateral ventricle is mildly effaced. No additional hemorrhage, mass or abnormal enhancement is present. The orbits are normal.  Mild generalized volume loss is present. An area of cystic volume loss in the left anterior temporal lobe appears similar to prior exams with some mild surrounding gliosis.     Impression: Solitary enhancing cystic and solid right frontal lobe lesion with considerable surrounding vasogenic edema, favored metastatic. Electronically Signed: Destin Henry MD  4/10/2024 9:29 AM EDT  Workstation ID: [id number]      ASSESSMENT/PLAN:    1.  Severe enterocolitis after 1 cycle of chemotherapy.  Her diarrhea seems to have improved.  Unfortunately she is profoundly hypotensive and is septicecondary to occult infection.  She is on broad-spectrum antibiotics and required Levophed to maintain her pressures.  She is not a candidate for chemotherapy at this point.  Her functional status is extremely poor.  She will likely need rehab after discharge from the hospital.  Only if she becomes much more functional would she be a candidate for any treatment that is systemic.    2.  Severe neutropenia secondary to chemotherapy.  She did receive Neupogen today.  Plan to continue until ANC above 2000 and    3.  Severe thrombocytopenia due to chemotherapy.  Transfuse if less than 15,000.    4.  GI bleeding.  Hemoglobin at 7.6    5.  B12 deficiency.  Patient did receive B12 prior to this cycle of chemo.    Hollie Mike MD    5/9/2024

## 2024-05-10 NOTE — PROGRESS NOTES
Pulmonary/Critical Care Follow-up     LOS: 1 day   Patient Care Team:  Ly Funez MD as PCP - General  Hollie iMke MD as Consulting Physician (Hematology)  Jean Marie Qureshi MD as Consulting Physician (Radiation Oncology)  Tra Kate DO as Consulting Physician (Thoracic Surgery)  Patrick Murdock MD as Consulting Physician (Dermatology)  Jono Henderson MD as Surgeon (Neurosurgery)  Ly Funez MD as Primary Care Provider (Internal Medicine)    Chief Complaint: Shock      Subjective     79 y.o. lifetime non-smoker with history of HTN, HLD, T2DM, reported asthma, previously followed by Dr. Fatima for migratory pulmonary infiltrates and status post nondiagnostic bronchoscopies and 2018 and 2019, the latter of which complicated by iatrogenic pneumothorax, who was ultimately diagnosed with metastatic adenocarcinoma after repeat bronchoscopy in June 2023.  Treated with neoadjuvant chemo/immunotherapy with subsequent left lower lobectomy November 2023.  She was found to have solitary brain metastasis undergoing CyberKnife.  Follows with Dr. Mike with plan for carboplatin and Alimta along with ipilimumab and nivolumab, which was started on 5/1/2024.  Patient presented for admission to the hospital this on 5/8/2024 secondary to developing diarrhea up to 10 times daily along with nausea, vomiting, and anorexia which started on 5/5/2024.    Patient transferred to the intensive care unit on the morning of 5/9/2024 secondary to hypotension for initiation of pressors.  She was started on norepinephrine.    Interval History:     Patient is now off of pressors.  Blood pressure up to the 160s and she was started on amlodipine.  Still a little high this morning and I started her home bisoprolol.  Still having liquid stools but much less frequent and less volume.  Afebrile.  Platelet count was low and she received a unit of platelets overnight.    History taken from: Patient, staff,  chart    PMH/FH/Social History were reviewed and updated appropriately in the electronic medical record.     Review of Systems:    Review of 14 systems was completed with positives and pertinent negatives noted in the subjective section.  All other systems reviewed and are negative.       Objective     Vital Signs  Temp:  [98 °F (36.7 °C)-100.1 °F (37.8 °C)] 98.4 °F (36.9 °C)  Heart Rate:  [77-97] 78  Resp:  [16-24] 24  BP: (103-162)/() 140/76  05/09 0701 - 05/10 0700  In: 7950.1 [I.V.:6381.8]  Out: 600 [Urine:600]  Body mass index is 32.34 kg/m².     IV drips:  norepinephrine, Last Rate: Stopped (05/09/24 1217)  Pharmacy to dose vancomycin  sodium chloride, Last Rate: 75 mL/hr (05/10/24 0155)       Physical Exam:     Constitutional:   Alert, in no acute distress   Head:   Normocephalic, atraumatic   Eyes:           Lids and lashes normal, conjunctivae and sclerae normal.  PER   ENMT:  Ears appear intact with no abnormalities noted     Lips normal.     Neck:  Trachea midline, no JVD   Lungs/Resp:    Normal effort, symmetric chest rise, no crepitus, clear to      auscultation bilaterally.               Heart/CV:   Mildly tachycardic and regular, no murmur   Abdomen/GI:    Soft, nontender, nondistended   :    Deferred   Extremities/MSK:  No clubbing or cyanosis.  Trace bilateral lower extremity edema.     Pulses:  Pulses palpable and equal bilaterally   Skin:  No bleeding, bruising or rash   Heme/Lymph:  No cervical or supraclavicular adenopathy.   Neurologic:    Psychiatric:    Moves all extremities with no obvious focal motor deficit.  Cranial nerves 2 - 12 grossly intact  Non-agitated, normal affect.    The above physical exam findings were reviewed and reflect my exam findings as of today's exam.   Electronically signed by:  Johnny Sanchez MD  05/10/24  08:28 EDT      Results Review:     I reviewed the patient's new clinical results.   Results from last 7 days   Lab Units 05/10/24  0549 05/09/24  0118  05/08/24  1553   SODIUM mmol/L 141 141 137   POTASSIUM mmol/L 3.2* 3.9 4.9   CHLORIDE mmol/L 112* 111* 104   CO2 mmol/L 13.0* 17.0* 18.0*   BUN mg/dL 47* 51* 51*   CREATININE mg/dL 1.96* 1.95* 1.96*   CALCIUM mg/dL 6.8* 6.5* 7.4*   BILIRUBIN mg/dL 0.3 0.2 0.3   ALK PHOS U/L 58 63 85   ALT (SGPT) U/L 55* 73* 97*   AST (SGOT) U/L 14 22 23   GLUCOSE mg/dL 183* 53* 318*     Results from last 7 days   Lab Units 05/10/24  0549 05/10/24  0029 05/09/24  1602 05/09/24  1246 05/09/24  0450 05/09/24  0118   WBC 10*3/mm3 0.31*  --   --   --  0.33* 0.36*   HEMOGLOBIN g/dL 7.6*  7.6* 7.2* 7.4*   < > 7.6* 8.8*   HEMATOCRIT % 23.3*  23.7* 22.4* 22.9*   < > 23.4* 27.7*   PLATELETS 10*3/mm3 12*  --   --   --  18* 25*   MONOCYTES % %  --   --   --   --   --  10.0   EOSINOPHIL % %  --   --   --   --   --  5.0    < > = values in this interval not displayed.     Results from last 7 days   Lab Units 05/09/24  0337   PH, ARTERIAL pH units 7.371   PO2 ART mm Hg 58.7*   PCO2, ARTERIAL mm Hg 28.7*   HCO3 ART mmol/L 16.6*         Results from last 7 days   Lab Units 05/10/24  0549 05/09/24  1553   MAGNESIUM mg/dL 1.5*  --    PHOSPHORUS mg/dL 4.8* 4.1       I reviewed the patient's new imaging including images and reports.    CT chest from 5/9/2024 was reviewed.  There are bilateral infiltrates versus atelectasis, right greater than left.  Radiologist's impression follows:    Impression:  Improving infiltrates on the left. New infiltrates on the right may represent combination of atelectasis and/or pneumonia. Small effusions.    Medication Review:   aspirin, 81 mg, Oral, Daily  atorvastatin, 10 mg, Oral, Daily  cefepime, 2,000 mg, Intravenous, Q24H  citalopram, 20 mg, Oral, Daily  filgrastim, 300 mcg, Subcutaneous, Q PM  gabapentin, 100 mg, Oral, Q12H  [Held by provider] heparin (porcine), 5,000 Units, Subcutaneous, Q12H  insulin lispro, 2-7 Units, Subcutaneous, 4x Daily AC & at Bedtime  levETIRAcetam, 500 mg, Oral, Q12H  levothyroxine, 50  mcg, Oral, Daily  pantoprazole, 40 mg, Intravenous, Q AM  sodium chloride, 10 mL, Intravenous, Q12H  vancomycin, 125 mg, Oral, Q6H  vancomycin, 750 mg, Intravenous, Q24H      norepinephrine, 0.02-0.3 mcg/kg/min (Order-Specific), Last Rate: Stopped (05/09/24 1217)  Pharmacy to dose vancomycin,   sodium chloride, 75 mL/hr, Last Rate: 75 mL/hr (05/10/24 0155)        Assessment & Plan         Diarrhea    Type 2 diabetes mellitus without complication, without long-term current use of insulin    Acute kidney injury    Hypotension    Mild intermittent asthma    Nausea & vomiting    Primary lung cancer with metastasis from lung to other site    Neuropathy    Anorexia    Sepsis    Neutropenia    Shock -septic versus hypovolemic    Pneumonia of both lower lobes due to infectious organism    79 y.o. lifetime non-smoker with history of HTN, HLD, T2DM, reported asthma, previously followed by Dr. Fatima for migratory pulmonary infiltrates and status post nondiagnostic bronchoscopies and 2018 and 2019, the latter of which complicated by iatrogenic pneumothorax, who was ultimately diagnosed with metastatic adenocarcinoma after repeat bronchoscopy in June 2023.  Treated with neoadjuvant chemo/immunotherapy with subsequent left lower lobectomy November 2023.  She was found to have solitary brain metastasis undergoing CyberKnife.  Follows with Dr. Mike with plan for carboplatin and Alimta along with ipilimumab and nivolumab, which was started on 5/1/2024.  Patient presented for admission to the hospital this on 5/8/2024 secondary to developing diarrhea up to 10 times daily along with nausea, vomiting, and anorexia which started on 5/5/2024.    Patient was admitted to the hospitalist service and received fluid and antibiotics.  Despite this, she developed worsening hypotension refractory to fluid resuscitation was transferred to the ICU on the morning of 5/9/2024.      Patient is now weaned off of pressors.  She is actually  hypertensive and I have had to restart her amlodipine and bisoprolol today.  Urine growing gram-negative rods.    Plan:  1.  For shock in the setting of neutropenia post chemotherapy and low-grade elevation in temperature/basilar pneumonia/urinary tract infection present on admission growing gram-negative rods: Broad-spectrum antibiotics.  Fluid resuscitation.  Pressors, wean as tolerated.  Oncology evaluation getting Neupogen.  Urine growing gram-negative rods.  Obtain sputum cultures.  Follow-up blood cultures  2.  For KANDY: Secondary to shock.  Stable.  Status post resuscitation, pressors.  Monitor.  Avoid nephrotoxins when feasible.  3.  For T2DM: Most recent hemoglobin A1c 6.8% on 1/18/2024.  Borderline low blood sugars currently.  Start D5 normal saline.  Does not appear to be on home diabetes medications.  Monitor blood sugars for now.  4.  Diarrhea: Awaiting C. difficile.  Fluid resuscitation.  Check O & P.   5.  DVT prophylaxis: Add subcutaneous heparin.    Okay to telemetry/hospitalist.    Electronically signed by:    Johnny Sanchez MD  05/10/24  08:28 EDT      *. Please note that portions of this note were completed with [employer] - a voice recognition program.

## 2024-05-10 NOTE — PLAN OF CARE
Goal Outcome Evaluation:   VSS, no acute events. Patient had 3 moderate bloody, mucoid, liquid bowel movements throughout shift, dark red blood. RN removed external catheter as it was not functioning properly due to BM's. H&H continues to decrease, APRN aware.

## 2024-05-10 NOTE — PLAN OF CARE
Goal Outcome Evaluation:      Hypertensive most of shift. Amlodipine and bisoprolol added back. Home meds restarted. K and Mag replaced.  ml. NS stopped. No BM. Afebrile. Family at bedside.

## 2024-05-10 NOTE — PROGRESS NOTES
"HEMATOLOGY/ONCOLOGY PROGRESS NOTE    S: Profoundly weak and feeling frail.  No fevers overnight.  Levophed was turned off.  Awaiting bed on telemetry.        Past medical history, social history and family history was reviewed and unchanged from prior visit.    Review of Systems:    Review of Systems   Constitutional:  Positive for activity change and fatigue.   HENT: Negative.     Eyes: Negative.    Respiratory:  Positive for shortness of breath.    Cardiovascular: Negative.    Musculoskeletal: Negative.    Neurological:  Positive for weakness.   Hematological: Negative.    Psychiatric/Behavioral:  The patient is nervous/anxious.             Medications:  The current medication list was reviewed in the EMR    ALLERGIES:    Allergies   Allergen Reactions    Augmentin [Amoxicillin-Pot Clavulanate] Diarrhea     Has tolerated Ceftriaxone, Cefdinir, Cefuroxime.    Benadryl [Diphenhydramine] Other (See Comments)     High Dose Caused uncontrollable shaking in legs    Codeine Nausea Only    Metformin Diarrhea    Rosuvastatin GI Intolerance         Physical Exam    VITAL SIGNS:  /77   Pulse 93   Temp 98.7 °F (37.1 °C) (Axillary)   Resp 22   Ht 149 cm (58.66\")   Wt 71.8 kg (158 lb 4.6 oz)   LMP  (LMP Unknown)   SpO2 92%   BMI 32.34 kg/m²   Temp:  [97.4 °F (36.3 °C)-98.7 °F (37.1 °C)] 98.7 °F (37.1 °C)      Performance Status:4                Physical Exam    General: Frail ill appearing, in no acute distress  HEENT: sclerae anicteric, neck is supple  Lymphatics: no cervical, supraclavicular, or axillary adenopathy  Cardiovascular: regular rate and rhythm, no murmurs, rubs or gallops  Lungs: clear to auscultation bilaterally  Abdomen: soft, nontender, nondistended.  No palpable organomegaly  Extremities: no lower extremity edema  Skin: no rashes, lesions, bruising, or petechiae  Msk: Profoundly weak  Psych: Mood is stable        RECENT LABS:    Lab Results   Component Value Date    HGB 7.6 (L) 05/10/2024    HGB " 7.6 (L) 05/10/2024    HCT 23.7 (L) 05/10/2024    HCT 23.3 (L) 05/10/2024    MCV 92.1 05/10/2024    PLT 34 (C) 05/10/2024    WBC 0.31 (C) 05/10/2024    NEUTROABS 0.15 (L) 05/09/2024    LYMPHSABS 0.10 (L) 05/09/2024    MONOSABS 0.05 (L) 05/09/2024    EOSABS 0.00 05/09/2024    BASOSABS 0.00 05/09/2024       Lab Results   Component Value Date    GLUCOSE 183 (H) 05/10/2024    BUN 47 (H) 05/10/2024    CREATININE 1.96 (H) 05/10/2024     05/10/2024    K 3.2 (L) 05/10/2024     (H) 05/10/2024    CO2 13.0 (L) 05/10/2024    CALCIUM 6.8 (L) 05/10/2024    PROTEINTOT 4.6 (L) 05/10/2024    ALBUMIN 2.6 (L) 05/10/2024    BILITOT 0.3 05/10/2024    ALKPHOS 58 05/10/2024    AST 14 05/10/2024    ALT 55 (H) 05/10/2024         Assessment/Plan    1.  Metastatic lung cancer.  Underwent first cycle of chemotherapy resulting in significant cytopenias and sepsis.  Seems to be turning the corner now.  Still profoundly neutropenic.  Continue growth factor support and platelet transfusions as necessary.  Not a candidate for any chemotherapy at this time.    2.  Septic shock secondary to neutropenia.  Patient slowly recovering with broad-spectrum antibiotics.  Counts are still fairly suppressed.            Hollie Mike MD  [facility]    5/10/2024

## 2024-05-11 PROBLEM — A41.50 SEPSIS DUE TO GRAM-NEGATIVE UTI: Status: ACTIVE | Noted: 2024-05-11

## 2024-05-11 PROBLEM — N39.0 SEPSIS DUE TO GRAM-NEGATIVE UTI: Status: ACTIVE | Noted: 2024-05-11

## 2024-05-11 NOTE — THERAPY EVALUATION
"Patient Name: Ruby Pettit  : 1945    MRN: [id number]                              Today's Date: 2024       Admit Date: 2024    Visit Dx: No diagnosis found.  Patient Active Problem List   Diagnosis    Migratory Lung nodules (\"Soft\" and solid, bilateral)    Non-smoker    History of asthma    Cough    Hypertension    Type 2 diabetes mellitus without complication, without long-term current use of insulin    Neuropathy    Lesion of temporal lobe    GERD    Malignant neoplasm of lower lobe of left lung    Acute kidney injury    COVID-19    Encephalopathy    Metastatic cancer to brain    Diarrhea    Hypotension    Mild intermittent asthma    Nausea & vomiting    Primary lung cancer with metastasis from lung to other site    Neuropathy    Anorexia    Sepsis    Neutropenia    Shock -septic versus hypovolemic    Pneumonia of both lower lobes due to infectious organism    Sepsis due to gram-negative UTI     Past Medical History:   Diagnosis Date    Anxiety and depression     Arthritis     Carotid stenosis, bilateral     Carotid duplex, 2019: Bilateral ICA stenosis 0-49%. Tortuous vessels. Vertebral flow antegrade.        Disease of thyroid gland     GERD (gastroesophageal reflux disease)     Head injury due to trauma     fell down stairs     History of transfusion     NO REACTIONS    Hyperlipidemia     Hypertension     Iatrogenic pneumothorax     Liver disorder     surgery  approx , BLOOD CLOTS    Lung cancer     Metastatic cancer     Nausea & vomiting 2024    Perennial rhinitis     Peripheral neuropathic pain     Syncope and collapse     Thyroid disease     UTI (urinary tract infection), bacterial     Wears eyeglasses      Past Surgical History:   Procedure Laterality Date    ABDOMINAL SURGERY      LIVER RESECTION    APPENDECTOMY      BRONCHOSCOPY N/A 2018    Procedure: BRONCHOSCOPY WITH SANDRITA ENDOBRONCHIAL ULTRASOUND WITH FLUORO;  Surgeon: Dixon Fatima MD;  Location: [facility]" ENDOSCOPY;  Service:     BRONCHOSCOPY N/A 03/21/2019    Procedure: NAVIGATIONAL BRONCHOSCOPY;  Surgeon: Dixon Fatima MD;  Location:  [facility];  Service: Pulmonary    BRONCHOSCOPY WITH ION ROBOTIC ASSIST N/A 06/29/2023    Procedure: NAVIGATIONAL BRONCHOSCOPY WITH ION ROBOT;  Surgeon: Dixon Fatima MD;  Location:  [facility] ENDOSCOPY;  Service: Robotics - Pulmonary;  Laterality: N/A;  Ion cath 3 - 0010,  3 - 0013.    CHOLECYSTECTOMY      COLONOSCOPY  2019    HYSTERECTOMY      2001    LIVER RESECTION      LUNG LOBECTOMY  11/17/2023    OOPHORECTOMY Bilateral     2001    ORIF WRIST FRACTURE Right     THYROID SURGERY      TUBAL ABDOMINAL LIGATION        General Information       Row Name 05/11/24 1236          Physical Therapy Time and Intention    Document Type evaluation  -     Mode of Treatment physical therapy  -       Row Name 05/11/24 1236          General Information    Patient Profile Reviewed yes  -LS     Prior Level of Function independent:;all household mobility;ADL's  prior to recent decline in health; sister has been assisting recently  -LS     Existing Precautions/Restrictions fall  -LS     Barriers to Rehab medically complex;previous functional deficit  -       Row Name 05/11/24 1236          Living Environment    People in Home alone  -       Row Name 05/11/24 1236          Home Main Entrance    Number of Stairs, Main Entrance none  -LS       Row Name 05/11/24 1236          Stairs Within Home, Primary    Number of Stairs, Within Home, Primary none  -LS       Row Name 05/11/24 1236          Cognition    Orientation Status (Cognition) oriented to;person;situation  -       Row Name 05/11/24 1236          Safety Issues, Functional Mobility    Impairments Affecting Function (Mobility) balance;coordination;endurance/activity tolerance;strength;postural/trunk control  -               User Key  (r) = Recorded By, (t) = Taken By, (c) = Cosigned By      Initials Name Provider  Type    LS Pricila Shin, PT Physical Therapist                   Mobility       Row Name 05/11/24 1237          Bed Mobility    Bed Mobility sit-supine  -LS     Sit-Supine [place] (Bed Mobility) maximum assist (25% patient effort);2 person assist;verbal cues  -LS     Assistive Device (Bed Mobility) head of bed elevated;draw sheet  -       Row Name 05/11/24 1237          Sit-Stand Transfer    Sit-Stand [place] (Transfers) moderate assist (50% patient effort);2 person assist;verbal cues  -LS       Row Name 05/11/24 1237          Gait/Stairs (Locomotion)    [place] Level (Gait) moderate assist (50% patient effort);2 person assist;verbal cues  -LS     Assistive Device (Gait) other (see comments)  BUE support  -LS     Distance in Feet (Gait) 2  -LS     Deviations/Abnormal Patterns (Gait) weight shifting decreased  -LS     Comment, (Gait/Stairs) Small sidesteps at HOB towards HOB. Further gait limited by fatigue.  -               User Key  (r) = Recorded By, (t) = Taken By, (c) = Cosigned By      Initials Name Provider Type    LS Pricila Shin, PT Physical Therapist                   Obj/Interventions       Row Name 05/11/24 1239          Range of Motion Comprehensive    General Range of Motion bilateral lower extremity ROM WFL  -       Row Name 05/11/24 1239          Strength Comprehensive (MMT)    General Manual Muscle Testing (MMT) Assessment lower extremity strength deficits identified  -     Comment, General Manual Muscle Testing (MMT) Assessment BLE grossly 3+/5  -       Row Name 05/11/24 1239          Balance    Balance Assessment sitting static balance;standing static balance  -LS     Static Sitting Balance contact guard  -LS     Position, Sitting Balance sitting edge of bed  -LS     Static Standing Balance minimal assist;2-person assist;verbal cues  -LS     Position/Device Used, Standing Balance supported  -       Row Name 05/11/24 1239          Sensory Assessment (Somatosensory)     Sensory Assessment (Somatosensory) LE sensation intact  -LS               User Key  (r) = Recorded By, (t) = Taken By, (c) = Cosigned By      Initials Name Provider Type    LS Pricila Shin, PT Physical Therapist                   Goals/Plan       Row Name 05/11/24 1248          Bed Mobility Goal 1 (PT)    Activity/Assistive Device (Bed Mobility Goal 1, PT) sit to supine/supine to sit  -LS     [place] Level/Cues Needed (Bed Mobility Goal 1, PT) minimum assist (75% or more patient effort)  -LS     Time Frame (Bed Mobility Goal 1, PT) 2 weeks;long term goal (LTG)  -LS       Row Name 05/11/24 1248          Transfer Goal 1 (PT)    Activity/Assistive Device (Transfer Goal 1, PT) sit-to-stand/stand-to-sit  -LS     [place] Level/Cues Needed (Transfer Goal 1, PT) contact guard required  -LS     Time Frame (Transfer Goal 1, PT) 2 weeks;long term goal (LTG)  -LS     Progress/Outcome (Transfer Goal 1, PT) goal ongoing  -LS       Row Name 05/11/24 1248          Gait Training Goal 1 (PT)    Activity/Assistive Device (Gait Training Goal 1, PT) gait (walking locomotion)  -LS     [place] Level (Gait Training Goal 1, PT) minimum assist (75% or more patient effort)  -LS     Distance (Gait Training Goal 1, PT) 100  -LS     Time Frame (Gait Training Goal 1, PT) 2 weeks;long term goal (LTG)  -LS       Row Name 05/11/24 1248          Therapy Assessment/Plan (PT)    Planned Therapy Interventions (PT) balance training;bed mobility training;gait training;home exercise program;stair training;transfer training;patient/family education;strengthening  -LS               User Key  (r) = Recorded By, (t) = Taken By, (c) = Cosigned By      Initials Name Provider Type    Pricila Cabral, PT Physical Therapist                   Clinical Impression       Row Name 05/11/24 1239          Pain    Pretreatment Pain Rating 0/10 - no pain  -LS     Posttreatment Pain Rating 0/10 - no pain  -LS       Row Name 05/11/24 1239          Plan of  Care Review    Plan of Care Reviewed With patient  -LS     Progress no change  -LS     Outcome Evaluation PT initial evaluation completed. Pt demonstrates generalized weakness and decreased indep/ functional endurance re: mobility, warranting further skilled PT services to promote PLOF. Limited today by fatigue but able to perform STS and small steps at EOB with mod x2 A. Recommend SNF at d/c based upon current functional status (TBA further pending GOC).  -       Row Name 05/11/24 1239          Therapy Assessment/Plan (PT)    Patient/Family Therapy Goals Statement (PT) return to PLOF  -LS     Rehab Potential (PT) good, to achieve stated therapy goals  -LS     Criteria for Skilled Interventions Met (PT) yes;skilled treatment is necessary  -LS     Therapy Frequency (PT) daily  -       Row Name 05/11/24 1239          Vital Signs    Pre Systolic BP Rehab 169  -LS     Pre Treatment Diastolic BP 86  -LS     Posttreatment Heart Rate (beats/min) 77  -LS     O2 Delivery Pre Treatment room air  -LS     O2 Delivery Intra Treatment room air  -LS     Post SpO2 (%) 93  -LS     O2 Delivery Post Treatment room air  -LS     Pre Patient Position Sitting  -LS     Intra Patient Position Standing  -LS     Post Patient Position Supine  -LS       Row Name 05/11/24 1239          Positioning and Restraints    Pre-Treatment Position in bed  -LS     Post Treatment Position bed  -LS     In Bed notified nsg;encouraged to call for assist;exit alarm on;legs elevated;call light within reach  -LS               User Key  (r) = Recorded By, (t) = Taken By, (c) = Cosigned By      Initials Name Provider Type    Pricila Cabral, PT Physical Therapist                   Outcome Measures       Row Name 05/11/24 1250 05/11/24 0800       How much help from another person do you currently need...    Turning from your back to your side while in flat bed without using bedrails? 2  -LS 2  -EL    Moving from lying on back to sitting on the side of a flat  bed without bedrails? 2  -LS 2  -EL    Moving to and from a bed to a chair (including a wheelchair)? 2  -LS 2  -EL    Standing up from a chair using your arms (e.g., wheelchair, bedside chair)? 2  -LS 2  -EL    Climbing 3-5 steps with a railing? 1  -LS 2  -EL    To walk in hospital room? 2  -LS 2  -EL    AM-PAC 6 Clicks Score (PT) 11  -LS 12  -EL    Highest Level of Mobility Goal 4 --> Transfer to chair/commode  -LS 4 --> Transfer to chair/commode  -EL      Row Name 05/11/24 1250 05/11/24 1248       Functional Assessment    Outcome Measure Options AM-PAC 6 Clicks Basic Mobility (PT)  -LS AM-PAC 6 Clicks Daily Activity (OT)  -              User Key  (r) = Recorded By, (t) = Taken By, (c) = Cosigned By      Initials Name Provider Type     Pricila Shin, PT Physical Therapist    Priyanka Chan, OT Occupational Therapist    Estelita Culp RN Registered Nurse                                 Physical Therapy Education       Title: PT OT SLP Therapies (In Progress)       Topic: Physical Therapy (In Progress)       Point: Mobility training (In Progress)       Learning Progress Summary             Patient Acceptance, E,D, NR by  at 5/11/2024 1251                         Point: Home exercise program (Not Started)       Learner Progress:  Not documented in this visit.              Point: Body mechanics (In Progress)       Learning Progress Summary             Patient Acceptance, E,D, NR by  at 5/11/2024 1251                         Point: Precautions (In Progress)       Learning Progress Summary             Patient Acceptance, E,D, NR by  at 5/11/2024 1251                                         User Key       Initials Effective Dates Name Provider Type Discipline     02/03/23 -  Pricila Shin, PT Physical Therapist PT                  PT Recommendation and Plan  Planned Therapy Interventions (PT): balance training, bed mobility training, gait training, home exercise program, stair training, transfer  training, patient/family education, strengthening  Plan of Care Reviewed With: patient  Progress: no change  Outcome Evaluation: PT initial evaluation completed. Pt demonstrates generalized weakness and decreased indep/ functional endurance re: mobility, warranting further skilled PT services to promote PLOF. Limited today by fatigue but able to perform STS and small steps at EOB with mod x2 A. Recommend SNF at d/c based upon current functional status (TBA further pending GOC).     Time Calculation:   PT Evaluation Complexity  History, PT Evaluation Complexity: 3 or more personal factors and/or comorbidities  Examination of Body Systems (PT Eval Complexity): total of 3 or more elements  Clinical Presentation (PT Evaluation Complexity): evolving  Clinical Decision Making (PT Evaluation Complexity): moderate complexity  Overall Complexity (PT Evaluation Complexity): moderate complexity     PT Charges       Row Name 05/11/24 1251             Time Calculation    Start Time 0935  -LS      PT Received On 05/11/24  -      PT Goal Re-Cert Due Date 05/21/24  -LS         Timed Charges    20775 - PT Therapeutic Activity Minutes 10  -LS         Untimed Charges    PT Eval/Re-eval Minutes 34  -LS         Total Minutes    Timed Charges Total Minutes 10  -LS      Untimed Charges Total Minutes 34  -LS       Total Minutes 44  -LS                User Key  (r) = Recorded By, (t) = Taken By, (c) = Cosigned By      Initials Name Provider Type     Pricila Shin, PT Physical Therapist                  Therapy Charges for Today       Code Description Service Date Service Provider Modifiers Qty    36975064208 HC PT EVAL MOD COMPLEXITY 3 5/11/2024 Pricila Shin, PT GP 1    8194514 HC PT THERAPEUTIC ACT EA 15 MIN 5/11/2024 Pricila Shin, PT GP 1            PT G-Codes  Outcome Measure Options: AM-PAC 6 Clicks Basic Mobility (PT)  AM-PAC 6 Clicks Score (PT): 11  AM-PAC 6 Clicks Score (OT): 11  PT Discharge Summary  Anticipated  Discharge Disposition (PT): skilled nursing facility    Pricila Shin, PT  5/11/2024

## 2024-05-11 NOTE — PROGRESS NOTES
Intensive Care Follow-up      LOS: 2 days     Ms. Ruby Pettit, 79 y.o. female is followed for: Sepsis due to gram-negative UTI     Subjective - Interval History     79 y.o. lifetime non-smoker with history of HTN, HLD, T2DM, reported asthma, previously followed by Dr. Fatima for migratory pulmonary infiltrates and status post nondiagnostic bronchoscopies and 2018 and 2019, the latter of which complicated by iatrogenic pneumothorax, who was ultimately diagnosed with metastatic adenocarcinoma after repeat bronchoscopy in June 2023.  Treated with neoadjuvant chemo/immunotherapy with subsequent left lower lobectomy November 2023.  She was found to have solitary brain metastasis undergoing [facility].  Follows with Dr. Mike with plan for carboplatin and Alimta along with ipilimumab and nivolumab, which was started on 5/1/2024.  Patient presented for admission to the hospital this on 5/8/2024 secondary to developing diarrhea up to 10 times daily along with nausea, vomiting, and anorexia which started on 5/5/2024.     Patient transferred to the intensive care unit on the morning of 5/9/2024 secondary to hypotension for initiation of pressors.  She was started on norepinephrine.     Interval History:     No changes overnight  Not requiring vasopressor support  Oxygenating adequately on room air    The patient's relevant past medical, surgical and social history were reviewed and updated in Epic as appropriate.     Objective     Infusions:  norepinephrine, 0.02-0.3 mcg/kg/min (Order-Specific), Last Rate: Stopped (05/09/24 1217)  Pharmacy to dose vancomycin,       Medications:  amLODIPine, 5 mg, Oral, Q24H  aspirin, 81 mg, Oral, Daily  atorvastatin, 10 mg, Oral, Daily  bisoprolol, 5 mg, Oral, Daily  cefepime, 2,000 mg, Intravenous, Q24H  citalopram, 20 mg, Oral, Daily  filgrastim, 300 mcg, Subcutaneous, Q PM  gabapentin, 100 mg, Oral, Q12H  insulin lispro, 2-7 Units, Subcutaneous, 4x Daily AC & at  Bedtime  levETIRAcetam, 500 mg, Oral, Q12H  levothyroxine, 50 mcg, Oral, Daily  pantoprazole, 40 mg, Intravenous, Q AM  sodium chloride, 500 mL, Intravenous, Once  sodium chloride, 10 mL, Intravenous, Q12H  [START ON 5/12/2024] vancomycin (dosing per levels), , Does not apply, Daily  vancomycin, 125 mg, Oral, Q6H      Intake/Output         05/10/24 0700 - 05/11/24 0659 05/11/24 0700 - 05/12/24 0659    Intake (ml) 1494.3 54    Output (ml) 725 --    Net (ml) 769.3 54          Vital Sign Min/Max for last 24 hours  Temp  Min: 96.8 °F (36 °C)  Max: 98.7 °F (37.1 °C)   BP  Min: 142/63  Max: 174/92   Pulse  Min: 67  Max: 96   Resp  Min: 16  Max: 26   SpO2  Min: 92 %  Max: 100 %   Flow (L/min)  Min: 1  Max: 2        Physical Exam:   GENERAL: Awake, no distress   HEENT: No adenopathy or thyromegaly   LUNGS: Decreased breath sounds on the left compared to the right   HEART: Regular rate and rhythm, no murmur   GI: Soft, nontender   EXTREMITIES: Trace edema without clubbing or cyanosis   NEURO/PSYCH: Awake and alert    Results from last 7 days   Lab Units 05/10/24  1206 05/10/24  0549 05/10/24  0029 05/09/24  1602 05/09/24  1246 05/09/24  0450 05/09/24  0118   WBC 10*3/mm3  --  0.31*  --   --   --  0.33* 0.36*   HEMOGLOBIN g/dL  --  7.6*  7.6* 7.2* 7.4*   < > 7.6* 8.8*   PLATELETS 10*3/mm3 34* 12*  --   --   --  18* 25*    < > = values in this interval not displayed.     Results from last 7 days   Lab Units 05/11/24  0238 05/10/24  1752 05/10/24  0549 05/09/24  1553 05/09/24  0118   SODIUM mmol/L 143  --  141  --  141   POTASSIUM mmol/L 3.3* 3.2* 3.2*  --  3.9   CO2 mmol/L 11.0*  --  13.0*  --  17.0*   BUN mg/dL 49*  --  47*  --  51*   CREATININE mg/dL 1.97*  --  1.96*  --  1.95*   MAGNESIUM mg/dL 3.1*  --  1.5*  --   --    PHOSPHORUS mg/dL 3.5  --  4.8* 4.1  --    GLUCOSE mg/dL 129*  --  183*  --  53*     Estimated Creatinine Clearance: 21.1 mL/min (A) (by C-G formula based on SCr of 1.97 mg/dL (H)).          Results from  last 7 days   Lab Units 05/09/24  0337   PH, ARTERIAL pH units 7.371   PCO2, ARTERIAL mm Hg 28.7*   PO2 ART mm Hg 58.7*     Lab Results   Component Value Date    LACTATE 1.0 05/10/2024          Images: CT scan of the chest for 5/9/2024 reviewed on PACS  Improving bilateral pulmonary infiltrates on the left, new right-sided infiltrate    I reviewed the patient's results and images.     Impression      Active Hospital Problems    Diagnosis     **Sepsis due to gram-negative UTI     Pneumonia of both lower lobes due to infectious organism     Neutropenia     Shock -septic versus hypovolemic     Diarrhea     Hypotension     Mild intermittent asthma     Nausea & vomiting     Primary lung cancer with metastasis from lung to other site     Neuropathy     Anorexia     Sepsis     Acute kidney injury     Type 2 diabetes mellitus without complication, without long-term current use of insulin             Plan        Continue current antimicrobial therapy  Hemodynamically stable and not requiring supplemental oxygen  Stable for transfer to the floor   Plan of care and goals reviewed with Multidisciplinary Team and Antibiotic Stewardship rounds   I discussed the patient's findings and my recommendations with patient and nursing staff           KALEIGH Fatima MD  Pulmonary and Critical Care Medicine

## 2024-05-11 NOTE — PLAN OF CARE
Goal Outcome Evaluation:  Plan of Care Reviewed With: patient           Outcome Evaluation: OT eval complete. Pt presents w/ deficits in strength, balance, and functional endurance warranting cont skilled IPOT POC to promote return to PLOF. Recommend pt DC to SNF, pending GOC.      Anticipated Discharge Disposition (OT): skilled nursing facility

## 2024-05-11 NOTE — THERAPY EVALUATION
"Patient Name: Ruby Pettit  : 1945    MRN: [id number]                              Today's Date: 2024       Admit Date: 2024    Visit Dx: No diagnosis found.  Patient Active Problem List   Diagnosis    Migratory Lung nodules (\"Soft\" and solid, bilateral)    Non-smoker    History of asthma    Cough    Hypertension    Type 2 diabetes mellitus without complication, without long-term current use of insulin    Neuropathy    Lesion of temporal lobe    GERD    Malignant neoplasm of lower lobe of left lung    Acute kidney injury    COVID-19    Encephalopathy    Metastatic cancer to brain    Diarrhea    Hypotension    Mild intermittent asthma    Nausea & vomiting    Primary lung cancer with metastasis from lung to other site    Neuropathy    Anorexia    Sepsis    Neutropenia    Shock -septic versus hypovolemic    Pneumonia of both lower lobes due to infectious organism    Sepsis due to gram-negative UTI     Past Medical History:   Diagnosis Date    Anxiety and depression     Arthritis     Carotid stenosis, bilateral     Carotid duplex, 2019: Bilateral ICA stenosis 0-49%. Tortuous vessels. Vertebral flow antegrade.        Disease of thyroid gland     GERD (gastroesophageal reflux disease)     Head injury due to trauma     fell down stairs     History of transfusion     NO REACTIONS    Hyperlipidemia     Hypertension     Iatrogenic pneumothorax     Liver disorder     surgery  approx , BLOOD CLOTS    Lung cancer     Metastatic cancer     Nausea & vomiting 2024    Perennial rhinitis     Peripheral neuropathic pain     Syncope and collapse     Thyroid disease     UTI (urinary tract infection), bacterial     Wears eyeglasses      Past Surgical History:   Procedure Laterality Date    ABDOMINAL SURGERY      LIVER RESECTION    APPENDECTOMY      BRONCHOSCOPY N/A 2018    Procedure: BRONCHOSCOPY WITH SANDRITA ENDOBRONCHIAL ULTRASOUND WITH FLUORO;  Surgeon: Dixon Fatima MD;  Location: [facility]" ENDOSCOPY;  Service:     BRONCHOSCOPY N/A 03/21/2019    Procedure: NAVIGATIONAL BRONCHOSCOPY;  Surgeon: Dixon Fatima MD;  Location:  [facility];  Service: Pulmonary    BRONCHOSCOPY WITH ION ROBOTIC ASSIST N/A 06/29/2023    Procedure: NAVIGATIONAL BRONCHOSCOPY WITH ION ROBOT;  Surgeon: Dixon Fatima MD;  Location:  [facility] END[facility];  Service: Robotics - Pulmonary;  Laterality: N/A;  Ion cath 3 - 0010,  3 - 0013.    CHOLECYSTECTOMY      COLONOSCOPY  2019    HYSTERECTOMY      2001    LIVER RESECTION      LUNG LOBECTOMY  11/17/2023    OOPHORECTOMY Bilateral     2001    ORIF WRIST FRACTURE Right     THYROID SURGERY      TUBAL ABDOMINAL LIGATION        General Information       Row Name 05/11/24 1243          OT Time and Intention    Document Type evaluation  -CS     Mode of Treatment occupational therapy  -CS       Row Name 05/11/24 1243          General Information    Patient Profile Reviewed yes  -CS     Prior Level of Function independent:;all household mobility;mod assist:;ADL's  -CS     Existing Precautions/Restrictions fall  -CS     Barriers to Rehab medically complex;previous functional deficit;cognitive status  -CS       Row Name 05/11/24 1243          Living Environment    People in Home sibling(s)  Pt to stay w/ sister upon DC, TBA further, pt limited historian  -CS       Row Name 05/11/24 1243          Stairs Within Home, Primary    Number of Stairs, Within Home, Primary none  -CS       Row Name 05/11/24 1243          Cognition    Orientation Status (Cognition) oriented to;person;place  -CS       Row Name 05/11/24 1243          Safety Issues, Functional Mobility    Impairments Affecting Function (Mobility) balance;coordination;endurance/activity tolerance;strength;postural/trunk control  -CS               User Key  (r) = Recorded By, (t) = Taken By, (c) = Cosigned By      Initials Name Provider Type    CS Priyanka Groves OT Occupational Therapist                     Mobility/ADL's        [facility] Name 05/11/24 1244          Bed Mobility    Bed Mobility supine-sit  -     Supine-Sit [place] (Bed Mobility) maximum assist (25% patient effort);verbal cues  -     Assistive Device (Bed Mobility) bed rails;draw sheet;head of bed elevated  -[facility] Name 05/11/24 1244          Transfers    Transfers sit-stand transfer;stand-sit transfer  -CS       Row Name 05/11/24 1244          Sit-Stand Transfer    Sit-Stand [place] (Transfers) 2 person assist;moderate assist (50% patient effort);verbal cues  -CS       Row Name 05/11/24 1244          Stand-Sit Transfer    Stand-Sit [place] (Transfers) moderate assist (50% patient effort);2 person assist;verbal cues  -CS       Row Name 05/11/24 1244          Activities of Daily Living    BADL Assessment/Intervention lower body dressing;grooming  -CS       Row Name 05/11/24 1244          Lower Body Dressing Assessment/Training    [place] Level (Lower Body Dressing) don;socks;dependent (less than 25% patient effort)  -CS     Position (Lower Body Dressing) supine  -CS       Row Name 05/11/24 1244          Grooming Assessment/Training    [place] Level (Grooming) hair care, combing/brushing;maximum assist (25% patient effort)  -CS     Position (Grooming) sitting up in bed  -               User Key  (r) = Recorded By, (t) = Taken By, (c) = Cosigned By      Initials Name Provider Type    CS Priyanka Groves OT Occupational Therapist                   Obj/Interventions       Row Name 05/11/24 1246          Sensory Assessment (Somatosensory)    Sensory Assessment (Somatosensory) UE sensation intact  -CS       Row Name 05/11/24 1246          Vision Assessment/Intervention    Visual Impairment/Limitations corrective lenses full-time  -CS       Row Name 05/11/24 1246          Range of Motion Comprehensive    General Range of Motion bilateral upper extremity ROM WFL  -CS       Row Name 05/11/24 1246          Strength Comprehensive (MMT)    Comment,  General Manual Muscle Testing (MMT) Assessment BUE grossly 3+/5  -CS       Row Name 05/11/24 1246          Balance    Balance Assessment sitting static balance;sitting dynamic balance;standing static balance;standing dynamic balance  -CS     Static Sitting Balance contact guard  -CS     Dynamic Sitting Balance contact guard  -CS     Position, Sitting Balance sitting edge of bed  -CS     Static Standing Balance minimal assist;2-person assist  -CS     Dynamic Standing Balance 2-person assist;moderate assist  -CS     Position/Device Used, Standing Balance supported  -CS     Balance Interventions sitting;standing;static;dynamic;dynamic reaching;occupation based/functional task;weight shifting activity  -CS               User Key  (r) = Recorded By, (t) = Taken By, (c) = Cosigned By      Initials Name Provider Type    CS Priyanka Groves, OT Occupational Therapist                   Goals/Plan       Row Name 05/11/24 1248          Transfer Goal 1 (OT)    Activity/Assistive Device (Transfer Goal 1, OT) sit-to-stand/stand-to-sit;toilet  -CS     [place] Level/Cues Needed (Transfer Goal 1, OT) minimum assist (75% or more patient effort)  -CS     Time Frame (Transfer Goal 1, OT) long term goal (LTG);10 days  -CS     Progress/Outcome (Transfer Goal 1, OT) goal ongoing  -       Row Name 05/11/24 1248          Toileting Goal 1 (OT)    Activity/Device (Toileting Goal 1, OT) adjust/manage clothing;perform perineal hygiene  -CS     [place] Level/Cues Needed (Toileting Goal 1, OT) minimum assist (75% or more patient effort)  -CS     Time Frame (Toileting Goal 1, OT) long term goal (LTG);10 days  -CS     Progress/Outcome (Toileting Goal 1, OT) goal ongoing  -       Row Name 05/11/24 1248          Grooming Goal 1 (OT)    Activity/Device (Grooming Goal 1, OT) hair care;wash face, hands  -CS     [place] (Grooming Goal 1, OT) standby assist  -CS     Time Frame (Grooming Goal 1, OT) long term goal (LTG);10 days  -CS      Strategies/Barriers (Grooming Goal 1, OT) unsupported sitting  -CS     Progress/Outcome (Grooming Goal 1, OT) goal ongoing  -CS       Row Name 05/11/24 1248          Therapy Assessment/Plan (OT)    Planned Therapy Interventions (OT) activity tolerance training;adaptive equipment training;BADL retraining;functional balance retraining;occupation/activity based interventions;ROM/therapeutic exercise;strengthening exercise;transfer/mobility retraining;patient/caregiver education/training  -CS               User Key  (r) = Recorded By, (t) = Taken By, (c) = Cosigned By      Initials Name Provider Type    CS Priyanka Groves, RAY Occupational Therapist                   Clinical Impression       Row Name 05/11/24 1247          Pain Assessment    Pretreatment Pain Rating 0/10 - no pain  -CS     Posttreatment Pain Rating 0/10 - no pain  -CS       Row Name 05/11/24 1247          Plan of Care Review    Plan of Care Reviewed With patient  -CS     Outcome Evaluation OT eval complete. Pt presents w/ deficits in strength, balance, and functional endurance warranting cont skilled IPOT POC to promote return to PLOF. Recommend pt DC to SNF, pending GOC.  -CS       Row Name 05/11/24 1247          Therapy Assessment/Plan (OT)    Patient/Family Therapy Goal Statement (OT) Return to PLOF  -CS     Rehab Potential (OT) good, to achieve stated therapy goals  -CS     Criteria for Skilled Therapeutic Interventions Met (OT) yes;skilled treatment is necessary  -CS     Therapy Frequency (OT) daily  -CS       Row Name 05/11/24 1247          Therapy Plan Review/Discharge Plan (OT)    Anticipated Discharge Disposition (OT) skilled nursing facility  -CS       Row Name 05/11/24 1247          Vital Signs    Pre Systolic BP Rehab 163  -CS     Pre Treatment Diastolic BP 72  -CS     Post Systolic BP Rehab 169  -CS     Post Treatment Diastolic BP 86  -CS     Pretreatment Heart Rate (beats/min) 75  -CS     Posttreatment Heart Rate (beats/min) 75  -CS     Pre  SpO2 (%) 96  -CS     O2 Delivery Pre Treatment room air  -CS     Post SpO2 (%) 97  -CS     O2 Delivery Post Treatment room air  -CS     Pre Patient Position Supine  -CS     Intra Patient Position Standing  -CS     Post Patient Position Sitting  -CS       Row Name 05/11/24 1247          Positioning and Restraints    Pre-Treatment Position in bed  -CS     Post Treatment Position bed  -CS     In Bed notified nsg;sitting EOB;with PT;call light within reach;encouraged to call for assist  -CS               User Key  (r) = Recorded By, (t) = Taken By, (c) = Cosigned By      Initials Name Provider Type    CS Priyanka Groves, RAY Occupational Therapist                   Outcome Measures       Row Name 05/11/24 1248          How much help from another is currently needed...    Putting on and taking off regular lower body clothing? 1  -CS     Bathing (including washing, rinsing, and drying) 2  -CS     Toileting (which includes using toilet bed pan or urinal) 1  -CS     Putting on and taking off regular upper body clothing 2  -CS     Taking care of personal grooming (such as brushing teeth) 2  -CS     Eating meals 3  -CS     AM-PAC 6 Clicks Score (OT) 11  -CS       Row Name 05/11/24 0800          How much help from another person do you currently need...    Turning from your back to your side while in flat bed without using bedrails? 2  -EL     Moving from lying on back to sitting on the side of a flat bed without bedrails? 2  -EL     Moving to and from a bed to a chair (including a wheelchair)? 2  -EL     Standing up from a chair using your arms (e.g., wheelchair, bedside chair)? 2  -EL     Climbing 3-5 steps with a railing? 2  -EL     To walk in hospital room? 2  -EL     AM-PAC 6 Clicks Score (PT) 12  -EL     Highest Level of Mobility Goal 4 --> Transfer to chair/commode  -EL       Row Name 05/11/24 1248          Functional Assessment    Outcome Measure Options AM-PAC 6 Clicks Daily Activity (OT)  -CS               User Key   (r) = Recorded By, (t) = Taken By, (c) = Cosigned By      Initials Name Provider Type     Priyanka Groves OT Occupational Therapist    Estelita Culp, RN Registered Nurse                    Occupational Therapy Education       Title: PT OT SLP Therapies (In Progress)       Topic: Occupational Therapy (In Progress)       Point: ADL training (In Progress)       Description:   Instruct learner(s) on proper safety adaptation and remediation techniques during self care or transfers.   Instruct in proper use of assistive devices.                  Learning Progress Summary             Patient Acceptance, E, NR by  at 5/11/2024 1249                         Point: Home exercise program (Not Started)       Description:   Instruct learner(s) on appropriate technique for monitoring, assisting and/or progressing therapeutic exercises/activities.                  Learner Progress:  Not documented in this visit.              Point: Precautions (In Progress)       Description:   Instruct learner(s) on prescribed precautions during self-care and functional transfers.                  Learning Progress Summary             Patient Acceptance, E, NR by  at 5/11/2024 1249                         Point: Body mechanics (In Progress)       Description:   Instruct learner(s) on proper positioning and spine alignment during self-care, functional mobility activities and/or exercises.                  Learning Progress Summary             Patient Acceptance, E, NR by  at 5/11/2024 1249                                         User Key       Initials Effective Dates Name Provider Type Discipline     09/02/21 -  Priyanka Groves OT Occupational Therapist OT                  OT Recommendation and Plan  Planned Therapy Interventions (OT): activity tolerance training, adaptive equipment training, BADL retraining, functional balance retraining, occupation/activity based interventions, ROM/therapeutic exercise, strengthening exercise,  transfer/mobility retraining, patient/caregiver education/training  Therapy Frequency (OT): daily  Plan of Care Review  Plan of Care Reviewed With: patient  Outcome Evaluation: OT eval complete. Pt presents w/ deficits in strength, balance, and functional endurance warranting cont skilled IPOT POC to promote return to PLOF. Recommend pt DC to SNF, pending GOC.     Time Calculation:   Evaluation Complexity (OT)  Review Occupational Profile/Medical/Therapy History Complexity: expanded/moderate complexity  Assessment, Occupational Performance/Identification of Deficit Complexity: 5 or more performance deficits  Clinical Decision Making Complexity (OT): detailed assessment/moderate complexity  Overall Complexity of Evaluation (OT): moderate complexity     Time Calculation- OT       Row Name 05/11/24 1249             Time Calculation- OT    OT Start Time 0922  -CS      OT Received On 05/11/24  -CS      OT Goal Re-Cert Due Date 05/21/24  -CS         Untimed Charges    OT Eval/Re-eval Minutes 46  -CS         Total Minutes    Untimed Charges Total Minutes 46  -CS       Total Minutes 46  -CS                User Key  (r) = Recorded By, (t) = Taken By, (c) = Cosigned By      Initials Name Provider Type    CS Priyanka Groves OT Occupational Therapist                  Therapy Charges for Today       Code Description Service Date Service Provider Modifiers Qty    96817027509 HC OT EVAL MOD COMPLEXITY 4 5/11/2024 Priyanka Groves OT GO 1                 Priyanka Groves OT  5/11/2024

## 2024-05-11 NOTE — PLAN OF CARE
Problem: Adult Inpatient Plan of Care  Goal: Plan of Care Review  Outcome: Ongoing, Progressing  Goal: Patient-Specific Goal (Individualized)  Outcome: Ongoing, Progressing  Goal: Absence of Hospital-Acquired Illness or Injury  Outcome: Ongoing, Progressing  Intervention: Identify and Manage Fall Risk  Recent Flowsheet Documentation  Taken 5/11/2024 0400 by Rebekah Hoyt RN  Safety Promotion/Fall Prevention:   clutter free environment maintained   fall prevention program maintained   lighting adjusted   room organization consistent   safety round/check completed  Taken 5/11/2024 0200 by Rebekah Hoyt RN  Safety Promotion/Fall Prevention:   clutter free environment maintained   fall prevention program maintained   lighting adjusted   room organization consistent   safety round/check completed  Taken 5/11/2024 0000 by Rebekah Hoyt RN  Safety Promotion/Fall Prevention:   clutter free environment maintained   fall prevention program maintained   lighting adjusted   room organization consistent   safety round/check completed  Taken 5/10/2024 2200 by Rebekah Hoyt RN  Safety Promotion/Fall Prevention:   clutter free environment maintained   fall prevention program maintained   lighting adjusted   room organization consistent   safety round/check completed  Taken 5/10/2024 2000 by Rebekah Hoyt RN  Safety Promotion/Fall Prevention:   clutter free environment maintained   fall prevention program maintained   lighting adjusted   room organization consistent   safety round/check completed  Intervention: Prevent Skin Injury  Recent Flowsheet Documentation  Taken 5/11/2024 0400 by Rebekah Hoyt RN  Body Position: turned  Skin Protection:   adhesive use limited   incontinence pads utilized   skin-to-device areas padded   skin-to-skin areas padded   transparent dressing maintained   tubing/devices free from skin contact  Taken 5/11/2024 0200 by Rebekah Hoyt RN  Body Position: turned  Skin  Protection:   adhesive use limited   incontinence pads utilized   skin-to-device areas padded   skin-to-skin areas padded   transparent dressing maintained   tubing/devices free from skin contact  Taken 5/11/2024 0000 by Rebekah Hoyt RN  Body Position: turned  Skin Protection:   adhesive use limited   incontinence pads utilized   skin-to-device areas padded   skin-to-skin areas padded   transparent dressing maintained   tubing/devices free from skin contact  Taken 5/10/2024 2200 by Rebekah Hoyt RN  Body Position: turned  Skin Protection:   adhesive use limited   incontinence pads utilized   skin-to-device areas padded   skin-to-skin areas padded   transparent dressing maintained   tubing/devices free from skin contact  Taken 5/10/2024 2000 by Rebekah Hoyt RN  Body Position: turned  Skin Protection:   adhesive use limited   incontinence pads utilized   skin-to-device areas padded   skin-to-skin areas padded   transparent dressing maintained   tubing/devices free from skin contact  Intervention: Prevent and Manage VTE (Venous Thromboembolism) Risk  Recent Flowsheet Documentation  Taken 5/11/2024 0400 by Rebekah Hoyt RN  Activity Management: activity encouraged  Taken 5/11/2024 0200 by Rebekah Hoyt RN  Activity Management: activity encouraged  Taken 5/11/2024 0000 by Rebekah Hoyt RN  Activity Management: activity encouraged  Taken 5/10/2024 2200 by Rebekah Hoyt RN  Activity Management: activity encouraged  Taken 5/10/2024 2000 by Rebekah Hoyt RN  Activity Management: activity encouraged  VTE Prevention/Management:   bilateral   sequential compression devices on  Intervention: Prevent Infection  Recent Flowsheet Documentation  Taken 5/11/2024 0400 by Rebekah Hoyt RN  Infection Prevention:   environmental surveillance performed   equipment surfaces disinfected   hand hygiene promoted   personal protective equipment utilized   rest/sleep promoted   single patient room  provided  Taken 5/11/2024 0200 by Rebekah Hoyt RN  Infection Prevention:   environmental surveillance performed   equipment surfaces disinfected   hand hygiene promoted   personal protective equipment utilized   rest/sleep promoted   single patient room provided  Taken 5/11/2024 0000 by Rebekah Hoyt RN  Infection Prevention:   environmental surveillance performed   equipment surfaces disinfected   hand hygiene promoted   personal protective equipment utilized   rest/sleep promoted   single patient room provided  Taken 5/10/2024 2200 by Rebekah Hoyt RN  Infection Prevention:   environmental surveillance performed   equipment surfaces disinfected   hand hygiene promoted   personal protective equipment utilized   rest/sleep promoted   single patient room provided  Taken 5/10/2024 2000 by Rebekah Hoyt RN  Infection Prevention:   environmental surveillance performed   equipment surfaces disinfected   hand hygiene promoted   personal protective equipment utilized   rest/sleep promoted   single patient room provided  Goal: Optimal Comfort and Wellbeing  Outcome: Ongoing, Progressing  Intervention: Provide Person-Centered Care  Recent Flowsheet Documentation  Taken 5/11/2024 0400 by Rebekah Hoyt RN  Trust Relationship/Rapport:   care explained   reassurance provided  Taken 5/11/2024 0200 by Rebekah Hoyt RN  Trust Relationship/Rapport:   care explained   reassurance provided  Taken 5/11/2024 0000 by Rebekah Hoyt RN  Trust Relationship/Rapport:   care explained   reassurance provided  Taken 5/10/2024 2200 by Rebekah Hoyt RN  Trust Relationship/Rapport:   care explained   reassurance provided  Taken 5/10/2024 2000 by Rebekah Hoyt RN  Trust Relationship/Rapport:   care explained   reassurance provided  Goal: Readiness for Transition of Care  Outcome: Ongoing, Progressing     Problem: Skin Injury Risk Increased  Goal: Skin Health and Integrity  Outcome: Ongoing,  Progressing  Intervention: Optimize Skin Protection  Recent Flowsheet Documentation  Taken 5/11/2024 0400 by Rebekah Hoyt RN  Pressure Reduction Techniques:   heels elevated off bed   positioned off wounds   pressure points protected   weight shift assistance provided  Head of Bed ([facility]) Positioning: [facility] elevated  Pressure Reduction Devices:   positioning supports utilized   pressure-redistributing mattress utilized   specialty bed utilized  Skin Protection:   adhesive use limited   incontinence pads utilized   skin-to-device areas padded   skin-to-skin areas padded   transparent dressing maintained   tubing/devices free from skin contact  Taken 5/11/2024 0200 by Rebekah Hoyt RN  Pressure Reduction Techniques:   heels elevated off bed   positioned off wounds   pressure points protected   weight shift assistance provided  Head of Bed ([facility]) Positioning: [facility] elevated  Pressure Reduction Devices:   positioning supports utilized   pressure-redistributing mattress utilized   specialty bed utilized  Skin Protection:   adhesive use limited   incontinence pads utilized   skin-to-device areas padded   skin-to-skin areas padded   transparent dressing maintained   tubing/devices free from skin contact  Taken 5/11/2024 0000 by Rebekah Hoyt RN  Pressure Reduction Techniques:   heels elevated off bed   positioned off wounds   pressure points protected   weight shift assistance provided  Head of Bed ([facility]) Positioning: [facility] elevated  Pressure Reduction Devices:   positioning supports utilized   pressure-redistributing mattress utilized   specialty bed utilized  Skin Protection:   adhesive use limited   incontinence pads utilized   skin-to-device areas padded   skin-to-skin areas padded   transparent dressing maintained   tubing/devices free from skin contact  Taken 5/10/2024 2200 by Rebekah Hoyt RN  Pressure Reduction Techniques:   heels elevated off bed   positioned off wounds   pressure points protected   weight  shift assistance provided  Head of Bed ([facility]) Positioning: [facility] elevated  Pressure Reduction Devices:   positioning supports utilized   pressure-redistributing mattress utilized   specialty bed utilized  Skin Protection:   adhesive use limited   incontinence pads utilized   skin-to-device areas padded   skin-to-skin areas padded   transparent dressing maintained   tubing/devices free from skin contact  Taken 5/10/2024 2000 by Rebekah Hoyt RN  Pressure Reduction Techniques:   heels elevated off bed   positioned off wounds   pressure points protected   weight shift assistance provided  Head of Bed ([facility]) Positioning: [facility] elevated  Pressure Reduction Devices:   positioning supports utilized   pressure-redistributing mattress utilized   specialty bed utilized  Skin Protection:   adhesive use limited   incontinence pads utilized   skin-to-device areas padded   skin-to-skin areas padded   transparent dressing maintained   tubing/devices free from skin contact     Problem: Adjustment to Illness (Sepsis/Septic Shock)  Goal: Optimal Coping  Outcome: Ongoing, Progressing  Intervention: Optimize Psychosocial Adjustment to Illness  Recent Flowsheet Documentation  Taken 5/11/2024 0400 by Rebekah Hoyt RN  Family/Support System Care: support provided  Taken 5/11/2024 0200 by Rebekah Hoyt RN  Family/Support System Care: support provided  Taken 5/11/2024 0000 by Rebekah Hoyt RN  Family/Support System Care: support provided  Taken 5/10/2024 2200 by Rebekah Hoyt RN  Family/Support System Care: support provided  Taken 5/10/2024 2000 by Rebekah Hoyt RN  Family/Support System Care: support provided     Problem: Bleeding (Sepsis/Septic Shock)  Goal: Absence of Bleeding  Outcome: Ongoing, Progressing     Problem: Glycemic Control Impaired (Sepsis/Septic Shock)  Goal: Blood Glucose Level Within Desired Range  Outcome: Ongoing, Progressing     Problem: Infection Progression (Sepsis/Septic Shock)  Goal: Absence of  Infection Signs and Symptoms  Outcome: Ongoing, Progressing  Intervention: Initiate Sepsis Management  Recent Flowsheet Documentation  Taken 5/11/2024 0400 by Rebekah Hoyt RN  Infection Prevention:   environmental surveillance performed   equipment surfaces disinfected   hand hygiene promoted   personal protective equipment utilized   rest/sleep promoted   single patient room provided  Taken 5/11/2024 0200 by Rebekah Hoyt RN  Infection Prevention:   environmental surveillance performed   equipment surfaces disinfected   hand hygiene promoted   personal protective equipment utilized   rest/sleep promoted   single patient room provided  Taken 5/11/2024 0000 by Rebekah Hoyt RN  Infection Prevention:   environmental surveillance performed   equipment surfaces disinfected   hand hygiene promoted   personal protective equipment utilized   rest/sleep promoted   single patient room provided  Taken 5/10/2024 2200 by Rebekah Hoyt RN  Infection Prevention:   environmental surveillance performed   equipment surfaces disinfected   hand hygiene promoted   personal protective equipment utilized   rest/sleep promoted   single patient room provided  Taken 5/10/2024 2000 by Rebekah Hoyt RN  Infection Prevention:   environmental surveillance performed   equipment surfaces disinfected   hand hygiene promoted   personal protective equipment utilized   rest/sleep promoted   single patient room provided  Intervention: Promote Recovery  Recent Flowsheet Documentation  Taken 5/11/2024 0400 by Rebekah Hoyt, RN  Activity Management: activity encouraged  Taken 5/11/2024 0200 by Rebekah Hoyt, RN  Activity Management: activity encouraged  Taken 5/11/2024 0000 by Rebekah Hoyt RN  Activity Management: activity encouraged  Taken 5/10/2024 2200 by Rebekah Hoyt, RN  Activity Management: activity encouraged  Taken 5/10/2024 2000 by Rebekah Hoyt, RN  Activity Management: activity encouraged     Problem:  Nutrition Impaired (Sepsis/Septic Shock)  Goal: Optimal Nutrition Intake  Outcome: Ongoing, Progressing     Problem: Fall Injury Risk  Goal: Absence of Fall and Fall-Related Injury  Outcome: Ongoing, Progressing  Intervention: Identify and Manage Contributors  Recent Flowsheet Documentation  Taken 5/11/2024 0400 by Rebekah Hoyt RN  Medication Review/Management: medications reviewed  Taken 5/11/2024 0200 by Rebekah Hoyt, RN  Medication Review/Management: medications reviewed  Taken 5/11/2024 0000 by Rebekah Hoyt RN  Medication Review/Management: medications reviewed  Taken 5/10/2024 2200 by Rebekah Hoyt RN  Medication Review/Management: medications reviewed  Taken 5/10/2024 2000 by Rebekah Hoyt RN  Medication Review/Management: medications reviewed  Intervention: Promote Injury-Free Environment  Recent Flowsheet Documentation  Taken 5/11/2024 0400 by Rebekah Hoyt RN  Safety Promotion/Fall Prevention:   clutter free environment maintained   fall prevention program maintained   lighting adjusted   room organization consistent   safety round/check completed  Taken 5/11/2024 0200 by Rebekah Hoyt RN  Safety Promotion/Fall Prevention:   clutter free environment maintained   fall prevention program maintained   lighting adjusted   room organization consistent   safety round/check completed  Taken 5/11/2024 0000 by Rebekah Hoyt RN  Safety Promotion/Fall Prevention:   clutter free environment maintained   fall prevention program maintained   lighting adjusted   room organization consistent   safety round/check completed  Taken 5/10/2024 2200 by Rebekah Hoyt RN  Safety Promotion/Fall Prevention:   clutter free environment maintained   fall prevention program maintained   lighting adjusted   room organization consistent   safety round/check completed  Taken 5/10/2024 2000 by Rebekah Hoyt RN  Safety Promotion/Fall Prevention:   clutter free environment maintained   fall prevention  program maintained   lighting adjusted   room organization consistent   safety round/check completed   Goal Outcome Evaluation:

## 2024-05-11 NOTE — PLAN OF CARE
Goal Outcome Evaluation:   VSS. No major changes. Replaced potassium this a.m. BM x2,  ml and x2 occurences. Afebrile. Family at bedside. Patient is not interested in food or drink. Flat affect, depressed mood.     Knowing she is fighting a terminal illness, this evening she tells me she is tired of treatment and does not want to continue. She doesn't want to be in the hospital anymore, and wants to be in peace. She is in pain. Tylenol given. Pt states her son is aware of her wishes, but wants her to continue treatment.

## 2024-05-11 NOTE — PROGRESS NOTES
Pharmacy Consult-Vancomycin Dosing  Ruby Pettit is a  79 y.o. female receiving vancomycin therapy.     Indication: sepsis  Consulting Provider: Hospitalist  ID Consult: N    Goal Trough: 400-600     Current Antimicrobial Therapy  Anti-Infectives (From admission, onward)      Ordered     Dose/Rate Route Frequency Start Stop    05/11/24 0724  vancomycin (dosing per levels)        Ordering Provider: Souleymane Urrutia PharmD     Does not apply Daily 05/12/24 0900 05/19/24 0859    05/11/24 0858  vancomycin (VANCOCIN) 1,000 mg in sodium chloride 0.9 % 250 mL IVPB-VTB        Ordering Provider: Souleymane Urrutia PharmD    1,000 mg  250 mL/hr over 60 Minutes Intravenous Once 05/11/24 0945      05/09/24 0942  cefepime 2000 mg IVPB in 100 mL NS (MBP)        Ordering Provider: Andreas Campos RPH    2,000 mg  over 4 Hours Intravenous Every 24 Hours Scheduled 05/10/24 0600 05/14/24 0559    05/09/24 1135  vancomycin (VANCOCIN) 500 mg IVPB in 100 mL NS (MBP)        Ordering Provider: Andreas Campos RPH    500 mg  200 mL/hr over 30 Minutes Intravenous Once 05/09/24 2100 05/09/24 2040    05/08/24 1730  vancomycin IVPB 1500 mg in 0.9% NaCl (Premix) 500 mL        Ordering Provider: Yanick Gonzales RPH    20 mg/kg × 71.8 kg  333.3 mL/hr over 90 Minutes Intravenous Once 05/08/24 1830 05/08/24 2318    05/08/24 1635  vancomycin (VANCOCIN) capsule 125 mg        Ordering Provider: Judith Fatima APRN    125 mg Oral Every 6 Hours Scheduled 05/08/24 1800 05/18/24 1759    05/08/24 1711  piperacillin-tazobactam (ZOSYN) 3.375 g IVPB in 100 mL NS MBP (CD)        Ordering Provider: Judith Fatima APRN    3.375 g  over 30 Minutes Intravenous Once 05/08/24 1800 05/08/24 2030    05/08/24 1711  Pharmacy to dose vancomycin        Ordering Provider: Judith Fatima APRN     Does not apply Continuous PRN 05/08/24 1710 05/15/24 1709            Allergies  Allergies as of 05/08/2024 - Reviewed 05/08/2024   Allergen Reaction Noted     "Augmentin [amoxicillin-pot clavulanate] Diarrhea 06/07/2021    Benadryl [diphenhydramine] Other (See Comments) 10/17/2023    Codeine Nausea Only 10/17/2023    Metformin Diarrhea 06/29/2023    Rosuvastatin GI Intolerance 06/29/2023       Labs    Results from last 7 days   Lab Units 05/11/24  0238 05/10/24  0549 05/09/24  0118   BUN mg/dL 49* 47* 51*   CREATININE mg/dL 1.97* 1.96* 1.95*       Results from last 7 days   Lab Units 05/10/24  0549 05/09/24  0450 05/09/24  0118   WBC 10*3/mm3 0.31* 0.33* 0.36*       Evaluation of Dosing     Last Dose Received in the ED/Outside Facility: N/A  Is Patient on Dialysis or Renal Replacement: N/A    Ht - 149 cm (58.66\")  Wt - 71.8 kg (158 lb 4.6 oz)    Estimated Creatinine Clearance: 21.1 mL/min (A) (by C-G formula based on SCr of 1.97 mg/dL (H)).    Intake & Output (last 3 days)       None            Microbiology and Radiology  Microbiology Results (last 10 days)       Procedure Component Value - Date/Time    Urine Culture - Urine, Straight Cath [[id number]]  (Abnormal) Collected: 05/09/24 1309    Lab Status: Preliminary result Specimen: Urine from Straight Cath Updated: 05/10/24 1046     Urine Culture >100,000 CFU/mL Gram Negative Bacilli    Narrative:      Colonization of the urinary tract without infection is common. Treatment is discouraged unless the patient is symptomatic, pregnant, or undergoing an invasive urologic procedure.    Clostridioides difficile Toxin - Stool, Per Rectum [[id number]]  (Normal) Collected: 05/09/24 1305    Lab Status: Final result Specimen: Stool from Per Rectum Updated: 05/09/24 1404    Narrative:      The following orders were created for panel order Clostridioides difficile Toxin - Stool, Per Rectum.  Procedure                               Abnormality         Status                     ---------                               -----------         ------                     Clostridioides difficile...[[id number]]  Normal              Final result     "             Please view results for these tests on the individual orders.    Clostridioides difficile Toxin, PCR - Stool, Per Rectum [[id number]]  (Normal) Collected: 05/09/24 1305    Lab Status: Final result Specimen: Stool from Per Rectum Updated: 05/09/24 1404     Toxigenic C. difficile by PCR Not Detected    Narrative:      The result indicates the absence of toxigenic C. difficile from stool specimen.     Gastrointestinal Panel, PCR - Stool, Per Rectum [[id number]]  (Normal) Collected: 05/09/24 1305    Lab Status: Final result Specimen: Stool from Per Rectum Updated: 05/09/24 1536     Campylobacter Not Detected     Plesiomonas shigelloides Not Detected     Salmonella Not Detected     Vibrio Not Detected     Vibrio cholerae Not Detected     Yersinia enterocolitica Not Detected     Enteroaggregative E. coli (EAEC) Not Detected     Enteropathogenic E. coli (EPEC) Not Detected     Enterotoxigenic E. coli (ETEC) lt/st Not Detected     Shiga-like toxin-producing E. coli (STEC) stx1/stx2 Not Detected     Shigella/Enteroinvasive E. coli (EIEC) Not Detected     Cryptosporidium Not Detected     Cyclospora cayetanensis Not Detected     Entamoeba histolytica Not Detected     Giardia lamblia Not Detected     Adenovirus F40/41 Not Detected     Astrovirus Not Detected     Norovirus GI/GII Not Detected     Rotavirus A Not Detected     Sapovirus (I, II, IV or V) Not Detected    Blood Culture - Blood, Hand, Right [[id number]]  (Normal) Collected: 05/08/24 1946    Lab Status: Preliminary result Specimen: Blood from Hand, Right Updated: 05/10/24 2015     Blood Culture No growth at 2 days    Narrative:      Aerobic Bottle Only    Less than seven (7) mL's of blood was collected.  Insufficient quantity may yield false negative results.    Blood Culture - Blood, Hand, Right [[id number]]  (Normal) Collected: 05/08/24 1941    Lab Status: Preliminary result Specimen: Blood from Hand, Right Updated: 05/10/24 2015     Blood Culture No growth  at 2 days    Narrative:      Aerobic Bottle Only    Less than seven (7) mL's of blood was collected.  Insufficient quantity may yield false negative results.    MRSA Screen, PCR (Inpatient) - Swab, Nares [[id number]]  (Normal) Collected: 05/08/24 1805    Lab Status: Final result Specimen: Swab from Nares Updated: 05/08/24 2052     MRSA PCR Negative    Narrative:      The negative predictive value of this diagnostic test is high and should only be used to consider de-escalating anti-MRSA therapy. A positive result may indicate colonization with MRSA and must be correlated clinically.  MRSA Negative    Gastrointestinal Panel, PCR - Stool, Per Rectum [[id number]]  (Normal) Collected: 05/08/24 1706    Lab Status: Final result Specimen: Stool from Per Rectum Updated: 05/08/24 1827     Campylobacter Not Detected     Plesiomonas shigelloides Not Detected     Salmonella Not Detected     Vibrio Not Detected     Vibrio cholerae Not Detected     Yersinia enterocolitica Not Detected     Enteroaggregative E. coli (EAEC) Not Detected     Enteropathogenic E. coli (EPEC) Not Detected     Enterotoxigenic E. coli (ETEC) lt/st Not Detected     Shiga-like toxin-producing E. coli (STEC) stx1/stx2 Not Detected     Shigella/Enteroinvasive E. coli (EIEC) Not Detected     Cryptosporidium Not Detected     Cyclospora cayetanensis Not Detected     Entamoeba histolytica Not Detected     Giardia lamblia Not Detected     Adenovirus F40/41 Not Detected     Astrovirus Not Detected     Norovirus GI/GII Not Detected     Rotavirus A Not Detected     Sapovirus (I, II, IV or V) Not Detected            Vancomycin Levels:    Results from last 7 days   Lab Units 05/11/24  0238 05/10/24  0549 05/09/24  0118   VANCOMYCIN RM mcg/mL 14.50 16.00 24.30                     Assessment/Plan:  Vancomycin 1g IV x 1.  Converted once again to pulse dosing, with CrCl < 30 mL/min.  Level check in AM.  Pharmacy will continue to follow and adjust dose based on renal  function, drug levels, and clinical status.    Thanks,  Souleymane Urrutia PharmD, BCPS, [facility]  05/11/24  10:39 EDT

## 2024-05-11 NOTE — PLAN OF CARE
Goal Outcome Evaluation:  Plan of Care Reviewed With: patient        Progress: no change  Outcome Evaluation: PT initial evaluation completed. Pt demonstrates generalized weakness and decreased indep/ functional endurance re: mobility, warranting further skilled PT services to promote PLOF. Limited today by fatigue but able to perform STS and small steps at EOB with mod x2 A. Recommend SNF at d/c based upon current functional status (TBA further pending GOC).      Anticipated Discharge Disposition (PT): skilled nursing facility

## 2024-05-12 PROBLEM — Z51.5 END OF LIFE CARE: Status: ACTIVE | Noted: 2024-05-12

## 2024-05-12 NOTE — H&P
Patient not seen by Hospice MD or NP today.   Initial hospice provider visit to follow tomorrow.   Hospice History and Physical     Patient Name:  Ruby Pettit   : 1945   Sex: female    Patient Care Team:  Ly Funez MD as PCP - General  Hollie Mike MD as Consulting Physician (Hematology)  Jean Marie Qureshi MD as Consulting Physician (Radiation Oncology)  Tra Kate DO as Consulting Physician (Thoracic Surgery)  Patrick Murdock MD as Consulting Physician (Dermatology)  Jono Henderson MD as Surgeon (Neurosurgery)  yL Funez MD as Primary Care Provider (Internal Medicine)    Code Status: DNR/DNI    Subjective     79 y.o.female presented to ED on 24 with c/o AMS, N/V, Diahrrea.  Active dx of lung cancer with mets to brain.    PMH significant for HTN, HLD, DM 2     In ED patient rapid response, was then admitted to ICU.  Patient is a 79 yof with lung cancer with mets to brain.  PPS 10%.  she was a [facility] palliative patient. .Patient Was brought to hospital with AMS,  N/V and diarrhea.  when she arrived in ED  a rapid response was called.  she was admitted to ICU and has remained there with continued decline.  Has uncontrolled symptoms to pain, dyspnea, and restlessness.  ALL PO meds have been d/c at this point.  continues on Robinul 0.4 mg  q 4 hrs. Prn,  Versed 2 mg iv q4hrs prn, morphine 4 mg iv q2 hrs prn.     Hospital Course:     Inpatient hospice team met with Son at bedside.  Inpatient team reviewed inpatient hospice service, medication management, and goals of care.  Family elected comfort focused POC.    Patient admitted to inpatient hospice on 2024 with terminal diagnosis of Lung cancer with mets to brain.HOSPICE for management of severe pain, N/V, DYSPNEA that will require injectable medications and frequent skilled nursing assessments to manage.  Prognosis POOR.      Review of Systems:  Review of Systems    History:  Past Medical History:    Diagnosis Date    Anxiety and depression     Arthritis     Carotid stenosis, bilateral     Carotid duplex, 01/16/2019: Bilateral ICA stenosis 0-49%. Tortuous vessels. Vertebral flow antegrade.        Disease of thyroid gland     GERD (gastroesophageal reflux disease)     Head injury due to trauma 1992    fell down stairs     History of transfusion     NO REACTIONS    Hyperlipidemia     Hypertension     Iatrogenic pneumothorax     Liver disorder     surgery 1998 approx , BLOOD CLOTS    Lung cancer     Metastatic cancer     Nausea & vomiting 5/8/2024    Perennial rhinitis     Peripheral neuropathic pain     Syncope and collapse     Thyroid disease     UTI (urinary tract infection), bacterial     Wears eyeglasses      Past Surgical History:   Procedure Laterality Date    ABDOMINAL SURGERY      LIVER RESECTION    APPENDECTOMY      BRONCHOSCOPY N/A 02/13/2018    Procedure: BRONCHOSCOPY WITH SANDRITA ENDOBRONCHIAL ULTRASOUND WITH FLUORO;  Surgeon: Dixon Fatima MD;  Location:  [facility];  Service:     BRONCHOSCOPY N/A 03/21/2019    Procedure: NAVIGATIONAL BRONCHOSCOPY;  Surgeon: Dixon Fatima MD;  Location:  [facility];  Service: Pulmonary    BRONCHOSCOPY WITH ION ROBOTIC ASSIST N/A 06/29/2023    Procedure: NAVIGATIONAL BRONCHOSCOPY WITH ION ROBOT;  Surgeon: Dixon Fatima MD;  Location:  [facility];  Service: Robotics - Pulmonary;  Laterality: N/A;  Ion cath 3 - 0010,  3 - 0013.    CHOLECYSTECTOMY      COLONOSCOPY  2019    HYSTERECTOMY      2001    LIVER RESECTION      LUNG LOBECTOMY  11/17/2023    OOPHORECTOMY Bilateral     2001    ORIF WRIST FRACTURE Right     THYROID SURGERY      TUBAL ABDOMINAL LIGATION       No current facility-administered medications for this encounter.     No current facility-administered medications for this encounter.      Allergies   Allergen Reactions    Augmentin [Amoxicillin-Pot Clavulanate] Diarrhea     Has tolerated Ceftriaxone, Cefdinir,  Cefuroxime.    Benadryl [Diphenhydramine] Other (See Comments)     High Dose Caused uncontrollable shaking in legs    Codeine Nausea Only    Metformin Diarrhea    Rosuvastatin GI Intolerance     Family History   Problem Relation Age of Onset    Cancer Mother     Ovarian cancer Mother 19    Emphysema Father     COPD Father     No Known Problems Sister     Tuberculosis Maternal Grandmother     Tuberculosis Maternal Grandfather     No Known Problems Paternal Grandmother     No Known Problems Paternal Grandfather     Breast cancer Neg Hx      Social History     Socioeconomic History    Marital status:     Number of children: 3   Tobacco Use    Smoking status: Never    Smokeless tobacco: Never   Vaping Use    Vaping status: Never Used   Substance and Sexual Activity    Alcohol use: Not Currently     Alcohol/week: 0.0 - 2.0 standard drinks of alcohol     Comment: seldom     Drug use: No    Sexual activity: Defer       Objective     Vital Signs:  Temp:  [96.9 °F (36.1 °C)-98.4 °F (36.9 °C)] 97.5 °F (36.4 °C)  Heart Rate:  [61-82] 75  Resp:  [16-28] 28  BP: ()/(52-87) 91/52    PPS: 10 %    Physical Exam:  Physical Exam    Results Reviewed:  LAB RESULTS:      Lab 05/12/24  0720 05/11/24  0238 05/10/24  1206 05/10/24  0549 05/10/24  0029 05/09/24  1602 05/09/24  1246 05/09/24  0450 05/09/24  0118 05/08/24  2209 05/08/24  1941 05/08/24  1553   WBC 1.13*  --   --  0.31*  --   --   --  0.33* 0.36*  --   --  0.59*   HEMOGLOBIN 9.3*  --   --  7.6*  7.6* 7.2* 7.4* 8.3* 7.6* 8.8* 9.4*  --  10.8*   HEMATOCRIT 28.7*  --   --  23.3*  23.7* 22.4* 22.9* 25.7* 23.4* 27.7* 29.7*  --  33.2*   PLATELETS 4*  --  34* 12*  --   --   --  18* 25*  --   --  47*   NEUTROS ABS 0.85*  --   --   --   --   --   --  0.15* 0.22*  --   --  0.40*   IMMATURE GRANS (ABS)  --   --   --   --   --   --   --  0.03  --   --   --  0.02   LYMPHS ABS  --   --   --   --   --   --   --  0.10*  --   --   --  0.12*   MONOS ABS  --   --   --   --   --   --    --  0.05*  --   --   --  0.04*   EOS ABS 0.00  --   --   --   --   --   --  0.00 0.02  --   --  0.00   MCV 92.6  --   --  92.1  --   --   --  94.7 94.9  --   --  92.0   SED RATE  --   --   --   --   --   --   --   --   --   --   --  4   CRP  --   --   --   --   --   --   --   --   --   --   --  19.87*   PROCALCITONIN  --  74.00*  --   --   --   --   --  >100.00* >100.00*  --   --  80.55*   LACTATE  --   --   --  1.0  --   --   --  2.5* 4.2* 5.7* 3.8* 5.8*   APTT  --   --   --   --   --   --   --  34.6  --   --   --   --    D DIMER QUANT  --   --   --   --   --   --   --  1.50*  --   --   --   --          Lab 05/12/24  0720 05/11/24  1150 05/11/24  0238 05/10/24  1752 05/10/24  0549 05/09/24  1553 05/09/24  0118 05/08/24  1553   SODIUM 144  --  143  --  141  --  141 137   POTASSIUM 4.1 3.5 3.3* 3.2* 3.2*  --  3.9 4.9   CHLORIDE 114*  --  115*  --  112*  --  111* 104   CO2 15.0*  --  11.0*  --  13.0*  --  17.0* 18.0*   ANION GAP 15.0  --  17.0*  --  16.0*  --  13.0 15.0   BUN 53*  --  49*  --  47*  --  51* 51*   CREATININE 1.96*  --  1.97*  --  1.96*  --  1.95* 1.96*   EGFR 25.6*  --  25.5*  --  25.6*  --  25.8* 25.6*   GLUCOSE 131*  --  129*  --  183*  --  53* 318*   CALCIUM 8.2*  --  7.1*  --  6.8*  --  6.5* 7.4*   MAGNESIUM 2.8*  --  3.1*  --  1.5*  --   --   --    PHOSPHORUS  --   --  3.5  --  4.8* 4.1  --   --          Lab 05/12/24  0720 05/11/24  0238 05/10/24  0549 05/09/24  0118 05/08/24  1553   TOTAL PROTEIN 4.9* 4.2* 4.6* 3.6* 4.3*   ALBUMIN 2.5* 2.7* 2.6* 1.9* 2.5*   GLOBULIN 2.4 1.5 2.0 1.7 1.8   ALT (SGPT) 43* 48* 55* 73* 97*   AST (SGOT) 15 12 14 22 23   BILIRUBIN 0.5 0.4 0.3 0.2 0.3   ALK PHOS 83 68 58 63 85   LIPASE  --   --   --   --  26         Lab 05/08/24  1553   PROBNP 4,680.0*             Lab 05/09/24  0118   ABO TYPING A   RH TYPING Positive   ANTIBODY SCREEN Negative         Lab 05/09/24  0337   PH, ARTERIAL 7.371   PCO2, ARTERIAL 28.7*   PO2 ART 58.7*   FIO2 32   HCO3 ART 16.6*   BASE EXCESS  ART -7.7*   CARBOXYHEMOGLOBIN 0.3     Brief Urine Lab Results  (Last result in the past 365 days)        Color   Clarity   Blood   Leuk Est   Nitrite   Protein   CREAT   Urine HCG        05/09/24 1309 Yellow   Turbid   Moderate (2+)   Negative   Negative   100 mg/dL (2+)                   Microbiology Results Abnormal       None              * No active hospital problems. *      Assessment & Plan     Assessment/Plan:   -Admit to inpatient hospice service 05/12/2024 with Lung cancer with mets to brain. Continue Robinoul  0.4, versed 2mg, morphine 4mg all IV route.  HOSPICE.     -Coordination of care with nursing staff and [facility] IDT.     -Pain: Morphine Dilaudid    -Dyspnea:  Morphine Dilaudid as above, oxygen via NC PRN     -Nausea/Vomiting: Zofran 8 mg iv q 6 hr PRN       -Anxiety/Agitation: Versed 2 mg iv q 4 hrs PRN;     -Bowel/bladder: bisacodyl supp q day PRN     -Nutrition: Comfort diet as tolerated     -ADLs: total care     -Terminal fever: tylenol supp 650 mg q 6 hr PRN.      -Terminal secretions:  May start PRN robinul  patient comfort: palliative oral rinse PRN, liquifilm PRN      -Goals of Care: achieve comfortable death, educate and support family through this process.           Total Visit Time:40  Face to Face Time:  Total time spent includes time reviewing chart, face-to-face time, counseling patient/family/caregiver, ordering medications/tests/procedures, communicating with other health care professionals, documenting clinical information in the electronic health record, and coordination of care with facility staff and [facility] IDT.      Verbal certification obtained from Dr Holley who has determined patient's eligibility with terminal diagnosis of Lung cancer with mets to brain.HOSPICE.  Medications and diagnosis determined to be related to terminal prognosis. See physician's CTI for information on eligibility determination.      Justification for care:  Patient meets criteria for acute in-patient care  with required nursing assessment and interventions for symptoms with IV medications.    Stacey Banuelos DNP, EMMIE  [facility]  Hospice and Palliative Care Nurse Practitioner  05/12/24  16:38 EDT

## 2024-05-12 NOTE — PROGRESS NOTES
Continued Stay Note  [facility]     Patient Name: Ruby Pettit  MRN: [id number]  Today's Date: 5/12/2024    Admit Date: 5/12/2024    Plan: Inpatient Hospice   Discharge Plan       Row Name 05/12/24 8438       Plan    Plan Inpatient Hospice    Plan Comments Hospice RN to bedside. Son and hospice nurse met in private consultation room. Discussion of inpatient hospice services and GOC. Son is wishing to shift focus to full comfort. Patient is a DNR/DNI. She is admitted to inpatient hospice services this day with approval of Stacey OLEA/Dr. Chio Holley for skilled nursing assessment. medication titration, and injectable medication administration. Dr.John Fatima at [facility] made aware of discharge/readmit to hospice. Care coordinated with [facility] staff. POlease call ext. [phone] with any needs.     Final Discharge Disposition Code 51 - hospice medical facility                   Discharge Codes    No documentation.                       Mervat Richter RN

## 2024-05-12 NOTE — PLAN OF CARE
Goal Outcome Evaluation:     Patient requiring more oxygen overnight, currently on 6l NC APRN made aware   40 of lasix and CXR completed

## 2024-05-12 NOTE — DISCHARGE PLACEMENT REQUEST
"Desean Pettit (79 y.o. Female)       Date of Birth   1945    Social Security Number       Address   [address] [place] [id number]    Home Phone   [phone]    MRN   [id number]       Samaritan   Jew    Marital Status                               Admission Date   5/8/24    Admission Type   Urgent    Admitting Provider   David Lyons MD    Attending Provider   Dixon Fatima MD    Department, Room/Bed   [facility] 2A ICU, N216/1       Discharge Date       Discharge Disposition       Discharge Destination                                 Attending Provider: Dixon Fatima MD    Allergies: Augmentin [Amoxicillin-pot Clavulanate], Benadryl [Diphenhydramine], Codeine, Metformin, Rosuvastatin    Isolation: None   Infection: None   Code Status: No CPR    Ht: 149 cm (58.66\")   Wt: 71.8 kg (158 lb 4.6 oz)    Admission Cmt: None   Principal Problem: Sepsis due to gram-negative UTI [A41.50,N39.0]                   Active Insurance as of 5/8/2024       Primary Coverage       Payor Plan Insurance Group Employer/Plan Group    HUMANA MEDICARE REPLACEMENT HUMANA MED ADV HMO [id number]       Payor Plan Address Payor Plan Phone Number Payor Plan Fax Number Effective Dates    [address] [address] [phone]  7/1/2023 - None Entered    Formerly [facility] [address]         Subscriber Name Subscriber Birth Date Member ID       DESEAN PETTIT 1945 [id number]                     Emergency Contacts        (Rel.) Home Phone Work Phone Mobile Phone    ANGELUCCI,GEIOVANNI (Son) [phone] -- [phone]    FELICIA PEÑA (Sister) [phone] -- [phone]    rama rodriguez (Friend) [phone] -- --              Emergency Contact Information       Name Relation Home Work Mobile    ANGELUCCI,GEIOVANNI Son [phone]    FELICIA PEÑA Sister [phone]    sharp,rama Friend [phone]            Insurance Information            "       HUMANA MEDICARE REPLACEMENT/HUMANA MED ADV HMO Phone: [phone]    Subscriber: Ruby Pettit Subscriber#: [id number]    Group#: [id number] Precert#: [id number]             History & Physical        Mari Espinal MD at 24 1643              [facility] Medicine Services  HISTORY AND PHYSICAL    Patient Name: Ruby Pettit  : 1945  MRN: [id number]  Primary Care Physician: Ly Funez MD  Date of admission: 2024    Subjective  Subjective     Chief Complaint:  Diarrhea, Nausea, vomiting, hypotension    HPI:  Ruby Pettit is a 79 y.o. female PMH T2DM, HTN, asthma, lung cancer with mets to brain presenting with diarrhea (up to 10xdaily), nausea, vomiting, anorexia that started 3 days ago. Pt is presently receiving cyberknife for the brain mets and chemo. Pt had antibioics for pneumonia the first week of April but has not had any since. She has also been eating fresh strawberries, watermelon and other raw fruits and vegetables. Pt presenting for fluids and was found hypotensive. Dr Mike wanted patient admitted to the hospital for further eval.     Review of Systems   Constitutional:  Positive for activity change, appetite change, chills and fatigue.   HENT: Negative.     Eyes: Negative.    Respiratory: Negative.     Cardiovascular: Negative.    Gastrointestinal:  Positive for abdominal pain, diarrhea, nausea and vomiting.   Endocrine: Negative.    Genitourinary: Negative.    Musculoskeletal: Negative.    Skin: Negative.    Allergic/Immunologic: Negative.    Neurological: Negative.    Hematological: Negative.    Psychiatric/Behavioral: Negative.                  Personal History     Past Medical History:   Diagnosis Date    Anxiety and depression     Arthritis     Carotid stenosis, bilateral     Carotid duplex, 2019: Bilateral ICA stenosis 0-49%. Tortuous vessels. Vertebral flow antegrade.        Disease of thyroid gland     GERD (gastroesophageal reflux  disease)     Head injury due to trauma 1992    fell down stairs     History of transfusion     NO REACTIONS    Hyperlipidemia     Hypertension     Iatrogenic pneumothorax     Liver disorder     surgery 1998 approx , BLOOD CLOTS    Lung cancer     Metastatic cancer     Nausea & vomiting 5/8/2024    Perennial rhinitis     Peripheral neuropathic pain     Syncope and collapse     Thyroid disease     UTI (urinary tract infection), bacterial     Wears eyeglasses          Oncology Problem List:  Primary lung cancer with metastasis from lung to other site (05/08/ 2024; Status: Active)  Metastatic cancer to brain (04/18/2024; Status: Active)  Malignant neoplasm of lower lobe of left lung (07/13/2023; Status:   Active)    Oncology/Hematology History   Malignant neoplasm of lower lobe of left lung   6/26/2023 Initial Diagnosis    Malignant neoplasm of bronchus of left lower lobe     6/29/2023 Cancer Staged    Staging form: Lung, AJCC 8th Edition  - Clinical stage from 6/29/2023: Stage IIIA (cT1c, cN2, cM0) - Signed by Hollie Mike MD on 7/20/2023 7/28/2023 - 9/8/2023 Chemotherapy    OP LUNG  Nivolumab 360mg /  PACLitaxel / CARBOplatin AUC=5     Completed 3 cycles neoadjuvantly     11/17/2023 Surgery    Surgery       Procedure:  Left lower lobectomy and mediastinoscopy with Dr. Kate at [facility] s/p neoadjuvant chemoimmunotherapy per Checkmate 816     11/17/2023 Cancer Staged    Staging form: Lung, AJCC 8th Edition  - Pathologic stage from 11/17/2023: Stage IIIA (ypT2, pN2, cM0) - Signed by Hollie Mike MD on 12/18/2023     1/15/2024 - 2/20/2024 Radiation    Radiation OncologyTreatment Course:  Ruby Pettit received 5000 cGy in 25 fractions to mediastinum via External Beam Radiation - EBRT.     5/1/2024 -  Chemotherapy    OP LUNG Nivolumab / Ipilimumab /  PEMEtrexed / CARBOplatin AUC=6     5/8/2024 -  Chemotherapy    OP SUPPORTIVE HYDRATION + ANTIEMETICS     Metastatic cancer to brain   4/18/2024 Initial  Diagnosis    Metastatic cancer to brain     5/2/2024 - 5/6/2024 Radiation    Radiation OncologyTreatment Course:  Ruby Pettit received 2700 cGy in 3 fractions to brain tumor via Stereotactic Radiation Therapy - SRT.         Past Surgical History:   Procedure Laterality Date    ABDOMINAL SURGERY      LIVER RESECTION    APPENDECTOMY      BRONCHOSCOPY N/A 02/13/2018    Procedure: BRONCHOSCOPY WITH SANDRITA ENDOBRONCHIAL ULTRASOUND WITH FLUORO;  Surgeon: Dixon Fatima MD;  Location:  [facility];  Service:     BRONCHOSCOPY N/A 03/21/2019    Procedure: NAVIGATIONAL BRONCHOSCOPY;  Surgeon: Dixon Fatima MD;  Location:  [facility];  Service: Pulmonary    BRONCHOSCOPY WITH ION ROBOTIC ASSIST N/A 06/29/2023    Procedure: NAVIGATIONAL BRONCHOSCOPY WITH ION ROBOT;  Surgeon: Dixon Fatima MD;  Location:  [facility];  Service: Robotics - Pulmonary;  Laterality: N/A;  Ion cath 3 - 0010,  3 - 0013.    CHOLECYSTECTOMY      COLONOSCOPY  2019    HYSTERECTOMY      2001    LIVER RESECTION      LUNG LOBECTOMY  11/17/2023    OOPHORECTOMY Bilateral     2001    ORIF WRIST FRACTURE Right     THYROID SURGERY      TUBAL ABDOMINAL LIGATION         Family History:  family history includes COPD in her father; Cancer in her mother; Emphysema in her father; No Known Problems in her paternal grandfather, paternal grandmother, and sister; Ovarian cancer (age of onset: 19) in her mother; Tuberculosis in her maternal grandfather and maternal grandmother.     Social History:  reports that she has never smoked. She has never used smokeless tobacco. She reports that she does not currently use alcohol. She reports that she does not use drugs.  Social History     Social History Narrative    Nonsmoker    Has been exposed to secondhand smoke        Has 3 children    No regular alcohol use    Caffeine: 1 cup coffee daily       Medications:  ALPRAZolam, Biotin, HYDROcodone-acetaminophen, OLANZapine,  acetaminophen, amLODIPine, aspirin, benzonatate, bisoprolol, cetirizine, citalopram, dexAMETHasone, diphenoxylate-atropine, folic acid, gabapentin, hydrOXYzine, hydroCHLOROthiazide, hyoscyamine, levETIRAcetam, levothyroxine, losartan, lovastatin, naloxone, omeprazole, ondansetron, and vitamin B-12    Allergies   Allergen Reactions    Augmentin [Amoxicillin-Pot Clavulanate] Diarrhea     Has tolerated Ceftriaxone, Cefdinir, Cefuroxime.    Benadryl [Diphenhydramine] Other (See Comments)     High Dose Caused uncontrollable shaking in legs    Codeine Nausea Only    Metformin Diarrhea    Rosuvastatin GI Intolerance       Objective  Objective     Vital Signs:   Temp:  [97.5 °F (36.4 °C)-97.8 °F (36.6 °C)] 97.8 °F (36.6 °C)  Heart Rate:  [] 79  Resp:  [18] 18  BP: ()/(31-96) 109/96  Flow (L/min):  [2] 2    Physical Exam  Constitutional:       Appearance: She is ill-appearing.   HENT:      Head: Normocephalic and atraumatic.      Mouth/Throat:      Mouth: Mucous membranes are dry.   Eyes:      General: No scleral icterus.     Conjunctiva/sclera: Conjunctivae normal.   Cardiovascular:      Rate and Rhythm: Normal rate and regular rhythm.      Heart sounds: No murmur heard.     No friction rub. No gallop.   Pulmonary:      Effort: Pulmonary effort is normal.      Breath sounds: Normal breath sounds.   Abdominal:      General: Bowel sounds are normal. There is no distension.      Tenderness: There is abdominal tenderness.   Musculoskeletal:      Cervical back: Neck supple.      Right lower leg: No edema.      Left lower leg: No edema.   Skin:     General: Skin is dry.   Neurological:      General: No focal deficit present.      Mental Status: She is alert.   Psychiatric:         Mood and Affect: Mood normal.         Behavior: Behavior normal.            Result Review:  I have personally reviewed the results from the time of this admission to 5/8/2024 17:57 EDT and agree with these findings:  [x]  Laboratory list /  accordion  []  Microbiology  [x]  Radiology  []  EKG/Telemetry   [x]  Cardiology/Vascular   []  Pathology  [x]  Old records  []  Other:      LAB RESULTS:      Lab 05/08/24  1553   WBC 0.59*   HEMOGLOBIN 10.8*   HEMATOCRIT 33.2*   PLATELETS 47*   NEUTROS ABS 0.40*   IMMATURE GRANS (ABS) 0.02   LYMPHS ABS 0.12*   MONOS ABS 0.04*   EOS ABS 0.00   MCV 92.0   SED RATE 4   CRP 19.87*   PROCALCITONIN 80.55*   LACTATE 5.8*         Lab 05/08/24  1553   SODIUM 137   POTASSIUM 4.9   CHLORIDE 104   CO2 18.0*   ANION GAP 15.0   BUN 51*   CREATININE 1.96*   EGFR 25.6*   GLUCOSE 318*   CALCIUM 7.4*         Lab 05/08/24  1553   TOTAL PROTEIN 4.3*   ALBUMIN 2.5*   GLOBULIN 1.8   ALT (SGPT) 97*   AST (SGOT) 23   BILIRUBIN 0.3   ALK PHOS 85   LIPASE 26         Lab 05/08/24  1553   PROBNP 4,680.0*                 Brief Urine Lab Results  (Last result in the past 365 days)        Color   Clarity   Blood   Leuk Est   Nitrite   Protein   CREAT   Urine HCG        04/08/24 2051 Yellow   Clear   Negative   Negative   Negative   Trace                 Microbiology Results (last 10 days)       ** No results found for the last 240 hours. **            XR Chest 1 View    Result Date: 5/8/2024  XR CHEST 1 VW Date of Exam: 5/8/2024 5:10 PM EDT Indication: cough Comparison: 4/8/2024 Findings: Left basilar opacity may represent atelectasis, scarring, or infiltrate.. No pleural effusion or pneumothorax is identified. Cardiomediastinal silhouette is enlarged. Pulmonary vasculature is unremarkable. No acute or suspicious osseous lesion is identified.     Impression: Impression: Cardiomegaly with left basilar atelectasis, scarring, or infiltrate. Electronically Signed: Benitez Mayo MD  5/8/2024 5:34 PM EDT  Workstation ID: [id number]     Results for orders placed during the hospital encounter of 07/08/19    Adult Transthoracic Echo Complete W/ Cont if Necessary Per Protocol    Interpretation Summary  · Estimated EF = 65%. Near cavitary obliteration at  rest with contractility  · Left ventricular systolic function is normal.  · Left ventricular diastolic dysfunction (grade I) consistent with impaired relaxation.  · Trace mitral regurgitation  · Trace to mild tricuspid regurgitation      Assessment & Plan  Assessment & Plan       Diarrhea    Anorexia    Sepsis    Hypotension    Nausea & vomiting    Type 2 diabetes mellitus without complication, without long-term current use of insulin    Mild intermittent asthma    Primary lung cancer with metastasis from lung to other site    Neuropathy    Hospital Course to date:  Ruby Pettit is a 79 y.o. female PMH T2DM, HTN, asthma, lung cancer with mets to brain and liver presenting with diarrhea (up to 10xdaily), nausea, vomiting, anorexia that started 3 days ago.     Sepsis  Diarrhea, Nausea, vomiting, anorexia  Neutroopenia (WBC 0.59)  Lactic acidosis  Hypotension secondary to dehydration  -stool PCR. C-diff  -zofran PRN nausea  -IV resuscitation, holding all blood pressure medication  -Lactic acid is 5.8 with Pro-Amaury 80.55, sed rate 4 CRP 19.87  -Vanco and Zosyn=>Cefepime    KANDY likely prerenal due to dehydration  -Cr  1.96  baseline 0.78-0.80  -No nephrotoxic medications.  Patient receiving fluid resuscitation    Thrombocytopenia  -Platelets 47 continue to monitor    Lung Cancer with metastatic disease to the brain   -Follows with   -Patient receiving carboplatin with alimta along with ipilimumab and nivolumab.  Is also receiving CyberKnife therapy.  -Will consult oncology    HTN, presently hypotensive  -holding HTN meds    Elevated BNP  - ECHO  -Last ECHO 7/2019 LVEF 65% with diastolic dysfunction grade 1     T2DM  -no home meds  -SSI  -A1C    Asthma  -no home meds    Neuropathy  -gabapentin    Anemia    DVT prophylaxis:  compression device    CODE STATUS:    Medical Intervention Limits: NO intubation (DNI)  Code Status (Patient has no pulse and is not breathing): No CPR (Do Not Attempt to Resuscitate)  Medical  Interventions (Patient has pulse or is breathing): Limited Support      Expected Discharge 05/10/2024        Signature: Electronically signed by EMMIE Arboleda, 05/08/24, 4:43 PM EDT.    Patient seen and examined.  Chart reviewed  On exam She is slightly hypotensive, shivering,  Greay  Heart tachycardic  Lungs shallow  Abd soft diffusely tender  Extermities cool    Labs reviewed, hgb dropped, ct reviewed    Enteritis  -must cover for infectious etiolgies  -- monitor HnH for possibly bleed, transfuse as needed  -start PPI  Consider steroids if this is immune mediated    Metastatic lung cancer  -per Dr Resendez    Pancytopenia  -one dose of neupogen    Hypotension, Hypovolemia and KANDY with lactic acidosis  -IVFs    Mari Espinal MD 05/09/24 00:07 EDT                Electronically signed by Mari Espinal MD at 05/09/24 6636

## 2024-05-12 NOTE — PROGRESS NOTES
Intensive Care Follow-up      LOS: 3 days     Ms. Ruby Pettit, 79 y.o. female is followed for: Sepsis due to gram-negative UTI     Subjective - Interval History     79 y.o. lifetime non-smoker with history of HTN, HLD, T2DM, reported asthma, previously followed by Dr. Fatima for migratory pulmonary infiltrates and status post nondiagnostic bronchoscopies and 2018 and 2019, the latter of which complicated by iatrogenic pneumothorax, who was ultimately diagnosed with adenocarcinoma after repeat bronchoscopy in June 2023.  Treated with neoadjuvant chemo/immunotherapy with subsequent left lower lobectomy November 2023.  Surgical Stage IIIA. She was found to have solitary brain metastasis undergoing CyberKnife.  Follows with Dr. Mike with plan for carboplatin and Alimta along with ipilimumab and nivolumab, which was started on 5/1/2024.  Patient presented for admission to the hospital this on 5/8/2024 secondary to developing diarrhea up to 10 times daily along with nausea, vomiting, and anorexia which started on 5/5/2024.     Patient transferred to the intensive care unit on the morning of 5/9/2024 secondary to hypotension for initiation of pressors.  She was started on norepinephrine.     Interval History:     No real complaints overnight although has required more supplemental oxygen  Now up to 4 L/min, previously on room air  No cough or hemoptysis  Given a dose of Lasix    The patient's relevant past medical, surgical and social history were reviewed and updated in Epic as appropriate.     Objective     Infusions:  norepinephrine, 0.02-0.3 mcg/kg/min (Order-Specific), Last Rate: Stopped (05/09/24 1217)      Medications:  amLODIPine, 5 mg, Oral, Q24H  aspirin, 81 mg, Oral, Daily  atorvastatin, 10 mg, Oral, Daily  bisoprolol, 5 mg, Oral, Daily  cefepime, 2,000 mg, Intravenous, Q24H  citalopram, 20 mg, Oral, Daily  filgrastim, 300 mcg, Subcutaneous, Q PM  gabapentin, 100 mg, Oral, Q12H  insulin lispro, 2-7  Units, Subcutaneous, 4x Daily AC & at Bedtime  levETIRAcetam, 500 mg, Oral, Q12H  levothyroxine, 50 mcg, Oral, Daily  pantoprazole, 40 mg, Intravenous, Q AM  sodium chloride, 10 mL, Intravenous, Q12H      Intake/Output         05/11/24 0700 - 05/12/24 0659 05/12/24 0700 - 05/13/24 0659    Intake (ml) 154 0    Output (ml) 500 --    Net (ml) -346 0          Vital Sign Min/Max for last 24 hours  Temp  Min: 96.8 °F (36 °C)  Max: 97.8 °F (36.6 °C)   BP  Min: 126/69  Max: 171/83   Pulse  Min: 61  Max: 82   Resp  Min: 16  Max: 23   SpO2  Min: 89 %  Max: 99 %   Flow (L/min)  Min: 6  Max: 6        Physical Exam:   GENERAL: Awake, no overt distress   HEENT: No adenopathy or thyromegaly   LUNGS: Decreased breath sounds bilaterally, left greater than right, no wheezes   HEART: Regular tachycardia, no murmurs   GI: Soft, nontender   EXTREMITIES: Trace lower extremity edema without clubbing or cyanosis   NEURO/PSYCH: Awake, alert    Results from last 7 days   Lab Units 05/12/24  0720 05/10/24  1206 05/10/24  0549 05/10/24  0029 05/09/24  1246 05/09/24  0450   WBC 10*3/mm3 1.13*  --  0.31*  --   --  0.33*   HEMOGLOBIN g/dL 9.3*  --  7.6*  7.6* 7.2*   < > 7.6*   PLATELETS 10*3/mm3 4* 34* 12*  --   --  18*    < > = values in this interval not displayed.     Results from last 7 days   Lab Units 05/12/24  0720 05/11/24  1150 05/11/24  0238 05/10/24  1752 05/10/24  0549 05/09/24  1553   SODIUM mmol/L 144  --  143  --  141  --    POTASSIUM mmol/L 4.1 3.5 3.3*   < > 3.2*  --    CO2 mmol/L 15.0*  --  11.0*  --  13.0*  --    BUN mg/dL 53*  --  49*  --  47*  --    CREATININE mg/dL 1.96*  --  1.97*  --  1.96*  --    MAGNESIUM mg/dL 2.8*  --  3.1*  --  1.5*  --    PHOSPHORUS mg/dL  --   --  3.5  --  4.8* 4.1   GLUCOSE mg/dL 131*  --  129*  --  183*  --     < > = values in this interval not displayed.     Estimated Creatinine Clearance: 21.2 mL/min (A) (by C-G formula based on SCr of 1.96 mg/dL (H)).          Results from last 7 days   Lab  Units 05/09/24  0337   PH, ARTERIAL pH units 7.371   PCO2, ARTERIAL mm Hg 28.7*   PO2 ART mm Hg 58.7*     Lab Results   Component Value Date    LACTATE 1.0 05/10/2024          Images: Chest x-ray is rotated and continues to show bilateral interstitial infiltrates.  She appears to have tracheomegaly.  Radiologist concerned about possible pneumomediastinum    I reviewed the patient's results and images.     Impression      Active Hospital Problems    Diagnosis     **Sepsis due to gram-negative UTI     Shock -septic versus hypovolemic     Pneumonia of both lower lobes due to infectious organism     Neutropenia     Diarrhea     Hypotension     Mild intermittent asthma     Nausea & vomiting     Neuropathy     Anorexia     Sepsis     Acute kidney injury     Malignant neoplasm of LLL (S/P LLL Lobectomy) Surgical stage IIIA     Type 2 diabetes mellitus without complication, without long-term current use of insulin             Plan        We will get a CT scan of the chest although no real reason for her to have a pneumomediastinum  Could be having spontaneous pulmonary hemorrhage related to extremely low platelet count although has had no hemoptysis  Could be drug reaction related to her ipilimumab or nivolumab    Follow-up renal function with diuresis  Follow-up repeat CT scan of the chest  Continue broad-spectrum empiric antibiotics  Diuresis as able  Platelet transfusion due to platelet count less than 10,000.  Currently no evidence of bleeding  Prognosis guarded     Plan of care and goals reviewed with Multidisciplinary Team and Antibiotic Stewardship rounds   I discussed the patient's findings and my recommendations with patient and nursing staff      Critical Care time spent in direct patient care: 35 minutes (excluding procedure time, if applicable) including high complexity decision making to assess, manipulate, and support vital organ system failure in this individual who has impairment of one or more vital organ  systems such that there is a high probability of imminent or life threatening deterioration in the patient's condition.      KALEIGH Fatima MD  Pulmonary and Critical Care Medicine

## 2024-05-12 NOTE — SIGNIFICANT NOTE
05/12/24 1124   SLP Deferred Reason   SLP Deferred Reason Routine  (Attempted to see for dysphagia eval- pt u/a to fully alert for PO. Likely not appropriate for PO diet d/t lethargy. Discussed w/ RN. Will f/u tomorrow.)

## 2024-05-12 NOTE — PLAN OF CARE
Goal Outcome Evaluation:           Progress: declining  Outcome Evaluation: Pt more became more restless and oxygen demands increased at beginning of shift. After discussion with Dr. Resendez and ICU APRN, pt's son decided he wanted a Hospice consult. Pt now comfort measures and admitted to hospice.

## 2024-05-12 NOTE — SIGNIFICANT NOTE
Called to the bedside by RN to discuss goals of care with son (POA). Patient has become increasingly lethargic throughout the shift and appears to be in pain, noted by the son and nursing staff. The son asked for a Hospice Consult and we discussed making the patient comfort measures only until Hospice could meet with them. He wished to change her code status so we could give her medications to make her comfortable. All questions answered and code status and orders updated.     Kristine Plummer, MSN, APRN, ACNPC-AG  Pulmonary and Critical Care Medicine  Electronically signed by EMMIE Ruiz, 05/12/24, 1:19 PM EDT.

## 2024-05-12 NOTE — PROGRESS NOTES
"HEMATOLOGY/ONCOLOGY PROGRESS NOTE    S: Profoundly weak and feeling frail.  No fevers overnight.  Having more shortness of breath overnight.  Had pneumomediastinum.        Past medical history, social history and family history was reviewed and unchanged from prior visit.    Review of Systems:    Review of Systems   Constitutional:  Positive for activity change and fatigue.   HENT: Negative.     Eyes: Negative.    Respiratory:  Positive for shortness of breath.    Cardiovascular: Negative.    Musculoskeletal: Negative.    Neurological:  Positive for weakness.   Hematological: Negative.    Psychiatric/Behavioral:  The patient is nervous/anxious.             Medications:  The current medication list was reviewed in the EMR    ALLERGIES:    Allergies   Allergen Reactions    Augmentin [Amoxicillin-Pot Clavulanate] Diarrhea     Has tolerated Ceftriaxone, Cefdinir, Cefuroxime.    Benadryl [Diphenhydramine] Other (See Comments)     High Dose Caused uncontrollable shaking in legs    Codeine Nausea Only    Metformin Diarrhea    Rosuvastatin GI Intolerance         Physical Exam    VITAL SIGNS:  /71   Pulse 73   Temp 98.2 °F (36.8 °C) (Axillary)   Resp 18   Ht 149 cm (58.66\")   Wt 71.8 kg (158 lb 4.6 oz)   LMP  (LMP Unknown)   SpO2 91%   BMI 32.34 kg/m²   Temp:  [96.8 °F (36 °C)-98.2 °F (36.8 °C)] 98.2 °F (36.8 °C)      Performance Status:4                Physical Exam    General: Frail ill appearing, requiring more oxygen  HEENT: sclerae anicteric, neck is supple  Lymphatics: no cervical, supraclavicular, or axillary adenopathy  Cardiovascular: regular rate and rhythm, no murmurs, rubs or gallops  Lungs: clear to auscultation bilaterally  Abdomen: soft, nontender, nondistended.  No palpable organomegaly  Extremities: no lower extremity edema  Skin: no rashes, lesions, bruising, or petechiae  Msk: Profoundly weak  Psych: Mood is stable        RECENT LABS:    Lab Results   Component Value Date    HGB 9.3 (L) " 05/12/2024    HCT 28.7 (L) 05/12/2024    MCV 92.6 05/12/2024    PLT 4 (C) 05/12/2024    WBC 1.13 (C) 05/12/2024    NEUTROABS 0.85 (L) 05/12/2024    LYMPHSABS 0.10 (L) 05/09/2024    MONOSABS 0.05 (L) 05/09/2024    EOSABS 0.00 05/12/2024    BASOSABS 0.00 05/12/2024       Lab Results   Component Value Date    GLUCOSE 131 (H) 05/12/2024    BUN 53 (H) 05/12/2024    CREATININE 1.96 (H) 05/12/2024     05/12/2024    K 4.1 05/12/2024     (H) 05/12/2024    CO2 15.0 (L) 05/12/2024    CALCIUM 8.2 (L) 05/12/2024    PROTEINTOT 4.9 (L) 05/12/2024    ALBUMIN 2.5 (L) 05/12/2024    BILITOT 0.5 05/12/2024    ALKPHOS 83 05/12/2024    AST 15 05/12/2024    ALT 43 (H) 05/12/2024     Lab Results   Component Value Date    PLT 4 (C) 05/12/2024    PLT 34 (C) 05/10/2024    PLT 12 (C) 05/10/2024    PLT 18 (C) 05/09/2024    PLT 25 (C) 05/09/2024     Lab Results   Component Value Date    NEUTROABS 0.85 (L) 05/12/2024    NEUTROABS 0.15 (L) 05/09/2024    NEUTROABS 0.22 (L) 05/09/2024    NEUTROABS 0.40 (L) 05/08/2024    NEUTROABS 5.68 04/30/2024         Assessment/Plan    1.  Metastatic lung cancer.  Significant thrombocytopenia today requiring transfusion support.  Likely due to chemotherapy effects.  Neutrophil starting to improve with growth factor support.  Unfortunately she is not improving clinically.  She is not a candidate for any further chemotherapy.  She was significantly primary refractory at the time of neoadjuvant therapy.  I spoke to her son today and informed him that she is not going to be able to undergo any further treatments.  For now supportive care to see if she improves.  Once she improves we can get her home.  She will likely need hospice care at some point.      2.  Septic shock secondary to neutropenia.  She is actually improved from this standpoint.    3.  Pneumomediastinum on chest x-ray.  Undergoing CT scan today.  Significant worsening of her oxygen requirement.  Unclear the cause.  She did was not  instrumentised recently and so trauma should not be a cause.    4.  Poor access.  Having a PICC line placed today.    5.  Having incontinence issues and the decubiti.  May require a [employer] for comfort and to prevent further breakdown.          Hollie Mike MD  [facility] On[facility]    5/12/2024

## 2024-05-12 NOTE — CASE MANAGEMENT/SOCIAL WORK
Case Management Discharge Note      Final Note: Patient is being admitted into Inpatient Hospice         Selected Continued Care - Admitted Since 5/8/2024       Destination Coordination complete.      Service Provider Selected Services Address Phone Fax Patient Preferred    [facility] [address] [address] [phone] --              Durable Medical Equipment    No services have been selected for the patient.                Dialysis/Infusion    No services have been selected for the patient.                Home Medical Care    No services have been selected for the patient.                Therapy    No services have been selected for the patient.                Community Resources    No services have been selected for the patient.                Community & DME    No services have been selected for the patient.                         Final Discharge Disposition Code: 51 - hospice medical facility

## 2024-05-13 PROBLEM — C34.92: Status: ACTIVE | Noted: 2024-05-08

## 2024-05-13 NOTE — PROGRESS NOTES
Hospice Progress Note    Patient Name: Ruby Pettit   : 1945  Gender: female    Code Status: comfort measures    Date of Admission: 2024    Subjective:  Ruby Pettit is a 79 y.o. female admitted to inpatient hospice 2024 with diagnosis of End of life care [Z51.5]  for symptom management.     Pt's son and niece Muriel at bedside this afternoon.  Pt appears comfortable, required multiple PRN's overnight to manage symptoms.  Son requesting that visitors be restricted to immediate family members only at this time per pts previously verbalized request to him.     - Scheduled:  Protonix 40 mg daily     - PRNs:  Morphine 2 mg x 4   Morphine 4 mg x 1   Versed 2 mg x 2   Zofran 4 mg x 1   Robinul 0.4 mg x 3     - Intake/Output  Intake & Output (last 3 days)         05/10 07 07 0701   07 07 07 07 0700    Urine   1450     Stool   0     Total Output   1450     Net   -1450             Urine Unmeasured Occurrence   1 x     Stool Unmeasured Occurrence   1 x                ROS:  Review of Systems   Unable to perform ROS: Acuity of condition       Reviewed current scheduled and prn medications for route, type, dose and frequency.       acetaminophen    bisacodyl    First Mouthwash (Magic Mouthwash)    glycopyrrolate    midazolam    Morphine    ondansetron    sodium chloride    Objective:   /55   Pulse 69   Temp 98.7 °F (37.1 °C) (Oral)   Resp 16   LMP  (LMP Unknown)   SpO2 (!) 65%      PPS: Palliative Performance Scale score as of 2024, 14:30 EDT is 10% based on the following measures:   Ambulation: Totally bed bound  Activity and Evidence of Disease: Unable to do any work, extensive evidence of disease  Self-Care: Total care  Intake:  Mouth care only  LOC: Drowsy or coma     Physical Exam:  Physical Exam  Vitals and nursing note reviewed. Exam conducted with a chaperone present.   Constitutional:       General: She is not in acute  distress.     Appearance: She is ill-appearing.   HENT:      Mouth/Throat:      Mouth: Mucous membranes are moist.      Pharynx: Oropharyngeal exudate present.   Cardiovascular:      Rate and Rhythm: Normal rate.   Pulmonary:      Effort: Pulmonary effort is normal.      Breath sounds: Rales present.   Abdominal:      General: Bowel sounds are normal. There is no distension.   Musculoskeletal:         General: No swelling.   Skin:     General: Skin is warm.   Neurological:      Mental Status: She is unresponsive.             Lung cancer, primary, with metastasis from lung to other site, left      Assessment/Plan:   Ruby Pettit is a 79 y.o. female admitted to inpatient hospice 05/12/2024 with diagnosis of End of life care [Z51.5]  for symptom management.     Symptoms:  Pain   Dyspnea   Terminal secretions     Discharge Disposition: EOLC     -Add morphine 2 mg q 6 hrs scheduled.   -Continue morphine 2 mg q 2 hrs PRN for BT pain or dyspnea.  -Add versed 1 mg q 6 hrs scheduled.   -Continue versed 2 mg q 4 hrs PRN for anxiety/terminal restlessness.   -Add scheduled palliative oral rinse      Total Visit Time: 20 min   Face to Face Time: 10 min     Justification for care:  Patient meets criteria for acute in-patient care due to need for frequent skilled nursing assessments to determine patient comfort and medication effectiveness at end of life.  Frequent adjustments to medications and interventions for symptom management, including injectable medications.      Margo Potts, MSN, APRN  [facility]  Hospice and Palliative Care Nurse Practitioner  05/13/24  14:30 EDT

## 2024-05-13 NOTE — PROGRESS NOTES
Continued Stay Note  [facility]     Patient Name: Ruby Pettit  MRN: [id number]  Today's Date: 5/13/2024    Admit Date: 5/12/2024    Plan: Inpatient hospice   Discharge Plan       Row Name 05/13/24 1017       Plan    Plan Inpatient hospice    Plan Comments 10% PPS. Patient resting peacefully with eyes closed. Face, jaw, and body relaxed and breathing even and unlabored. Patient is unresponsive to voice and touch during assessment. She has required the following prns in the past 24 hours: Robinul 0.4mg x3, Versed 2mg x2, Morphine 2mg x4, Morphine 4mg x1, Zofran 4mg x1. Called son, Artie with an update. She continues to require inpatient hospice for skilled nursing assessment, medication titration, and injectable medication administration for palliation of pain and anxiety.                    Discharge Codes    No documentation.                       Katya Warner RN

## 2024-05-13 NOTE — DISCHARGE SUMMARY
DISCHARGE SUMMARY     Admit date: 5/8/2024  Date of Discharge:  5/12/2024    Discharge Diagnoses  Active Hospital Problems    Diagnosis     **Sepsis due to gram-negative UTI     Shock -septic versus hypovolemic     Pneumonia of both lower lobes due to infectious organism     Neutropenia     Diarrhea     Hypotension     Mild intermittent asthma     Nausea & vomiting     Neuropathy     Anorexia     Sepsis     Acute kidney injury     Malignant neoplasm of LLL (S/P LLL Lobectomy) Surgical stage IIIA     Type 2 diabetes mellitus without complication, without long-term current use of insulin        Past Surgical History:   Procedure Laterality Date    ABDOMINAL SURGERY      LIVER RESECTION    APPENDECTOMY      BRONCHOSCOPY N/A 02/13/2018    Procedure: BRONCHOSCOPY WITH SANDRITA ENDOBRONCHIAL ULTRASOUND WITH FLUORO;  Surgeon: Dixon Fatima MD;  Location:  [facility];  Service:     BRONCHOSCOPY N/A 03/21/2019    Procedure: NAVIGATIONAL BRONCHOSCOPY;  Surgeon: Dixon Fatima MD;  Location:  [facility];  Service: Pulmonary    BRONCHOSCOPY WITH ION ROBOTIC ASSIST N/A 06/29/2023    Procedure: NAVIGATIONAL BRONCHOSCOPY WITH ION ROBOT;  Surgeon: Dixon Fatima MD;  Location:  [facility];  Service: Robotics - Pulmonary;  Laterality: N/A;  Ion cath 3 - 0010,  3 - 0013.    CHOLECYSTECTOMY      COLONOSCOPY  2019    HYSTERECTOMY      2001    LIVER RESECTION      LUNG LOBECTOMY  11/17/2023    OOPHORECTOMY Bilateral     2001    ORIF WRIST FRACTURE Right     THYROID SURGERY      TUBAL ABDOMINAL LIGATION         History of Present Illness    Patient is a 79 y.o. female presented with: Sepsis due to gram-negative UTI    79 y.o. lifetime non-smoker with history of HTN, HLD, T2DM, reported asthma, previously followed by Dr. Fatima for migratory pulmonary infiltrates and status post nondiagnostic bronchoscopies and 2018 and 2019, the latter of which complicated by iatrogenic pneumothorax, who was  ultimately diagnosed with adenocarcinoma after repeat bronchoscopy in June 2023.  Treated with neoadjuvant chemo/immunotherapy with subsequent left lower lobectomy November 2023.  Surgical Stage IIIA. She was found to have solitary brain metastasis undergoing [employer].  Follows with Dr. Mike with plan for carboplatin and Alimta along with ipilimumab and nivolumab, which was started on 5/1/2024.  Patient presented for admission to the hospital this on 5/8/2024 secondary to developing diarrhea up to 10 times daily along with nausea, vomiting, and anorexia which started on 5/5/2024.     Patient transferred to the intensive care unit on the morning of 5/9/2024 secondary to hypotension for initiation of pressors.  She was started on norepinephrine.    Hospital Course    The patient was treated for sepsis.  She was relatively stable and was weaned from vasopressor support.  However, developed worsening hypoxic respiratory failure, pulmonary infiltrates, and was uncomfortable.    Patient and family chose to pursue a palliative course of care and hospice was consulted.    At this time the patient is transferred to the hospice care service for palliation of symptoms    Consults: Medical oncology, hospice    Pertinent Test Results:   Results from last 7 days   Lab Units 05/12/24  0720   WBC 10*3/mm3 1.13*   HEMOGLOBIN g/dL 9.3*   HEMATOCRIT % 28.7*   PLATELETS 10*3/mm3 4*     Results from last 7 days   Lab Units 05/12/24  0720 05/11/24  1150 05/11/24  0238 05/10/24  1752 05/10/24  0549 05/09/24  1553   SODIUM mmol/L 144  --  143  --  141  --    POTASSIUM mmol/L 4.1 3.5 3.3*   < > 3.2*  --    CHLORIDE mmol/L 114*  --  115*  --  112*  --    CO2 mmol/L 15.0*  --  11.0*  --  13.0*  --    BUN mg/dL 53*  --  49*  --  47*  --    CREATININE mg/dL 1.96*  --  1.97*  --  1.96*  --    EGFR mL/min/1.73 25.6*  --  25.5*  --  25.6*  --    GLUCOSE mg/dL 131*  --  129*  --  183*  --    CALCIUM mg/dL 8.2*  --  7.1*  --  6.8*  --     PHOSPHORUS mg/dL  --   --  3.5  --  4.8* 4.1    < > = values in this interval not displayed.           Condition on Discharge: Stable but with poor prognosis    Discharge Disposition: Hospice/Medical Facility ([facility] [place], operated by [facility])    Discharge Medications:      Discharge Medications        ASK your doctor about these medications        Instructions Start Date   acetaminophen 500 MG tablet  Commonly known as: TYLENOL   Oral      ALPRAZolam 0.5 MG tablet  Commonly known as: Xanax   0.5 mg, Oral, Daily PRN      amLODIPine 5 MG tablet  Commonly known as: NORVASC   5 mg, Oral, Every 24 Hours Scheduled      aspirin 81 MG EC tablet   Oral, Daily      benzonatate 100 MG capsule  Commonly known as: TESSALON   1 capsule, Oral      Biotin 1000 MCG chewable tablet   Oral      bisoprolol 5 MG tablet  Commonly known as: ZEBeta   Oral, Daily      cetirizine 10 MG tablet  Commonly known as: zyrTEC   10 mg, Oral      citalopram 20 MG tablet  Commonly known as: CeleXA   Oral, Daily      dexAMETHasone 4 MG tablet  Commonly known as: DECADRON   Take 1 tablet oral twice a day for 3 consecutive days beginning the day before chemotherapy and continue for 6 doses. Take with food.      diphenoxylate-atropine 2.5-0.025 MG per tablet  Commonly known as: LOMOTIL   1 tablet, Oral, 4 Times Daily PRN      folic acid 1 MG tablet  Commonly known as: FOLVITE   1 mg, Oral, Daily, Start at least 7 days prior to chemotherapy until at least 3 weeks after all chemotherapy.      gabapentin 300 MG capsule  Commonly known as: NEURONTIN   300 mg, Oral, Every 12 Hours      hydroCHLOROthiazide 12.5 MG capsule  Commonly known as: MICROZIDE   1 capsule, Oral, Daily      HYDROcodone-acetaminophen 5-325 MG per tablet  Commonly known as: NORCO   1 tablet, Oral, Every 12 Hours PRN      hydrOXYzine 25 MG tablet  Commonly known as: ATARAX   12.5-25 mg, Oral      hyoscyamine 0.125 MG tablet  Commonly known as: ANASPAZ,LEVSIN   Oral, 4 Times Daily       levETIRAcetam 500 MG tablet  Commonly known as: KEPPRA   500 mg, Oral, Every 12 Hours Scheduled      levothyroxine 50 MCG tablet  Commonly known as: SYNTHROID, LEVOTHROID   50 mcg, Oral, Daily      losartan 50 MG tablet  Commonly known as: COZAAR   Oral      lovastatin 20 MG tablet  Commonly known as: MEVACOR   Oral      naloxone 4 MG/0.1ML nasal spray  Commonly known as: NARCAN   Call 911. Don't prime. [place] in 1 nostril for overdose. Repeat in 2-3 minutes in other nostril if no or minimal breathing/responsiveness.      OLANZapine 5 MG tablet  Commonly known as: zyPREXA   5 mg, Oral, Nightly, Take nightly x 4 starting night of chemotherapy..      omeprazole 20 MG capsule  Commonly known as: priLOSEC   20 mg, Oral, 3 Times Daily      ondansetron 8 MG tablet  Commonly known as: ZOFRAN   8 mg, Oral, Every 8 Hours PRN      vitamin B-12 1000 MCG tablet  Commonly known as: [facility] Vitamin B-12   1,000 mcg, Oral, Daily               Discharge Diet: No diet orders on file    Activity at Discharge: Ad corina.    Follow-up Appointments  Future Appointments   Date Time Provider Department Center   5/22/2024  9:00 AM Hollie Mike MD [facility]   5/22/2024  9:30 AM CHAIR 17  [facility]   6/4/2024 11:30 AM La Olivares, EMMIE [facility]ART[facility] None   8/6/2024 12:00 PM [facility]   8/8/2024 12:00 PM Jono Henderson MD [facility]         Test Results Pending at Discharge  Pending Labs       Order Current Status    Blood Culture - Blood, Hand, Right Preliminary result    Blood Culture - Blood, Hand, Right Preliminary result             Code Status and Medical Interventions:   Ordered at: 05/12/24 1307     Code Status (Patient has no pulse and is not breathing):    No CPR (Do Not Attempt to Resuscitate)     Medical Interventions (Patient has pulse or is breathing):    Comfort Measures       Dixon Fatima MD  05/13/24  08:31 EDT    Discharge Time Spent: 30 Minutes   DISPLAY PLAN FREE TEXT

## 2024-05-14 NOTE — DISCHARGE SUMMARY
Date of Admission: 05/12/2024   Date and Time of Death: 5/14/2024 at 8:05 AM    Hospital Course:  Ruby Pettit is a 79 y.o. female admitted to inpatient hospice with diagnosis of End of life care [Z51.5]  for symptom management. Medications were available throughout admission for symptom management and comfort. Patient continued to decline after admission as expected ultimately to their death on 5/14/2024 at 8:05 AM. Patient was pronounced dead by Clinical House Supervisor. Spiritual and psychosocial support were available to patient and family throughout admission. Patient's remains were released per [facility] protocol.       Margo Potts, MSN, APRN, ACHPN  [facility]  Hospice and Palliative Care Nurse Practitioner  05/14/24  10:03 EDT

## 2024-05-14 NOTE — PLAN OF CARE
Goal Outcome Evaluation:  Plan of Care Reviewed With: patient        Progress: declining  Outcome Evaluation: Transfer from ICU for hospice care. Pt appears comfortable. No PRN's needed at this time. Will continue comfort measures.

## 2024-05-14 NOTE — PROGRESS NOTES
Enter Query Response Below      Query Response: Both septic and hypovolemic shock              If applicable, please update the problem list.       Patient: Ruby Pettit        : 1945  Account: [id number]           Admit Date: 2024        How to Respond to this query:       a. Click New Note     b. Answer query within the yellow box.                c. Update the Problem List, if applicable.      If you have any questions about this query contact me at: [email or web address]     Dr. Fatima:     History and physical states bp 109/96, wbc 0.59, lactate 5.8, procal 80.55, creatinine 1.96, sepsis, hypotension secondary to dehydration, iv resuscitation, holding all blood pressure medication, iv vanc, iv zosyn.   Hospitalist note states called due to hypotension, received total of 3.5L of normal saline along with solucortef and albumin, bp 88/46 with MAP of 58. Transferred to ICU, started on norepinephrine. 5/10 Pulmonary note states shock septic versus hypovolemic, fluid resuscitation, pressors wean as tolerated. 4/10 Oncology note states septic shock secondary to neutropenia. Discharge summary states shock - septic versus hypovolemic.      Please clarify conflicting documentation as:    Septic shock  Hypovolemic shock  Both septic and hypovolemic shock  Other- specify__________  Unable to determine.    By submitting this query, we are merely seeking further clarification of documentation to accurately reflect all conditions that you are monitoring, evaluating, treating or that extend the hospitalization or utilize additional resources of care. Please utilize your independent clinical judgment when addressing the question(s) above.     This query and your response, once completed, will be entered into the legal medical record.    Sincerely,  Jenn OCASIO RN BSN   Clinical Documentation Integrity Program   [email or web address]

## 2024-05-14 NOTE — SIGNIFICANT NOTE
Exam confirms with auscultation zero audible heart tones and zero audible respirations. Ms.Norma Pettit was pronounced dead at 0805.  MD notified by Patient's RN.    Raji Bingham RN  Clinical House Supervisor  5/14/2024 09:13 EDT

## (undated) DEVICE — Device: Brand: SINGLE USE ASPIRATION NEEDLE NA-U401SX

## (undated) DEVICE — SOL LR 1000ML

## (undated) DEVICE — BOWL UTIL STRL 32OZ

## (undated) DEVICE — TRAP,MUCUS SPECIMEN,40CC: Brand: MEDLINE

## (undated) DEVICE — BRUSH CYTO BRONCOSCOPE

## (undated) DEVICE — PTCH SENSR INREACH PULM GUIDANCE

## (undated) DEVICE — SINGLE USE BIOPSY VALVE MAJ-210: Brand: SINGLE USE BIOPSY VALVE (STERILE)

## (undated) DEVICE — FRCP BX RADJAW4 PULM WO NDL STD1.8X2 100

## (undated) DEVICE — 3-WAY STOPCOCK: Brand: MAXGUARD

## (undated) DEVICE — SENSR O2 OXIMAX FNGR A/ 18IN NONSTR

## (undated) DEVICE — Device: Brand: BALLOON

## (undated) DEVICE — MEDI-VAC NON-CONDUCTIVE SUCTION TUBING: Brand: CARDINAL HEALTH

## (undated) DEVICE — SINGLE USE SUCTION VALVE MAJ-209: Brand: SINGLE USE SUCTION VALVE (STERILE)

## (undated) DEVICE — CANNULA,OXY,ADULT,SUPERSOFT,W/7'TUB,UC: Brand: MEDLINE

## (undated) DEVICE — MEDI-VAC YANKAUER SUCTION HANDLE W/BULBOUS TIP: Brand: CARDINAL HEALTH

## (undated) DEVICE — SYR SLPTP 20CC

## (undated) DEVICE — ADAPT BRONCH EDGE FOR USE W/OLYMPUS SCOPE

## (undated) DEVICE — KT BRONCH NAV EDGE 180D EXT WO/ ADAPT OLYMPUS

## (undated) DEVICE — ST EXT MICROBORE FIX M LL 38IN

## (undated) DEVICE — ADAPT SWVL FIBROPTIC BRONCH

## (undated) DEVICE — Device